# Patient Record
Sex: FEMALE | Race: WHITE | Employment: UNEMPLOYED | URBAN - METROPOLITAN AREA
[De-identification: names, ages, dates, MRNs, and addresses within clinical notes are randomized per-mention and may not be internally consistent; named-entity substitution may affect disease eponyms.]

---

## 2020-01-26 ENCOUNTER — OFFICE VISIT (OUTPATIENT)
Dept: URGENT CARE | Facility: URGENT CARE | Age: 20
End: 2020-01-26
Payer: COMMERCIAL

## 2020-01-26 ENCOUNTER — ANCILLARY PROCEDURE (OUTPATIENT)
Dept: GENERAL RADIOLOGY | Facility: CLINIC | Age: 20
End: 2020-01-26
Attending: PHYSICIAN ASSISTANT
Payer: COMMERCIAL

## 2020-01-26 VITALS
BODY MASS INDEX: 25.5 KG/M2 | DIASTOLIC BLOOD PRESSURE: 68 MMHG | OXYGEN SATURATION: 98 % | TEMPERATURE: 98 F | WEIGHT: 158 LBS | SYSTOLIC BLOOD PRESSURE: 116 MMHG | HEART RATE: 118 BPM

## 2020-01-26 DIAGNOSIS — R07.0 THROAT PAIN: ICD-10-CM

## 2020-01-26 DIAGNOSIS — J20.9 ACUTE BRONCHITIS, UNSPECIFIED ORGANISM: Primary | ICD-10-CM

## 2020-01-26 DIAGNOSIS — J20.9 ACUTE BRONCHITIS, UNSPECIFIED ORGANISM: ICD-10-CM

## 2020-01-26 LAB
DEPRECATED S PYO AG THROAT QL EIA: NORMAL
SPECIMEN SOURCE: NORMAL

## 2020-01-26 PROCEDURE — 99214 OFFICE O/P EST MOD 30 MIN: CPT | Performed by: PHYSICIAN ASSISTANT

## 2020-01-26 PROCEDURE — 71046 X-RAY EXAM CHEST 2 VIEWS: CPT

## 2020-01-26 PROCEDURE — 87081 CULTURE SCREEN ONLY: CPT | Performed by: PHYSICIAN ASSISTANT

## 2020-01-26 PROCEDURE — 87880 STREP A ASSAY W/OPTIC: CPT | Performed by: PHYSICIAN ASSISTANT

## 2020-01-26 RX ORDER — BUSPIRONE HYDROCHLORIDE 10 MG/1
10 TABLET ORAL 3 TIMES DAILY
COMMUNITY
End: 2021-03-18

## 2020-01-26 RX ORDER — METHYLPHENIDATE HYDROCHLORIDE 20 MG/1
50 TABLET ORAL
COMMUNITY
Start: 2016-12-20 | End: 2021-03-18

## 2020-01-26 RX ORDER — ALBUTEROL SULFATE 90 UG/1
2 AEROSOL, METERED RESPIRATORY (INHALATION)
Qty: 1 INHALER | Refills: 0 | Status: SHIPPED | OUTPATIENT
Start: 2020-01-26 | End: 2021-03-18

## 2020-01-26 NOTE — PROGRESS NOTES
SUBJECTIVE:  Pau Sanchez is a 19 year old female who presents to the clinic today with a chief complaint of cough cough  for 5 day(s).  Her cough is described as persistent.    The patient's symptoms are moderate and worsening.  Associated symptoms include RESOLVED VOMIT X1. The patient's symptoms are exacerbated by no particular triggers  Patient has been using ibuprofen  to improve symptoms 2 HOURS AGO.    Past Medical History:   Diagnosis Date     Bipolar affective disorder (H)      Constipation        Current Outpatient Medications   Medication Sig Dispense Refill     buPROPion (WELLBUTRIN XL) 150 MG 24 hr tablet Take 1 tablet (150 mg) by mouth daily 30 tablet 0     busPIRone (BUSPAR) 10 MG tablet Take 10 mg by mouth 3 times daily       methylphenidate (RITALIN) 20 MG tablet Take 50 mg by mouth       albuterol (PROAIR HFA, PROVENTIL HFA, VENTOLIN HFA) 108 (90 BASE) MCG/ACT inhaler Inhale 2 puffs into the lungs every 6 hours as needed for shortness of breath / dyspnea or wheezing       MELATONIN PO Take 3 mg by mouth nightly as needed       QUEtiapine (SEROQUEL XR) 300 MG 24 hr tablet Take 1 tablet (300 mg) by mouth At Bedtime (Patient not taking: Reported on 1/26/2020) 60 each 0     QUEtiapine (SEROQUEL) 50 MG tablet Take 1 tablet (50 mg) by mouth At Bedtime (Patient not taking: Reported on 1/26/2020) 120 tablet 0       Social History     Tobacco Use     Smoking status: Never Smoker     Smokeless tobacco: Never Used   Substance Use Topics     Alcohol use: No     Alcohol/week: 0.0 standard drinks     Comment: ETOH-last use 1/1/16       ROS  10 point ROS negative except as listed above      OBJECTIVE:  /68   Pulse 118   Temp 98  F (36.7  C) (Tympanic)   Wt 71.7 kg (158 lb)   SpO2 98%   BMI 25.50 kg/m    GENERAL APPEARANCE: healthy, alert and no distress  EYES: EOMI,  PERRL, conjunctiva clear  HENT: ear canals and TM's normal.  Nose and mouth without ulcers, erythema or lesions  NECK: supple,  nontender, no lymphadenopathy  RESP: lungs clear to auscultation - no rales, rhonchi or wheezes  CV: regular rates and rhythm, normal S1 S2, no murmur noted  ABDOMEN:  soft, nontender, no HSM or masses and bowel sounds normal  NEURO: Normal strength and tone, sensory exam grossly normal,  normal speech and mentation  SKIN: no suspicious lesions or rashes      Results for orders placed or performed in visit on 01/26/20   XR Chest 2 Views     Status: None    Narrative    CHEST TWO VIEW   1/26/2020 5:38 PM     HISTORY: Acute bronchitis, unspecified organism.    COMPARISON: None.      Impression    IMPRESSION: PA and lateral views of the chest. Lungs are clear. Heart  is normal in size. No effusions are evident. No pneumothorax.    NAMAN BOSS MD   Results for orders placed or performed in visit on 01/26/20   Rapid strep screen     Status: None   Result Value Ref Range    Specimen Description Throat     Rapid Strep A Screen       NEGATIVE: No Group A streptococcal antigen detected by immunoassay, await culture report.   Beta strep group A culture     Status: None   Result Value Ref Range    Specimen Description Throat     Culture Micro No beta hemolytic Streptococcus Group A isolated      X-ray image initially interpreted in clinic by me in order to rule out pneumonia.  No evidence appreciated.    ASSESSMENT:    (J20.9) Acute bronchitis, unspecified organism  (primary encounter diagnosis)  Comment: no evidence of bacteria  Plan: albuterol (PROAIR HFA/PROVENTIL HFA/VENTOLIN         HFA) 108 (90 Base) MCG/ACT inhaler, XR Chest 2         Views      (R07.0) Throat pain  Plan: Rapid strep screen, Beta strep group A culture      Follow up with PCP if symptoms worsen or fail to improve      Patient Instructions     Patient Education     Viral Upper Respiratory Illness with Wheezing (Adult)    You have a viral upper respiratory illness (URI), which is another term for the common cold. When the infection causes a lot of  irritation, the air passages can go into spasm. This causes wheezing and shortness of breath.  This illness is contagious during the first few days. It is spread through the air by coughing and sneezing. It may also be spread by direct contact. This could be by touching the sick person and then touching your own eyes, nose, or mouth. Frequent handwashing will decrease the risk.  Most viral illnesses go away within 7 to 10 days with rest and simple home remedies. Sometimes the illness may last for several weeks. Antibiotics will not kill a virus, and they are generally not prescribed for this condition.  Home care    If symptoms are severe, rest at home for the first 2 to 3 days. When you resume activity, don't let yourself get too tired.    If you smoke, stop. Ask your healthcare provider if you need help.    Stay away from secondhand cigarette smoke. Don't let people smoke in your house or car.    You may use acetaminophen or ibuprofen to control pain and fever, unless another medicine was prescribed. Take the medicine only as directed on the label. If you have chronic liver or kidney disease, have ever had a stomach ulcer or gastrointestinal bleeding, or are taking blood-thinning medicines, talk with your healthcare provider before using these medicines. Aspirin should never be given to anyone under 18 years of age who is ill with a viral infection or fever. It may cause severe liver or brain damage.    Your appetite may be poor, so a light diet is fine. Stay well hydrated by drinking 6 to 8 glasses of fluids per day (water, soft drinks, juices, tea, or soup). Extra fluids will help loosen secretions in the nose and lungs.    Over-the-counter cold medicines will not shorten the length of time you re sick, but they may be helpful for the following symptoms: cough, sore throat, and nasal and sinus congestion. Ask your healthcare provider or pharmacist which over-the-counter medicine to use. Don't use decongestants  if you have high blood pressure.  Follow-up care  Follow up with your healthcare provider, or as advised.  When to seek medical advice  Call your healthcare provider right away if any of these occur:    Cough with lots of colored sputum (mucus)    Severe headache; face, neck, or ear pain    Difficulty swallowing due to throat pain    Fever of 100.4 F (38 C) or higher, or as directed by your healthcare provider  Call 911  Call 911 if any of these occur:    Chest pain, shortness of breath, worsening wheezing, or difficulty breathing    Coughing up blood    Very severe pain when swallowing, especially if it goes along with a muffled voice  Date Last Reviewed: 6/1/2018 2000-2019 [employer]. [address] [place] [address], [place] [address]. All rights reserved. This information is not intended as a substitute for professional medical care. Always follow your healthcare professional's instructions.

## 2020-01-28 LAB
BACTERIA SPEC CULT: NORMAL
SPECIMEN SOURCE: NORMAL

## 2020-02-08 ENCOUNTER — HEALTH MAINTENANCE LETTER (OUTPATIENT)
Age: 20
End: 2020-02-08

## 2020-11-07 ENCOUNTER — HEALTH MAINTENANCE LETTER (OUTPATIENT)
Age: 20
End: 2020-11-07

## 2021-03-11 ENCOUNTER — OFFICE VISIT (OUTPATIENT)
Dept: FAMILY MEDICINE | Facility: CLINIC | Age: 21
End: 2021-03-11
Payer: COMMERCIAL

## 2021-03-11 VITALS
DIASTOLIC BLOOD PRESSURE: 82 MMHG | SYSTOLIC BLOOD PRESSURE: 114 MMHG | BODY MASS INDEX: 27.16 KG/M2 | WEIGHT: 169 LBS | HEIGHT: 66 IN | HEART RATE: 74 BPM

## 2021-03-11 DIAGNOSIS — Z00.8 ENCOUNTER FOR BIOMETRIC SCREENING: Primary | ICD-10-CM

## 2021-03-11 LAB
CHOLEST SERPL-MCNC: 170 MG/DL
GLUCOSE SERPL-MCNC: 96 MG/DL (ref 70–99)
HDLC SERPL-MCNC: 60 MG/DL
LDLC SERPL CALC-MCNC: 101 MG/DL
NONHDLC SERPL-MCNC: 110 MG/DL
TRIGL SERPL-MCNC: 46 MG/DL

## 2021-03-11 PROCEDURE — 36415 COLL VENOUS BLD VENIPUNCTURE: CPT | Performed by: NURSE PRACTITIONER

## 2021-03-11 PROCEDURE — 80061 LIPID PANEL: CPT | Performed by: NURSE PRACTITIONER

## 2021-03-11 PROCEDURE — 82947 ASSAY GLUCOSE BLOOD QUANT: CPT | Performed by: NURSE PRACTITIONER

## 2021-03-11 PROCEDURE — 99207 PR NO CHARGE NURSE ONLY: CPT

## 2021-03-11 ASSESSMENT — MIFFLIN-ST. JEOR: SCORE: 1535.39

## 2021-03-11 NOTE — PATIENT INSTRUCTIONS
"Thank you for completing your biometric screening on 3/11/2021.    Your laboratory results from this visit are:    Triglycerides (TRG) measures the amount of fat in your bloodstream. High levels may raise the risk of heart disease, especially in women.  Normal = under 150 mg/dl  Abnormal = over 150 mg/dl   Your value:   Triglycerides   Date Value Ref Range Status   03/11/2021 46 <150 mg/dL Final     Comment:     Fasting specimen        Fasting glucose measures the sugar circulating in your blood stream which is used for energy by all organs, particularly the brain and muscles.    Normal = 70 - 99 mg/dl  Prediabetes = 100 - 125 mg/dl  Diabetes = more than 125 mg/dl   Your value:   Glucose   Date Value Ref Range Status   03/11/2021 96 70 - 99 mg/dL Final     Comment:     Fasting specimen        Total cholesterol measures the total amount of cholesterol in your blood. High cholesterol levels can indicate fatty buildup in your arteries.  Normal = under 200 mg/dl   Borderline = 200 - 239 mg/dl  Abnormal = over 239 mg/dl   Your value:   Cholesterol   Date Value Ref Range Status   03/11/2021 170 <200 mg/dL Final     Comment:     Testing performed on the [facility] at the [facility]        High Density Lipoprotein (HDL) is a measurement of good fat.  A low HDL puts you at higher risk for coronary disease.  Normal Men: Above 39 mg/dl  Normal Women: Above 49 mg/dl  Your value:   HDL Cholesterol   Date Value Ref Range Status   03/11/2021 60 >49 mg/dL Final       Low Density Lipoprotein (LDL) measures the type of cholesterol is referred to by some as \"bad cholesterol.\"  An elevated LDL puts you at a greater risk for coronary disease.  Normal = under 100 mg/dl  Borderline = 100 - 129 mg/dl  Abnormal = above 129 mg/dl   Your value:   LDL Cholesterol Calculated   Date Value Ref Range Status   03/11/2021 101 (H) <100 mg/dL Final     Comment:     Above desirable:  100-129 mg/dl  Borderline High:  130-159 mg/dL  High:         "     160-189 mg/dL  Very high:       >189 mg/dl         Follow-Up    We recommend the following steps based on today's results:  Follow-up with your  during the 1st quarter of 2021 to discuss your abnormal results. You can schedule with your [employer]  by calling [phone] and selecting option 3 or emailing [email or web address]. You must complete this visit by 3/31/2021 to qualify for the wellness program.    As a reminder, you may also be due for the following screening(s):  Pap Smear    If you are due, please call your PCP to schedule.

## 2021-03-18 ENCOUNTER — OFFICE VISIT (OUTPATIENT)
Dept: FAMILY MEDICINE | Facility: CLINIC | Age: 21
End: 2021-03-18
Payer: COMMERCIAL

## 2021-03-18 VITALS
WEIGHT: 173 LBS | SYSTOLIC BLOOD PRESSURE: 118 MMHG | HEIGHT: 66 IN | OXYGEN SATURATION: 100 % | DIASTOLIC BLOOD PRESSURE: 78 MMHG | BODY MASS INDEX: 27.8 KG/M2 | TEMPERATURE: 99 F | HEART RATE: 69 BPM | RESPIRATION RATE: 16 BRPM

## 2021-03-18 DIAGNOSIS — Z11.3 SCREENING FOR STDS (SEXUALLY TRANSMITTED DISEASES): ICD-10-CM

## 2021-03-18 DIAGNOSIS — Z11.59 NEED FOR HEPATITIS C SCREENING TEST: ICD-10-CM

## 2021-03-18 DIAGNOSIS — Z12.4 SCREENING FOR MALIGNANT NEOPLASM OF CERVIX: ICD-10-CM

## 2021-03-18 DIAGNOSIS — Z00.00 ROUTINE GENERAL MEDICAL EXAMINATION AT A HEALTH CARE FACILITY: Primary | ICD-10-CM

## 2021-03-18 LAB — HGB BLD-MCNC: 13.7 G/DL (ref 11.7–15.7)

## 2021-03-18 PROCEDURE — 90471 IMMUNIZATION ADMIN: CPT | Performed by: FAMILY MEDICINE

## 2021-03-18 PROCEDURE — 87591 N.GONORRHOEAE DNA AMP PROB: CPT | Performed by: FAMILY MEDICINE

## 2021-03-18 PROCEDURE — 85018 HEMOGLOBIN: CPT | Performed by: FAMILY MEDICINE

## 2021-03-18 PROCEDURE — 86803 HEPATITIS C AB TEST: CPT | Performed by: FAMILY MEDICINE

## 2021-03-18 PROCEDURE — G0145 SCR C/V CYTO,THINLAYER,RESCR: HCPCS | Performed by: FAMILY MEDICINE

## 2021-03-18 PROCEDURE — 36415 COLL VENOUS BLD VENIPUNCTURE: CPT | Performed by: FAMILY MEDICINE

## 2021-03-18 PROCEDURE — 87491 CHLMYD TRACH DNA AMP PROBE: CPT | Performed by: FAMILY MEDICINE

## 2021-03-18 PROCEDURE — 99395 PREV VISIT EST AGE 18-39: CPT | Mod: 25 | Performed by: FAMILY MEDICINE

## 2021-03-18 PROCEDURE — 90715 TDAP VACCINE 7 YRS/> IM: CPT | Performed by: FAMILY MEDICINE

## 2021-03-18 ASSESSMENT — ENCOUNTER SYMPTOMS
DIARRHEA: 0
EYE PAIN: 0
NAUSEA: 0
ABDOMINAL PAIN: 0
PALPITATIONS: 0
JOINT SWELLING: 0
WEAKNESS: 0
SHORTNESS OF BREATH: 0
DYSURIA: 0
HEADACHES: 1
COUGH: 0
HEMATURIA: 0
HEARTBURN: 1
PARESTHESIAS: 0
DIZZINESS: 0
SORE THROAT: 0
FREQUENCY: 0
CHILLS: 0
FEVER: 0
ARTHRALGIAS: 0
CONSTIPATION: 0
BREAST MASS: 0
NERVOUS/ANXIOUS: 1
MYALGIAS: 0
HEMATOCHEZIA: 0

## 2021-03-18 ASSESSMENT — MIFFLIN-ST. JEOR: SCORE: 1561.47

## 2021-03-18 NOTE — PROGRESS NOTES
SUBJECTIVE:   CC: Pau Sanchez is an 21 year old woman who presents for preventive health visit.       Patient has been advised of split billing requirements and indicates understanding: Yes  Healthy Habits:     Getting at least 3 servings of Calcium per day:  Yes    Bi-annual eye exam:  NO    Dental care twice a year:  NO    Sleep apnea or symptoms of sleep apnea:  None    Diet:  Regular (no restrictions)    Frequency of exercise:  None    Taking medications regularly:  Yes    Medication side effects:  None    PHQ-2 Total Score: 1    Additional concerns today:  No      Today's PHQ-2 Score:   PHQ-2 ( 1999 [employer]) 3/18/2021   Q1: Little interest or pleasure in doing things 0   Q2: Feeling down, depressed or hopeless 1   PHQ-2 Score 1   Q1: Little interest or pleasure in doing things Not at all   Q2: Feeling down, depressed or hopeless Several days   PHQ-2 Score 1       Abuse: Current or Past (Physical, Sexual or Emotional) - No  Do you feel safe in your environment? Yes    Have you ever done Advance Care Planning? (For example, a Health Directive, POLST, or a discussion with a medical provider or your loved ones about your wishes): No, advance care planning information given to patient to review.  Patient declined advance care planning discussion at this time.    Social History     Tobacco Use     Smoking status: Never Smoker     Smokeless tobacco: Never Used   Substance Use Topics     Alcohol use: Yes     Alcohol/week: 0.0 standard drinks     Comment: ETOH-last use 1/1/16     If you drink alcohol do you typically have >3 drinks per day or >7 drinks per week? No    Alcohol Use 3/18/2021   Prescreen: >3 drinks/day or >7 drinks/week? No   Prescreen: >3 drinks/day or >7 drinks/week? -         Reviewed orders with patient.  Reviewed health maintenance and updated orders accordingly - Yes  Lab work is in process  Labs reviewed in EPIC        History of abnormal Pap smear: NO - age 21-29 PAP every 3 years  "recommended     Reviewed and updated as needed this visit by clinical staff  Tobacco  Allergies    Med Hx  Surg Hx  Fam Hx  Soc Hx        Reviewed and updated as needed this visit by Provider                Past Medical History:   Diagnosis Date     Bipolar affective disorder (H)      Constipation       Past Surgical History:   Procedure Laterality Date     EXTRACTION(S) DENTAL       ORTHOPEDIC SURGERY      had pins put in arm age 2       Review of Systems   Constitutional: Negative for chills and fever.   HENT: Negative for congestion, ear pain, hearing loss and sore throat.    Eyes: Negative for pain and visual disturbance.   Respiratory: Negative for cough and shortness of breath.    Cardiovascular: Negative for chest pain, palpitations and peripheral edema.   Gastrointestinal: Positive for heartburn. Negative for abdominal pain, constipation, diarrhea, hematochezia and nausea.   Breasts:  Negative for tenderness, breast mass and discharge.   Genitourinary: Positive for vaginal discharge. Negative for dysuria, frequency, genital sores, hematuria, pelvic pain, urgency and vaginal bleeding.   Musculoskeletal: Negative for arthralgias, joint swelling and myalgias.   Skin: Negative for rash.   Neurological: Positive for headaches. Negative for dizziness, weakness and paresthesias.   Psychiatric/Behavioral: Positive for mood changes. The patient is nervous/anxious.           OBJECTIVE:   /78 (BP Location: Right arm, Patient Position: Chair, Cuff Size: Adult Regular)   Pulse 69   Temp 99  F (37.2  C) (Oral)   Resp 16   Ht 1.676 m (5' 6\")   Wt 78.5 kg (173 lb)   LMP 02/18/2021 (Approximate)   SpO2 100%   Breastfeeding No   BMI 27.92 kg/m    Physical Exam  Vitals signs reviewed. Exam conducted with a chaperone present (MAREN Rebolledo).   Constitutional:       General: She is not in acute distress.     Appearance: Normal appearance. She is not ill-appearing or diaphoretic.   HENT:      Head: " Normocephalic and atraumatic.      Right Ear: Tympanic membrane normal.      Left Ear: Tympanic membrane normal.      Nose: Nose normal. No congestion or rhinorrhea.      Mouth/Throat:      Mouth: Mucous membranes are moist.      Pharynx: Oropharynx is clear.   Eyes:      General: No scleral icterus.        Right eye: No discharge.         Left eye: No discharge.      Extraocular Movements: Extraocular movements intact.      Pupils: Pupils are equal, round, and reactive to light.   Neck:      Musculoskeletal: Normal range of motion.   Cardiovascular:      Rate and Rhythm: Normal rate and regular rhythm.      Heart sounds: No murmur.   Pulmonary:      Effort: Pulmonary effort is normal.      Breath sounds: Normal breath sounds.   Abdominal:      General: Abdomen is flat. There is no distension.   Genitourinary:     Comments: Vulva is normal appearance.  Normal [place]'s glands and Barth[place]'s glands.    Thin white discharge from cervical eyes.    Manual examination, no adnexal tenderness, no cervical motion tenderness.  Unable to palpate uterine fundus, normal vaginal tone.  Musculoskeletal:         General: No swelling.   Lymphadenopathy:      Cervical: No cervical adenopathy.   Skin:     General: Skin is warm.      Capillary Refill: Capillary refill takes less than 2 seconds.   Neurological:      General: No focal deficit present.      Mental Status: She is alert.   Psychiatric:         Mood and Affect: Mood normal.         Behavior: Behavior normal.         Thought Content: Thought content normal.         Judgment: Judgment normal.         Diagnostic Test Results:  Labs reviewed in Epic    ASSESSMENT/PLAN:   1. Routine general medical examination at a health care facility  - Hemoglobin  - Hepatitis C Screen Reflex to HCV RNA Quant and Genotype    2. Screening for STDs (sexually transmitted diseases)  - NEISSERIA GONORRHOEA PCR  - CHLAMYDIA TRACHOMATIS PCR    3. Need for hepatitis C screening test    4. Screening for  "malignant neoplasm of cervix  - Pap imaged thin layer screen only - recommended age 21 - 24 years    Patient has been advised of split billing requirements and indicates understanding: Yes  COUNSELING:  Reviewed preventive health counseling, as reflected in patient instructions       Regular exercise       Healthy diet/nutrition       Consider Hep C screening for all patients one time for ages 18-79 years    Estimated body mass index is 27.92 kg/m  as calculated from the following:    Height as of this encounter: 1.676 m (5' 6\").    Weight as of this encounter: 78.5 kg (173 lb).    Weight management plan: Discussed healthy diet and exercise guidelines    She reports that she has never smoked. She has never used smokeless tobacco.      Counseling Resources:  ATP IV Guidelines  Pooled Cohorts Equation Calculator  Breast Cancer Risk Calculator  BRCA-Related Cancer Risk Assessment: FHS-7 Tool  FRAX Risk Assessment  ICSI Preventive Guidelines  Dietary Guidelines for Americans, 2010  USDA's MyPlate  ASA Prophylaxis  Lung CA Screening    Edd Siddiqui MD  [facility]  "

## 2021-03-18 NOTE — NURSING NOTE
Prior to immunization administration, verified patients identity using patient s name and date of birth. Please see Immunization Activity for additional information.     Screening Questionnaire for Adult Immunization    Are you sick today?   No   Do you have allergies to medications, food, a vaccine component or latex?   No   Have you ever had a serious reaction after receiving a vaccination?   No   Do you have a long-term health problem with heart, lung, kidney, or metabolic disease (e.g., diabetes), asthma, a blood disorder, no spleen, complement component deficiency, a cochlear implant, or a spinal fluid leak?  Are you on long-term aspirin therapy?   No   Do you have cancer, leukemia, HIV/AIDS, or any other immune system problem?   No   Do you have a parent, brother, or sister with an immune system problem?   No   In the past 3 months, have you taken medications that affect  your immune system, such as prednisone, other steroids, or anticancer drugs; drugs for the treatment of rheumatoid arthritis, Crohn s disease, or psoriasis; or have you had radiation treatments?   No   Have you had a seizure, or a brain or other nervous system problem?   No   During the past year, have you received a transfusion of blood or blood    products, or been given immune (gamma) globulin or antiviral drug?   No   For women: Are you pregnant or is there a chance you could become       pregnant during the next month?   No   Have you received any vaccinations in the past 4 weeks?   No     Immunization questionnaire answers were all negative.        Per orders of Dr. diop, injection of tdap given by Jhonathan Mendoza CMA. Patient instructed to remain in clinic for 15 minutes afterwards, and to report any adverse reaction to me immediately.       Screening performed by Jhonathan Mendoza CMA on 3/18/2021 at 6:05 PM.

## 2021-03-19 LAB — HCV AB SERPL QL IA: NONREACTIVE

## 2021-03-20 LAB
C TRACH DNA SPEC QL NAA+PROBE: NEGATIVE
N GONORRHOEA DNA SPEC QL NAA+PROBE: NEGATIVE
SPECIMEN SOURCE: NORMAL
SPECIMEN SOURCE: NORMAL

## 2021-03-23 LAB
COPATH REPORT: NORMAL
PAP: NORMAL

## 2021-09-05 ENCOUNTER — HEALTH MAINTENANCE LETTER (OUTPATIENT)
Age: 21
End: 2021-09-05

## 2021-09-21 ENCOUNTER — OFFICE VISIT (OUTPATIENT)
Dept: FAMILY MEDICINE | Facility: CLINIC | Age: 21
End: 2021-09-21
Payer: COMMERCIAL

## 2021-09-21 VITALS
HEIGHT: 67 IN | DIASTOLIC BLOOD PRESSURE: 76 MMHG | WEIGHT: 180.9 LBS | RESPIRATION RATE: 18 BRPM | HEART RATE: 70 BPM | TEMPERATURE: 97.3 F | SYSTOLIC BLOOD PRESSURE: 110 MMHG | OXYGEN SATURATION: 98 % | BODY MASS INDEX: 28.39 KG/M2

## 2021-09-21 DIAGNOSIS — Z79.899 CONTROLLED SUBSTANCE AGREEMENT SIGNED: ICD-10-CM

## 2021-09-21 DIAGNOSIS — F90.9 ADULT ADHD: Primary | ICD-10-CM

## 2021-09-21 DIAGNOSIS — Z30.09 GENERAL COUNSELING FOR PRESCRIPTION OF ORAL CONTRACEPTIVES: ICD-10-CM

## 2021-09-21 LAB — HCG UR QL: NEGATIVE

## 2021-09-21 PROCEDURE — 99214 OFFICE O/P EST MOD 30 MIN: CPT | Performed by: FAMILY MEDICINE

## 2021-09-21 PROCEDURE — 81025 URINE PREGNANCY TEST: CPT | Performed by: FAMILY MEDICINE

## 2021-09-21 PROCEDURE — 96127 BRIEF EMOTIONAL/BEHAV ASSMT: CPT | Performed by: FAMILY MEDICINE

## 2021-09-21 RX ORDER — DEXTROAMPHETAMINE SACCHARATE, AMPHETAMINE ASPARTATE MONOHYDRATE, DEXTROAMPHETAMINE SULFATE AND AMPHETAMINE SULFATE 2.5; 2.5; 2.5; 2.5 MG/1; MG/1; MG/1; MG/1
10 CAPSULE, EXTENDED RELEASE ORAL DAILY
Qty: 30 CAPSULE | Refills: 0 | Status: SHIPPED | OUTPATIENT
Start: 2021-09-21 | End: 2022-04-05

## 2021-09-21 RX ORDER — NORGESTIMATE AND ETHINYL ESTRADIOL 0.25-0.035
1 KIT ORAL DAILY
Qty: 84 TABLET | Refills: 3 | Status: SHIPPED | OUTPATIENT
Start: 2021-09-21 | End: 2022-04-05

## 2021-09-21 ASSESSMENT — ANXIETY QUESTIONNAIRES
7. FEELING AFRAID AS IF SOMETHING AWFUL MIGHT HAPPEN: SEVERAL DAYS
GAD7 TOTAL SCORE: 13
3. WORRYING TOO MUCH ABOUT DIFFERENT THINGS: SEVERAL DAYS
5. BEING SO RESTLESS THAT IT IS HARD TO SIT STILL: NEARLY EVERY DAY
7. FEELING AFRAID AS IF SOMETHING AWFUL MIGHT HAPPEN: SEVERAL DAYS
8. IF YOU CHECKED OFF ANY PROBLEMS, HOW DIFFICULT HAVE THESE MADE IT FOR YOU TO DO YOUR WORK, TAKE CARE OF THINGS AT HOME, OR GET ALONG WITH OTHER PEOPLE?: VERY DIFFICULT
2. NOT BEING ABLE TO STOP OR CONTROL WORRYING: SEVERAL DAYS
GAD7 TOTAL SCORE: 13
1. FEELING NERVOUS, ANXIOUS, OR ON EDGE: MORE THAN HALF THE DAYS
GAD7 TOTAL SCORE: 13
6. BECOMING EASILY ANNOYED OR IRRITABLE: NEARLY EVERY DAY
4. TROUBLE RELAXING: MORE THAN HALF THE DAYS

## 2021-09-21 ASSESSMENT — PATIENT HEALTH QUESTIONNAIRE - PHQ9
10. IF YOU CHECKED OFF ANY PROBLEMS, HOW DIFFICULT HAVE THESE PROBLEMS MADE IT FOR YOU TO DO YOUR WORK, TAKE CARE OF THINGS AT HOME, OR GET ALONG WITH OTHER PEOPLE: VERY DIFFICULT
SUM OF ALL RESPONSES TO PHQ QUESTIONS 1-9: 11
SUM OF ALL RESPONSES TO PHQ QUESTIONS 1-9: 11

## 2021-09-21 ASSESSMENT — MIFFLIN-ST. JEOR: SCORE: 1605.25

## 2021-09-21 ASSESSMENT — ENCOUNTER SYMPTOMS
NERVOUS/ANXIOUS: 0
DECREASED CONCENTRATION: 1

## 2021-09-21 NOTE — PATIENT INSTRUCTIONS
Patient Education     Treating Attention-Deficit/Hyperactivity Disorder (ADHD) in Adults  Attention-deficit/hyperactivity disorder (ADHD) starts in childhood. It may continue throughout your life. When it does, it may affect your job and even your relationships. But with help, you can manage ADHD.      Treatment for ADD can help you achieve your goals.   Therapy  Your therapist can help you learn healthy ways to cope with ADHD. Sometimes, your partner or family may attend your sessions with you. This helps them understand more about your disorder and give you additional support.   Coaching  An ADHD  works with you to achieve your goals. You ll learn the best ways to manage your time. You ll also learn to avoid clutter and noise that may distract you. In time, your life will have more order and structure. And your  will provide support and feedback on your progress.   Work  Look for jobs where you can be free and creative. Stay away from those that are dull or centered on details. You may still need to make a special effort. These tips may help:     Try to work at home, at least part time.    Ask for a private office.    Use headphones to muffle noise.    Work on more than one project at the same time. When you get bored with one, switch to the other.    Work on boring tasks when you feel most alert.    Have a schedule for each day and make every effort to stick to it.    Ask your  or  to help with details.    Use a day planner and to-do lists. Write yourself notes or set up reminder alerts on your electronic devices.    Reward yourself when you finish a task.  Medicines  In most cases, medicines can help control symptoms of ADHD. Most often, you'll use medicine along with therapy, coaching, and lifestyle changes. Your healthcare provider may prescribe a stimulant to help you stay focused. Or you may take a type of antidepressant. It may take some time to find what works best for  you. Keep in mind that medicines don t cure ADHD. And they may cause side effects such as headaches, trouble sleeping, or stomach problems. Take your medicine as prescribed. If you re bothered by side effects, be sure to tell your healthcare provider. Always talk with your healthcare provider before making any changes to your medicine.   Mila last reviewed this educational content on 5/1/2020 2000-2021 [employer]. All rights reserved. This information is not intended as a substitute for professional medical care. Always follow your healthcare professional's instructions.

## 2021-09-21 NOTE — LETTER
[facility] [place]  09/21/21  Patient: Pau Sanchez  YOB: 2000  Medical Record Number: [id number]                                                                                  Non-Opioid Controlled Substance Agreement    This is an agreement between you and your provider regarding safe and appropriate use of controlled substances prescribed by your care team. Controlled substances are?medicines that can cause physical and mental dependence (abuse).     There are strict laws about having and using these medicines. We here at [facility] are  committed to working with you in your efforts to get better. To support you in this work, we'll help you schedule regular office appointments for medicine refills. If we must cancel or change your appointment for any reason, we'll make sure you have enough medicine to last until your next appointment.     As a Provider, I will:     Listen carefully to your concerns while treating you with respect.     Recommend a treatment plan that I believe is in your best interest and may involve therapies other than medicine.      Talk with you often about the possible benefits and the risk of harm of any medicine that we prescribe for you.    Assess the safety of this medicine and check how well it works.      Provide a plan on how to taper (discontinue or go off) using this medicine if the decision is made to stop its use.      ::  As a Patient, I understand controlled substances:       Are prescribed by my care provider to help me function or work and manage my condition(s).?    Are strong medicines and can cause serious side effects.       Need to be taken exactly as prescribed.?Combining controlled substances with certain medicines or chemicals (such as illegal drugs, alcohol, sedatives, sleeping pills, and benzodiazepines) can be dangerous or even fatal.? If I stop taking my medicines suddenly, I may have severe withdrawal symptoms.     The  risks, benefits, and side effects of these medicine(s) were explained to me. I agree that:    1. I will take part in other treatments as advised by my care team. This may be psychiatry or counseling, physical therapy, behavioral therapy, group treatment or a referral to specialist.    2. I will keep all my appointments and understand this is part of the monitoring of controlled substances.?My care team may require an office visit for EVERY controlled substance refill. If I miss appointments or don t follow instructions, my care team may stop my medicine    3. I will take my medicines as prescribed. I will not change the dose or schedule unless my care team tells me to. There will be no refills if I run out early.      4. I may be asked to come to the clinic and complete a urine drug test or complete a pill count. If I don t give a urine sample or participate in a pill count, the care team may stop my medicine.    5. I will only receive controlled substance prescriptions from this clinic. If I am treated by another provider, I will tell them that I am taking controlled substances and that I have a treatment agreement with this provider. I will inform my [facility] care team within one business day if I am given a prescription for any controlled substance by another healthcare provider. My [facility] care team can contact other providers and pharmacists about my use of any medicines.    6. It is up to me to make sure that I don't run out of my medicines on weekends or holidays.?If my care team is willing to refill my prescription without a visit, I must request refills only during office hours. Refills may take up to 3 business days to process. I will use one pharmacy to fill all my controlled substance prescriptions. I will notify the clinic about any changes to my insurance or medicine availability.    7. I am responsible for my prescriptions. If the medicine/prescription is lost, stolen or destroyed,  it will not be replaced.?I also agree not to share controlled substance medicines with anyone.     8. I am aware I should not use any illegal or recreational drugs. I agree not to drink alcohol unless my care team says I can.     9. If I enroll in the [place] [facility] program, I will tell my care team before my next refill.    10. I will tell my care team right away if I become pregnant, have a new medical problem treated outside of my regular clinic, or have a change in my medicines.     11. I understand that this medicine can affect my thinking, judgment and reaction time.? Alcohol and drugs affect the brain and body, which can affect the safety of my driving. Being under the influence of alcohol or drugs can affect my decision-making, behaviors, personal safety and the safety of others. Driving while impaired (DWI) can occur if a person is driving, operating or in physical control of a car, motorcycle, boat, snowmobile, ATV, motorbike, off-road vehicle or any other motor vehicle ([place] Statute 169A.20). I understand the risk if I choose to drive or operate any vehicle or machinery.    I understand that if I do not follow any of the conditions above, my prescriptions or treatment may be stopped or changed.   I agree that my provider, clinic care team and pharmacy may work with any city, state or federal law enforcement agency that investigates the misuse, sale or other diversion of my controlled medicine. I will allow my provider to discuss my care with, or share a copy of, this agreement with any other treating provider, pharmacy or emergency room where I receive care.     I have read this agreement and have asked questions about anything I did not understand.    ________________________________________________________  Patient Signature - Pau Sanchez     ___________________                   Date     ________________________________________________________  Provider Signature - Edd Siddiqui MD        ___________________                   Date     ________________________________________________________  Witness Signature (required if provider not present while patient signing)          ___________________                   Date

## 2021-09-21 NOTE — PROGRESS NOTES
Assessment & Plan     Adult ADHD  Previous childhood psychiatric diagnosis through [facility], previously also see mental health counseling at [facility].  Does have a listed past medical history of bipolar affective disorder, lina and psychosis.  Has history of cannabis use disorder.  I had a gentle conversation with Pau about restarting stimulant-based medications and close interval monitoring of her mental health while restarting stimulant based medications for side effects of lina and substance use disorder which she is agreeable.  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  Close follow-up in 1 month for recheck.  Controlled substance agreement signed and placed. UDS within next 2 visits.   - amphetamine-dextroamphetamine (ADDERALL XR) 10 MG 24 hr capsule  Dispense: 30 capsule; Refill: 0    General counseling for prescription of oral contraceptives  Negative urine pregnancy test, previously had unintentional weight gain while on Depo-Provera, no contraindications to combination therapy, agreeable to start oral combined contraception, advised to use barrier protection with at least a week while starting oral contraceptives.  - HCG Qual, Urine ([id number])  - HCG Qual, Urine ([id number])  - norgestimate-ethinyl estradiol (ORTHO-CYCLEN) 0.25-35 MG-MCG tablet  Dispense: 84 tablet; Refill: 3                 Depression Screening Follow Up    PHQ 9/21/2021   PHQ-9 Total Score 11   Q9: Thoughts of better off dead/self-harm past 2 weeks Not at all       Return in about 1 month (around 10/21/2021) for ADHD followup .    Edd Siddiqui MD  [facility]                  Subjective   Pau is a 21 year old who presents for the following health issues     HPI     Medication Followup of ADHD medication, adderall, or ritalyn    Taking Medication as prescribed: NO-has been off of it 7-8 months    Side  "Effects:  Starting to have issues again    Medication Helping Symptoms:  Being off of it is affecting her job     Patient would like to restart her ADHD medication as she is having symptoms of forgetfulness and distractibility.    She would like to also start birth control, currently has a boyfriend.  No current intentions of being pregnant.  Previously did not tolerate Depo-Provera shots due to unintentional weight gain.  She is willing to try oral birth control.  No history of migraines with auras, no family or personal history of estrogen or progesterone sensitive cancers.      Review of Systems   Psychiatric/Behavioral: Positive for decreased concentration. The patient is not nervous/anxious.             Objective    /76   Pulse 70   Temp 97.3  F (36.3  C) (Tympanic)   Resp 18   Ht 1.689 m (5' 6.5\")   Wt 82.1 kg (180 lb 14.4 oz)   SpO2 98%   BMI 28.76 kg/m    Body mass index is 28.76 kg/m .  Physical Exam  Vitals reviewed.   Constitutional:       Appearance: Normal appearance.   Pulmonary:      Effort: No respiratory distress.   Neurological:      Mental Status: She is alert.   Psychiatric:         Mood and Affect: Mood normal.         Behavior: Behavior normal.        Ref Range & Units 10:41 AM      hCG Urine Qualitative Negative Negative    Comment: This test is for screening purposes.  Results should be interpreted along with the clinical picture.  Confirmation testing is available if warranted by ordering [id number], HCG Quantitative Pregnancy.         Answers for HPI/ROS submitted by the patient on 9/21/2021  If you checked off any problems, how difficult have these problems made it for you to do your work, take care of things at home, or get along with other people?: Very difficult  PHQ9 TOTAL SCORE: 11  KRISTA 7 TOTAL SCORE: 13      "

## 2021-09-22 ASSESSMENT — PATIENT HEALTH QUESTIONNAIRE - PHQ9: SUM OF ALL RESPONSES TO PHQ QUESTIONS 1-9: 11

## 2021-09-22 ASSESSMENT — ANXIETY QUESTIONNAIRES: GAD7 TOTAL SCORE: 13

## 2021-10-18 ENCOUNTER — VIRTUAL VISIT (OUTPATIENT)
Dept: FAMILY MEDICINE | Facility: CLINIC | Age: 21
End: 2021-10-18
Payer: COMMERCIAL

## 2021-10-18 DIAGNOSIS — F90.9 ADULT ADHD: ICD-10-CM

## 2021-10-18 DIAGNOSIS — U07.1 INFECTION DUE TO 2019 NOVEL CORONAVIRUS: Primary | ICD-10-CM

## 2021-10-18 PROCEDURE — 99214 OFFICE O/P EST MOD 30 MIN: CPT | Mod: 95 | Performed by: FAMILY MEDICINE

## 2021-10-18 RX ORDER — DEXTROAMPHETAMINE SACCHARATE, AMPHETAMINE ASPARTATE MONOHYDRATE, DEXTROAMPHETAMINE SULFATE AND AMPHETAMINE SULFATE 3.75; 3.75; 3.75; 3.75 MG/1; MG/1; MG/1; MG/1
15 CAPSULE, EXTENDED RELEASE ORAL DAILY
Qty: 30 CAPSULE | Refills: 0 | Status: SHIPPED | OUTPATIENT
Start: 2021-10-18 | End: 2021-11-17

## 2021-10-18 RX ORDER — FLUTICASONE PROPIONATE 50 MCG
1 SPRAY, SUSPENSION (ML) NASAL DAILY
Qty: 16 G | Refills: 2 | Status: SHIPPED | OUTPATIENT
Start: 2021-10-18 | End: 2024-05-28

## 2021-10-18 RX ORDER — DEXTROAMPHETAMINE SACCHARATE, AMPHETAMINE ASPARTATE MONOHYDRATE, DEXTROAMPHETAMINE SULFATE AND AMPHETAMINE SULFATE 3.75; 3.75; 3.75; 3.75 MG/1; MG/1; MG/1; MG/1
15 CAPSULE, EXTENDED RELEASE ORAL DAILY
Qty: 30 CAPSULE | Refills: 0 | Status: SHIPPED | OUTPATIENT
Start: 2021-12-19 | End: 2022-01-18

## 2021-10-18 RX ORDER — DEXTROAMPHETAMINE SACCHARATE, AMPHETAMINE ASPARTATE MONOHYDRATE, DEXTROAMPHETAMINE SULFATE AND AMPHETAMINE SULFATE 3.75; 3.75; 3.75; 3.75 MG/1; MG/1; MG/1; MG/1
15 CAPSULE, EXTENDED RELEASE ORAL DAILY
Qty: 30 CAPSULE | Refills: 0 | Status: SHIPPED | OUTPATIENT
Start: 2021-11-18 | End: 2021-12-18

## 2021-10-18 RX ORDER — ALBUTEROL SULFATE 90 UG/1
1-2 AEROSOL, METERED RESPIRATORY (INHALATION) EVERY 6 HOURS PRN
Qty: 18 G | Refills: 3 | Status: SHIPPED | OUTPATIENT
Start: 2021-10-18 | End: 2024-05-28

## 2021-10-18 ASSESSMENT — ENCOUNTER SYMPTOMS
COUGH: 1
DECREASED CONCENTRATION: 1
FEVER: 1
SLEEP DISTURBANCE: 1
SHORTNESS OF BREATH: 1
CHILLS: 1

## 2021-10-18 NOTE — PATIENT INSTRUCTIONS
"Patient Education       Coronavirus Disease 2019 (COVID-19): Caring for Yourself or Others  If you or a household member have symptoms of COVID-19, follow these guidelines for preventing spread of the virus and managing symptoms. This is regardless of your vaccination status.  If you have COVID-19 symptoms    Stay home. Call your healthcare provider and tell them you have symptoms of COVID-19. Do this before going to any hospital or clinic. Follow your provider's instructions. You may be advised to isolate yourself at home. This is called self-isolation. You may also be told to stay at least 6 feet from others to prevent the spread of COVID-19. This is called \"social distancing.\"    Stay away from work, school, and public places. Limit physical contact with family members. Limit visitors. Don't kiss anyone or share eating or drinking utensils. Clean surfaces you touch with disinfectant. This is to help prevent the virus from spreading.    If you need to cough or sneeze, do it into a tissue. Then throw the tissue into the trash. If you don't have tissues, cough or sneeze into the bend of your elbow.    Wear a cloth face mask with two or more layers of washable, breathable fabric and a nose wire while in public or when indoors with people who don't live with you. Or you can wear a disposable paper mask with a cloth mask on top. Wear the mask so that it covers both your nose and mouth.    Don t share food or personal items with people in your household. This includes items like eating and drinking utensils, towels, and bedding.    If you need to go to a hospital or clinic, expect that the healthcare staff will wear protective equipment such as masks, gowns, gloves, and eye protection. You may be advised to wait in or enter through a separate area. This is to prevent the possible virus from spreading.    Tell the healthcare staff about recent travel. This includes local travel on public transport. Staff may need to find " other people you have been in contact with.    Follow all instructions the healthcare staff give you.    If you have been diagnosed with COVID-19    Stay home and start self-isolation. Don t leave your home unless you need to get medical care. Don't go to work, school, or public areas. Don't use public transportation or taxis.    Follow all instructions from your healthcare provider. Call your healthcare provider s office before going. They can prepare and give you instructions. This will help prevent the virus from spreading.    If you need to go to a hospital or clinic, expect that the healthcare staff will wear protective equipment such as masks, gowns, gloves, and eye protection. You may be advised to wait in or enter through a separate area. This is to prevent the possible virus from spreading.    Wear a face mask with 2 or more layers and a nose wire. Use either a cloth mask with layers of tightly woven, breathable fabric or a disposable paper mask with a cloth mask on top. This is to protect other people from your germs. If you are not able to wear a mask, your caregivers should. Wear the mask so that it covers both your nose and mouth.    Stay away from other people in your home.    Stay away from pets and other animals.    Don t share food or personal items with people in your household. This includes items like eating and drinking utensils, towels, and bedding.    If you need to cough or sneeze, do it into a tissue. Then throw the tissue into the trash. If you don't have tissues, cough or sneeze into the bend of your elbow.    Wash your hands often.    Self-care at home   Prevention  The FDA has approved several vaccines to prevent COVID-19 or reduce its severity. These vaccines also reduce how severe the illness will be if you get the virus. No vaccine is ever 100% effective in preventing any illness, but the COVID-19 vaccines work well and are safe. One vaccine has been approved for people as young as  12. Expert groups, including ACOG and CDC, advise pregnant or breastfeeding people to be vaccinated. Talk with your healthcare provider about which vaccine is best for you and your family.  Treatment  Current treatment is mainly aimed at helping your body while it fights the virus. This is known as supportive care. For serious COVID-19, you may need to stay in the hospital. Supportive care includes:    Getting rest. This helps your body fight the illness.    Staying hydrated.  Drinking liquids is the best way to prevent dehydration. Try to drink 6 to 8 glasses of liquids every day, or as advised by your provider. Also check with your provider about which fluids are best for you. Don't drink fluids that contain caffeine or alcohol.    Taking over-the-counter (OTC) pain medicine. These are used to help ease pain and reduce fever. Follow your healthcare provider's instructions for which OTC medicine to use.  If you've been treated for suspected or confirmed COVID-19 , follow all of your healthcare team's instructions. This will include when it's OK to stop self-isolation. You may also get instructions on position changes to help your breathing, such as lying on your belly (prone positioning). If you were treated at a hospital and discharged, you may be sent home with a pulse oximeter. This is a small electronic device that you clip on your fingertip. It measures the amount of oxygen in your body. Follow your healthcare team's instructions on its use, how they will be in touch with you, and when to call them.  The FDA approved monoclonal antibody therapy for emergency investigational use in certain people who have a positive COVID-19 viral test and have mild to moderate symptoms but are not in the hospital. Your healthcare provider will advise you on whether monoclonal antibody therapy is appropriate for you. It's not approved to prevent COVID-19. It's approved for people 12 years and older who weigh about 88 pounds (40  kgs) and are at high risk for severe COVID-19 and a hospital stay. This includes people who are 65 years and older and people with certain chronic conditions. Monoclonal antibody therapy is not approved for people who:    Are in the hospital with COVID-19, or    Need oxygen therapy for COVID-19, or    Need oxygen therapy for a chronic condition and need to have oxygen flow increased because of COVID-19     If you've had confirmed COVID-19, your healthcare team may ask you to consider donating your plasma. This is called COVID-19 convalescent plasma donation. Plasma from people fully recovered from COVID-19 may contain antibodies to help fight COVID-19 in people who are currently seriously ill with the disease. Experts don't know the safety of COVID-19 convalescent plasma or how well it works. Research continues. The FDA has approved it for emergency use in certain people with serious or life-threatening COVID-19.  Home care for a sick person     Follow all instructions from healthcare staff.    Wash your hands often.    Wear protective clothing as advised.    Make sure the sick person wears a mask. If they can't wear a mask, don't stay in the same room with the person. If you must be in the same room, wear a face mask. When wearing a mask, make sure that it covers both the nose and mouth.    Keep track of the sick person s symptoms.    Clean home surfaces often with disinfectant. This includes phones, kitchen counters, fridge door handle, bathroom surfaces, and others.    Don t let anyone share household items with the sick person. This includes eating and drinking tools, towels, sheets, or blankets.    Clean fabrics and laundry thoroughly.    Keep other people and pets away from the sick person.    When you can stop self-isolation  When you are sick with COVID-19, you should stay away from other people. This is called self-isolation.  For normally healthy children or adults with COVID-19 symptoms, CDC advises  stopping self-isolation when all 3 of these are true:  1. You have had no fever for at least 24 hours. This means no fever without medicine that reduces fever, such as acetaminophen, for at least 24 hours.  2. Your symptoms such as cough or trouble breathing have improved.  3. It has been at least 10 days since your first symptoms started.  For people who have COVID-19 but no symptoms , isolation can stop 10 days after the first positive test.  If you have a weak immune system and COVID-19, or if you've had severe COVID-19,  your instructions on when to stop isolation will be somewhat different. Some conditions and treatments can cause a weak immune system. These include cancer treatment, bone marrow or organ transplants, and conditions such as HIV or other immune system disorders. You may be advised to self-isolate up to 20 days after your symptoms first started. Your healthcare provider may want to retest you for COVID-19. Follow your provider's instructions.  CDC mask guidance  Consider the CDC's guidance and your local community's instructions on face masks.       Cloth masks may help prevent people who have COVID-19 from spreading the virus to others.    Cloth masks are most likely to reduce COVID-19 spread when masks are widely used by people who are out in the public.    Wear a mask inside your house if you live with someone who has symptoms of COVID-19 or has tested positive for COVID-19.    CDC advises all people older than 2 who are not fully vaccinated to wear a mask and stay 6 feet away from others while in public.    CDC advises people with weak immune systems, even if fully vaccinated, to continue wearing masks and to stay 6 feet away from others while in public.    CDC advises indoor masking for all teachers, staff, students, and visitors to schools, regardless of vaccination status.    Like other viruses, the virus that causes COVID-19 changes (mutates) all the time. This leads to variants that are  easily spread, including the delta variant. To protect against variants, CDC advises all people over age 2, including those who are fully vaccinated, to wear a mask indoors in public settings if you are in an area of high numbers of COVID-19 cases. See the [facility]'s county data website for current transmission information in your area.     Certain people should not wear a face mask. This includes:    Children younger than 2 years old    Anyone with a health, developmental, or mental health condition that can be made worse by wearing a mask    Anyone who is unconscious or unable to remove the face covering without help     See the CDC's mask guidance.  When to call your healthcare provider  Call your healthcare provider right away if a sick person has any of these:    Trouble breathing    Pain or pressure in chest  If a sick person has any of these, call 911:    Trouble breathing that gets worse    Pain or pressure in chest that gets worse    Blue tint to lips or face    Fast or irregular heartbeat    Confusion or trouble waking    Fainting or loss of consciousness    Coughing up blood  Going home from the hospital  If you were diagnosed with COVID-19 and were recently discharged from the hospital:    Follow the instructions above for self-care and isolation.    Follow the hospital healthcare team s specific instructions.    Ask questions if anything is unclear to you. Write down answers so you remember them.  Date last modified: 9/24/2021  Mila last reviewed this educational content on 4/1/2020 2000-2021 [employer]. All rights reserved. This information is not intended as a substitute for professional medical care. Always follow your healthcare professional's instructions.

## 2021-10-18 NOTE — LETTER
[facility]  [address] [address]  Phone: [phone]    10/18/21    Pau ELIAS Laura  [address] [address]      To whom it may concern:     Okay to go back to work on 10- without restrictions.    Sincerely,      Edd Siddiqui MD

## 2021-10-18 NOTE — PROGRESS NOTES
Clotilde is a 21 year old who is being evaluated via a billable video visit.      How would you like to obtain your AVS? MyChart  If the video visit is dropped, the invitation should be resent by: Text to cell phone: [phone]  Will anyone else be joining your video visit? No      Video Start Time: 8:54 AM    Assessment & Plan     Adult ADHD  Suboptimally controlled, escalate to Adderall XR 15 mg, may need afternoon dose in future.  Need UDS at next in-person visit.  - amphetamine-dextroamphetamine (ADDERALL XR) 15 MG 24 hr capsule  Dispense: 30 capsule; Refill: 0  - amphetamine-dextroamphetamine (ADDERALL XR) 15 MG 24 hr capsule  Dispense: 30 capsule; Refill: 0  - amphetamine-dextroamphetamine (ADDERALL XR) 15 MG 24 hr capsule  Dispense: 30 capsule; Refill: 0    Infection due to 2019 novel coronavirus  Tested positive at outside facility, been started around 10-.  Some mild shortness of breath without overt respiratory distress.  Trial albuterol inhaler, and Flonase for symptom control.  Patient if she is having difficulty breathing, chest pain or tightness recommend in-person evaluation to rule out Covid pneumonia.  Work note written.  - albuterol (PROAIR HFA/PROVENTIL HFA/VENTOLIN HFA) 108 (90 Base) MCG/ACT inhaler  Dispense: 18 g; Refill: 3  - fluticasone (FLONASE) 50 MCG/ACT nasal spray  Dispense: 16 g; Refill: 2      Return in about 3 months (around 1/18/2022) for ADHD.    Edd Siddiqui MD  [facility]    Burke Mabry is a 21 year old who presents for the following health issues     HPI     Medication Followup of adderall    Taking Medication as prescribed: yes    Side Effects:  None    Medication Helping Symptoms:  yes     Interval ADHD follow-up.  Feels mostly symptoms are okay however feels in the afternoon the medication wears off and subsequently become sluggish.  Tolerates the medication well without insomnia.  Recently had some more difficult sleep due to a  traumatic event she's experienced in [employer].    Recently tested positive for COVID-19, symptoms started last Thursday, had positive testing at outside facility.  Still endorsing some mild generalized body aches, fatigue, chills.  Using over-the-counter analgesics with improvement.  Requesting refill on her albuterol inhaler which has been helpful, also requesting intranasal spray to help with congestion.  No chest pain or dizziness.  Minimal cough.  Not lost taste or smell.  Eating and drinking without difficulty.  Works as a  and is requesting return back to work note.        Review of Systems   Constitutional: Positive for chills and fever.   Respiratory: Positive for cough and shortness of breath.    Cardiovascular: Negative for chest pain.   Psychiatric/Behavioral: Positive for decreased concentration and sleep disturbance.        Objective           Vitals:  No vitals were obtained today due to virtual visit.    Physical Exam   GENERAL: Healthy, alert and no distress  EYES: Eyes grossly normal to inspection.  No discharge or erythema, or obvious scleral/conjunctival abnormalities.  RESP: No audible wheeze, cough, or visible cyanosis.  No visible retractions or increased work of breathing.    SKIN: Visible skin clear. No significant rash, abnormal pigmentation or lesions.  NEURO: Cranial nerves grossly intact.  Mentation and speech appropriate for age.  PSYCH: Mentation appears normal, affect normal/bright, judgement and insight intact, normal speech and appearance well-groomed.        Video-Visit Details    Type of service:  Video Visit    Video End Time: 9:15 AM    Originating Location (pt. Location): Home    Distant Location (provider location):  [facility]     Platform used for Video Visit: EMOSpeech

## 2022-01-19 ENCOUNTER — OFFICE VISIT (OUTPATIENT)
Dept: FAMILY MEDICINE | Facility: CLINIC | Age: 22
End: 2022-01-19
Payer: COMMERCIAL

## 2022-01-19 VITALS
TEMPERATURE: 97.8 F | HEIGHT: 67 IN | SYSTOLIC BLOOD PRESSURE: 118 MMHG | RESPIRATION RATE: 14 BRPM | WEIGHT: 173 LBS | DIASTOLIC BLOOD PRESSURE: 76 MMHG | BODY MASS INDEX: 27.15 KG/M2 | HEART RATE: 84 BPM

## 2022-01-19 DIAGNOSIS — F90.9 ADULT ADHD: ICD-10-CM

## 2022-01-19 LAB
AMPHETAMINES UR QL: DETECTED
BARBITURATES UR QL SCN: NOT DETECTED
BENZODIAZ UR QL SCN: NOT DETECTED
BUPRENORPHINE UR QL: NOT DETECTED
CANNABINOIDS UR QL: NOT DETECTED
COCAINE UR QL SCN: NOT DETECTED
D-METHAMPHET UR QL: NOT DETECTED
METHADONE UR QL SCN: NOT DETECTED
OPIATES UR QL SCN: NOT DETECTED
OXYCODONE UR QL SCN: NOT DETECTED
PCP UR QL SCN: NOT DETECTED
PROPOXYPH UR QL: NOT DETECTED
TRICYCLICS UR QL SCN: NOT DETECTED

## 2022-01-19 PROCEDURE — 99213 OFFICE O/P EST LOW 20 MIN: CPT | Performed by: FAMILY MEDICINE

## 2022-01-19 PROCEDURE — 80306 DRUG TEST PRSMV INSTRMNT: CPT | Performed by: FAMILY MEDICINE

## 2022-01-19 RX ORDER — DEXTROAMPHETAMINE SACCHARATE, AMPHETAMINE ASPARTATE, DEXTROAMPHETAMINE SULFATE AND AMPHETAMINE SULFATE 2.5; 2.5; 2.5; 2.5 MG/1; MG/1; MG/1; MG/1
10 TABLET ORAL DAILY PRN
Qty: 30 TABLET | Refills: 0 | Status: SHIPPED | OUTPATIENT
Start: 2022-02-19 | End: 2022-03-21

## 2022-01-19 RX ORDER — DEXTROAMPHETAMINE SACCHARATE, AMPHETAMINE ASPARTATE MONOHYDRATE, DEXTROAMPHETAMINE SULFATE AND AMPHETAMINE SULFATE 5; 5; 5; 5 MG/1; MG/1; MG/1; MG/1
20 CAPSULE, EXTENDED RELEASE ORAL DAILY
Qty: 30 CAPSULE | Refills: 0 | Status: SHIPPED | OUTPATIENT
Start: 2022-01-19 | End: 2022-04-05

## 2022-01-19 RX ORDER — DEXTROAMPHETAMINE SACCHARATE, AMPHETAMINE ASPARTATE MONOHYDRATE, DEXTROAMPHETAMINE SULFATE AND AMPHETAMINE SULFATE 5; 5; 5; 5 MG/1; MG/1; MG/1; MG/1
20 CAPSULE, EXTENDED RELEASE ORAL DAILY
Qty: 30 CAPSULE | Refills: 0 | Status: SHIPPED | OUTPATIENT
Start: 2022-02-19 | End: 2022-03-21

## 2022-01-19 RX ORDER — DEXTROAMPHETAMINE SACCHARATE, AMPHETAMINE ASPARTATE, DEXTROAMPHETAMINE SULFATE AND AMPHETAMINE SULFATE 2.5; 2.5; 2.5; 2.5 MG/1; MG/1; MG/1; MG/1
10 TABLET ORAL DAILY PRN
Qty: 30 TABLET | Refills: 0 | Status: SHIPPED | OUTPATIENT
Start: 2022-01-19 | End: 2022-02-18

## 2022-01-19 RX ORDER — DEXTROAMPHETAMINE SACCHARATE, AMPHETAMINE ASPARTATE MONOHYDRATE, DEXTROAMPHETAMINE SULFATE AND AMPHETAMINE SULFATE 5; 5; 5; 5 MG/1; MG/1; MG/1; MG/1
20 CAPSULE, EXTENDED RELEASE ORAL DAILY
Qty: 30 CAPSULE | Refills: 0 | Status: SHIPPED | OUTPATIENT
Start: 2022-03-22 | End: 2022-04-05

## 2022-01-19 RX ORDER — DEXTROAMPHETAMINE SACCHARATE, AMPHETAMINE ASPARTATE MONOHYDRATE, DEXTROAMPHETAMINE SULFATE AND AMPHETAMINE SULFATE 5; 5; 5; 5 MG/1; MG/1; MG/1; MG/1
20 CAPSULE, EXTENDED RELEASE ORAL DAILY
Qty: 30 CAPSULE | Refills: 0 | Status: SHIPPED | OUTPATIENT
Start: 2022-01-19 | End: 2022-02-18

## 2022-01-19 RX ORDER — DEXTROAMPHETAMINE SACCHARATE, AMPHETAMINE ASPARTATE, DEXTROAMPHETAMINE SULFATE AND AMPHETAMINE SULFATE 2.5; 2.5; 2.5; 2.5 MG/1; MG/1; MG/1; MG/1
10 TABLET ORAL DAILY PRN
Qty: 30 TABLET | Refills: 0 | Status: SHIPPED | OUTPATIENT
Start: 2022-03-22 | End: 2022-04-05

## 2022-01-19 ASSESSMENT — ENCOUNTER SYMPTOMS
SLEEP DISTURBANCE: 0
DECREASED CONCENTRATION: 1
DYSPHORIC MOOD: 0

## 2022-01-19 ASSESSMENT — MIFFLIN-ST. JEOR: SCORE: 1569.41

## 2022-01-19 NOTE — PROGRESS NOTES
"  {PROVIDER CHARTING PREFERENCE:575511}    Burke Mabry is a 21 year old who presents for the following health issues {ACCOMPANIED BY STATEMENT (Optional):085753}    A.D.H.D    History of Present Illness     Asthma:  She presents for follow up of asthma.  She has some cough, no wheezing, and no shortness of breath. She is using a relief medication a few times a month. She does not have a controller medication. Patient is aware of the following triggers: upper respiratory infections. The patient has not had a visit to the Emergency Room, Urgent Care or Hospital due to asthma since the last clinic visit.     She eats 0-1 servings of fruits and vegetables daily.She consumes 1 sweetened beverage(s) daily.She exercises with enough effort to increase her heart rate 10 to 19 minutes per day.  She exercises with enough effort to increase her heart rate 3 or less days per week. She is missing 1 dose(s) of medications per week.       Medication Followup of Adderall    Taking Medication as prescribed: {.:718176::\"yes\"}    Side Effects:  {NONEORCHOOSE:585590::\"None\"}    Medication Helping Symptoms:  {.:780640::\"yes\"}     {additonal problems for provider to add (Optional):597888}    Review of Systems   {ROS COMP (Optional):737380}      Objective    There were no vitals taken for this visit.  There is no height or weight on file to calculate BMI.  Physical Exam   {Exam List (Optional):049739}    {Diagnostic Test Results (Optional):632723}    {AMBULATORY ATTESTATION (Optional):329472}        "

## 2022-01-19 NOTE — PROGRESS NOTES
Assessment & Plan     Adult ADHD  Uncontrolled, increased morning dose from 15 to 20 mg of Adderall XR, start afternoon as needed dose of Adderall immediate release 10 mg as needed for concentration.  Informed patient to send [address] message in 3 months for med refill, med check in 6 months.  UDS collected today.  PDMP reviewed, appropriate  - Drug Abuse Screen Panel 13, Urine (Pain Care Package) - lab collect  - amphetamine-dextroamphetamine (ADDERALL XR) 20 MG 24 hr capsule  Dispense: 30 capsule; Refill: 0  - amphetamine-dextroamphetamine (ADDERALL XR) 20 MG 24 hr capsule  Dispense: 30 capsule; Refill: 0  - amphetamine-dextroamphetamine (ADDERALL XR) 20 MG 24 hr capsule  Dispense: 30 capsule; Refill: 0  - amphetamine-dextroamphetamine (ADDERALL) 10 MG tablet  Dispense: 30 tablet; Refill: 0  - amphetamine-dextroamphetamine (ADDERALL) 10 MG tablet  Dispense: 30 tablet; Refill: 0  - amphetamine-dextroamphetamine (ADDERALL) 10 MG tablet  Dispense: 30 tablet; Refill: 0  - Drug Abuse Screen Panel 13, Urine (Pain Care Package) - lab collect      Return in about 6 months (around 7/19/2022) for ADHD.    Edd Siddiqui MD  [facility]    Burke Mabry is a 21 year old who presents for the following health issues     A.D.H.D    History of Present Illness     Asthma:  She presents for follow up of asthma.  She has some cough, no wheezing, and no shortness of breath. She is using a relief medication a few times a month. She does not have a controller medication. Patient is aware of the following triggers: upper respiratory infections. The patient has not had a visit to the Emergency Room, Urgent Care or Hospital due to asthma since the last clinic visit.     She eats 0-1 servings of fruits and vegetables daily.She consumes 1 sweetened beverage(s) daily.She exercises with enough effort to increase her heart rate 10 to 19 minutes per day.  She exercises with enough effort to increase her heart rate  "3 or less days per week. She is missing 1 dose(s) of medications per week.       Medication Followup of adderall    Taking Medication as prescribed: yes    Side Effects:  None    Medication Helping Symptoms:  yes     Patient still continues to have difficulty with focus especially when working a double shift.  Afternoon, can be harder for her.  Otherwise no palpitations, dizziness, chest pain or insomnia.        Review of Systems   Psychiatric/Behavioral: Positive for decreased concentration. Negative for dysphoric mood and sleep disturbance.            Objective    /76 (BP Location: Right arm, Patient Position: Sitting, Cuff Size: Adult Regular)   Pulse 84   Temp 97.8  F (36.6  C) (Oral)   Resp 14   Ht 1.689 m (5' 6.5\")   Wt 78.5 kg (173 lb)   Breastfeeding No   BMI 27.50 kg/m    Body mass index is 27.5 kg/m .  Physical Exam  Constitutional:       Appearance: She is not ill-appearing.   Cardiovascular:      Rate and Rhythm: Normal rate and regular rhythm.   Pulmonary:      Effort: Pulmonary effort is normal.      Breath sounds: Normal breath sounds.   Psychiatric:         Mood and Affect: Mood normal.         Behavior: Behavior normal.         Thought Content: Thought content normal.         Judgment: Judgment normal.                        "

## 2022-03-25 ENCOUNTER — TELEPHONE (OUTPATIENT)
Dept: OBGYN | Facility: CLINIC | Age: 22
End: 2022-03-25
Payer: COMMERCIAL

## 2022-03-25 NOTE — TELEPHONE ENCOUNTER
Pt calling, newly pregnant. nauseous and vomiting frequently.  LMP 2/3/22, 7w1d.    Reviewed [employer]/B6, small, frequent meals, shola, not eating and drinking a the same time.    Will CB if no relief from above recommendations.    Edith Stanton RN

## 2022-03-29 ENCOUNTER — OFFICE VISIT (OUTPATIENT)
Dept: URGENT CARE | Facility: URGENT CARE | Age: 22
End: 2022-03-29
Payer: COMMERCIAL

## 2022-03-29 VITALS
SYSTOLIC BLOOD PRESSURE: 128 MMHG | HEART RATE: 81 BPM | RESPIRATION RATE: 16 BRPM | DIASTOLIC BLOOD PRESSURE: 76 MMHG | WEIGHT: 175 LBS | BODY MASS INDEX: 27.47 KG/M2 | HEIGHT: 67 IN | TEMPERATURE: 98.4 F | OXYGEN SATURATION: 97 %

## 2022-03-29 DIAGNOSIS — R09.89 RUNNY NOSE: Primary | ICD-10-CM

## 2022-03-29 DIAGNOSIS — R11.0 NAUSEA: ICD-10-CM

## 2022-03-29 DIAGNOSIS — R82.90 ABNORMAL URINE: ICD-10-CM

## 2022-03-29 DIAGNOSIS — R50.9 FEVER IN ADULT: ICD-10-CM

## 2022-03-29 DIAGNOSIS — Z33.1 PREGNANT STATE, INCIDENTAL: ICD-10-CM

## 2022-03-29 LAB
ALBUMIN UR-MCNC: NEGATIVE MG/DL
APPEARANCE UR: CLEAR
BACTERIA #/AREA URNS HPF: ABNORMAL /HPF
BASOPHILS # BLD AUTO: 0 10E3/UL (ref 0–0.2)
BASOPHILS NFR BLD AUTO: 1 %
BILIRUB UR QL STRIP: NEGATIVE
COLOR UR AUTO: YELLOW
DEPRECATED S PYO AG THROAT QL EIA: NEGATIVE
EOSINOPHIL # BLD AUTO: 0.1 10E3/UL (ref 0–0.7)
EOSINOPHIL NFR BLD AUTO: 2 %
ERYTHROCYTE [DISTWIDTH] IN BLOOD BY AUTOMATED COUNT: 12.3 % (ref 10–15)
FLUAV AG SPEC QL IA: NEGATIVE
FLUBV AG SPEC QL IA: NEGATIVE
GLUCOSE UR STRIP-MCNC: NEGATIVE MG/DL
GROUP A STREP BY PCR: NOT DETECTED
HCT VFR BLD AUTO: 40 % (ref 35–47)
HGB BLD-MCNC: 13.7 G/DL (ref 11.7–15.7)
HGB UR QL STRIP: NEGATIVE
KETONES UR STRIP-MCNC: 15 MG/DL
LEUKOCYTE ESTERASE UR QL STRIP: ABNORMAL
LYMPHOCYTES # BLD AUTO: 1.6 10E3/UL (ref 0.8–5.3)
LYMPHOCYTES NFR BLD AUTO: 24 %
MCH RBC QN AUTO: 29.1 PG (ref 26.5–33)
MCHC RBC AUTO-ENTMCNC: 34.3 G/DL (ref 31.5–36.5)
MCV RBC AUTO: 85 FL (ref 78–100)
MONOCYTES # BLD AUTO: 0.8 10E3/UL (ref 0–1.3)
MONOCYTES NFR BLD AUTO: 12 %
NEUTROPHILS # BLD AUTO: 4.1 10E3/UL (ref 1.6–8.3)
NEUTROPHILS NFR BLD AUTO: 61 %
NITRATE UR QL: NEGATIVE
PH UR STRIP: 6 [PH] (ref 5–7)
PLATELET # BLD AUTO: 227 10E3/UL (ref 150–450)
RBC # BLD AUTO: 4.71 10E6/UL (ref 3.8–5.2)
RBC #/AREA URNS AUTO: ABNORMAL /HPF
SP GR UR STRIP: 1.02 (ref 1–1.03)
SQUAMOUS #/AREA URNS AUTO: ABNORMAL /LPF
UROBILINOGEN UR STRIP-ACNC: 0.2 E.U./DL
WBC # BLD AUTO: 6.6 10E3/UL (ref 4–11)
WBC #/AREA URNS AUTO: ABNORMAL /HPF

## 2022-03-29 PROCEDURE — 87088 URINE BACTERIA CULTURE: CPT | Performed by: FAMILY MEDICINE

## 2022-03-29 PROCEDURE — U0003 INFECTIOUS AGENT DETECTION BY NUCLEIC ACID (DNA OR RNA); SEVERE ACUTE RESPIRATORY SYNDROME CORONAVIRUS 2 (SARS-COV-2) (CORONAVIRUS DISEASE [COVID-19]), AMPLIFIED PROBE TECHNIQUE, MAKING USE OF HIGH THROUGHPUT TECHNOLOGIES AS DESCRIBED BY CMS-2020-01-R: HCPCS | Performed by: FAMILY MEDICINE

## 2022-03-29 PROCEDURE — 99214 OFFICE O/P EST MOD 30 MIN: CPT | Performed by: FAMILY MEDICINE

## 2022-03-29 PROCEDURE — 87651 STREP A DNA AMP PROBE: CPT | Performed by: FAMILY MEDICINE

## 2022-03-29 PROCEDURE — 87804 INFLUENZA ASSAY W/OPTIC: CPT | Performed by: FAMILY MEDICINE

## 2022-03-29 PROCEDURE — 85025 COMPLETE CBC W/AUTO DIFF WBC: CPT | Performed by: FAMILY MEDICINE

## 2022-03-29 PROCEDURE — 87086 URINE CULTURE/COLONY COUNT: CPT | Performed by: FAMILY MEDICINE

## 2022-03-29 PROCEDURE — 36415 COLL VENOUS BLD VENIPUNCTURE: CPT | Performed by: FAMILY MEDICINE

## 2022-03-29 PROCEDURE — 81001 URINALYSIS AUTO W/SCOPE: CPT | Performed by: FAMILY MEDICINE

## 2022-03-29 PROCEDURE — U0005 INFEC AGEN DETEC AMPLI PROBE: HCPCS | Performed by: FAMILY MEDICINE

## 2022-03-29 RX ORDER — ONDANSETRON 4 MG/1
4 TABLET, ORALLY DISINTEGRATING ORAL EVERY 12 HOURS PRN
Qty: 20 TABLET | Refills: 0 | Status: SHIPPED | OUTPATIENT
Start: 2022-03-29 | End: 2022-05-26

## 2022-03-29 NOTE — PROGRESS NOTES
Chief Complaint   Patient presents with     Sick     8 weeks pregnant - NOW , vomiting, nausea x Thursday, temp, runny nose, congestion x 1 day  - took  Tylenol at 1am     Consult     HAD positive covid OCT 2021 at [facility]     Initial differential diagnosis included but was not restricted to   Differential Diagnosis:  URI Adult/Peds:  Allergic rhinitis, Bronchitis-viral, Influenza, Laryngitis, Sinusitis, Strep pharyngitis, Tonsilitis, Viral pharyngitis, Viral syndrome and Viral upper respiratory illness  Medical Decision Making:    ASSESMENT AND PLAN   Pau was seen today for sick and consult.    Diagnoses and all orders for this visit:    Runny nose  -     Influenza A/B antigen  -     Symptomatic; Unknown COVID-19 Virus (Coronavirus) by PCR Nose; Future    Pregnant state, incidental    Nausea    Fever in adult  -     CBC with platelets and differential; Future  -     UA Macro with Reflex to Micro and Culture - lab collect; Future  -     Streptococcus A Rapid Screen w/Reflex to PCR - Clinic Collect  -     Influenza A/B antigen  -     Symptomatic; Unknown COVID-19 Virus (Coronavirus) by PCR Nose; Future        Reviewed with Patient to do small meals to help with the nausea symptoms.  Urine culture is pending.  If fever does not resolve within 2 to 3 days should follow for further relation and treatment.  Tylenol, Fluids, Rest, Saline gargles and Vaporizer  Routine discharge counseling was given to the patient and the patient understands that worsening, changing or persistent symptoms should prompt an immediate call or follow up with their primary physician or the emergency department. The importance of appropriate follow up was also discussed with the patient.     I have reviewed the nursing notes.  Review of the result(s) of each unique test -   Results for orders placed or performed in visit on 03/29/22   UA Macro with Reflex to Micro and Culture - lab collect     Status: Abnormal    Specimen: Urine,  Midstream   Result Value Ref Range    Color Urine Yellow Colorless, Straw, Light Yellow, Yellow    Appearance Urine Clear Clear    Glucose Urine Negative Negative mg/dL    Bilirubin Urine Negative Negative    Ketones Urine 15  (A) Negative mg/dL    Specific Gravity Urine 1.025 1.003 - 1.035    Blood Urine Negative Negative    pH Urine 6.0 5.0 - 7.0    Protein Albumin Urine Negative Negative mg/dL    Urobilinogen Urine 0.2 0.2, 1.0 E.U./dL    Nitrite Urine Negative Negative    Leukocyte Esterase Urine Trace (A) Negative   CBC with platelets and differential     Status: None   Result Value Ref Range    WBC Count 6.6 4.0 - 11.0 10e3/uL    RBC Count 4.71 3.80 - 5.20 10e6/uL    Hemoglobin 13.7 11.7 - 15.7 g/dL    Hematocrit 40.0 35.0 - 47.0 %    MCV 85 78 - 100 fL    MCH 29.1 26.5 - 33.0 pg    MCHC 34.3 31.5 - 36.5 g/dL    RDW 12.3 10.0 - 15.0 %    Platelet Count 227 150 - 450 10e3/uL    % Neutrophils 61 %    % Lymphocytes 24 %    % Monocytes 12 %    % Eosinophils 2 %    % Basophils 1 %    Absolute Neutrophils 4.1 1.6 - 8.3 10e3/uL    Absolute Lymphocytes 1.6 0.8 - 5.3 10e3/uL    Absolute Monocytes 0.8 0.0 - 1.3 10e3/uL    Absolute Eosinophils 0.1 0.0 - 0.7 10e3/uL    Absolute Basophils 0.0 0.0 - 0.2 10e3/uL   Urine Microscopic     Status: Abnormal   Result Value Ref Range    Bacteria Urine Moderate (A) None Seen /HPF    RBC Urine 0-2 0-2 /HPF /HPF    WBC Urine 0-5 0-5 /HPF /HPF    Squamous Epithelials Urine Few (A) None Seen /LPF    Narrative    Urine Culture not indicated   Streptococcus A Rapid Screen w/Reflex to PCR - Clinic Collect     Status: Normal    Specimen: Throat; Swab   Result Value Ref Range    Group A Strep antigen Negative Negative   Influenza A/B antigen     Status: Normal    Specimen: Nose; Swab   Result Value Ref Range    Influenza A antigen Negative Negative    Influenza B antigen Negative Negative    Narrative    Test results must be correlated with clinical data. If necessary, results should be  confirmed by a molecular assay or viral culture.   CBC with platelets and differential     Status: None    Narrative    The following orders were created for panel order CBC with platelets and differential.  Procedure                               Abnormality         Status                     ---------                               -----------         ------                     CBC with platelets and d...[[id number]]                      Final result                 Please view results for these tests on the individual orders.         Time  spent on the date of the encounter doing chart review, review of test results, interpretation of tests, patient visit and documentation   see orders in Epic  Pt verbalized and agreed with the plan and is aware of the worsening symptoms for which would need to follow up .  Pt was stable during time of discharge from the clinic     [facility]     Pau Sanchez is a 22 year old female presenting with a chief complaint of    Chief Complaint   Patient presents with     Sick     8 weeks pregnant - NOW , vomiting, nausea x Thursday, temp, runny nose, congestion x 1 day  - took  Tylenol at 1am     Consult     HAD positive covid OCT 2021 at [facility]           Pau Sanchez is a 22 year old female 8 weeks pregnant presenting with a chief complaint of nausea and vomiting. She is an established patient of [facility].  Onset of symptoms was 4 day(s) ago.  Course of illness is worsening.    Severity moderate  Current and Associated symptoms: runny nose, stuffy nose and fever for a day   Also noticing some urinary frequency too   Treatment measures tried include Tylenol/Ibuprofen.  Predisposing factors include None.    Past Medical History:   Diagnosis Date     Bipolar affective disorder (H)      Constipation      Current Outpatient Medications   Medication Sig Dispense Refill     albuterol (PROAIR HFA/PROVENTIL HFA/VENTOLIN HFA) 108 (90 Base) MCG/ACT inhaler Inhale 1-2 puffs into the lungs  "every 6 hours as needed for shortness of breath / dyspnea or wheezing (Patient not taking: Reported on 3/29/2022) 18 g 3     amphetamine-dextroamphetamine (ADDERALL XR) 10 MG 24 hr capsule Take 1 capsule (10 mg) by mouth daily (Patient not taking: Reported on 3/29/2022) 30 capsule 0     amphetamine-dextroamphetamine (ADDERALL XR) 20 MG 24 hr capsule Take 1 capsule (20 mg) by mouth daily (Patient not taking: Reported on 3/29/2022) 30 capsule 0     amphetamine-dextroamphetamine (ADDERALL XR) 20 MG 24 hr capsule Take 1 capsule (20 mg) by mouth daily (Patient not taking: Reported on 3/29/2022) 30 capsule 0     amphetamine-dextroamphetamine (ADDERALL) 10 MG tablet Take 1 tablet (10 mg) by mouth daily as needed (concentration) (Patient not taking: Reported on 3/29/2022) 30 tablet 0     fluticasone (FLONASE) 50 MCG/ACT nasal spray Spray 1 spray into both nostrils daily (Patient not taking: Reported on 3/29/2022) 16 g 2     norgestimate-ethinyl estradiol (ORTHO-CYCLEN) 0.25-35 MG-MCG tablet Take 1 tablet by mouth daily (Patient not taking: Reported on 3/29/2022) 84 tablet 3     Social History     Tobacco Use     Smoking status: Never Smoker     Smokeless tobacco: Never Used   Substance Use Topics     Alcohol use: Not Currently     Alcohol/week: 0.0 standard drinks     Family History   Problem Relation Age of Onset     Bipolar Disorder Mother      Hyperlipidemia Father      Depression Sister          ROS:    10 point ROS of systems including Constitutional, Eyes, Respiratory, Cardiovascular, Gastroenterology, Genitourinary, Integumentary, Muscularskeletal, Psychiatric ,neurological were all negative except for pertinent positives noted in my HPI         OBJECTIVE:    /76 (BP Location: Right arm, Patient Position: Chair, Cuff Size: Adult Regular)   Pulse 81   Temp 98.4  F (36.9  C) (Oral)   Resp 16   Ht 1.689 m (5' 6.5\")   Wt 79.4 kg (175 lb)   LMP  (Approximate)   SpO2 97%   Breastfeeding No   BMI 27.82 kg/m  "   GENERAL APPEARANCE: healthy, alert and no distress  EYES: EOMI,  PERRL, conjunctiva clear  HENT: ear canals and TM's normal.  Nose and mouth without ulcers, erythema or lesions  NECK: supple, nontender, no lymphadenopathy  RESP: lungs clear to auscultation - no rales, rhonchi or wheezes  CV: regular rates and rhythm, normal S1 S2, no murmur noted  ABDOMEN:  soft, nontender, no HSM or masses and bowel sounds normal  SKIN: no suspicious lesions or rashes  PSYCH: mentation appears normal  Physical Exam      (Note was completed, in part, with [employer] voice-recognition software. Documentation reviewed, but some grammatical, spelling, and word errors may remain.)  Lexi Cummings MD.

## 2022-03-30 LAB — SARS-COV-2 RNA RESP QL NAA+PROBE: NEGATIVE

## 2022-03-31 ENCOUNTER — TELEPHONE (OUTPATIENT)
Dept: FAMILY MEDICINE | Facility: CLINIC | Age: 22
End: 2022-03-31
Payer: COMMERCIAL

## 2022-03-31 ENCOUNTER — MYC MEDICAL ADVICE (OUTPATIENT)
Dept: FAMILY MEDICINE | Facility: CLINIC | Age: 22
End: 2022-03-31
Payer: COMMERCIAL

## 2022-03-31 DIAGNOSIS — N30.90 CYSTITIS: Primary | ICD-10-CM

## 2022-03-31 LAB
BACTERIA UR CULT: ABNORMAL
BACTERIA UR CULT: ABNORMAL

## 2022-03-31 RX ORDER — AMOXICILLIN 875 MG
875 TABLET ORAL 2 TIMES DAILY
Qty: 10 TABLET | Refills: 0 | Status: SHIPPED | OUTPATIENT
Start: 2022-03-31 | End: 2022-04-05

## 2022-03-31 NOTE — TELEPHONE ENCOUNTER
Please see telephone encounter 3/31/22. Pt called in and message [employer] again regarding test results from 3/29/22. Informed pt that the original message was received at 1206 on 3/31/22, informed pt message was sent over to ordering provider as high priority. Informed pt that provider should get back to pt by the end of the day. Pt PCP states can address results with pt through an e-visit per telephone encounter 3/31/22 if pt would rather results be addressed by PCP. Apologized to pt regarding experiencing at . Pt states she will schedule e-visit with PCP to get results and discuss treatment options. No further questions.    Foreign CHAMPION RN

## 2022-03-31 NOTE — TELEPHONE ENCOUNTER
Received call from pt   She has seen her UA results and wants to know if she needs an ABX  Pt is pregnant- 8 weeks  Pt would like this message sent to her PCP    UC visit 3/29/22    Thank you  Dav Koenig RN on 3/31/2022 at 12:20 PM    This encounter is being handled by a team outside your facility.  If action needs to be taken, please route the encounter back to your team at your own clinic, not the sender.  Thank you

## 2022-03-31 NOTE — TELEPHONE ENCOUNTER
Please see pt [employer] message regarding UA results 3/29/22.     Foreign CHAMPION RN

## 2022-04-01 ENCOUNTER — MYC MEDICAL ADVICE (OUTPATIENT)
Dept: URGENT CARE | Facility: URGENT CARE | Age: 22
End: 2022-04-01
Payer: COMMERCIAL

## 2022-04-04 ASSESSMENT — PATIENT HEALTH QUESTIONNAIRE - PHQ9
10. IF YOU CHECKED OFF ANY PROBLEMS, HOW DIFFICULT HAVE THESE PROBLEMS MADE IT FOR YOU TO DO YOUR WORK, TAKE CARE OF THINGS AT HOME, OR GET ALONG WITH OTHER PEOPLE: SOMEWHAT DIFFICULT
SUM OF ALL RESPONSES TO PHQ QUESTIONS 1-9: 12
SUM OF ALL RESPONSES TO PHQ QUESTIONS 1-9: 12

## 2022-04-05 ENCOUNTER — PRENATAL OFFICE VISIT (OUTPATIENT)
Dept: NURSING | Facility: CLINIC | Age: 22
End: 2022-04-05
Payer: COMMERCIAL

## 2022-04-05 DIAGNOSIS — Z34.01 ENCOUNTER FOR SUPERVISION OF NORMAL FIRST PREGNANCY IN FIRST TRIMESTER: Primary | ICD-10-CM

## 2022-04-05 PROCEDURE — 99207 PR NO CHARGE NURSE ONLY: CPT

## 2022-04-05 RX ORDER — PRENATAL VIT/IRON FUM/FOLIC AC 27MG-0.8MG
1 TABLET ORAL DAILY
Qty: 90 TABLET | Refills: 3
Start: 2022-04-05 | End: 2024-05-28

## 2022-04-05 ASSESSMENT — PATIENT HEALTH QUESTIONNAIRE - PHQ9: SUM OF ALL RESPONSES TO PHQ QUESTIONS 1-9: 12

## 2022-04-05 NOTE — PROGRESS NOTES
Answers for HPI/ROS submitted by the patient on 4/4/2022  If you checked off any problems, how difficult have these problems made it for you to do your work, take care of things at home, or get along with other people?: Somewhat difficult  PHQ9 TOTAL SCORE: 12

## 2022-04-05 NOTE — PROGRESS NOTES
"Chief Complaint   Patient presents with     Prenatal Care     New Prenatal Nurse Telephone Visit   8w5d  Estimated Date of Delivery: Nov 10, 2022      Initial LMP 2022  Estimated body mass index is 27.82 kg/m  as calculated from the following:    Height as of 3/29/22: 1.689 m (5' 6.5\").    Weight as of 3/29/22: 79.4 kg (175 lb).  BP completed using cuff size: NA (Not Taken)    Questioned patient about current smoking habits.  Pt. has never smoked.    NPN nurse visit done over the phone. Pt will be given NPN folder and book at her upcoming appt.   Discussed optional screening available to assess chromosomal anomalies. Questions answered. Pt advised to call the clinic if she has any questions or concerns related to her pregnancy. Prenatal labs will be obtained at her upcoming appt. New prenatal visit scheduled on 22 with   Delma Johnson CNM.        Lab Results   Component Value Date    PAP NIL 2021           Patient supplied answers from flow sheet for:  Prenatal OB Questionnaire.  Past Medical History  Have you ever recieved care for your mental health? : (!) (P) Yes  Have you ever been in a major accident or suffered serious trauma?: (P) No  Within the last year, has anyone hit, slapped, kicked or otherwise hurt you?: (P) No  In the last year, has anyone forced you to have sex when you didn't want to?: (P) No    Past Medical History 2   Have you ever received a blood transfusion?: No  Would you accept a blood transfusion if was medically recommended?: Yes  Does anyone in your home smoke?: (P) No   Is your blood type Rh negative?: (P) Unknown  Have you ever ?: (P) No  Have you been hospitalized for a nonsurgical reason excluding normal delivery?: (!) Yes (hospitalized  for drug induced psychosis)  Have you ever had an abnormal pap smear?: No    Past Medical History (Continued)  Do you have a history of abnormalities of the uterus?: (P) No  Did your mother take ROXANA or any other " hormones when she was pregnant with you?: (P) No  Do you have any other problems we have not asked about which you feel may be important to this pregnancy?: (P) No    Maggie Lo RN                          Answers for HPI/ROS submitted by the patient on 4/4/2022  If you checked off any problems, how difficult have these problems made it for you to do your work, take care of things at home, or get along with other people?: Somewhat difficult  PHQ9 TOTAL SCORE: 12

## 2022-04-08 ENCOUNTER — TELEPHONE (OUTPATIENT)
Dept: OBGYN | Facility: CLINIC | Age: 22
End: 2022-04-08

## 2022-04-08 ENCOUNTER — LAB (OUTPATIENT)
Dept: LAB | Facility: CLINIC | Age: 22
End: 2022-04-08
Payer: COMMERCIAL

## 2022-04-08 DIAGNOSIS — R30.0 DYSURIA: ICD-10-CM

## 2022-04-08 DIAGNOSIS — N30.00 ACUTE CYSTITIS WITHOUT HEMATURIA: Primary | ICD-10-CM

## 2022-04-08 LAB
ALBUMIN UR-MCNC: NEGATIVE MG/DL
APPEARANCE UR: CLEAR
BACTERIA #/AREA URNS HPF: ABNORMAL /HPF
BILIRUB UR QL STRIP: NEGATIVE
COLOR UR AUTO: YELLOW
GLUCOSE UR STRIP-MCNC: NEGATIVE MG/DL
HGB UR QL STRIP: NEGATIVE
KETONES UR STRIP-MCNC: ABNORMAL MG/DL
LEUKOCYTE ESTERASE UR QL STRIP: ABNORMAL
NITRATE UR QL: POSITIVE
PH UR STRIP: 8.5 [PH] (ref 5–7)
RBC #/AREA URNS AUTO: ABNORMAL /HPF
SP GR UR STRIP: 1.01 (ref 1–1.03)
SQUAMOUS #/AREA URNS AUTO: ABNORMAL /LPF
UROBILINOGEN UR STRIP-ACNC: 0.2 E.U./DL
WBC #/AREA URNS AUTO: ABNORMAL /HPF

## 2022-04-08 PROCEDURE — 87086 URINE CULTURE/COLONY COUNT: CPT

## 2022-04-08 PROCEDURE — 81001 URINALYSIS AUTO W/SCOPE: CPT

## 2022-04-08 PROCEDURE — 87186 SC STD MICRODIL/AGAR DIL: CPT

## 2022-04-08 RX ORDER — NITROFURANTOIN 25; 75 MG/1; MG/1
100 CAPSULE ORAL 2 TIMES DAILY
Qty: 14 CAPSULE | Refills: 0 | Status: SHIPPED | OUTPATIENT
Start: 2022-04-08 | End: 2022-05-25

## 2022-04-08 NOTE — TELEPHONE ENCOUNTER
Called patient and advised of UA results.    Results for orders placed or performed in visit on 04/08/22   UA with Microscopic - lab collect     Status: Abnormal   Result Value Ref Range    Color Urine Yellow Colorless, Straw, Light Yellow, Yellow    Appearance Urine Clear Clear    Glucose Urine Negative Negative mg/dL    Bilirubin Urine Negative Negative    Ketones Urine Trace (A) Negative mg/dL    Specific Gravity Urine 1.015 1.003 - 1.035    Blood Urine Negative Negative    pH Urine 8.5 (H) 5.0 - 7.0    Protein Albumin Urine Negative Negative mg/dL    Urobilinogen Urine 0.2 0.2, 1.0 E.U./dL    Nitrite Urine Positive (A) Negative    Leukocyte Esterase Urine Moderate (A) Negative   Urine Microscopic Exam     Status: Abnormal   Result Value Ref Range    Bacteria Urine Many (A) None Seen /HPF    RBC Urine None Seen 0-2 /HPF /HPF    WBC Urine 5-10 (A) 0-5 /HPF /HPF    Squamous Epithelials Urine Many (A) None Seen /LPF         UC pending. Will treat with antibiotics. Advised patient to finish all antibiotics, even if feeling better. Call if she notes no improvement or worsening of symptoms. Encouraged patient to call with any questions or concerns.      ALOK Vidal, CNM

## 2022-04-10 LAB — BACTERIA UR CULT: ABNORMAL

## 2022-04-13 ENCOUNTER — PRENATAL OFFICE VISIT (OUTPATIENT)
Dept: MIDWIFE SERVICES | Facility: CLINIC | Age: 22
End: 2022-04-13
Payer: COMMERCIAL

## 2022-04-13 ENCOUNTER — ANCILLARY PROCEDURE (OUTPATIENT)
Dept: ULTRASOUND IMAGING | Facility: CLINIC | Age: 22
End: 2022-04-13
Payer: COMMERCIAL

## 2022-04-13 VITALS
DIASTOLIC BLOOD PRESSURE: 70 MMHG | WEIGHT: 167.6 LBS | SYSTOLIC BLOOD PRESSURE: 110 MMHG | BODY MASS INDEX: 26.3 KG/M2 | HEIGHT: 67 IN

## 2022-04-13 DIAGNOSIS — Z34.01 ENCOUNTER FOR SUPERVISION OF NORMAL FIRST PREGNANCY IN FIRST TRIMESTER: Primary | ICD-10-CM

## 2022-04-13 DIAGNOSIS — Z34.01 ENCOUNTER FOR SUPERVISION OF NORMAL FIRST PREGNANCY IN FIRST TRIMESTER: ICD-10-CM

## 2022-04-13 DIAGNOSIS — O21.9 NAUSEA AND VOMITING IN PREGNANCY: ICD-10-CM

## 2022-04-13 PROCEDURE — 76817 TRANSVAGINAL US OBSTETRIC: CPT | Performed by: OBSTETRICS & GYNECOLOGY

## 2022-04-13 PROCEDURE — 76801 OB US < 14 WKS SINGLE FETUS: CPT | Performed by: OBSTETRICS & GYNECOLOGY

## 2022-04-13 PROCEDURE — 99207 PR FIRST OB VISIT: CPT | Performed by: ADVANCED PRACTICE MIDWIFE

## 2022-04-13 PROCEDURE — 87591 N.GONORRHOEAE DNA AMP PROB: CPT | Performed by: ADVANCED PRACTICE MIDWIFE

## 2022-04-13 PROCEDURE — 87491 CHLMYD TRACH DNA AMP PROBE: CPT | Performed by: ADVANCED PRACTICE MIDWIFE

## 2022-04-13 RX ORDER — SCOLOPAMINE TRANSDERMAL SYSTEM 1 MG/1
1 PATCH, EXTENDED RELEASE TRANSDERMAL
Qty: 12 PATCH | Refills: 0 | Status: SHIPPED | OUTPATIENT
Start: 2022-04-13 | End: 2022-04-18

## 2022-04-13 RX ORDER — ONDANSETRON 4 MG/1
4 TABLET, ORALLY DISINTEGRATING ORAL EVERY 8 HOURS PRN
Qty: 30 TABLET | Refills: 1 | Status: SHIPPED | OUTPATIENT
Start: 2022-04-13 | End: 2022-05-26

## 2022-04-13 RX ORDER — MULTIVITAMIN WITH IRON
100 TABLET ORAL DAILY
COMMUNITY
End: 2022-09-21

## 2022-04-13 NOTE — PROGRESS NOTES
Pau Sanchez is a 22 year  woman,  who is not a previous CNM patient. She presents for a new OB Visit. This was not a planned pregnancy, a surprise they are adjusting to. FOB is Laith who is in good health. They are engaged and Laith is actively involved in relationship and this pregnancy.    She has not had bleeding since her LMP.    She has had occasional abdominal discomfort  She has had nausea.  has had vomiting- daily. Some days will vomit 0-1 times, others worse. 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   Any personal or family history of blood clots? No  History of sickle cell anemia or trait? No         Patient's last menstrual period was 2022..  Estimated Date of Delivery: Nov 10, 2022 Ultrasound consistent with LMP.    MENSTRUAL HISTORY    frequency: every 28-30 days, regular  Last PAP:  3/18/21  History of abnormal Pap?  No    Health maintenance updated:  yes        Current medications are:    Current Outpatient Medications:      albuterol (PROAIR HFA/PROVENTIL HFA/VENTOLIN HFA) 108 (90 Base) MCG/ACT inhaler, Inhale 1-2 puffs into the lungs every 6 hours as needed for shortness of breath / dyspnea or wheezing, Disp: 18 g, Rfl: 3     doxylamine (UNISOM) 25 MG TABS tablet, Take 25 mg by mouth At Bedtime, Disp: , Rfl:      fluticasone (FLONASE) 50 MCG/ACT nasal spray, Spray 1 spray into both nostrils daily, Disp: 16 g, Rfl: 2     nitroFURantoin macrocrystal-monohydrate (MACROBID) 100 MG capsule, Take 1 capsule (100 mg) by mouth 2 times daily, Disp: 14 capsule, Rfl: 0     ondansetron (ZOFRAN-ODT) 4 MG ODT tab, Take 1 tablet (4 mg) by mouth every 8 hours as needed for nausea, Disp: 30 tablet, Rfl: 1     ondansetron (ZOFRAN-ODT) 4 MG ODT tab, Take 1 tablet (4 mg) by mouth every 12 hours as needed for nausea, Disp: 20 tablet, Rfl: 0     Prenatal Vit-Fe Fumarate-FA (PRENATAL MULTIVITAMIN W/IRON) 27-0.8 MG tablet, Take 1 tablet by mouth daily, Disp: 90 tablet, Rfl: 3      scopolamine (TRANSDERM) 1 MG/3DAYS 72 hr patch, Place 1 patch onto the skin every 72 hours, Disp: 12 patch, Rfl: 0     vitamin B6 (PYRIDOXINE) 100 MG tablet, Take 100 mg by mouth daily, Disp: , Rfl:   No current facility-administered medications for this visit.    Facility-Administered Medications Ordered in Other Visits:      acetaminophen (TYLENOL) tablet 650 mg, 650 mg, Oral, Q4H PRN, Marcello Mann MD     calcium carbonate (TUMS) chewable tablet 500-1,000 mg, 500-1,000 mg, Oral, Q2H PRN, Marcello Mann MD     phenol-menthol (CEPASTAT) lozenge 1 lozenge, 1 lozenge, Buccal, Q1H PRN, Marcello Mann MD     What medications are you currently taking? Unisom, B6, zofran every day, PNV (she will check if there is iron in it)    INFECTION HISTORY  HIV: No  Hepatitis B: No  Hepatitis C: No  Tuberculosis: No    Genital Herpes self: no  Herpes partner:  no  Chlamydia:  no  Gonorrhea:  no  HPV: No  BV:  No  Syphilis:  No  Chicken Pox:  Yes   Gets UTIs easily - is currently taking Nitrofuratoin for a UTI diagnosed last week in urgent care on       OB HISTORY  OB History    Para Term  AB Living   1 0 0 0 0 0   SAB IAB Ectopic Multiple Live Births   0 0 0 0 0      # Outcome Date GA Lbr Sincere/2nd Weight Sex Delivery Anes PTL Lv   1 Current                Low Dose Aspirin for Preeclampsia Prevention:  Consider for those with 1 high risk or 2  moderate risk factors     High risk: previous pregnancy with preeclampsia, multifetal gestation, chronic htn, diabetes, chronic kidney disease, autoimmune disorder     Medium risk: nulliparity, BMI >30, family hx preeclampsia, age >/= 35,  race, low SES, personal risk factors (hx low birth weight, hx stillbirth, >10yrs between pregnancies).      Patient not qualify for low dose aspirin therapy        PERSONAL HISTORY  Exercise Habits:  walking irregularly- walks a lot at work   Employment: Full time.  Her job  involves moderate activity with no potential for toxic exposure.    Travel plans:  are intra [place] air travel- [place] in August  Diet: eats at irregular times with nausea  Prenatal vitamins? Yes:   Fish Oil? No  Abuse concerns? No  Any history if abuse, varbal, physical, sexual? No  Hgb A1c screen:  BMI > 30: No, 1st degree family DM: No, History of GDM: No, PCOS: No, High risk ethnicity: No    Social History     Socioeconomic History     Marital status: Single     Spouse name: Laith Murphy     Number of children: Not on file     Years of education: Not on file     Highest education level: Bachelor's degree (e.g., BA, AB, BS)   Occupational History     Occupation:    Tobacco Use     Smoking status: Never Smoker     Smokeless tobacco: Never Used   Vaping Use     Vaping Use: Never used   Substance and Sexual Activity     Alcohol use: Not Currently     Alcohol/week: 0.0 standard drinks     Drug use: Yes     Types: Marijuana, Cocaine     Sexual activity: Yes     Partners: Male     Birth control/protection: None   Other Topics Concern     Not on file   Social History Narrative             Social Determinants of Health     Financial Resource Strain: Not on file   Food Insecurity: Not on file   Transportation Needs: Not on file   Physical Activity: Not on file   Stress: Not on file   Social Connections: Not on file   Intimate Partner Violence: Not on file   Housing Stability: Not on file         She  reports that she has never smoked. She has never used smokeless tobacco.    STD testing offered?  Accepted  Last PHQ-9 score on record =     PHQ-9 SCORE 4/7/2022   PHQ-9 Total Score MyChart 5 (Mild depression)   PHQ-9 Total Score 5     Last GAD7 score on record =   KRISTA-7 SCORE 9/21/2021   Total Score 13 (moderate anxiety)   Total Score 13     Alcohol Score = 0  Referral/Meds needed? no    PAST MEDICAL/SURGICAL HISTORY  Past Medical History:   Diagnosis Date     Bipolar affective disorder (H)      Constipation       "Depressive disorder      Uncomplicated asthma      Past Surgical History:   Procedure Laterality Date     EXTRACTION(S) DENTAL       ORTHOPEDIC SURGERY      had pins put in arm age 2       FAMILY HISTORY  Family History   Problem Relation Age of Onset     Bipolar Disorder Mother      Hyperlipidemia Father      Impaired Fasting Glucose Father      Depression Sister      Diabetes Paternal Grandmother          ROS:  CONSTITUTIONAL: NEGATIVE for fever, chills, change in weight  INTEGUMENTARU/SKIN: NEGATIVE for worrisome rashes, moles or lesions  EYES: NEGATIVE for vision changes or irritation  ENT: NEGATIVE for ear, mouth and throat problems  RESP: NEGATIVE for significant cough or SOB  BREAST: NEGATIVE for masses, tenderness or discharge  CV: NEGATIVE for chest pain, palpitations or peripheral edema  GI: NEGATIVE for nausea, abdominal pain, heartburn, or change in bowel habits  : NEGATIVE for unusual urinary or vaginal symptoms. Periods are regular.  MUSCULOSKELETAL: NEGATIVE for significant arthralgias or myalgia  NEURO: NEGATIVE for weakness, dizziness or paresthesias  PSYCHIATRIC: NEGATIVE for changes in mood or affect    PHYSICAL EXAM  Vitals: /70 (BP Location: Left arm, Cuff Size: Adult Regular)   Ht 1.689 m (5' 6.5\")   Wt 76 kg (167 lb 9.6 oz)   LMP 02/03/2022   BMI 26.65 kg/m    BMI= Body mass index is 26.65 kg/m .     GENERAL:  22 year old pleasant pregnant female, alert, cooperative and well groomed.  NECK:  Thyroid without enlargement and nodules.  Lymph nodes not palpable.   LUNGS:  Clear to auscultation.  BREAST:  Declined  HEART:  RRR without murmur.  ABDOMEN: Soft without masses or tenderness.  No scars noted..  GENITALIA: Declined exam  LOWER EXTREMITIES: No edema. No significant varicosities.    ASSESSMENT/PLAN:    IUP at 9w6d    ICD-10-CM    1. Encounter for supervision of normal first pregnancy in first trimester  Z34.01 NEISSERIA GONORRHOEA PCR     CHLAMYDIA TRACHOMATIS PCR   2. Nausea " and vomiting in pregnancy  O21.9 scopolamine (TRANSDERM) 1 MG/3DAYS 72 hr patch     ondansetron (ZOFRAN-ODT) 4 MG ODT tab        consult for US for AMA patients: No   Genetic Testing reviewed and discussed, patient desires [facility]. 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 She has lost around 7lbs from her pre-pregnancy weight. An RX for scopolamine patches were provided which she is going to try. She has been instructed to call if she finds no relief from nausea rather than waiting 4 weeks for her next visit. PO fluids are encouraged as well to help keep her hydrated and with the constipation she is experiencing.       Instructed on use of triage nurse line and contacting the on call CNM after hours in an emergency.     Symptoms of N&V and fatigue usually start to resolve around 12-16 weeks     Reviewed CNM philosophy, call schedule for labor and delivery, and [facility] for delivery    1st OB handout given outlining appointment spacing and CNM information    Reviewed exercise and nutrition    Recommend to gain 20-30 pounds with her pregnancy.    Discussed OTC medications. OB med list given    Encouraged patient to arrange  if needed    Encouraged patient to take PNV's/DHA    Travel precautions discussed, no air travel after 36 weeks and Zika Virus discussed    Will call patient with lab results when available    Does patient desire a RN home visit from the [facility] at the [facility]?  No    If yes, paperwork completed?  No     F/U to be addressed next visit:  Nausea, constipation (she is going to try hydrating more), Rx's given    Will return to the clinic in 1 week for labs and 4 weeks for her next routine prenatal check.  Will call to be seen sooner if problems arise.    LUZMA Whittaker    I was present with the student who participated in the service and documentation of the services provided. I have verified the history and personally  performed the physical exam and medical decision making as documented by the student and edited by me.  ALOK Vidal, KAYLA 4/13/2022 1:00 PM

## 2022-04-13 NOTE — NURSING NOTE
"Chief Complaint   Patient presents with     Prenatal Care     New OB, 9w 6d, had U/S this morning. NPN blood work needs to be done. Reports that she has had N&V with pregnancy. Patient stated that she is concerned that she may have an allergy to amoxicillin. She stated that it caused vomiting when she tried it 1-2 weeks ago. She also noted that her twin sister has an allergy to amoxicillin.      Women's Health     UA/UC on 2022 positive for UTI. Last pap: 3/18/2021: NIL       Initial /70 (BP Location: Left arm, Cuff Size: Adult Regular)   Ht 1.689 m (5' 6.5\")   Wt 76 kg (167 lb 9.6 oz)   LMP 2022   BMI 26.65 kg/m   Estimated body mass index is 26.65 kg/m  as calculated from the following:    Height as of this encounter: 1.689 m (5' 6.5\").    Weight as of this encounter: 76 kg (167 lb 9.6 oz).  BP completed using cuff size: regular    Questioned patient about current smoking habits.  Pt. has never smoked.          The following HM Due: Sheila (13-)    "

## 2022-04-13 NOTE — PATIENT INSTRUCTIONS
Thank you for coming to see the Midwives at the   [facility]    We will notify you about your labs that were drawn today once we get the results back or if you have [employer] they will be posted there as well    We will call you personally with results that require further discussion    If any referrals were ordered today you should be getting a call in the next week or you may need to call the number listed with your referral to schedule.          If you need any refills of medications please call your pharmacy and they will contact us    If you have any questions or concerns before your next visit, you can reach the nurse midwife on call by calling the clinic number at [phone].    If you  wish to schedule another appointment, please call our office at [phone]. You can also make appointments through [employer]      If you have a medical emergency please call [phone].    Because you are pregnant, we have additional resources for you:    You may call our consulting RN's during normal business hours for non-urgent questions about your pregnancy.    After hours you may also page the midwife on call for urgent questions or issues by calling the triage line at [phone].  There is always a midwife on call 24 hours a day.    Prenatal Reminders:    Before 14 weeks: Dating ultrasound, Genetic testing       This ultrasound helps us determine your dates accurately. Verifi can be drawn anytime after 10 weeks of gestation  16 weeks: Optional genetic testing (quad screen) or single AFP       This testing helps understand your baby's risk for some genetic abnormalities.  20 weeks:  Screening ultrasound (fetal survey)       This testing will look for early growth abnormalities, and may tell the baby's sex if you wish to find out.  28 weeks: One hour sugar test (GCT), hemoglobin and platelets       This test helps identify diabetes of pregnancy or gestational diabetes.  We also look      at the iron in your blood and how  well your blood clots.  28 - 36 weeks: Tetanus shot (Tdap)       This shot helps protect you and your baby from tetanus and whooping cough.  36 weeks and later: Group B Strep test (GBS)       This test helps predict if you need antibiotics in labor to prevent infection in your baby.  Anytime September to April:  Flu shot       This shot helps protect you and your family from the flu.  This is especially important during pregnancy        Any time during or after your pregnancy you may experience increased depression and/or mood changes.    We are here to support you. Please contact us if you are:  Feeling anxious  Overwhelmed or sad   Trouble sleeping  Crying uncontrollably  Trouble caring for yourself or baby.    The typical schedule after your first visit today you can expect:     Visit 2 - 12-16 weeks  Visit 3 - 20 weeks  Visit 4 - 24 weeks  Visit 5 - 28 weeks  Visit 6 - 32 weeks  Visit 7 - 34 weeks  Visit 8 - 36 weeks  Weekly after 36 weeks until delivery.    If anything comes up between your visits or you have concerns please don't hesitate to contact us.    Secure access to your medical record:  Use [employer] (secure email communication and access to your chart) to send your primary care provider a message or make an appointment. Ask someone on your Team how to sign up for [employer]. To log on to GRAYL or for more information in [employer] please visit the website at [email or web address].       Certified Nurse Midwife (CNM) Team  ALOK Vidal, CNALOK Davis, RENATAM  ALOK Morris, CNM  ALOK Haddad, ALOK Juarez, KAYLA    Again, thank you for choosing the midwives at [facility].  We are excited to be a part of your pregnancy. Please let us know how we can best partner with you to improve your and your family's health.

## 2022-04-14 LAB
C TRACH DNA SPEC QL NAA+PROBE: NEGATIVE
N GONORRHOEA DNA SPEC QL NAA+PROBE: NEGATIVE

## 2022-04-18 PROBLEM — O21.9 NAUSEA/VOMITING IN PREGNANCY: Status: ACTIVE | Noted: 2022-04-18

## 2022-04-19 ENCOUNTER — INFUSION THERAPY VISIT (OUTPATIENT)
Dept: INFUSION THERAPY | Facility: CLINIC | Age: 22
End: 2022-04-19
Attending: ADVANCED PRACTICE MIDWIFE
Payer: COMMERCIAL

## 2022-04-19 VITALS
DIASTOLIC BLOOD PRESSURE: 69 MMHG | SYSTOLIC BLOOD PRESSURE: 100 MMHG | HEART RATE: 71 BPM | OXYGEN SATURATION: 97 % | TEMPERATURE: 97.7 F | RESPIRATION RATE: 16 BRPM

## 2022-04-19 DIAGNOSIS — O21.9 NAUSEA/VOMITING IN PREGNANCY: Primary | ICD-10-CM

## 2022-04-19 PROCEDURE — 96361 HYDRATE IV INFUSION ADD-ON: CPT

## 2022-04-19 PROCEDURE — 258N000003 HC RX IP 258 OP 636: Performed by: ADVANCED PRACTICE MIDWIFE

## 2022-04-19 PROCEDURE — 96374 THER/PROPH/DIAG INJ IV PUSH: CPT

## 2022-04-19 PROCEDURE — 250N000011 HC RX IP 250 OP 636: Performed by: ADVANCED PRACTICE MIDWIFE

## 2022-04-19 RX ORDER — DEXTROSE, SODIUM CHLORIDE, SODIUM LACTATE, POTASSIUM CHLORIDE, AND CALCIUM CHLORIDE 5; .6; .31; .03; .02 G/100ML; G/100ML; G/100ML; G/100ML; G/100ML
1000 INJECTION, SOLUTION INTRAVENOUS ONCE
Status: COMPLETED | OUTPATIENT
Start: 2022-04-19 | End: 2022-04-19

## 2022-04-19 RX ORDER — ONDANSETRON 2 MG/ML
4 INJECTION INTRAMUSCULAR; INTRAVENOUS ONCE
Status: CANCELLED | OUTPATIENT
Start: 2022-04-19 | End: 2022-04-19

## 2022-04-19 RX ORDER — HEPARIN SODIUM,PORCINE 10 UNIT/ML
5 VIAL (ML) INTRAVENOUS
Status: CANCELLED | OUTPATIENT
Start: 2022-04-19

## 2022-04-19 RX ORDER — HEPARIN SODIUM (PORCINE) LOCK FLUSH IV SOLN 100 UNIT/ML 100 UNIT/ML
5 SOLUTION INTRAVENOUS
Status: CANCELLED | OUTPATIENT
Start: 2022-04-19

## 2022-04-19 RX ORDER — ONDANSETRON 2 MG/ML
4 INJECTION INTRAMUSCULAR; INTRAVENOUS ONCE
Status: COMPLETED | OUTPATIENT
Start: 2022-04-19 | End: 2022-04-19

## 2022-04-19 RX ORDER — DEXTROSE, SODIUM CHLORIDE, SODIUM LACTATE, POTASSIUM CHLORIDE, AND CALCIUM CHLORIDE 5; .6; .31; .03; .02 G/100ML; G/100ML; G/100ML; G/100ML; G/100ML
1000 INJECTION, SOLUTION INTRAVENOUS ONCE
Status: CANCELLED | OUTPATIENT
Start: 2022-04-19 | End: 2022-04-19

## 2022-04-19 RX ADMIN — SODIUM CHLORIDE, SODIUM LACTATE, POTASSIUM CHLORIDE, CALCIUM CHLORIDE AND DEXTROSE MONOHYDRATE 1000 ML: 5; 600; 310; 30; 20 INJECTION, SOLUTION INTRAVENOUS at 14:48

## 2022-04-19 RX ADMIN — SODIUM CHLORIDE, POTASSIUM CHLORIDE, SODIUM LACTATE AND CALCIUM CHLORIDE 1000 ML: 600; 310; 30; 20 INJECTION, SOLUTION INTRAVENOUS at 13:40

## 2022-04-19 RX ADMIN — ONDANSETRON 4 MG: 2 INJECTION INTRAMUSCULAR; INTRAVENOUS at 14:45

## 2022-04-19 ASSESSMENT — PAIN SCALES - GENERAL: PAINLEVEL: NO PAIN (0)

## 2022-04-19 NOTE — PROGRESS NOTES
Infusion Nursing Note:  Pau Sanchez presents today for IV hydration/Zofran.    Patient seen by provider today: No   present during visit today: Not Applicable.    Note: Has c/o nausea.  Started on Reglan last night and emesis has decreased.  Has c/o dizziness with change in position.      Intravenous Access:  Peripheral IV placed.    Treatment Conditions:  Not Applicable.      Post Infusion Assessment:  Patient tolerated infusion without incident.  Blood return noted pre and post infusion.  Site patent and intact, free from redness, edema or discomfort.  No evidence of extravasations.  Access discontinued per protocol.       Discharge Plan:   Discharge instructions reviewed with: Patient.  Patient discharged in stable condition accompanied by: self.  Departure Mode: Ambulatory.      JUAQUIN WINTERS RN

## 2022-04-27 ENCOUNTER — LAB (OUTPATIENT)
Dept: LAB | Facility: CLINIC | Age: 22
End: 2022-04-27
Payer: COMMERCIAL

## 2022-04-27 DIAGNOSIS — R30.0 DYSURIA: ICD-10-CM

## 2022-04-27 DIAGNOSIS — R82.71 BACTERIURIA DURING PREGNANCY: Primary | ICD-10-CM

## 2022-04-27 DIAGNOSIS — O99.891 BACTERIURIA DURING PREGNANCY: Primary | ICD-10-CM

## 2022-04-27 LAB
ALBUMIN UR-MCNC: 30 MG/DL
APPEARANCE UR: CLEAR
BACTERIA #/AREA URNS HPF: ABNORMAL /HPF
BILIRUB UR QL STRIP: NEGATIVE
COLOR UR AUTO: YELLOW
GLUCOSE UR STRIP-MCNC: NEGATIVE MG/DL
HGB UR QL STRIP: NEGATIVE
KETONES UR STRIP-MCNC: NEGATIVE MG/DL
LEUKOCYTE ESTERASE UR QL STRIP: ABNORMAL
NITRATE UR QL: NEGATIVE
PH UR STRIP: 8.5 [PH] (ref 5–7)
RBC #/AREA URNS AUTO: ABNORMAL /HPF
SP GR UR STRIP: 1.02 (ref 1–1.03)
SQUAMOUS #/AREA URNS AUTO: ABNORMAL /LPF
UROBILINOGEN UR STRIP-ACNC: 1 E.U./DL
WBC #/AREA URNS AUTO: ABNORMAL /HPF

## 2022-04-27 PROCEDURE — 87086 URINE CULTURE/COLONY COUNT: CPT

## 2022-04-27 PROCEDURE — 81001 URINALYSIS AUTO W/SCOPE: CPT

## 2022-04-27 RX ORDER — SULFAMETHOXAZOLE/TRIMETHOPRIM 800-160 MG
1 TABLET ORAL 2 TIMES DAILY
Qty: 6 TABLET | Refills: 0 | Status: SHIPPED | OUTPATIENT
Start: 2022-04-27 | End: 2022-04-30

## 2022-04-29 LAB — BACTERIA UR CULT: NORMAL

## 2022-05-02 DIAGNOSIS — O21.9 NAUSEA/VOMITING IN PREGNANCY: Primary | ICD-10-CM

## 2022-05-02 RX ORDER — HEPARIN SODIUM (PORCINE) LOCK FLUSH IV SOLN 100 UNIT/ML 100 UNIT/ML
5 SOLUTION INTRAVENOUS
Status: CANCELLED | OUTPATIENT
Start: 2022-05-02

## 2022-05-02 RX ORDER — ONDANSETRON 2 MG/ML
4 INJECTION INTRAMUSCULAR; INTRAVENOUS ONCE
Status: CANCELLED | OUTPATIENT
Start: 2022-05-02 | End: 2022-05-02

## 2022-05-02 RX ORDER — HEPARIN SODIUM,PORCINE 10 UNIT/ML
5 VIAL (ML) INTRAVENOUS
Status: CANCELLED | OUTPATIENT
Start: 2022-05-02

## 2022-05-04 ENCOUNTER — LAB (OUTPATIENT)
Dept: INFUSION THERAPY | Facility: CLINIC | Age: 22
End: 2022-05-04
Attending: REGISTERED NURSE
Payer: COMMERCIAL

## 2022-05-04 VITALS
TEMPERATURE: 97.7 F | DIASTOLIC BLOOD PRESSURE: 62 MMHG | HEART RATE: 70 BPM | RESPIRATION RATE: 18 BRPM | OXYGEN SATURATION: 99 % | SYSTOLIC BLOOD PRESSURE: 101 MMHG

## 2022-05-04 DIAGNOSIS — O21.9 NAUSEA/VOMITING IN PREGNANCY: Primary | ICD-10-CM

## 2022-05-04 PROCEDURE — 250N000011 HC RX IP 250 OP 636: Performed by: REGISTERED NURSE

## 2022-05-04 PROCEDURE — 96365 THER/PROPH/DIAG IV INF INIT: CPT

## 2022-05-04 PROCEDURE — 250N000009 HC RX 250: Performed by: REGISTERED NURSE

## 2022-05-04 PROCEDURE — 258N000003 HC RX IP 258 OP 636: Performed by: REGISTERED NURSE

## 2022-05-04 PROCEDURE — 96375 TX/PRO/DX INJ NEW DRUG ADDON: CPT

## 2022-05-04 RX ORDER — HEPARIN SODIUM,PORCINE 10 UNIT/ML
5 VIAL (ML) INTRAVENOUS
Status: CANCELLED | OUTPATIENT
Start: 2022-05-07

## 2022-05-04 RX ORDER — ONDANSETRON 2 MG/ML
4 INJECTION INTRAMUSCULAR; INTRAVENOUS ONCE
Status: COMPLETED | OUTPATIENT
Start: 2022-05-04 | End: 2022-05-04

## 2022-05-04 RX ORDER — HEPARIN SODIUM (PORCINE) LOCK FLUSH IV SOLN 100 UNIT/ML 100 UNIT/ML
5 SOLUTION INTRAVENOUS
Status: CANCELLED | OUTPATIENT
Start: 2022-05-07

## 2022-05-04 RX ORDER — ONDANSETRON 2 MG/ML
4 INJECTION INTRAMUSCULAR; INTRAVENOUS ONCE
Status: CANCELLED | OUTPATIENT
Start: 2022-05-07 | End: 2022-05-07

## 2022-05-04 RX ADMIN — ONDANSETRON 4 MG: 2 INJECTION INTRAMUSCULAR; INTRAVENOUS at 14:12

## 2022-05-04 RX ADMIN — ASCORBIC ACID, VITAMIN A PALMITATE, CHOLECALCIFEROL, THIAMINE HYDROCHLORIDE, RIBOFLAVIN-5 PHOSPHATE SODIUM, PYRIDOXINE HYDROCHLORIDE, NIACINAMIDE, DEXPANTHENOL, ALPHA-TOCOPHEROL ACETATE, VITAMIN K1, FOLIC ACID, BIOTIN, CYANOCOBALAMIN: 200; 3300; 200; 6; 3.6; 6; 40; 15; 10; 150; 600; 60; 5 INJECTION, SOLUTION INTRAVENOUS at 14:12

## 2022-05-04 ASSESSMENT — PAIN SCALES - GENERAL: PAINLEVEL: NO PAIN (0)

## 2022-05-04 NOTE — PROGRESS NOTES
Infusion Nursing Note:  Pau Sanchez presents today for IVF, Zofran.    Patient seen by provider today: No   present during visit today: Not Applicable.    Note: N/A.    Intravenous Access:  Peripheral IV placed.    Treatment Conditions:  Not Applicable.    Post Infusion Assessment:  Patient tolerated infusion without incident.  Blood return noted pre and post infusion.  Site patent and intact, free from redness, edema or discomfort.  No evidence of extravasations.  Access discontinued per protocol.     Discharge Plan:   AVS to patient via MYCHART.    Patient discharged in stable condition accompanied by: self.  Departure Mode: Ambulatory.      Isi Rojas RN

## 2022-05-09 ENCOUNTER — INFUSION THERAPY VISIT (OUTPATIENT)
Dept: INFUSION THERAPY | Facility: CLINIC | Age: 22
End: 2022-05-09
Attending: REGISTERED NURSE
Payer: COMMERCIAL

## 2022-05-09 VITALS
RESPIRATION RATE: 16 BRPM | HEART RATE: 75 BPM | DIASTOLIC BLOOD PRESSURE: 61 MMHG | OXYGEN SATURATION: 98 % | SYSTOLIC BLOOD PRESSURE: 86 MMHG | TEMPERATURE: 98.2 F

## 2022-05-09 DIAGNOSIS — O21.9 NAUSEA/VOMITING IN PREGNANCY: Primary | ICD-10-CM

## 2022-05-09 PROCEDURE — 96375 TX/PRO/DX INJ NEW DRUG ADDON: CPT

## 2022-05-09 PROCEDURE — 250N000009 HC RX 250: Performed by: REGISTERED NURSE

## 2022-05-09 PROCEDURE — 258N000003 HC RX IP 258 OP 636: Performed by: REGISTERED NURSE

## 2022-05-09 PROCEDURE — 250N000011 HC RX IP 250 OP 636: Performed by: REGISTERED NURSE

## 2022-05-09 PROCEDURE — 96365 THER/PROPH/DIAG IV INF INIT: CPT

## 2022-05-09 RX ORDER — ONDANSETRON 2 MG/ML
4 INJECTION INTRAMUSCULAR; INTRAVENOUS ONCE
Status: CANCELLED | OUTPATIENT
Start: 2022-05-10 | End: 2022-05-10

## 2022-05-09 RX ORDER — HEPARIN SODIUM (PORCINE) LOCK FLUSH IV SOLN 100 UNIT/ML 100 UNIT/ML
5 SOLUTION INTRAVENOUS
Status: CANCELLED | OUTPATIENT
Start: 2022-05-10

## 2022-05-09 RX ORDER — HEPARIN SODIUM,PORCINE 10 UNIT/ML
5 VIAL (ML) INTRAVENOUS
Status: CANCELLED | OUTPATIENT
Start: 2022-05-10

## 2022-05-09 RX ORDER — ONDANSETRON 2 MG/ML
4 INJECTION INTRAMUSCULAR; INTRAVENOUS ONCE
Status: COMPLETED | OUTPATIENT
Start: 2022-05-09 | End: 2022-05-09

## 2022-05-09 RX ADMIN — ASCORBIC ACID, VITAMIN A PALMITATE, CHOLECALCIFEROL, THIAMINE HYDROCHLORIDE, RIBOFLAVIN-5 PHOSPHATE SODIUM, PYRIDOXINE HYDROCHLORIDE, NIACINAMIDE, DEXPANTHENOL, ALPHA-TOCOPHEROL ACETATE, VITAMIN K1, FOLIC ACID, BIOTIN, CYANOCOBALAMIN: 200; 3300; 200; 6; 3.6; 6; 40; 15; 10; 150; 600; 60; 5 INJECTION, SOLUTION INTRAVENOUS at 11:01

## 2022-05-09 RX ADMIN — ONDANSETRON 4 MG: 2 INJECTION INTRAMUSCULAR; INTRAVENOUS at 10:48

## 2022-05-09 NOTE — PROGRESS NOTES
Infusion Nursing Note:  Pau Sanchez presents today for IVF, IV Zofran.    Patient seen by provider today: No   present during visit today: Not Applicable.    Note: Still has ongoing nausea and vomiting. Feels better for ~ 24hrs after infusion appt then N/V returns.   Denies fever/chills/cough/SOB/chest pain.    Intravenous Access:  Peripheral IV placed.    Treatment Conditions:  Not Applicable.      Post Infusion Assessment:  Patient tolerated infusion without incident.  Blood return noted pre and post infusion.  Site patent and intact, free from redness, edema or discomfort.  No evidence of extravasations.  Access discontinued per protocol.       Discharge Plan:   AVS to patient via MYCHART.  Patient will return PRN for next appointment.   Patient discharged in stable condition accompanied by: self.  Departure Mode: Ambulatory.      Jonathan Naranjo RN

## 2022-05-11 ENCOUNTER — PRENATAL OFFICE VISIT (OUTPATIENT)
Dept: MIDWIFE SERVICES | Facility: CLINIC | Age: 22
End: 2022-05-11
Payer: COMMERCIAL

## 2022-05-11 VITALS — SYSTOLIC BLOOD PRESSURE: 92 MMHG | BODY MASS INDEX: 25.6 KG/M2 | DIASTOLIC BLOOD PRESSURE: 50 MMHG | WEIGHT: 161 LBS

## 2022-05-11 DIAGNOSIS — Z34.02 ENCOUNTER FOR SUPERVISION OF NORMAL FIRST PREGNANCY IN SECOND TRIMESTER: Primary | ICD-10-CM

## 2022-05-11 DIAGNOSIS — Z34.01 ENCOUNTER FOR SUPERVISION OF NORMAL FIRST PREGNANCY IN FIRST TRIMESTER: ICD-10-CM

## 2022-05-11 LAB
ABO/RH(D): NORMAL
ANTIBODY SCREEN: NEGATIVE
HBV SURFACE AG SERPL QL IA: NONREACTIVE
HCV AB SERPL QL IA: NONREACTIVE
HIV 1+2 AB+HIV1 P24 AG SERPL QL IA: NONREACTIVE
SPECIMEN EXPIRATION DATE: NORMAL

## 2022-05-11 PROCEDURE — 99207 PR PRENATAL VISIT: CPT | Performed by: ADVANCED PRACTICE MIDWIFE

## 2022-05-11 PROCEDURE — 36415 COLL VENOUS BLD VENIPUNCTURE: CPT | Performed by: ADVANCED PRACTICE MIDWIFE

## 2022-05-11 PROCEDURE — 86762 RUBELLA ANTIBODY: CPT | Performed by: ADVANCED PRACTICE MIDWIFE

## 2022-05-11 PROCEDURE — 86901 BLOOD TYPING SEROLOGIC RH(D): CPT | Performed by: ADVANCED PRACTICE MIDWIFE

## 2022-05-11 PROCEDURE — 86900 BLOOD TYPING SEROLOGIC ABO: CPT | Performed by: ADVANCED PRACTICE MIDWIFE

## 2022-05-11 PROCEDURE — 86850 RBC ANTIBODY SCREEN: CPT | Performed by: ADVANCED PRACTICE MIDWIFE

## 2022-05-11 PROCEDURE — 87389 HIV-1 AG W/HIV-1&-2 AB AG IA: CPT | Performed by: ADVANCED PRACTICE MIDWIFE

## 2022-05-11 PROCEDURE — 86803 HEPATITIS C AB TEST: CPT | Performed by: ADVANCED PRACTICE MIDWIFE

## 2022-05-11 PROCEDURE — 86780 TREPONEMA PALLIDUM: CPT | Performed by: ADVANCED PRACTICE MIDWIFE

## 2022-05-11 PROCEDURE — 87340 HEPATITIS B SURFACE AG IA: CPT | Performed by: ADVANCED PRACTICE MIDWIFE

## 2022-05-11 NOTE — NURSING NOTE
"Chief Complaint   Patient presents with     Prenatal Care     13w 6d, [facility] today       Initial BP 92/50 (BP Location: Right arm, Cuff Size: Adult Regular)   Wt 73 kg (161 lb)   LMP 2022   BMI 25.60 kg/m   Estimated body mass index is 25.6 kg/m  as calculated from the following:    Height as of 22: 1.689 m (5' 6.5\").    Weight as of this encounter: 73 kg (161 lb).  BP completed using cuff size: regular    Questioned patient about current smoking habits.  Pt. has never smoked.          The following HM Due: NONE    "

## 2022-05-11 NOTE — PATIENT INSTRUCTIONS
Weeks 14 thru 16 - Gestational Age (Fetal age - Weeks 12 thru 14):  The fetus s skin is thin and see-through. Fine hair called lanugo begins to form on the head. The fetus begins sucking and swallows bits of amniotic fluid. Fingerprints which individualize each human being have now begun to develop on the tiny fingers of the fetus. Meconium is made in the intestinal tract and will build up to be the baby s first bowel movement. Flutters may be felt in the mom s growing abdomen, as the fetus begins to move around more. Sweat glands have developed, and the liver and pancreas produce fluid secretions. The fetus has reached 6 inches in length and weighs about 4 ounces

## 2022-05-11 NOTE — PROGRESS NOTES
S: Patient feels a little better. Has been having IV hydration therapy 2x/week.  Patient has had nausea and has had vomiting  O: BP 92/50 (BP Location: Right arm, Cuff Size: Adult Regular)   Wt 73 kg (161 lb)   LMP 02/03/2022   BMI 25.60 kg/m         Exam:  Constitutional: healthy, alert and no distress  Respiratory: Respirations even and unlabored  Gastrointestinal: Abdomen soft, non-tender. Fundus measures appropriately for gestational age. Fetal heart tones heard easily  Psychiatric: mentation appears normal and affect normal/bright  A:    Diagnosis Comments   1. Encounter for supervision of normal first pregnancy in second trimester  Non Invasive Prenatal Test Cell Free DNA        P:  Educated about diet and exercise and normal weight gain  Normal to feel movement between 18-22 weeks  Reviewed labs from 1st OB  Discussed genetic screening; patient is having NIPT today.  Warning signs discussed  Discussed option for Quad screen at next visit.   Return to clinic 4 weeks  Encouraged patient to call with any questions or concerns.    ALOK Vidal, CNM

## 2022-05-12 LAB
RUBV IGG SERPL QL IA: 1.91 INDEX
RUBV IGG SERPL QL IA: POSITIVE
T PALLIDUM AB SER QL: NONREACTIVE

## 2022-05-16 ENCOUNTER — INFUSION THERAPY VISIT (OUTPATIENT)
Dept: INFUSION THERAPY | Facility: CLINIC | Age: 22
End: 2022-05-16
Attending: REGISTERED NURSE
Payer: COMMERCIAL

## 2022-05-16 VITALS
OXYGEN SATURATION: 97 % | DIASTOLIC BLOOD PRESSURE: 57 MMHG | SYSTOLIC BLOOD PRESSURE: 104 MMHG | TEMPERATURE: 98.7 F | HEART RATE: 81 BPM

## 2022-05-16 DIAGNOSIS — O21.9 NAUSEA/VOMITING IN PREGNANCY: Primary | ICD-10-CM

## 2022-05-16 PROCEDURE — 96375 TX/PRO/DX INJ NEW DRUG ADDON: CPT

## 2022-05-16 PROCEDURE — 250N000009 HC RX 250: Performed by: REGISTERED NURSE

## 2022-05-16 PROCEDURE — 258N000003 HC RX IP 258 OP 636: Performed by: REGISTERED NURSE

## 2022-05-16 PROCEDURE — 96374 THER/PROPH/DIAG INJ IV PUSH: CPT

## 2022-05-16 PROCEDURE — 250N000011 HC RX IP 250 OP 636: Performed by: REGISTERED NURSE

## 2022-05-16 RX ORDER — ONDANSETRON 2 MG/ML
4 INJECTION INTRAMUSCULAR; INTRAVENOUS ONCE
Status: COMPLETED | OUTPATIENT
Start: 2022-05-16 | End: 2022-05-16

## 2022-05-16 RX ORDER — ONDANSETRON 2 MG/ML
4 INJECTION INTRAMUSCULAR; INTRAVENOUS ONCE
Status: CANCELLED | OUTPATIENT
Start: 2022-05-17 | End: 2022-05-17

## 2022-05-16 RX ORDER — HEPARIN SODIUM (PORCINE) LOCK FLUSH IV SOLN 100 UNIT/ML 100 UNIT/ML
5 SOLUTION INTRAVENOUS
Status: CANCELLED | OUTPATIENT
Start: 2022-05-17

## 2022-05-16 RX ORDER — HEPARIN SODIUM,PORCINE 10 UNIT/ML
5 VIAL (ML) INTRAVENOUS
Status: CANCELLED | OUTPATIENT
Start: 2022-05-17

## 2022-05-16 RX ADMIN — ONDANSETRON 4 MG: 2 INJECTION INTRAMUSCULAR; INTRAVENOUS at 08:42

## 2022-05-16 RX ADMIN — ASCORBIC ACID, VITAMIN A PALMITATE, CHOLECALCIFEROL, THIAMINE HYDROCHLORIDE, RIBOFLAVIN-5 PHOSPHATE SODIUM, PYRIDOXINE HYDROCHLORIDE, NIACINAMIDE, DEXPANTHENOL, ALPHA-TOCOPHEROL ACETATE, VITAMIN K1, FOLIC ACID, BIOTIN, CYANOCOBALAMIN: 200; 3300; 200; 6; 3.6; 6; 40; 15; 10; 150; 600; 60; 5 INJECTION, SOLUTION INTRAVENOUS at 08:49

## 2022-05-16 NOTE — PROGRESS NOTES
Infusion Nursing Note:  Pau Sanchez presents today for IVF plus Zofran.    Patient seen by provider today: No   present during visit today: Not Applicable.    Note: Pt reports her weekend was a little rough as far as nausea was concerned.  Takes Reglan and Zofran every four hours alternating when at home.      Intravenous Access:  Peripheral IV placed.    Treatment Conditions:  Not Applicable.      Post Infusion Assessment:  Patient tolerated infusion without incident.  Blood return noted pre and post infusion.  Site patent and intact, free from redness, edema or discomfort.  No evidence of extravasations.  Access discontinued per protocol.       Discharge Plan:   [facility]  Patient will return 5/18/22 for IVF/Zofran for next appointment.   Patient discharged in stable condition accompanied by: self.  Departure Mode: Ambulatory.      Cherie Arias RN

## 2022-05-18 ENCOUNTER — INFUSION THERAPY VISIT (OUTPATIENT)
Dept: INFUSION THERAPY | Facility: CLINIC | Age: 22
End: 2022-05-18
Attending: REGISTERED NURSE
Payer: COMMERCIAL

## 2022-05-18 VITALS
HEART RATE: 96 BPM | SYSTOLIC BLOOD PRESSURE: 107 MMHG | DIASTOLIC BLOOD PRESSURE: 72 MMHG | OXYGEN SATURATION: 98 % | RESPIRATION RATE: 16 BRPM | TEMPERATURE: 98.2 F

## 2022-05-18 DIAGNOSIS — O21.9 NAUSEA/VOMITING IN PREGNANCY: Primary | ICD-10-CM

## 2022-05-18 PROCEDURE — 96365 THER/PROPH/DIAG IV INF INIT: CPT

## 2022-05-18 PROCEDURE — 96375 TX/PRO/DX INJ NEW DRUG ADDON: CPT

## 2022-05-18 PROCEDURE — 250N000011 HC RX IP 250 OP 636: Performed by: REGISTERED NURSE

## 2022-05-18 PROCEDURE — 258N000003 HC RX IP 258 OP 636: Performed by: REGISTERED NURSE

## 2022-05-18 PROCEDURE — 250N000009 HC RX 250: Performed by: REGISTERED NURSE

## 2022-05-18 RX ORDER — HEPARIN SODIUM,PORCINE 10 UNIT/ML
5 VIAL (ML) INTRAVENOUS
Status: CANCELLED | OUTPATIENT
Start: 2022-05-19

## 2022-05-18 RX ORDER — ONDANSETRON 2 MG/ML
4 INJECTION INTRAMUSCULAR; INTRAVENOUS ONCE
Status: CANCELLED | OUTPATIENT
Start: 2022-05-19 | End: 2022-05-19

## 2022-05-18 RX ORDER — HEPARIN SODIUM (PORCINE) LOCK FLUSH IV SOLN 100 UNIT/ML 100 UNIT/ML
5 SOLUTION INTRAVENOUS
Status: CANCELLED | OUTPATIENT
Start: 2022-05-19

## 2022-05-18 RX ORDER — ONDANSETRON 2 MG/ML
4 INJECTION INTRAMUSCULAR; INTRAVENOUS ONCE
Status: COMPLETED | OUTPATIENT
Start: 2022-05-18 | End: 2022-05-18

## 2022-05-18 RX ADMIN — ONDANSETRON 4 MG: 2 INJECTION INTRAMUSCULAR; INTRAVENOUS at 10:40

## 2022-05-18 RX ADMIN — ASCORBIC ACID, VITAMIN A PALMITATE, CHOLECALCIFEROL, THIAMINE HYDROCHLORIDE, RIBOFLAVIN-5 PHOSPHATE SODIUM, PYRIDOXINE HYDROCHLORIDE, NIACINAMIDE, DEXPANTHENOL, ALPHA-TOCOPHEROL ACETATE, VITAMIN K1, FOLIC ACID, BIOTIN, CYANOCOBALAMIN: 200; 3300; 200; 6; 3.6; 6; 40; 15; 10; 150; 600; 60; 5 INJECTION, SOLUTION INTRAVENOUS at 10:40

## 2022-05-18 NOTE — PROGRESS NOTES
Infusion Nursing Note:  Pau Sanchez presents today for IVF and IV Zofran.    Patient seen by provider today: No   present during visit today: Not Applicable.    Note: NA.      Intravenous Access:  Peripheral IV placed.    Treatment Conditions:  Not Applicable.      Post Infusion Assessment:  Patient tolerated infusion without incident.  Blood return noted pre and post infusion.  Site patent and intact, free from redness, edema or discomfort.  No evidence of extravasations.  Access discontinued per protocol.       Discharge Plan:   AVS to patient via MYCHART.  Patient will return 5/23 for next appointment.   Patient discharged in stable condition accompanied by: self.  Departure Mode: Ambulatory.      Jonathan Naranjo RN

## 2022-05-23 ENCOUNTER — INFUSION THERAPY VISIT (OUTPATIENT)
Dept: INFUSION THERAPY | Facility: CLINIC | Age: 22
End: 2022-05-23
Attending: REGISTERED NURSE
Payer: COMMERCIAL

## 2022-05-23 VITALS — HEART RATE: 63 BPM | OXYGEN SATURATION: 97 % | DIASTOLIC BLOOD PRESSURE: 77 MMHG | SYSTOLIC BLOOD PRESSURE: 119 MMHG

## 2022-05-23 DIAGNOSIS — O21.9 NAUSEA/VOMITING IN PREGNANCY: Primary | ICD-10-CM

## 2022-05-23 LAB — SCANNED LAB RESULT: NORMAL

## 2022-05-23 PROCEDURE — 250N000011 HC RX IP 250 OP 636: Performed by: REGISTERED NURSE

## 2022-05-23 PROCEDURE — 96365 THER/PROPH/DIAG IV INF INIT: CPT

## 2022-05-23 PROCEDURE — 96375 TX/PRO/DX INJ NEW DRUG ADDON: CPT

## 2022-05-23 PROCEDURE — 258N000003 HC RX IP 258 OP 636: Performed by: REGISTERED NURSE

## 2022-05-23 PROCEDURE — 250N000009 HC RX 250: Performed by: REGISTERED NURSE

## 2022-05-23 PROCEDURE — 96367 TX/PROPH/DG ADDL SEQ IV INF: CPT

## 2022-05-23 RX ORDER — HEPARIN SODIUM (PORCINE) LOCK FLUSH IV SOLN 100 UNIT/ML 100 UNIT/ML
5 SOLUTION INTRAVENOUS
Status: CANCELLED | OUTPATIENT
Start: 2022-05-24

## 2022-05-23 RX ORDER — ONDANSETRON 2 MG/ML
4 INJECTION INTRAMUSCULAR; INTRAVENOUS ONCE
Status: CANCELLED | OUTPATIENT
Start: 2022-05-24 | End: 2022-05-24

## 2022-05-23 RX ORDER — HEPARIN SODIUM,PORCINE 10 UNIT/ML
5 VIAL (ML) INTRAVENOUS
Status: CANCELLED | OUTPATIENT
Start: 2022-05-24

## 2022-05-23 RX ORDER — ONDANSETRON 2 MG/ML
4 INJECTION INTRAMUSCULAR; INTRAVENOUS ONCE
Status: COMPLETED | OUTPATIENT
Start: 2022-05-23 | End: 2022-05-23

## 2022-05-23 RX ADMIN — ONDANSETRON 4 MG: 2 INJECTION INTRAMUSCULAR; INTRAVENOUS at 08:17

## 2022-05-23 RX ADMIN — ASCORBIC ACID, VITAMIN A PALMITATE, CHOLECALCIFEROL, THIAMINE HYDROCHLORIDE, RIBOFLAVIN-5 PHOSPHATE SODIUM, PYRIDOXINE HYDROCHLORIDE, NIACINAMIDE, DEXPANTHENOL, ALPHA-TOCOPHEROL ACETATE, VITAMIN K1, FOLIC ACID, BIOTIN, CYANOCOBALAMIN: 200; 3300; 200; 6; 3.6; 6; 40; 15; 10; 150; 600; 60; 5 INJECTION, SOLUTION INTRAVENOUS at 08:25

## 2022-05-23 NOTE — PROGRESS NOTES
Infusion Nursing Note:  Pau Sanchez presents today for Zofran plus Infuvite.    Patient seen by provider today: No   present during visit today: Not Applicable.    Note: Pt states she had a rough day on Saturday in terms for nausea/vomiting. Only vomited once on Sunday.  Denies feeling dizzy/lightheaded.  States she seems to do better starting early evenings and that's when she gets most of her oral hydration in.    Pt had her gender reveal party this weekend...its a girl!      Intravenous Access:  Peripheral IV placed.    Treatment Conditions:  Not Applicable.      Post Infusion Assessment:  Patient tolerated infusion without incident.  Blood return noted pre and post infusion.  Site patent and intact, free from redness, edema or discomfort.  No evidence of extravasations.  Access discontinued per protocol.       Discharge Plan:   AVS to patient via MYCHART.  Patient will return 5/25/22 for Zofran/Infuvite for next appointment.   Patient discharged in stable condition accompanied by: self.  Departure Mode: Ambulatory.      Cherie Arias RN

## 2022-05-25 ENCOUNTER — INFUSION THERAPY VISIT (OUTPATIENT)
Dept: INFUSION THERAPY | Facility: CLINIC | Age: 22
End: 2022-05-25
Attending: REGISTERED NURSE
Payer: COMMERCIAL

## 2022-05-25 VITALS
TEMPERATURE: 98.4 F | DIASTOLIC BLOOD PRESSURE: 59 MMHG | OXYGEN SATURATION: 98 % | RESPIRATION RATE: 16 BRPM | SYSTOLIC BLOOD PRESSURE: 93 MMHG | HEART RATE: 64 BPM

## 2022-05-25 DIAGNOSIS — O21.9 NAUSEA/VOMITING IN PREGNANCY: Primary | ICD-10-CM

## 2022-05-25 PROCEDURE — 96375 TX/PRO/DX INJ NEW DRUG ADDON: CPT

## 2022-05-25 PROCEDURE — 258N000003 HC RX IP 258 OP 636: Performed by: REGISTERED NURSE

## 2022-05-25 PROCEDURE — 96365 THER/PROPH/DIAG IV INF INIT: CPT

## 2022-05-25 PROCEDURE — 250N000009 HC RX 250: Performed by: REGISTERED NURSE

## 2022-05-25 PROCEDURE — 250N000011 HC RX IP 250 OP 636: Performed by: REGISTERED NURSE

## 2022-05-25 RX ORDER — ONDANSETRON 2 MG/ML
4 INJECTION INTRAMUSCULAR; INTRAVENOUS ONCE
Status: COMPLETED | OUTPATIENT
Start: 2022-05-25 | End: 2022-05-25

## 2022-05-25 RX ORDER — ONDANSETRON 2 MG/ML
4 INJECTION INTRAMUSCULAR; INTRAVENOUS ONCE
Status: CANCELLED | OUTPATIENT
Start: 2022-05-26 | End: 2022-05-26

## 2022-05-25 RX ORDER — HEPARIN SODIUM (PORCINE) LOCK FLUSH IV SOLN 100 UNIT/ML 100 UNIT/ML
5 SOLUTION INTRAVENOUS
Status: DISCONTINUED | OUTPATIENT
Start: 2022-05-25 | End: 2022-05-25 | Stop reason: HOSPADM

## 2022-05-25 RX ORDER — HEPARIN SODIUM,PORCINE 10 UNIT/ML
5 VIAL (ML) INTRAVENOUS
Status: CANCELLED | OUTPATIENT
Start: 2022-05-26

## 2022-05-25 RX ORDER — HEPARIN SODIUM (PORCINE) LOCK FLUSH IV SOLN 100 UNIT/ML 100 UNIT/ML
5 SOLUTION INTRAVENOUS
Status: CANCELLED | OUTPATIENT
Start: 2022-05-26

## 2022-05-25 RX ORDER — HEPARIN SODIUM,PORCINE 10 UNIT/ML
5 VIAL (ML) INTRAVENOUS
Status: DISCONTINUED | OUTPATIENT
Start: 2022-05-25 | End: 2022-05-25 | Stop reason: HOSPADM

## 2022-05-25 RX ADMIN — ASCORBIC ACID, VITAMIN A PALMITATE, CHOLECALCIFEROL, THIAMINE HYDROCHLORIDE, RIBOFLAVIN-5 PHOSPHATE SODIUM, PYRIDOXINE HYDROCHLORIDE, NIACINAMIDE, DEXPANTHENOL, ALPHA-TOCOPHEROL ACETATE, VITAMIN K1, FOLIC ACID, BIOTIN, CYANOCOBALAMIN: 200; 3300; 200; 6; 3.6; 6; 40; 15; 10; 150; 600; 60; 5 INJECTION, SOLUTION INTRAVENOUS at 10:15

## 2022-05-25 RX ADMIN — ONDANSETRON 4 MG: 2 INJECTION INTRAMUSCULAR; INTRAVENOUS at 10:35

## 2022-05-25 ASSESSMENT — PAIN SCALES - GENERAL: PAINLEVEL: NO PAIN (0)

## 2022-05-25 NOTE — PROGRESS NOTES
Infusion Nursing Note:  Pau Sanchez presents today for IV hydration/Zofran.    Patient seen by provider today: No   present during visit today: Not Applicable.    Note: Feels like nausea, emesis, appetite and dizziness are slowly improving.      Intravenous Access:  Peripheral IV placed.    Treatment Conditions:  Not Applicable.      Post Infusion Assessment:  Patient tolerated infusion without incident.  Blood return noted pre and post infusion.  Site patent and intact, free from redness, edema or discomfort.  No evidence of extravasations.  Access discontinued per protocol.       Discharge Plan:   Discharge instructions reviewed with: Patient.  Patient discharged in stable condition accompanied by: self.  Departure Mode: Ambulatory.  Next infusion is scheduled for 5/31/22.      JUAQUIN WINTERS RN

## 2022-05-26 ENCOUNTER — MYC REFILL (OUTPATIENT)
Dept: URGENT CARE | Facility: URGENT CARE | Age: 22
End: 2022-05-26
Payer: COMMERCIAL

## 2022-05-26 DIAGNOSIS — O21.9 NAUSEA AND VOMITING IN PREGNANCY: ICD-10-CM

## 2022-05-26 DIAGNOSIS — R11.0 NAUSEA: ICD-10-CM

## 2022-05-26 RX ORDER — ONDANSETRON 4 MG/1
4 TABLET, ORALLY DISINTEGRATING ORAL EVERY 8 HOURS PRN
Qty: 30 TABLET | Refills: 1 | Status: ON HOLD | OUTPATIENT
Start: 2022-05-26 | End: 2022-11-19

## 2022-05-27 DIAGNOSIS — O21.9 NAUSEA/VOMITING IN PREGNANCY: ICD-10-CM

## 2022-05-27 RX ORDER — METOCLOPRAMIDE 5 MG/1
5 TABLET ORAL
Qty: 30 TABLET | Refills: 1 | Status: SHIPPED | OUTPATIENT
Start: 2022-05-27 | End: 2022-07-15

## 2022-05-27 RX ORDER — ONDANSETRON 4 MG/1
4 TABLET, ORALLY DISINTEGRATING ORAL EVERY 12 HOURS PRN
Qty: 20 TABLET | Refills: 0 | Status: SHIPPED | OUTPATIENT
Start: 2022-05-27 | End: 2022-06-16

## 2022-05-27 NOTE — TELEPHONE ENCOUNTER
"Requested Prescriptions   Pending Prescriptions Disp Refills     metoclopramide (REGLAN) 5 MG tablet 30 tablet 1     Sig: Take 1 tablet (5 mg) by mouth 4 times daily (before meals and nightly)        Antivertigo/Antiemetic Agents Passed - 5/27/2022  8:46 AM        Passed - Recent (12 mo) or future (30 days) visit within the authorizing provider's specialty     Patient has had an office visit with the authorizing provider or a provider within the authorizing providers department within the previous 12 mos or has a future within next 30 days. See \"Patient Info\" tab in inbasket, or \"Choose Columns\" in Meds & Orders section of the refill encounter.              Passed - Medication is active on med list        Passed - Patient is 18 years of age or older           16w1d    Filled.    Jennifer COOLEY RN BSN        "

## 2022-05-27 NOTE — TELEPHONE ENCOUNTER
Routing refill request to provider for review/approval because:  Filled by TORRES Varela RN

## 2022-05-31 ENCOUNTER — INFUSION THERAPY VISIT (OUTPATIENT)
Dept: INFUSION THERAPY | Facility: CLINIC | Age: 22
End: 2022-05-31
Attending: REGISTERED NURSE
Payer: COMMERCIAL

## 2022-05-31 VITALS
DIASTOLIC BLOOD PRESSURE: 57 MMHG | SYSTOLIC BLOOD PRESSURE: 99 MMHG | TEMPERATURE: 98.2 F | RESPIRATION RATE: 16 BRPM | OXYGEN SATURATION: 98 % | HEART RATE: 77 BPM

## 2022-05-31 DIAGNOSIS — O21.9 NAUSEA/VOMITING IN PREGNANCY: Primary | ICD-10-CM

## 2022-05-31 PROCEDURE — 250N000009 HC RX 250: Performed by: REGISTERED NURSE

## 2022-05-31 PROCEDURE — 96361 HYDRATE IV INFUSION ADD-ON: CPT

## 2022-05-31 PROCEDURE — 96375 TX/PRO/DX INJ NEW DRUG ADDON: CPT

## 2022-05-31 PROCEDURE — 96365 THER/PROPH/DIAG IV INF INIT: CPT

## 2022-05-31 PROCEDURE — 96374 THER/PROPH/DIAG INJ IV PUSH: CPT

## 2022-05-31 PROCEDURE — 250N000011 HC RX IP 250 OP 636: Performed by: REGISTERED NURSE

## 2022-05-31 PROCEDURE — 258N000003 HC RX IP 258 OP 636: Performed by: REGISTERED NURSE

## 2022-05-31 RX ORDER — HEPARIN SODIUM (PORCINE) LOCK FLUSH IV SOLN 100 UNIT/ML 100 UNIT/ML
5 SOLUTION INTRAVENOUS
Status: CANCELLED | OUTPATIENT
Start: 2022-06-03

## 2022-05-31 RX ORDER — ONDANSETRON 2 MG/ML
4 INJECTION INTRAMUSCULAR; INTRAVENOUS ONCE
Status: CANCELLED | OUTPATIENT
Start: 2022-06-03 | End: 2022-06-03

## 2022-05-31 RX ORDER — HEPARIN SODIUM,PORCINE 10 UNIT/ML
5 VIAL (ML) INTRAVENOUS
Status: CANCELLED | OUTPATIENT
Start: 2022-06-03

## 2022-05-31 RX ORDER — ONDANSETRON 2 MG/ML
4 INJECTION INTRAMUSCULAR; INTRAVENOUS ONCE
Status: COMPLETED | OUTPATIENT
Start: 2022-05-31 | End: 2022-05-31

## 2022-05-31 RX ADMIN — ASCORBIC ACID, VITAMIN A PALMITATE, CHOLECALCIFEROL, THIAMINE HYDROCHLORIDE, RIBOFLAVIN-5 PHOSPHATE SODIUM, PYRIDOXINE HYDROCHLORIDE, NIACINAMIDE, DEXPANTHENOL, ALPHA-TOCOPHEROL ACETATE, VITAMIN K1, FOLIC ACID, BIOTIN, CYANOCOBALAMIN: 200; 3300; 200; 6; 3.6; 6; 40; 15; 10; 150; 600; 60; 5 INJECTION, SOLUTION INTRAVENOUS at 08:47

## 2022-05-31 RX ADMIN — ONDANSETRON 4 MG: 2 INJECTION INTRAMUSCULAR; INTRAVENOUS at 08:45

## 2022-05-31 NOTE — PROGRESS NOTES
Infusion Nursing Note:  Pau Sanchez presents today for IVF/viatmin bag and zofran.    Patient seen by provider today: No   present during visit today: Not Applicable.    Note: N/A.      Intravenous Access:  Peripheral IV placed.    Treatment Conditions:  Not Applicable.      Post Infusion Assessment:  Patient tolerated infusion without incident.  Blood return noted pre and post infusion.  Site patent and intact, free from redness, edema or discomfort.  No evidence of extravasations.  Access discontinued per protocol.       Discharge Plan:   Discharge instructions reviewed with: Patient.  Patient and/or family verbalized understanding of discharge instructions and all questions answered.  AVS to patient via [employer].  Patient will return 6/2/2022 for next appointment.   Patient discharged in stable condition accompanied by: self.  Departure Mode: Ambulatory.      Jeniffer Peng RN

## 2022-06-02 ENCOUNTER — INFUSION THERAPY VISIT (OUTPATIENT)
Dept: INFUSION THERAPY | Facility: CLINIC | Age: 22
End: 2022-06-02
Attending: REGISTERED NURSE
Payer: COMMERCIAL

## 2022-06-02 VITALS
TEMPERATURE: 97.5 F | HEART RATE: 71 BPM | DIASTOLIC BLOOD PRESSURE: 63 MMHG | SYSTOLIC BLOOD PRESSURE: 112 MMHG | OXYGEN SATURATION: 99 %

## 2022-06-02 DIAGNOSIS — O21.9 NAUSEA/VOMITING IN PREGNANCY: Primary | ICD-10-CM

## 2022-06-02 PROCEDURE — 258N000003 HC RX IP 258 OP 636: Performed by: REGISTERED NURSE

## 2022-06-02 PROCEDURE — 250N000011 HC RX IP 250 OP 636: Performed by: REGISTERED NURSE

## 2022-06-02 PROCEDURE — 96375 TX/PRO/DX INJ NEW DRUG ADDON: CPT

## 2022-06-02 PROCEDURE — 250N000009 HC RX 250: Performed by: REGISTERED NURSE

## 2022-06-02 PROCEDURE — 96365 THER/PROPH/DIAG IV INF INIT: CPT

## 2022-06-02 RX ORDER — HEPARIN SODIUM (PORCINE) LOCK FLUSH IV SOLN 100 UNIT/ML 100 UNIT/ML
5 SOLUTION INTRAVENOUS
Status: CANCELLED | OUTPATIENT
Start: 2022-06-03

## 2022-06-02 RX ORDER — ONDANSETRON 2 MG/ML
4 INJECTION INTRAMUSCULAR; INTRAVENOUS ONCE
Status: COMPLETED | OUTPATIENT
Start: 2022-06-02 | End: 2022-06-02

## 2022-06-02 RX ORDER — ONDANSETRON 2 MG/ML
4 INJECTION INTRAMUSCULAR; INTRAVENOUS ONCE
Status: CANCELLED | OUTPATIENT
Start: 2022-06-03 | End: 2022-06-03

## 2022-06-02 RX ORDER — HEPARIN SODIUM,PORCINE 10 UNIT/ML
5 VIAL (ML) INTRAVENOUS
Status: CANCELLED | OUTPATIENT
Start: 2022-06-03

## 2022-06-02 RX ADMIN — ASCORBIC ACID, VITAMIN A PALMITATE, CHOLECALCIFEROL, THIAMINE HYDROCHLORIDE, RIBOFLAVIN-5 PHOSPHATE SODIUM, PYRIDOXINE HYDROCHLORIDE, NIACINAMIDE, DEXPANTHENOL, ALPHA-TOCOPHEROL ACETATE, VITAMIN K1, FOLIC ACID, BIOTIN, CYANOCOBALAMIN: 200; 3300; 200; 6; 3.6; 6; 40; 15; 10; 150; 600; 60; 5 INJECTION, SOLUTION INTRAVENOUS at 08:55

## 2022-06-02 RX ADMIN — ONDANSETRON 4 MG: 2 INJECTION INTRAMUSCULAR; INTRAVENOUS at 08:49

## 2022-06-02 NOTE — PROGRESS NOTES
Infusion Nursing Note:  Pau Sanchez presents today for Infuvite/Zofran.    Patient seen by provider today: No   present during visit today: Not Applicable.    Note: Pt reports she has not had any vomiting since the day of her last infusion, two days ago.  Still not eating much of anything and taking po antiemetics, however, is keeping fluids down.      Intravenous Access:  Peripheral IV placed.    Treatment Conditions:  Not Applicable.      Post Infusion Assessment:  Patient tolerated infusion without incident  Site patent and intact, free from redness, edema or discomfort.  No evidence of extravasations.  Access discontinued per protocol.       Discharge Plan:   AVS to patient via MYCHART.  Patient will return 6/6/22 for Infuvite/Zofran for next appointment.   Patient discharged in stable condition accompanied by: self.  Departure Mode: Ambulatory.      Cherie Arias RN

## 2022-06-06 ENCOUNTER — INFUSION THERAPY VISIT (OUTPATIENT)
Dept: INFUSION THERAPY | Facility: CLINIC | Age: 22
End: 2022-06-06
Attending: REGISTERED NURSE
Payer: COMMERCIAL

## 2022-06-06 VITALS
TEMPERATURE: 98 F | HEART RATE: 76 BPM | OXYGEN SATURATION: 98 % | DIASTOLIC BLOOD PRESSURE: 75 MMHG | SYSTOLIC BLOOD PRESSURE: 107 MMHG | RESPIRATION RATE: 16 BRPM

## 2022-06-06 DIAGNOSIS — O21.9 NAUSEA/VOMITING IN PREGNANCY: Primary | ICD-10-CM

## 2022-06-06 PROCEDURE — 96375 TX/PRO/DX INJ NEW DRUG ADDON: CPT

## 2022-06-06 PROCEDURE — 96365 THER/PROPH/DIAG IV INF INIT: CPT

## 2022-06-06 PROCEDURE — 250N000009 HC RX 250: Performed by: REGISTERED NURSE

## 2022-06-06 PROCEDURE — 258N000003 HC RX IP 258 OP 636: Performed by: REGISTERED NURSE

## 2022-06-06 PROCEDURE — 250N000011 HC RX IP 250 OP 636: Performed by: REGISTERED NURSE

## 2022-06-06 RX ORDER — HEPARIN SODIUM,PORCINE 10 UNIT/ML
5 VIAL (ML) INTRAVENOUS
Status: CANCELLED | OUTPATIENT
Start: 2022-06-07

## 2022-06-06 RX ORDER — HEPARIN SODIUM (PORCINE) LOCK FLUSH IV SOLN 100 UNIT/ML 100 UNIT/ML
5 SOLUTION INTRAVENOUS
Status: CANCELLED | OUTPATIENT
Start: 2022-06-07

## 2022-06-06 RX ORDER — ONDANSETRON 2 MG/ML
4 INJECTION INTRAMUSCULAR; INTRAVENOUS ONCE
Status: COMPLETED | OUTPATIENT
Start: 2022-06-06 | End: 2022-06-06

## 2022-06-06 RX ORDER — ONDANSETRON 2 MG/ML
4 INJECTION INTRAMUSCULAR; INTRAVENOUS ONCE
Status: CANCELLED | OUTPATIENT
Start: 2022-06-07 | End: 2022-06-07

## 2022-06-06 RX ADMIN — ASCORBIC ACID, VITAMIN A PALMITATE, CHOLECALCIFEROL, THIAMINE HYDROCHLORIDE, RIBOFLAVIN-5 PHOSPHATE SODIUM, PYRIDOXINE HYDROCHLORIDE, NIACINAMIDE, DEXPANTHENOL, ALPHA-TOCOPHEROL ACETATE, VITAMIN K1, FOLIC ACID, BIOTIN, CYANOCOBALAMIN: 200; 3300; 200; 6; 3.6; 6; 40; 15; 10; 150; 600; 60; 5 INJECTION, SOLUTION INTRAVENOUS at 11:34

## 2022-06-06 RX ADMIN — ONDANSETRON 4 MG: 2 INJECTION INTRAMUSCULAR; INTRAVENOUS at 11:35

## 2022-06-06 NOTE — PROGRESS NOTES
Infusion Nursing Note:  Pau Sanchez presents today for IVF, IV Zofran.    Patient seen by provider today: No   present during visit today: Not Applicable.    Note: N/A.      Intravenous Access:  Peripheral IV placed.    Treatment Conditions:  Not Applicable.      Post Infusion Assessment:  Patient tolerated infusion without incident.  Blood return noted pre and post infusion.  Site patent and intact, free from redness, edema or discomfort.  No evidence of extravasations.  Access discontinued per protocol.       Discharge Plan:   AVS to patient via MYCHART.  Patient will return 6/9 for next appointment.   Patient discharged in stable condition accompanied by: self.  Departure Mode: Ambulatory.      Jonathan Naranjo RN

## 2022-06-08 ENCOUNTER — PRENATAL OFFICE VISIT (OUTPATIENT)
Dept: MIDWIFE SERVICES | Facility: CLINIC | Age: 22
End: 2022-06-08
Payer: COMMERCIAL

## 2022-06-08 VITALS — WEIGHT: 162.6 LBS | SYSTOLIC BLOOD PRESSURE: 100 MMHG | DIASTOLIC BLOOD PRESSURE: 64 MMHG | BODY MASS INDEX: 25.85 KG/M2

## 2022-06-08 DIAGNOSIS — Z34.02 ENCOUNTER FOR SUPERVISION OF NORMAL FIRST PREGNANCY IN SECOND TRIMESTER: Primary | ICD-10-CM

## 2022-06-08 PROCEDURE — 99207 PR PRENATAL VISIT: CPT | Performed by: ADVANCED PRACTICE MIDWIFE

## 2022-06-08 NOTE — PATIENT INSTRUCTIONS
"Genetic Screening in Pregnancy    There are several options you have for genetic screening in your pregnancy.  Everyone has their own personal reasons to screen or not to screen.  We want you to make the best choice for you and your pregnancy.  Below is a list of options, what they screen for and when the screening is done.  Genetic screening is recommended for women who are 35 and older at delivery and for those with a family history of chromosomal abnormalities. However, screening is offered to all women.    Innatal:      This is a screening for the more common chromosome abnormalities, including trisomy 21 (Down's syndrome), trisomy 18, trisomy 13 and sex chromosome abnormalities. This is a prenatal test that can be done as early as 10 weeks gestation.     A maternal blood sample is drawn that sequences cell-free DNA in maternal plasma. This in turn allows for molecular counting of chromosome copy numbers with a >99% detection rate of Down syndrome, a 97% detection rate of Trisomy 18, and a 78% detection rate of Trisomy 13.  This test will give information about the gender of the baby.  You can choose to not have that disclosed.     Results come back within 10-14 days.   About 5% of samples will have results that are designated as \"indeterminate\" or \"uninterruptable.\" With this type of result, genetic counseling and diagnostic testing are recommended. Remember this is a screening test and does not definitively diagnose or exclude the presence of these chromosome conditions.   This screening may or may not be covered by insurance.  We recommend you consult your insurance company to discuss.  Financial assistance is available at a low cost if not covered by your insurance.         Hemoglobin Electrophoresis:  Hemoglobin electrophoresis is a non-invasive blood test that looks at abnormal hemoglobin (component of red blood cells) production. Examples of this include sickle cell anemia (which is a disorder of the red " blood cells and their shape) and the thalassemia s (which is also a form of anemia).  High risk groups include:  , Southeast Asians, , Mediterranean, American, South and Central American and [place] descent. This blood test is usually done with your first OB labs.  This is a one-time test.      Trio Carrier Screen:   1.  Cystic Fibrosis (CF) is the most common life-shortening autosomal  recessive disease among  populations, with a frequency of 1 in  Every 3,500 live births. Although it mainly affects the   Population anyone can request screening. If you have a family history of  cystic fibrosis you should request this test.  This screening is a blood draw      2.  Spinal Muscular Atrophy (SMA) is the most common inherited cause  of infant death.  The most common form of this disorder causes death by a age two.  One in every 6,000 to 10,000 babies born in the  has SMA      3.  Fragile X Syndrome (FXS) is the most common inherited cause of  intellectual disability.  Approximately 1 in every 3,600 boys and 1 in every  6,000 girls is born with FXS      **If you are a carrier of CF or SMA, your partner will also need to be tested. This partner testing is offered free of charge.  This screen can be done prior to pregnancy or at any time during the pregnancy.      Quad Screen:    The quad screen is a quadruple marker screening test done during the second trimester of your pregnancy. This prenatal screening test that measures levels of four substances in a pregnant woman's blood; Alpha-fetoprotein (AFP), Human chorionic gonadotropin (HCG), Estriol, and Inhibin A.   AFP screens for open neural tube defects like Spina Bifida and Anencephaly.   Spina bifida is a serious birth defect that occurs when a portion of the neural tube fails to develop or close properly, causing defects in the spinal cord and in the bones of the spine.   Anencephaly is a serious birth defect in the closure of  the neural tube, resulting in an underdeveloped brain and an incomplete skull.   Human chorionic gonadotropin (HCG), Estriol, and Inhibin screen for Down syndrome (trisomy 21) and Trisomy 18.   Down syndrome is a chromosomal disorder that causes lifelong intellectual disability and developmental delays and, in some people, health problems  Trisomy 18 is a chromosomal disorder that causes severe developmental delays and anatomic abnormalities. It is often fatal by age 1.    The screening is ideally done between 15 and 18 weeks of pregnancy but can be done up to week 20. Even if you have done the Ver[employer] testing you can still get a single AFP drawn to check for neural tube defects.       Screening Ultrasound:    This is a fetal survey done around 20 weeks gestation.  This screen will look at the cardiac activity, fetal heart rate and rhythm, assessment of the amniotic fluid volume, placenta appearance and location, and the umbilical cord vessel number and placental insertion site.  They also do many fetal measurements to make sure the baby is growing properly.  The cervical length is also measured and fetal movement is evaluated.  The sex of the baby can usually be determined.      In the event of a positive screen:    There are two diagnostic tests available if any screening tests come back with an increased risk.  You would first be referred to Maternal Fetal Medicine to be seen by a genetic counselor.  They would assess your risk and see if further diagnostic testing is warranted.  The options are; chorionic villus sampling (CVS), usually done at 11 to 12 weeks of pregnancy and amniocentesis which is generally done later in pregnancy around 15 to 20 weeks.  All risks/benefits would be explained and you can decide what course of action is best for you and your family.    Why you might choose to screen?  A desire to know as much as you can about your baby  If your baby had a genetic abnormality you can learn about it  before they are born  Choose whether to continue the pregnancy or to terminate    Why you might choose to NOT screen?  You feel that whatever happens is fine and you would not terminate  You know you don't want to do any diagnostic tests even if the screening test showed a high possibility of a genetic abnormality        For further information please call:  Keyonna Salas  [phone]         Weeks 17 thru 20 - Gestational Age (Fetal Age - Weeks 15 thru 18):  The baby has reached a point where movements are being felt more often by the mother. The eyebrows and eyelashes grow in, and tiny nails have begun to grow on the fingers and toes. The skin of the fetus is going through many changes and begins to produce vernix at the twentieth week. Vernix is a white pasty substance that covers the fetus  skin to protect it from amniotic fluid. Your baby can now hear your voices and music.  A fetal heartbeat can be heard by a stethoscope now. The fetus has reached a length of 8 inches and weighs about 12 ounces.

## 2022-06-08 NOTE — NURSING NOTE
"Chief Complaint   Patient presents with     Prenatal Care     17w 6d       Initial /64 (BP Location: Right arm, Cuff Size: Adult Regular)   Wt 73.8 kg (162 lb 9.6 oz)   LMP 2022   Breastfeeding No   BMI 25.85 kg/m   Estimated body mass index is 25.85 kg/m  as calculated from the following:    Height as of 22: 1.689 m (5' 6.5\").    Weight as of this encounter: 73.8 kg (162 lb 9.6 oz).  BP completed using cuff size: regular    Questioned patient about current smoking habits.  Pt. has never smoked.          The following HM Due: NONE    "

## 2022-06-08 NOTE — PROGRESS NOTES
S:Feels better. Has not vomited for one week.  Fetal movement No  Denies loss of fluid/vb/contractions/pelvic pain  Depression screening done  O:  /64 (BP Location: Right arm, Cuff Size: Adult Regular)   Wt 73.8 kg (162 lb 9.6 oz)   LMP 02/03/2022   Breastfeeding No   BMI 25.85 kg/m    Exam:  Constitutional: healthy, alert and no distress  Respiratory: Respirations even and unlabored  Gastrointestinal: Abdomen soft, non-tender. Fundus measures appropriately for gestational age. Fetal heart tones heard easily.  Psychiatric: mentation appears normal and affect normal/bright  A:    Diagnosis Comments   1. Encounter for supervision of normal first pregnancy in second trimester  US OB > 14 Weeks        P: Discussed options for quad screen today  Patient wishes to do some more research, and will let us know.  Anatomy ultrasound next visit between 20-22 weeks  Return to clinic 4 weeks    ALOK Vidal, KAYLA

## 2022-06-12 ENCOUNTER — HEALTH MAINTENANCE LETTER (OUTPATIENT)
Age: 22
End: 2022-06-12

## 2022-06-13 ENCOUNTER — INFUSION THERAPY VISIT (OUTPATIENT)
Dept: INFUSION THERAPY | Facility: CLINIC | Age: 22
End: 2022-06-13
Attending: REGISTERED NURSE
Payer: COMMERCIAL

## 2022-06-13 VITALS
SYSTOLIC BLOOD PRESSURE: 104 MMHG | DIASTOLIC BLOOD PRESSURE: 71 MMHG | OXYGEN SATURATION: 96 % | RESPIRATION RATE: 16 BRPM | TEMPERATURE: 98.7 F | HEART RATE: 76 BPM

## 2022-06-13 DIAGNOSIS — O21.9 NAUSEA/VOMITING IN PREGNANCY: Primary | ICD-10-CM

## 2022-06-13 PROCEDURE — 250N000011 HC RX IP 250 OP 636: Performed by: REGISTERED NURSE

## 2022-06-13 PROCEDURE — 258N000003 HC RX IP 258 OP 636: Performed by: REGISTERED NURSE

## 2022-06-13 PROCEDURE — 96374 THER/PROPH/DIAG INJ IV PUSH: CPT

## 2022-06-13 PROCEDURE — 250N000009 HC RX 250: Performed by: REGISTERED NURSE

## 2022-06-13 PROCEDURE — 96361 HYDRATE IV INFUSION ADD-ON: CPT

## 2022-06-13 RX ORDER — HEPARIN SODIUM,PORCINE 10 UNIT/ML
5 VIAL (ML) INTRAVENOUS
Status: CANCELLED | OUTPATIENT
Start: 2022-06-14

## 2022-06-13 RX ORDER — ONDANSETRON 2 MG/ML
4 INJECTION INTRAMUSCULAR; INTRAVENOUS ONCE
Status: COMPLETED | OUTPATIENT
Start: 2022-06-13 | End: 2022-06-13

## 2022-06-13 RX ORDER — HEPARIN SODIUM (PORCINE) LOCK FLUSH IV SOLN 100 UNIT/ML 100 UNIT/ML
5 SOLUTION INTRAVENOUS
Status: CANCELLED | OUTPATIENT
Start: 2022-06-14

## 2022-06-13 RX ORDER — ONDANSETRON 2 MG/ML
4 INJECTION INTRAMUSCULAR; INTRAVENOUS ONCE
Status: CANCELLED | OUTPATIENT
Start: 2022-06-14 | End: 2022-06-14

## 2022-06-13 RX ADMIN — ONDANSETRON 4 MG: 2 INJECTION INTRAMUSCULAR; INTRAVENOUS at 08:13

## 2022-06-13 RX ADMIN — ASCORBIC ACID, VITAMIN A PALMITATE, CHOLECALCIFEROL, THIAMINE HYDROCHLORIDE, RIBOFLAVIN-5 PHOSPHATE SODIUM, PYRIDOXINE HYDROCHLORIDE, NIACINAMIDE, DEXPANTHENOL, ALPHA-TOCOPHEROL ACETATE, VITAMIN K1, FOLIC ACID, BIOTIN, CYANOCOBALAMIN: 200; 3300; 200; 6; 3.6; 6; 40; 15; 10; 150; 600; 60; 5 INJECTION, SOLUTION INTRAVENOUS at 08:17

## 2022-06-13 NOTE — PROGRESS NOTES
Infusion Nursing Note:  Pau Sanchez presents today for IVF and zofran.    Patient seen by provider today: No   present during visit today: Not Applicable.    Note: Patient states she has been feeling better, denies vomiting for the past week.  Eating and drinking much better and getting some relief from the nausea.     Intravenous Access:  Peripheral IV placed.    Treatment Conditions:  Not Applicable.    Post Infusion Assessment:  Patient tolerated infusion without incident.  Blood return noted pre and post infusion.  Site patent and intact, free from redness, edema or discomfort.  No evidence of extravasations.  Access discontinued per protocol.     Discharge Plan:   AVS to patient via MYCHART.  Patient will return 6/16/22 for next appointment.   Patient discharged in stable condition accompanied by: self.  Departure Mode: Ambulatory.      Kaitlin Arzola RN

## 2022-06-15 DIAGNOSIS — R11.0 NAUSEA: ICD-10-CM

## 2022-06-15 NOTE — TELEPHONE ENCOUNTER
Routing refill request to provider for review/approval because:  Ongoing Rx?     Foreign CHAMPION RN

## 2022-06-16 RX ORDER — ONDANSETRON 4 MG/1
4 TABLET, ORALLY DISINTEGRATING ORAL EVERY 12 HOURS PRN
Qty: 20 TABLET | Refills: 0 | Status: SHIPPED | OUTPATIENT
Start: 2022-06-16 | End: 2022-09-21

## 2022-06-29 ENCOUNTER — INFUSION THERAPY VISIT (OUTPATIENT)
Dept: INFUSION THERAPY | Facility: CLINIC | Age: 22
End: 2022-06-29
Attending: REGISTERED NURSE
Payer: COMMERCIAL

## 2022-06-29 VITALS
SYSTOLIC BLOOD PRESSURE: 113 MMHG | RESPIRATION RATE: 18 BRPM | HEART RATE: 90 BPM | TEMPERATURE: 98.1 F | DIASTOLIC BLOOD PRESSURE: 75 MMHG | OXYGEN SATURATION: 96 %

## 2022-06-29 DIAGNOSIS — O21.9 NAUSEA/VOMITING IN PREGNANCY: Primary | ICD-10-CM

## 2022-06-29 PROCEDURE — 250N000009 HC RX 250: Performed by: REGISTERED NURSE

## 2022-06-29 PROCEDURE — 96374 THER/PROPH/DIAG INJ IV PUSH: CPT

## 2022-06-29 PROCEDURE — 250N000011 HC RX IP 250 OP 636: Performed by: REGISTERED NURSE

## 2022-06-29 PROCEDURE — 96361 HYDRATE IV INFUSION ADD-ON: CPT

## 2022-06-29 PROCEDURE — 258N000003 HC RX IP 258 OP 636: Performed by: REGISTERED NURSE

## 2022-06-29 RX ORDER — ONDANSETRON 2 MG/ML
4 INJECTION INTRAMUSCULAR; INTRAVENOUS ONCE
Status: COMPLETED | OUTPATIENT
Start: 2022-06-29 | End: 2022-06-29

## 2022-06-29 RX ORDER — HEPARIN SODIUM,PORCINE 10 UNIT/ML
5 VIAL (ML) INTRAVENOUS
Status: CANCELLED | OUTPATIENT
Start: 2022-07-02

## 2022-06-29 RX ORDER — HEPARIN SODIUM (PORCINE) LOCK FLUSH IV SOLN 100 UNIT/ML 100 UNIT/ML
5 SOLUTION INTRAVENOUS
Status: CANCELLED | OUTPATIENT
Start: 2022-07-02

## 2022-06-29 RX ORDER — ONDANSETRON 2 MG/ML
4 INJECTION INTRAMUSCULAR; INTRAVENOUS ONCE
Status: CANCELLED | OUTPATIENT
Start: 2022-07-02 | End: 2022-07-02

## 2022-06-29 RX ADMIN — ONDANSETRON 4 MG: 2 INJECTION INTRAMUSCULAR; INTRAVENOUS at 13:21

## 2022-06-29 RX ADMIN — ASCORBIC ACID, VITAMIN A PALMITATE, CHOLECALCIFEROL, THIAMINE HYDROCHLORIDE, RIBOFLAVIN-5 PHOSPHATE SODIUM, PYRIDOXINE HYDROCHLORIDE, NIACINAMIDE, DEXPANTHENOL, ALPHA-TOCOPHEROL ACETATE, VITAMIN K1, FOLIC ACID, BIOTIN, CYANOCOBALAMIN: 200; 3300; 200; 6; 3.6; 6; 40; 15; 10; 150; 600; 60; 5 INJECTION, SOLUTION INTRAVENOUS at 13:19

## 2022-06-29 ASSESSMENT — PAIN SCALES - GENERAL: PAINLEVEL: NO PAIN (0)

## 2022-06-29 NOTE — PROGRESS NOTES
"Infusion Nursing Note:  Pau Sanchez presents today for IVF, Zofran.    Patient seen by provider today: No   present during visit today: Not Applicable.    Note: Patient reports having a migraine the day before yesterday that \"really knocked her out\". Reports that she was vomiting for about a day, and was \"unable to keep anything down\". Reports she was able to eat this morning with no emesis afterwards, still having some nausea when she arrived for appointment.    Intravenous Access:  Peripheral IV placed.    Treatment Conditions:  Not Applicable.    Post Infusion Assessment:  Patient tolerated infusion without incident.  Blood return noted pre and post infusion.  Site patent and intact, free from redness, edema or discomfort.  No evidence of extravasations.  Access discontinued per protocol.     Discharge Plan:   [facility]  Patient will schedule next appointment as needed.   Patient discharged in stable condition accompanied by: self.  Departure Mode: Ambulatory.      Isi Rojas RN                      "

## 2022-07-01 ENCOUNTER — HOME INFUSION (PRE-WILLOW HOME INFUSION) (OUTPATIENT)
Dept: PHARMACY | Facility: CLINIC | Age: 22
End: 2022-07-01

## 2022-07-01 ENCOUNTER — NURSE TRIAGE (OUTPATIENT)
Dept: NURSING | Facility: CLINIC | Age: 22
End: 2022-07-01

## 2022-07-01 NOTE — TELEPHONE ENCOUNTER
Blance from [facility] is calling and says that patient self referred herself for hyperemesis and wants home infusion for IV fluids and IV Zofran.  [employer] routed message to midwife.

## 2022-07-06 NOTE — PROGRESS NOTES
This is a recent snapshot of the patient's [facility] medical record.  For current drug dose and complete information and questions, call [phone] or In Basket pool, [facility] ([id number])  CSN Number:  [id number]

## 2022-07-07 ENCOUNTER — HOME INFUSION (PRE-WILLOW HOME INFUSION) (OUTPATIENT)
Dept: PHARMACY | Facility: CLINIC | Age: 22
End: 2022-07-07

## 2022-07-12 NOTE — PROGRESS NOTES
This is a recent snapshot of the patient's [facility] medical record.  For current drug dose and complete information and questions, call [phone] or In Basket pool, [facility] ([id number])  CSN Number:  [id number]

## 2022-07-13 ENCOUNTER — ANCILLARY PROCEDURE (OUTPATIENT)
Dept: ULTRASOUND IMAGING | Facility: CLINIC | Age: 22
End: 2022-07-13
Attending: ADVANCED PRACTICE MIDWIFE
Payer: COMMERCIAL

## 2022-07-13 DIAGNOSIS — Z34.02 ENCOUNTER FOR SUPERVISION OF NORMAL FIRST PREGNANCY IN SECOND TRIMESTER: ICD-10-CM

## 2022-07-13 PROCEDURE — 76805 OB US >/= 14 WKS SNGL FETUS: CPT | Performed by: OBSTETRICS & GYNECOLOGY

## 2022-07-14 ENCOUNTER — HOME INFUSION (PRE-WILLOW HOME INFUSION) (OUTPATIENT)
Dept: PHARMACY | Facility: CLINIC | Age: 22
End: 2022-07-14

## 2022-07-15 ENCOUNTER — PRENATAL OFFICE VISIT (OUTPATIENT)
Dept: MIDWIFE SERVICES | Facility: CLINIC | Age: 22
End: 2022-07-15
Payer: COMMERCIAL

## 2022-07-15 VITALS — SYSTOLIC BLOOD PRESSURE: 106 MMHG | DIASTOLIC BLOOD PRESSURE: 68 MMHG | WEIGHT: 168.1 LBS | BODY MASS INDEX: 26.73 KG/M2

## 2022-07-15 DIAGNOSIS — Z34.02 ENCOUNTER FOR SUPERVISION OF NORMAL FIRST PREGNANCY IN SECOND TRIMESTER: Primary | ICD-10-CM

## 2022-07-15 PROCEDURE — 99207 PR PRENATAL VISIT: CPT | Performed by: ADVANCED PRACTICE MIDWIFE

## 2022-07-15 NOTE — PROGRESS NOTES
S: Feels well today. Had a bad migraine last week.  Baby active.  Denies uterine cramping, vaginal bleeding or leaking of fluid  O: Vitals: /68 (BP Location: Right arm, Cuff Size: Adult Regular)   Wt 76.2 kg (168 lb 1.6 oz)   LMP 02/03/2022   Breastfeeding No   BMI 26.73 kg/m    BMI= Body mass index is 26.73 kg/m .  Exam:  Constitutional: healthy, alert and no distress  Respiratory: respirations even and unlabored  Gastrointestinal: Abdomen soft, non-tender. Fundus measures appropriate for gestational age. Fetal heart tones hear without difficulty and within normal limits  : Deferred  Psychiatric: mentation appears normal and affect normal/bright  A:    Diagnosis Comments   1. Encounter for supervision of normal first pregnancy in second trimester       P: Discussed GCT/repeat RPR for next visit, handout provided, reminded of longer appointment.  Tdap next visit; reviewed CDC recommendations and partner/family vaccination recommended as well.  Need for Rhogam? No, to be done next visit   Encouraged patient to call with any questions or concerns.  Return to clinic 4 weeks    ALOK Vidal, KAYLA

## 2022-07-15 NOTE — PATIENT INSTRUCTIONS
Weeks 24 thru 26 - Gestational Age (Fetal Age - Weeks 22 thru 24)- Beginning the third trimester:  If your baby was delivered now, it could possibly survive outside of your body, with the assistance of medical technology. The fetus has developed patterns of  sleeping and waking and mom will begin to notice when each of these takes place. The fetus has a startle reflex, and the air sacs in the lungs have begun formation. The brain will be developing rapidly over the next few weeks. The nervous system has developed enough to control some functions. The fetus has reached about 14 inches in length and weighs about 2   pounds.   GESTATIONAL DIABETES SCREENING    All pregnant women are screened at least once for diabetes as part of their prenatal care. A woman has gestational diabetes if she has high blood sugars for the first time during pregnancy.    Diabetes can harm your health and the health of your baby.  But if we find the diabetes early in pregnancy we will watch your health closely and prevent further problems.     We will check for gestational diabetes during 28 week visit. Please note you can not do this prior to 24 weeks of gestation.    Plan to spend about an hour at the clinic.  When you check in let us know that you will be having your diabetes screening that day.     We will give you a 50 gram glucose drink that you have 5 minutes to consume.  Exactly one hour later you will have draw blood from your arm to check your blood sugar level.    We will call you to let you know if your results are normal.  If the results are normal no more testing will be needed.  If your results are not normal we will discuss follow up testing with you.      You may eat prior to the testing but it is not recommended to eat or drink very sweet things such as pop, juice, candy or dessert type foods.  Eat a high protein, low carb meals prior to testing.    If you have any questions please call:    [facility]   [phone]

## 2022-07-15 NOTE — NURSING NOTE
"Chief Complaint   Patient presents with     Prenatal Care     23w 1d, +FM. Reports migraine with vomiting 2 weeks ago, has since resolved.       Initial /68 (BP Location: Right arm, Cuff Size: Adult Regular)   Wt 76.2 kg (168 lb 1.6 oz)   LMP 2022   Breastfeeding No   BMI 26.73 kg/m   Estimated body mass index is 26.73 kg/m  as calculated from the following:    Height as of 22: 1.689 m (5' 6.5\").    Weight as of this encounter: 76.2 kg (168 lb 1.6 oz).  BP completed using cuff size: regular    Questioned patient about current smoking habits.  Pt. has never smoked.             "

## 2022-07-26 ENCOUNTER — HOME INFUSION (PRE-WILLOW HOME INFUSION) (OUTPATIENT)
Dept: PHARMACY | Facility: CLINIC | Age: 22
End: 2022-07-26

## 2022-07-27 NOTE — PROGRESS NOTES
This is a recent snapshot of the patient's [facility] medical record.  For current drug dose and complete information and questions, call [phone] or In Basket pool, [facility] ([id number])  CSN Number:  [id number]

## 2022-07-28 ENCOUNTER — HOME INFUSION (PRE-WILLOW HOME INFUSION) (OUTPATIENT)
Dept: PHARMACY | Facility: CLINIC | Age: 22
End: 2022-07-28

## 2022-08-01 NOTE — PROGRESS NOTES
This is a recent snapshot of the patient's [facility] medical record.  For current drug dose and complete information and questions, call [phone] or In Basket pool, [facility] ([id number])  CSN Number:  [id number]

## 2022-08-05 ENCOUNTER — HOME INFUSION (PRE-WILLOW HOME INFUSION) (OUTPATIENT)
Dept: PHARMACY | Facility: CLINIC | Age: 22
End: 2022-08-05

## 2022-08-09 NOTE — PROGRESS NOTES
This is a recent snapshot of the patient's [facility] medical record.  For current drug dose and complete information and questions, call [phone] or In Basket pool, [facility] ([id number])  CSN Number:  [id number]

## 2022-08-10 ENCOUNTER — PRENATAL OFFICE VISIT (OUTPATIENT)
Dept: MIDWIFE SERVICES | Facility: CLINIC | Age: 22
End: 2022-08-10
Payer: COMMERCIAL

## 2022-08-10 VITALS — SYSTOLIC BLOOD PRESSURE: 112 MMHG | BODY MASS INDEX: 27.65 KG/M2 | DIASTOLIC BLOOD PRESSURE: 64 MMHG | WEIGHT: 173.9 LBS

## 2022-08-10 DIAGNOSIS — Z34.02 ENCOUNTER FOR SUPERVISION OF NORMAL FIRST PREGNANCY IN SECOND TRIMESTER: Primary | ICD-10-CM

## 2022-08-10 DIAGNOSIS — O21.9 NAUSEA/VOMITING IN PREGNANCY: ICD-10-CM

## 2022-08-10 DIAGNOSIS — Z91.89 AT RISK FOR POSTPARTUM DEPRESSION: ICD-10-CM

## 2022-08-10 PROCEDURE — 99207 PR PRENATAL VISIT: CPT | Performed by: ADVANCED PRACTICE MIDWIFE

## 2022-08-10 NOTE — NURSING NOTE
"Chief Complaint   Patient presents with     Prenatal Care     26w 6d, +FM daily. Reports lower abdominal pain the last day or two. Recently went on a roller coaster and didn't realize she shouldn't go on them. Denies vaginal bleeding or LOF. Offered GCT today, but patient reports that she had a coffee beverage that is higher in sugar prior to appointment. Discussed with provider and recommends she come back for a Nurse Only visit to do the GCT another day.       Initial /64 (BP Location: Left arm, Cuff Size: Adult Regular)   Wt 78.9 kg (173 lb 14.4 oz)   LMP 2022   Breastfeeding No   BMI 27.65 kg/m   Estimated body mass index is 27.65 kg/m  as calculated from the following:    Height as of 22: 1.689 m (5' 6.5\").    Weight as of this encounter: 78.9 kg (173 lb 14.4 oz).  BP completed using cuff size: regular    Questioned patient about current smoking habits.  Pt. has never smoked.          Patient aware she needs to schedule Nurse Only for GCT and to avoid high sugar diet prior to test.    "

## 2022-08-10 NOTE — PROGRESS NOTES
S: Feels overall well,  Baby active.  Denies uterine cramping, vaginal bleeding or leaking of fluid    Continues to receive weekly IV fluids d/t nausea and vomiting. States this helps her symptoms. She states she skipped a week and her symptoms worsened.     States she had a caramel machiatto just prior to her appointment, worries this will impact her glucose test today.     EPDS 11, 0 on last question    O: Vitals: /64 (BP Location: Left arm, Cuff Size: Adult Regular)   Wt 78.9 kg (173 lb 14.4 oz)   LMP 02/03/2022   Breastfeeding No   BMI 27.65 kg/m    BMI= Body mass index is 27.65 kg/m .  Exam:  Constitutional: healthy, alert and no distress  Respiratory: respirations even and unlabored  Gastrointestinal: Abdomen soft, non-tender. Fundus measures appropriate for gestational age. Fetal heart tones hear without difficulty and within normal limits  : Deferred  Psychiatric: mentation appears normal and affect normal/bright  A/P:  1. (Z34.02) Encounter for supervision of normal first pregnancy in second trimester  (primary encounter diagnosis)      2. (Z91.89) At risk for postpartum depression  EPDS 11  Pt reports history of anxiety/depression. States she has been on medication in the past, but feels her symptoms are currently mild and declines meds or referral to counseling. Advised to reach out to us with any worsening or persistent symptoms, or thoughts of hurting herself/others.       Glucose test deferred today d/t recent ingestion of high sugar beverage.   Advised to make lab appointment for later this week for glucose , cbc, rpr.     Need for Rhogam? No, to be done next visit   Encouraged patient to call with any questions or concerns.  Return to clinic 4 weeks    ALOK Polk, KAYLA

## 2022-08-11 ENCOUNTER — ALLIED HEALTH/NURSE VISIT (OUTPATIENT)
Dept: NURSING | Facility: CLINIC | Age: 22
End: 2022-08-11
Payer: COMMERCIAL

## 2022-08-11 DIAGNOSIS — Z23 NEED FOR TDAP VACCINATION: ICD-10-CM

## 2022-08-11 DIAGNOSIS — O21.9 NAUSEA/VOMITING IN PREGNANCY: ICD-10-CM

## 2022-08-11 DIAGNOSIS — Z34.02 ENCOUNTER FOR SUPERVISION OF NORMAL FIRST PREGNANCY IN SECOND TRIMESTER: Primary | ICD-10-CM

## 2022-08-11 LAB
ERYTHROCYTE [DISTWIDTH] IN BLOOD BY AUTOMATED COUNT: 13.2 % (ref 10–15)
GLUCOSE 1H P 50 G GLC PO SERPL-MCNC: 104 MG/DL (ref 70–129)
HCT VFR BLD AUTO: 37.8 % (ref 35–47)
HGB BLD-MCNC: 13 G/DL (ref 11.7–15.7)
MCH RBC QN AUTO: 31.3 PG (ref 26.5–33)
MCHC RBC AUTO-ENTMCNC: 34.4 G/DL (ref 31.5–36.5)
MCV RBC AUTO: 91 FL (ref 78–100)
PLATELET # BLD AUTO: 192 10E3/UL (ref 150–450)
RBC # BLD AUTO: 4.16 10E6/UL (ref 3.8–5.2)
WBC # BLD AUTO: 12.1 10E3/UL (ref 4–11)

## 2022-08-11 PROCEDURE — 90715 TDAP VACCINE 7 YRS/> IM: CPT

## 2022-08-11 PROCEDURE — 86780 TREPONEMA PALLIDUM: CPT

## 2022-08-11 PROCEDURE — 82950 GLUCOSE TEST: CPT

## 2022-08-11 PROCEDURE — 99207 PR NO CHARGE NURSE ONLY: CPT

## 2022-08-11 PROCEDURE — 85027 COMPLETE CBC AUTOMATED: CPT

## 2022-08-11 PROCEDURE — 90471 IMMUNIZATION ADMIN: CPT

## 2022-08-11 PROCEDURE — 36415 COLL VENOUS BLD VENIPUNCTURE: CPT

## 2022-08-11 NOTE — NURSING NOTE
"Chief Complaint   Patient presents with     Allied Health Visit     GCT and tdap       Initial LMP 2022  Estimated body mass index is 27.65 kg/m  as calculated from the following:    Height as of 22: 1.689 m (5' 6.5\").    Weight as of 8/10/22: 78.9 kg (173 lb 14.4 oz).  BP completed using cuff size: regular    Questioned patient about current smoking habits.  Pt. has never smoked.        "

## 2022-08-12 ENCOUNTER — TELEPHONE (OUTPATIENT)
Dept: FAMILY MEDICINE | Facility: CLINIC | Age: 22
End: 2022-08-12

## 2022-08-12 ENCOUNTER — HOME INFUSION (PRE-WILLOW HOME INFUSION) (OUTPATIENT)
Dept: PHARMACY | Facility: CLINIC | Age: 22
End: 2022-08-12

## 2022-08-12 LAB — T PALLIDUM AB SER QL: NONREACTIVE

## 2022-08-12 NOTE — TELEPHONE ENCOUNTER
Reason for Call:  Form, our goal is to have forms completed with 72 hours, however, some forms may require a visit or additional information.    Type of letter, form or note:  medical    Who is the form from?: [employer] (if other please explain)    Where did the form come from: form was faxed in    What clinic location was the form placed at?: [facility]     Where the form was placed: Dr. Siddiqui Box/Folder    What number is listed as a contact on the form?: [phone]       Additional comments: fax back to [phone]    Call taken on 8/12/2022 at 9:23 AM by Cheire Naik MA

## 2022-08-19 ENCOUNTER — HOME INFUSION (PRE-WILLOW HOME INFUSION) (OUTPATIENT)
Dept: PHARMACY | Facility: CLINIC | Age: 22
End: 2022-08-19

## 2022-08-23 PROBLEM — Z34.03 ENCOUNTER FOR SUPERVISION OF NORMAL FIRST PREGNANCY IN THIRD TRIMESTER: Status: ACTIVE | Noted: 2022-08-23

## 2022-08-24 ENCOUNTER — PRENATAL OFFICE VISIT (OUTPATIENT)
Dept: MIDWIFE SERVICES | Facility: CLINIC | Age: 22
End: 2022-08-24
Payer: COMMERCIAL

## 2022-08-24 VITALS — SYSTOLIC BLOOD PRESSURE: 110 MMHG | BODY MASS INDEX: 28.08 KG/M2 | DIASTOLIC BLOOD PRESSURE: 64 MMHG | WEIGHT: 176.6 LBS

## 2022-08-24 DIAGNOSIS — Z34.03 ENCOUNTER FOR SUPERVISION OF NORMAL FIRST PREGNANCY IN THIRD TRIMESTER: Primary | ICD-10-CM

## 2022-08-24 DIAGNOSIS — Z91.89 AT RISK FOR POSTPARTUM DEPRESSION: ICD-10-CM

## 2022-08-24 PROCEDURE — 99207 PR PRENATAL VISIT: CPT | Performed by: REGISTERED NURSE

## 2022-08-24 NOTE — NURSING NOTE
"Chief Complaint   Patient presents with     Prenatal Care     28w 6d, +FM daily       Initial /64 (BP Location: Left arm, Cuff Size: Adult Regular)   Wt 80.1 kg (176 lb 9.6 oz)   LMP 2022   Breastfeeding No   BMI 28.08 kg/m   Estimated body mass index is 28.08 kg/m  as calculated from the following:    Height as of 22: 1.689 m (5' 6.5\").    Weight as of this encounter: 80.1 kg (176 lb 9.6 oz).  BP completed using cuff size: regular    Questioned patient about current smoking habits.  Pt. has never smoked.               "

## 2022-08-24 NOTE — PROGRESS NOTES
S: Feels good overall,  Baby active.  Denies uterine cramping, vaginal bleeding or leaking of fluid    Still getting home IV infusions. This helps.     Sciatic pain- worse after long shifts. Works as .     O: Vitals: /64 (BP Location: Left arm, Cuff Size: Adult Regular)   Wt 80.1 kg (176 lb 9.6 oz)   LMP 02/03/2022   Breastfeeding No   BMI 28.08 kg/m    BMI= Body mass index is 28.08 kg/m .  Exam:  Constitutional: healthy, alert and no distress  Respiratory: respirations even and unlabored  Gastrointestinal: Abdomen soft, non-tender. Fundus measures appropriate for gestational age. Fetal heart tones hear without difficulty and within normal limits  : Deferred  Psychiatric: mentation appears normal and affect normal/bright  A:     ICD-10-CM    1. Encounter for supervision of normal first pregnancy in third trimester  Z34.03    2. At risk for postpartum depression  Z91.89      P: Passed GCT. Has gotten tdap.   Discussed patient options for postpartum contraception. Patient is planning unsure- might consider depo shot. Reviewed options.   covid booster- declines   Reviewed comfort measures- recommended pregnancy support belt for later in pregnancy, especially given active job.   Encouraged patient to call with any questions or concerns.  Gave childbirth education resources      Return to clinic 2 weeks        ALOK Beasley CNM

## 2022-08-24 NOTE — PATIENT INSTRUCTIONS
Weeks 27 thru 32 - Gestational Age (Fetal Age - Weeks 25 thru 30):  The fetus really fills out over these next few weeks, storing fat on the body, reaching about 15-17 inches long and weighing about 4-4   lbs by the 32nd week. The lungs are not fully mature yet, but some rhythmic breathing movements are occurring. The bones are fully developed, but are still soft and pliable. The fetus is storing its own calcium, iron and phosphorus. The eyelids open after being closed, since the end of the first trimester.

## 2022-08-25 NOTE — PROGRESS NOTES
This is a recent snapshot of the patient's [facility] medical record.  For current drug dose and complete information and questions, call [phone] or In Basket pool, fv home infusion ([id number])  CSN Number:  [id number]

## 2022-08-26 ENCOUNTER — HOME INFUSION (PRE-WILLOW HOME INFUSION) (OUTPATIENT)
Dept: PHARMACY | Facility: CLINIC | Age: 22
End: 2022-08-26

## 2022-08-31 NOTE — PROGRESS NOTES
Agree with orders   This is a recent snapshot of the patient's [facility] medical record.  For current drug dose and complete information and questions, call [phone] or In Basket pool, fv home infusion ([id number])  CSN Number:  [id number]

## 2022-09-01 ENCOUNTER — HOME INFUSION (PRE-WILLOW HOME INFUSION) (OUTPATIENT)
Dept: PHARMACY | Facility: CLINIC | Age: 22
End: 2022-09-01

## 2022-09-08 ENCOUNTER — HOME INFUSION (PRE-WILLOW HOME INFUSION) (OUTPATIENT)
Dept: PHARMACY | Facility: CLINIC | Age: 22
End: 2022-09-08

## 2022-09-09 ENCOUNTER — HOME INFUSION (PRE-WILLOW HOME INFUSION) (OUTPATIENT)
Dept: PHARMACY | Facility: CLINIC | Age: 22
End: 2022-09-09

## 2022-09-09 NOTE — PROGRESS NOTES
This is a recent snapshot of the patient's [facility] medical record.  For current drug dose and complete information and questions, call [phone] or In Basket pool, fv home infusion ([id number])  CSN Number:  [id number]

## 2022-09-12 ENCOUNTER — MEDICAL CORRESPONDENCE (OUTPATIENT)
Dept: HEALTH INFORMATION MANAGEMENT | Facility: CLINIC | Age: 22
End: 2022-09-12

## 2022-09-14 ENCOUNTER — PRENATAL OFFICE VISIT (OUTPATIENT)
Dept: MIDWIFE SERVICES | Facility: CLINIC | Age: 22
End: 2022-09-14
Payer: COMMERCIAL

## 2022-09-14 VITALS — BODY MASS INDEX: 28.62 KG/M2 | WEIGHT: 180 LBS | SYSTOLIC BLOOD PRESSURE: 106 MMHG | DIASTOLIC BLOOD PRESSURE: 74 MMHG

## 2022-09-14 DIAGNOSIS — Z34.03 ENCOUNTER FOR SUPERVISION OF NORMAL FIRST PREGNANCY IN THIRD TRIMESTER: Primary | ICD-10-CM

## 2022-09-14 PROCEDURE — 99207 PR PRENATAL VISIT: CPT

## 2022-09-14 NOTE — PROGRESS NOTES
S: Feels good overal, complains about seasonal allergies, list of OTC meds that are safe during pregnancy given.  Baby active, reports decreased baby movement, reviewed kick counts with the patient and knows when to call.  Denies uterine cramping, vaginal bleeding or leaking of fluid  O: Vitals: /74 (BP Location: Left arm, Cuff Size: Adult Regular)   Wt 81.6 kg (180 lb)   LMP 02/03/2022   BMI 28.62 kg/m    BMI= Body mass index is 28.62 kg/m .  Exam:  Constitutional: healthy, alert and no distress  Respiratory: respirations even and unlabored  Gastrointestinal: Abdomen soft, non-tender. Fundus measures appropriate for gestational age. Fetal heart tones hear without difficulty and within normal limits  : Deferred  Psychiatric: mentation appears normal and affect normal/bright  A:     ICD-10-CM    1. Encounter for supervision of normal first pregnancy in third trimester  Z34.03      P: Discussed plans for labor. Discussed patient options for postpartum contraception. Patient is planning depo or other injectable  Encouraged patient to call with any questions or concerns.  Return to clinic 2 weeks    ALOK HAGER CNM

## 2022-09-15 ENCOUNTER — E-VISIT (OUTPATIENT)
Dept: URGENT CARE | Facility: CLINIC | Age: 22
End: 2022-09-15
Payer: COMMERCIAL

## 2022-09-15 ENCOUNTER — HOME INFUSION (PRE-WILLOW HOME INFUSION) (OUTPATIENT)
Dept: PHARMACY | Facility: CLINIC | Age: 22
End: 2022-09-15

## 2022-09-15 ENCOUNTER — NURSE TRIAGE (OUTPATIENT)
Dept: NURSING | Facility: CLINIC | Age: 22
End: 2022-09-15

## 2022-09-15 DIAGNOSIS — U07.1 SARS-COV-2 POSITIVE: Primary | ICD-10-CM

## 2022-09-15 PROCEDURE — 99207 PR NO CHARGE LOS: CPT | Performed by: NURSE PRACTITIONER

## 2022-09-15 NOTE — TELEPHONE ENCOUNTER
"Nurse Triage SBAR    Is this a 2nd Level Triage? YES, LICENSED PRACTITIONER REVIEW IS REQUIRED    Situation:   Covid positive today with home test, \"thought it was allergies\"    Background:   32w0d pregnant, pt denies high risk categories, mild exercise induced asthma \"haven't used inhaler since pregnancy\". IV fluid infusion scheduled for tomorrow, has been having for nausea and vomiting in pregnancy . Pt reports \"having some brief lower abdominal pains, three or four times, today and yesterday, after eating or drinking, goes away after 30 seconds\". Pt denies decreased fetal movement, vaginal bleeding or fluid leaking\".     Assessment:   Mild Covid illness     Protocol Recommended Disposition:   Call PCP Now    Recommendation:   Second level triage     Paged to provider Delma Johnson CNM     Does the patient meet one of the following criteria for ADS visit consideration? 16+ years old, with an [facility] PCP     TIP  Providers, please consider if this condition is appropriate for management at one of our Acute and Diagnostic Services sites.     If patient is a good candidate, please use dotphrase <dot>triageresponse and select Refer to ADS to document.    Reason for Disposition    HIGH RISK for severe COVID complications (e.g., weak immune system, age > 64 years, obesity with BMI > 25, pregnant, chronic lung disease or other chronic medical condition)  (Exception: Already seen by PCP and no new or worsening symptoms.)    [1] Intermittent lower abdominal pain AND [2] present > 24 hours    Additional Information    Negative: SEVERE difficulty breathing (e.g., struggling for each breath, speaks in single words)    Negative: Difficult to awaken or acting confused (e.g., disoriented, slurred speech)    Negative: Bluish (or gray) lips or face now    Negative: Shock suspected (e.g., cold/pale/clammy skin, too weak to stand, low BP, rapid pulse)    Negative: Sounds like a life-threatening emergency to the triager    Negative: [1] " Diagnosed or suspected COVID-19 AND [2] symptoms lasting 3 or more weeks    Negative: [1] COVID-19 exposure AND [2] no symptoms    Negative: COVID-19 vaccine reaction suspected (e.g., fever, headache, muscle aches) occurring 1 to 3 days after getting vaccine    Negative: COVID-19 vaccine, questions about    Negative: [1] Lives with someone known to have influenza (flu test positive) AND [2] flu-like symptoms (e.g., cough, runny nose, sore throat, SOB; with or without fever)    Negative: [1] Adult with possible COVID-19 symptoms AND [2] triager concerned about severity of symptoms or other causes    Negative: COVID-19 and breastfeeding, questions about    Negative: SEVERE or constant chest pain or pressure  (Exception: Mild central chest pain, present only when coughing.)    Negative: MODERATE difficulty breathing (e.g., speaks in phrases, SOB even at rest, pulse 100-120)    Negative: [1] Headache AND [2] stiff neck (can't touch chin to chest)    Negative: Oxygen level (e.g., pulse oximetry) 90 percent or lower    Negative: Chest pain or pressure    Negative: Patient sounds very sick or weak to the triager    Negative: MILD difficulty breathing (e.g., minimal/no SOB at rest, SOB with walking, pulse <100)    Negative: Fever > 103 F (39.4 C)    Negative: [1] Fever > 101 F (38.3 C) AND [2] age > 60 years    Negative: [1] Fever > 100.0 F (37.8 C) AND [2] bedridden (e.g., nursing home patient, CVA, chronic illness, recovering from surgery)    Negative: Passed out (i.e., lost consciousness, collapsed and was not responding)    Negative: Shock suspected (e.g., cold/pale/clammy skin, too weak to stand, low BP, rapid pulse)    Negative: Difficult to awaken or acting confused (e.g., disoriented, slurred speech)    Negative: [1] SEVERE abdominal pain (e.g., excruciating) AND [2] constant AND [3] present > 1 hour    Negative: SEVERE vaginal bleeding (e.g., continuous red blood from vagina, large blood clots)    Negative: Sounds  "like a life-threatening emergency to the triager    Negative: Followed an abdomen (stomach) injury    Negative: [1] Having contractions or other symptoms of labor (such as vaginal pressure) AND [2] 37 or more weeks pregnant (i.e., term pregnancy)    Negative: [1] Having contractions or other symptoms of labor (such as vaginal pressure) AND [2] < 37 weeks pregnant (i.e., )    Negative: [1] Abdominal pain AND [2] pregnant < 20 weeks    Negative: [1] Vomiting AND [2] contains red blood or black (\"coffee ground\") material  (Exception: few red streaks in vomit that only happened once)    Negative: [1] SEVERE headache AND [2] not relieved with acetaminophen (e.g., Tylenol)    Negative: MODERATE-SEVERE abdominal pain (e.g., interferes with normal activities, awakens from sleep)    Negative: Vaginal bleeding or spotting    Negative: [1] Baby moving less today (e.g., kick count < 5 in 1 hour or < 10 in 2 hours) AND [2] pregnant 23 or more weeks    Negative: Leakage of fluid from vagina    Negative: New hand or face swelling    Negative: New blurred vision or vision changes    Negative: [1] Upper abdominal pain AND [2] Systolic BP >= 140 OR Diastolic BP >= 90    Answer Assessment - Initial Assessment Questions  1. COVID-19 DIAGNOSIS: \"Who made your COVID-19 diagnosis?\" \"Was it confirmed by a positive lab test or self-test?\" If not diagnosed by a doctor (or NP/PA), ask \"Are there lots of cases (community spread) where you live?\" Note: See public health department website, if unsure.     At home Covid test \"about an hour ago\"  2. COVID-19 EXPOSURE: \"Was there any known exposure to COVID before the symptoms began?\" CDC Definition of close contact: within 6 feet (2 meters) for a total of 15 minutes or more over a 24-hour period.      Pt denies \"thought it was allergies\"  3. ONSET: \"When did the COVID-19 symptoms start?\"      \"started Monday\"  4. WORST SYMPTOM: \"What is your worst symptom?\" (e.g., cough, fever, shortness of " "breath, muscle aches)     \"dry cough and headache\"   5. COUGH: \"Do you have a cough?\" If Yes, ask: \"How bad is the cough?\"       \"not like a horrible cough, just often\"  6. FEVER: \"Do you have a fever?\" If Yes, ask: \"What is your temperature, how was it measured, and when did it start?\"     98.9 at time of call, oral temperature  7. RESPIRATORY STATUS: \"Describe your breathing?\" (e.g., shortness of breath, wheezing, unable to speak)       \"chest congestion, mouth breathing, haven't had to use inhaler\"  8. BETTER-SAME-WORSE: \"Are you getting better, staying the same or getting worse compared to yesterday?\"  If getting worse, ask, \"In what way?\"      \"getting worse, feel it more in chest, coughing more persistant\"  9. HIGH RISK DISEASE: \"Do you have any chronic medical problems?\" (e.g., asthma, heart or lung disease, weak immune system, obesity, etc.)     \"exercise induced asthma, mild per pt  10. VACCINE: \"Have you had the COVID-19 vaccine?\" If Yes, ask: \"Which one, how many shots, when did you get it?\"        Two doses  11. BOOSTER: \"Have you received your COVID-19 booster?\" If Yes, ask: \"Which one and when did you get it?\"        Pt denies   12. PREGNANCY: \"Is there any chance you are pregnant?\" \"When was your last menstrual period?\"        32w0d pregnant  13. OTHER SYMPTOMS: \"Do you have any other symptoms?\"  (e.g., chills, fatigue, headache, loss of smell or taste, muscle pain, sore throat)        \"moderate headache, pt denies neck stiffness\"  14. O2 SATURATION MONITOR:  \"Do you use an oxygen saturation monitor (pulse oximeter) at home?\" If Yes, ask \"What is your reading (oxygen level) today?\" \"What is your usual oxygen saturation reading?\" (e.g., 95%)        Pt not monitoring    Protocols used: CORONAVIRUS (COVID-19) DIAGNOSED OR [id number] 1.18.2022, PREGNANCY - ABDOMINAL PAIN GREATER THAN 20 WEEKS [id number]      "

## 2022-09-15 NOTE — TELEPHONE ENCOUNTER
Per Delma Johnson CNM pt should schedule an appointment to discuss Paxlovid treatment and Delma will call pt this evening.     Writer reviewed information below with pt as well as home care for Covid and CNM Delma Johnson advice to schedule an appointment.     Pt is interested but would like to wait to schedule until she has discussed with KAYLA Johnson. Pt understands Paxlovid must be started within five days of symptom onset.     Coronavirus (COVID-19) Notification    Caller Name (Patient, parent, daughter/son, grandparent, etc)  Pt    Gather patient reported symptoms   Assessment   Current Symptoms at time of phone call, reported by patient: (if no symptoms, document No symptoms] Nasal and chest congestion, cough, moderate headache, mild infrequent pelvic pain, backache   Date of Symptom(s) onset (if applicable) 9/12/22     If at time of call, Patients symptoms hare worsened, the Patient should contact 911 or have someone drive them to Emergency Dept promptly:      If Patient calling 911, inform 911 personal that you have tested positive for the Coronavirus (COVID-19).  Place mask on and await 911 to arrive.    If Emergency Dept, If possible, please have another adult drive you to the Emergency Dept but you need to wear mask when in contact with other people.      Monoclonal Antibody Administration    You may be eligible to receive a new treatment with a monoclonal antibody or oral medication for preventing hospitalization in patients at high risk for complications from COVID-19. This medication is still experimental and available on a limited basis; monoclonal antibodies are given through an IV and must be given at an infusion center. Please note that not all people who are eligible will receive the medication since it is in limited supply.  Is the patient symptomatic and onset of symptoms within the last 7 days?  Yes  Is the patient interested in a visit with a provider to discuss treatment options?: Yes  Is  the patient seen at [facility] or [facility]?  No: Warm transfer caller to [phone] to be scheduled with a virtual urgent provider.  During transfer, instruct  on appropriate time frame for visit     Review information with Patient    Your result was positive. This means you have COVID-19 (coronavirus).      How can I protect others?    These guidelines are for isolating before returning to work, school or .       If you DO have symptoms:  o Stay home and away from others  - For at least 5 days after your symptoms started, AND   - You are fever free for 24 hours (with no medicine that reduces fever), AND  - Your other symptoms are better.  o Wear a mask for 10 full days any time you are around others.    If you DON'T have symptoms:  o Stay at home and away from others for at least 5 days after your positive test.  o Wear a mask for 10 full days any time you are around others.    There may be different guidelines for healthcare facilities. Please check with the specific sites before arriving.     If you've been told by a doctor that you were severely ill with COVID-19 or are immunocompromised, you should isolate for at least 10 days.    You should not go back to work until you meet the guidelines above for ending your home isolation. You don't need to be retested for COVID-19 before going back to work--studies show that you won't spread the virus if it's been at least 10 days since your symptoms started (or 20 days, if you have a weak immune system).    Employers, schools, and daycares: This is an official notice for this person's medical guidelines for returning in-person. They must meet the above guidelines before going back to work, school, or  in person.    You will receive a positive COVID-19 letter via [employer] or the mail soon with additional self-care information.      Would you like me to review some of that information with you now?  No    If you were tested for an upcoming procedure,  please contact your provider for next steps.     Mary Ellen Whitley RN

## 2022-09-16 ENCOUNTER — TELEPHONE (OUTPATIENT)
Dept: MIDWIFE SERVICES | Facility: CLINIC | Age: 22
End: 2022-09-16

## 2022-09-16 ENCOUNTER — MYC MEDICAL ADVICE (OUTPATIENT)
Dept: FAMILY MEDICINE | Facility: CLINIC | Age: 22
End: 2022-09-16

## 2022-09-16 ENCOUNTER — HOSPITAL ENCOUNTER (OUTPATIENT)
Facility: CLINIC | Age: 22
End: 2022-09-16
Admitting: REGISTERED NURSE
Payer: COMMERCIAL

## 2022-09-16 ENCOUNTER — VIRTUAL VISIT (OUTPATIENT)
Dept: FAMILY MEDICINE | Facility: CLINIC | Age: 22
End: 2022-09-16
Payer: COMMERCIAL

## 2022-09-16 ENCOUNTER — HOSPITAL ENCOUNTER (OUTPATIENT)
Facility: CLINIC | Age: 22
Discharge: HOME | End: 2022-09-16
Attending: REGISTERED NURSE | Admitting: REGISTERED NURSE
Payer: COMMERCIAL

## 2022-09-16 ENCOUNTER — HOME INFUSION (PRE-WILLOW HOME INFUSION) (OUTPATIENT)
Dept: PHARMACY | Facility: CLINIC | Age: 22
End: 2022-09-16

## 2022-09-16 VITALS — RESPIRATION RATE: 18 BRPM | BODY MASS INDEX: 28.93 KG/M2 | TEMPERATURE: 99 F | WEIGHT: 180 LBS | HEIGHT: 66 IN

## 2022-09-16 DIAGNOSIS — U07.1 INFECTION DUE TO 2019 NOVEL CORONAVIRUS: Primary | ICD-10-CM

## 2022-09-16 DIAGNOSIS — U07.1 COVID-19 AFFECTING PREGNANCY IN THIRD TRIMESTER: Primary | ICD-10-CM

## 2022-09-16 DIAGNOSIS — O98.513 COVID-19 AFFECTING PREGNANCY IN THIRD TRIMESTER: Primary | ICD-10-CM

## 2022-09-16 PROCEDURE — G0463 HOSPITAL OUTPT CLINIC VISIT: HCPCS | Mod: 25

## 2022-09-16 PROCEDURE — 59025 FETAL NON-STRESS TEST: CPT

## 2022-09-16 PROCEDURE — 99213 OFFICE O/P EST LOW 20 MIN: CPT | Mod: CS | Performed by: NURSE PRACTITIONER

## 2022-09-16 ASSESSMENT — ACTIVITIES OF DAILY LIVING (ADL): ADLS_ACUITY_SCORE: 33

## 2022-09-16 NOTE — PLAN OF CARE
Data: Patient assessed in the Birthplace for decreased fetal movement.  Cervical exam not examined.  Membranes intact.  Contractions/uterine assessment performed, not present.  Action:  Presumed adequate fetal oxygenation documented (see flow record). Discharge instructions reviewed.  Patient instructed to report change in fetal movement, vaginal leaking of fluid or bleeding, abdominal pain, or any concerns related to the pregnancy to her nurse/physician.    Response: Orders to discharge home per Marjorie Cook.  Patient verbalized understanding of education and verbalized agreement with plan. Discharged to home at 0942.

## 2022-09-16 NOTE — DISCHARGE INSTRUCTIONS
Discharge Instruction for Undelivered Patients      You were seen for: Fetal Assessment  We Consulted: [facility], Marjorie TREVINO CNM  You had (Test or Medicine):fetal and uterine monitoring     Diet:   Drink 8 to 12 glasses of liquids (milk, juice, water) every day.  You may eat meals and snacks.     Activity:  Call your doctor or nurse midwife if your baby is moving less than usual.     Call your provider if you notice:  Swelling in your face or increased swelling in your hands or legs.  Headaches that are not relieved by Tylenol (acetaminophen).  Changes in your vision (blurring: seeing spots or stars.)  Nausea (sick to your stomach) and vomiting (throwing up).   Weight gain of 5 pounds or more per week.  Heartburn that doesn't go away.  Signs of bladder infection: pain when you urinate (use the toilet), need to go more often and more urgently.  The bag of anand (rupture of membranes) breaks, or you notice leaking in your underwear.  Bright red blood in your underwear.  Abdominal (lower belly) or stomach pain.  For first baby: Contractions (tightening) less than 5 minutes apart for one hour or more.  *If less than 34 weeks: Contractions (tightening) more than 6 times in one hour.  Increase or change in vaginal discharge (note the color and amount)    Follow-up:  As scheduled in the clinic

## 2022-09-16 NOTE — PROGRESS NOTES
"Clotilde is a 22 year old who is being evaluated via a billable telephone visit.      What phone number would you like to be contacted at? [phone]  How would you like to obtain your AVS? MyChart    Assessment & Plan     Infection due to 2019 novel coronavirus    - nirmatrelvir and ritonavir (PAXLOVID) therapy pack; Take 3 tablets by mouth 2 times daily for 5 days             BMI:   Estimated body mass index is 28.62 kg/m  as calculated from the following:    Height as of 4/13/22: 1.689 m (5' 6.5\").    Weight as of 9/14/22: 81.6 kg (180 lb).       FUTURE APPOINTMENTS:       - Follow up in 1 week for persistent symptoms, sooner for new or worsening symptoms.     See Patient Instructions    No follow-ups on file.    ALOK Simpson CNP  [facility]    Subjective   Clotilde is a 22 year old, presenting for the following health issues:  Covid Concern      HPI       COVID-19 Symptom Review  How many days ago did these symptoms start? 5 days, tested positive yesterday - patient is pregnant  Are any of the following symptoms significant for you?  New or worsening difficulty breathing? Yes    Please describe what kind of difficulty you are having breathing:Mild dyspnea (able to do ADLs without difficulty, mild shortness of breath with activities such as climbing one or two flights of stairs or walking briskly)    Worsening cough? Yes, it's a dry cough.     Fever or chills? No    Headache: YES    Sore throat: YES    Chest pain: No    Diarrhea: No    Body aches? YES    What treatments has patient tried? Robitussen, tylenol, flonase, guaifenesin    Does patient live in a nursing home, group home, or shelter? No  Does patient have a way to get food/medications during quarantined? Yes, I have a friend or family member who can help me.                Review of Systems   Constitutional, HEENT, cardiovascular, pulmonary, gi and gu systems are negative, except as otherwise noted.      Objective  "          Vitals:  No vitals were obtained today due to virtual visit.    Physical Exam   alert, no distress and fatigued  PSYCH: Alert and oriented times 3; coherent speech- hoarse voice, normal   rate and volume, able to articulate logical thoughts, able   to abstract reason, no tangential thoughts, no hallucinations   or delusions  Her affect is normal  RESP: Audible harsh dry cough, no audible wheezing, able to talk in full sentences  Remainder of exam unable to be completed due to telephone visits          Phone call duration: 9 minutes

## 2022-09-16 NOTE — PLAN OF CARE
Pt given marker button for fetal movements. Pt has marked 12x from [id number]. Pt states she is feeling better about the movement.

## 2022-09-16 NOTE — PLAN OF CARE
Data: Patient presented to Birthplace: 2022  8:51 AM.  Reason for maternal/fetal assessment is decreased fetal movement. Patient reports less fetal movement last night and fewer movements this morning.  Patient is a .  Prenatal record reviewed. Pregnancy  has been complicated by hyperemesis gravidarum.  Gestational Age 32w1d. VSS. Fetal movement decreased since 9/15. Patient denies uterine contractions, leaking of vaginal fluid/rupture of membranes, vaginal bleeding, abdominal pain, pelvic pressure, nausea, vomiting, visual disturbances, epigastric or URQ pain, significant edema. Support person is present.   Action: Verbal consent for EFM. Triage assessment completed. Bill of rights reviewed.  Response: Patient verbalized agreement with plan. Will contact KAYLA Cook with update and for further orders.

## 2022-09-16 NOTE — PATIENT INSTRUCTIONS
You are past the window for antivirals, but we give monoclonal antibodies up till day 7. May a virtual urgent care visit through my chart to discuss

## 2022-09-16 NOTE — TELEPHONE ENCOUNTER
Attempted to call patient regarding symptoms. Got voice mail. Will try to call her back shorty.    ALOK Vidal, RENATAM

## 2022-09-16 NOTE — PROVIDER NOTIFICATION
09/16/22 0918   Provider Notification   Provider Name/Title KAYLA Amador   Method of Notification Electronic Page;Phone   Notification Reason Patient Arrived   CNM updated re: pt arrival and c/o dfm since last evening. Cat 1 tracing, no ucs, pt has marked 8 times since being here for fetal movement. Marjorie will come up to see pt.

## 2022-09-16 NOTE — PROGRESS NOTES
"[facility] CNM TRIAGE NOTE    Pau Sanchez is a 22 year old  with and IUP at 32w1d who presents with complaint of decreased fetal movement. Patient reports less fetal movement last night and fewer movements this morning. She is COVID positive as of 5 days ago, has Rx to start paxlovid today. Has home IV infusion scheduled for today for N/V of pregnancy.     Denies ROM   Denies vaginal bleeding  Present OB History at [facility] with the CNMs.    Patient Active Problem List   Diagnosis     Psychosis (H)     Bipolar 1 disorder (H)     Bipolar affective disorder (H)     Suicidal ideation     Controlled substance agreement signed     Nausea/vomiting in pregnancy     At risk for postpartum depression     Encounter for supervision of normal first pregnancy in third trimester     Indication for care in labor and delivery, antepartum         ROS:  Patient is alert and oriented      PHYSICAL EXAM:  Temp 99  F (37.2  C) (Oral)   Resp 18   Ht 1.676 m (5' 6\")   Wt 81.6 kg (180 lb)   LMP 2022   BMI 29.05 kg/m   SpO2: 98%    FHT's 140 with moderate variability  Accelerations: present   Decelerations:  absent        Contractions: Pt is not romeo     Abdomen: gravid, mild tenderness over round ligaments   Bloody show: no  Cervix: not examined   Membranes are intact       ASSESSMENT :   22 year old  with monson IUP 32w1d with decreased fetal movement   NST  Reactive  COVID positive- O2 sats above 97%  GBS unknown and membranes intact      PLAN:  - NST- reactive, fetal movement picked up and she felt over 8 movements in 30 minute period  - has home IV infusion appointment at 1100 she is hoping to make if everything looks ok   - has Rx for paxlovid and will start taking today. Has not needed to use inhaler  - Reviewed reasons to call/present for evaluation   - discussed and ordered later 3rd tri US for hx of COVID in pregnancy and she is accepting of this     Discharge home " given reactive NST, increased fetal movement and stable vital signs   Continue routine prenatal care      Teaching done r/t to s/s of labor, SROM, decreased fetal movement, comfort measures in third trimester.  Instructed to please refer to the discharge handouts, the RN triage line or on-call CNM for any questions or concerns.  Pt verbalizes understanding and agreement with current plan of care.    ALOK BeasleyM

## 2022-09-16 NOTE — TELEPHONE ENCOUNTER
Was able to talk patient. Advised to call and set up appointment for e-visit in order to be treated with paxlovid. Patient also had asthma. She reports that she does have a current inhaler. Advised patient to call me back if she has any difficulty scheduling e-visit.  Patient verbalizes understanding.      ALOK Vidal, RENATAM

## 2022-09-16 NOTE — TELEPHONE ENCOUNTER
Pt calling, 32w1d.  Recently dx with COVID, has Rx for antivirals.    Has been up for 1 1/2 hr and has only felt 1 kick.  Has eaten and drank and has only felt 1 kick.    No ctx, VB or LOF.    Advised to go to L&D for eval.     L&D notified, CNM is on floor and they will let her know pt is coming in.    Edith Stanton, RN

## 2022-09-16 NOTE — PROVIDER NOTIFICATION
09/16/22 0930   Provider Notification   Provider Name/Title KAYLA Amador   Method of Notification At Bedside   Request Evaluate in Person   Pt reassured and feeling fetal movement.

## 2022-09-20 ENCOUNTER — HOME INFUSION (PRE-WILLOW HOME INFUSION) (OUTPATIENT)
Dept: PHARMACY | Facility: CLINIC | Age: 22
End: 2022-09-20

## 2022-09-21 ENCOUNTER — PRENATAL OFFICE VISIT (OUTPATIENT)
Dept: MIDWIFE SERVICES | Facility: CLINIC | Age: 22
End: 2022-09-21
Payer: COMMERCIAL

## 2022-09-21 VITALS
HEART RATE: 111 BPM | TEMPERATURE: 98 F | OXYGEN SATURATION: 96 % | WEIGHT: 182.6 LBS | RESPIRATION RATE: 20 BRPM | SYSTOLIC BLOOD PRESSURE: 112 MMHG | BODY MASS INDEX: 29.35 KG/M2 | DIASTOLIC BLOOD PRESSURE: 74 MMHG | HEIGHT: 66 IN

## 2022-09-21 DIAGNOSIS — J45.20 MILD INTERMITTENT ASTHMA WITHOUT COMPLICATION: ICD-10-CM

## 2022-09-21 DIAGNOSIS — Z34.03 ENCOUNTER FOR SUPERVISION OF NORMAL FIRST PREGNANCY IN THIRD TRIMESTER: Primary | ICD-10-CM

## 2022-09-21 PROCEDURE — 99207 PR PRENATAL VISIT: CPT | Performed by: ADVANCED PRACTICE MIDWIFE

## 2022-09-21 RX ORDER — ALBUTEROL SULFATE 90 UG/1
2 AEROSOL, METERED RESPIRATORY (INHALATION) EVERY 6 HOURS
Qty: 18 G | Refills: 1 | Status: SHIPPED | OUTPATIENT
Start: 2022-09-21 | End: 2022-10-17

## 2022-09-21 NOTE — PROGRESS NOTES
This is a recent snapshot of the patient's [facility] medical record.  For current drug dose and complete information and questions, call [phone] or In Basket pool, fv home infusion ([id number])  CSN Number:  [id number]

## 2022-09-21 NOTE — PATIENT INSTRUCTIONS
Weeks 33 thru 36 - Gestational Age (Fetal Age - Weeks 31 thru 34):  This is about the time that the fetus will descend into the head down position preparing for birth. The fetus is beginning to gain weight more rapidly. The lanugo hair will disappear from the skin, and it is becoming less red and wrinkled. The fetus is now 16-19 inches and weighs anywhere from 5   lbs to 6   lbs.

## 2022-09-21 NOTE — PROGRESS NOTES
"S: Feels OK. No SOB, no concerns today, came in for regular prenatal visit,  Baby active.  Denies uterine cramping, vaginal bleeding or leaking of fluid  O: Vitals: /74 (BP Location: Right arm, Cuff Size: Adult Regular)   Pulse 111   Temp 98  F (36.7  C) (Oral)   Resp 20   Ht 1.676 m (5' 6\")   Wt 82.8 kg (182 lb 9.6 oz)   LMP 02/03/2022   SpO2 96%   Breastfeeding No   BMI 29.47 kg/m    BMI= Body mass index is 29.47 kg/m .  Exam:  Constitutional: healthy, alert and no distress  Respiratory: respirations even and unlabored, lungs clear to auscultation  Gastrointestinal: Abdomen soft, non-tender. Fundus measures appropriate for gestational age. Fetal heart tones hear without difficulty and within normal limits  : Deferred  Psychiatric: mentation appears normal and affect normal/bright  A:     ICD-10-CM    1. Encounter for supervision of normal first pregnancy in third trimester  Z34.03    2. Mild intermittent asthma without complication  J45.20 albuterol (PROAIR HFA/PROVENTIL HFA/VENTOLIN HFA) 108 (90 Base) MCG/ACT inhaler     P: Discussed plans for subsequent visits, every two weeks.   Encouraged patient to call with any questions or concerns.  Return to clinic 2 weeks    Betty Wagner student KAYLA    I was present with the student who participated in the service and documentation of the services provided. I have verified the history and personally performed the physical exam and medical decision making as documented by the student and edited by me.  ALOK Vidal CNM 9/21/2022 11:44 AM        "

## 2022-09-21 NOTE — NURSING NOTE
"Chief Complaint   Patient presents with     Prenatal Care     32w 6d, +FM daily. Covid-19 positive on 2022, symptoms started on 2022. Reports that her symptoms have been improving and no fevers. States that she has lost her voice some.       Initial /74 (BP Location: Right arm, Cuff Size: Adult Regular)   Pulse 111   Temp 98  F (36.7  C) (Oral)   Resp 20   Ht 1.676 m (5' 6\")   Wt 82.8 kg (182 lb 9.6 oz)   LMP 2022   SpO2 96%   Breastfeeding No   BMI 29.47 kg/m   Estimated body mass index is 29.47 kg/m  as calculated from the following:    Height as of this encounter: 1.676 m (5' 6\").    Weight as of this encounter: 82.8 kg (182 lb 9.6 oz).  BP completed using cuff size: regular    Questioned patient about current smoking habits.  Pt. has never smoked.        "

## 2022-09-26 NOTE — PROGRESS NOTES
This is a recent snapshot of the patient's [facility] medical record.  For current drug dose and complete information and questions, call [phone] or In Basket pool, fv home infusion ([id number])  CSN Number:  [id number]

## 2022-09-30 ENCOUNTER — HOME INFUSION (PRE-WILLOW HOME INFUSION) (OUTPATIENT)
Dept: PHARMACY | Facility: CLINIC | Age: 22
End: 2022-09-30

## 2022-10-03 NOTE — PROGRESS NOTES
This is a recent snapshot of the patient's [facility] medical record.  For current drug dose and complete information and questions, call [phone] or In Basket pool, fv home infusion ([id number])  CSN Number:  [id number]

## 2022-10-05 ENCOUNTER — PRENATAL OFFICE VISIT (OUTPATIENT)
Dept: MIDWIFE SERVICES | Facility: CLINIC | Age: 22
End: 2022-10-05
Payer: COMMERCIAL

## 2022-10-05 VITALS
HEIGHT: 66 IN | BODY MASS INDEX: 29.89 KG/M2 | WEIGHT: 186 LBS | SYSTOLIC BLOOD PRESSURE: 110 MMHG | DIASTOLIC BLOOD PRESSURE: 64 MMHG

## 2022-10-05 DIAGNOSIS — O98.513 COVID-19 AFFECTING PREGNANCY IN THIRD TRIMESTER: ICD-10-CM

## 2022-10-05 DIAGNOSIS — Z34.83 ENCOUNTER FOR SUPERVISION OF OTHER NORMAL PREGNANCY IN THIRD TRIMESTER: Primary | ICD-10-CM

## 2022-10-05 DIAGNOSIS — U07.1 COVID-19 AFFECTING PREGNANCY IN THIRD TRIMESTER: ICD-10-CM

## 2022-10-05 DIAGNOSIS — Z34.03 ENCOUNTER FOR SUPERVISION OF NORMAL FIRST PREGNANCY IN THIRD TRIMESTER: ICD-10-CM

## 2022-10-05 PROCEDURE — 99207 PR PRENATAL VISIT: CPT | Performed by: ADVANCED PRACTICE MIDWIFE

## 2022-10-05 NOTE — PROGRESS NOTES
"S: Feels well, still doesn't have her voice back completely from her covid infection,  Baby active.  Denies uterine cramping, vaginal bleeding or leaking of fluid    COVID+ two weeks ago  Growth US planned for 10/19    Continuing weekly IV fluid infusions d/t nausea. Hasn't had to take any Zofran recently. She notices if she goes a week without IV fluids her nausea gets worse.     They are ready for baby's arrival. Her breast pump is ordered. Recommended she get her carseat installed soon. Discussed picking a pediatrician. She will have 12 wks off pp, Laith will have 1. Her sister lives with them and is in nursing school.       O: Vitals: /64 (BP Location: Right arm, Cuff Size: Adult Regular)   Ht 1.676 m (5' 6\")   Wt 84.4 kg (186 lb)   LMP 02/03/2022   Breastfeeding No   BMI 30.02 kg/m    BMI= Body mass index is 30.02 kg/m .  Exam:  Constitutional: healthy, alert and no distress  Respiratory: respirations even and unlabored  Gastrointestinal: Abdomen soft, non-tender. Fundus measures appropriate for gestational age. Fetal heart tones hear without difficulty and within normal limits  : Deferred  Psychiatric: mentation appears normal and affect normal/bright  A/P:   1. (Z34.83) Encounter for supervision of other normal pregnancy in third trimester  (primary encounter diagnosis)      2. (O98.513,  U07.1) COVID-19 affecting pregnancy in third trimester  - Recovered  - Growth Us 10/19      Discussed GBS screen for next visit. Discussed plans for labor.    Encouraged patient to call with any questions or concerns.  Return to clinic 2 weeks    ALOK Polk, CNM                            "

## 2022-10-07 ENCOUNTER — HOME INFUSION (PRE-WILLOW HOME INFUSION) (OUTPATIENT)
Dept: PHARMACY | Facility: CLINIC | Age: 22
End: 2022-10-07

## 2022-10-11 NOTE — PROGRESS NOTES
This is a recent snapshot of the patient's [facility] medical record.  For current drug dose and complete information and questions, call [phone] or In Basket pool, fv home infusion ([id number])  CSN Number:  [id number]

## 2022-10-17 ENCOUNTER — PRENATAL OFFICE VISIT (OUTPATIENT)
Dept: MIDWIFE SERVICES | Facility: CLINIC | Age: 22
End: 2022-10-17
Payer: COMMERCIAL

## 2022-10-17 ENCOUNTER — TELEPHONE (OUTPATIENT)
Dept: MIDWIFE SERVICES | Facility: CLINIC | Age: 22
End: 2022-10-17

## 2022-10-17 VITALS
DIASTOLIC BLOOD PRESSURE: 78 MMHG | HEIGHT: 66 IN | SYSTOLIC BLOOD PRESSURE: 114 MMHG | BODY MASS INDEX: 30.39 KG/M2 | WEIGHT: 189.1 LBS

## 2022-10-17 DIAGNOSIS — Z34.03 ENCOUNTER FOR SUPERVISION OF NORMAL FIRST PREGNANCY IN THIRD TRIMESTER: Primary | ICD-10-CM

## 2022-10-17 DIAGNOSIS — O36.8130 DECREASED FETAL MOVEMENTS IN THIRD TRIMESTER, SINGLE OR UNSPECIFIED FETUS: ICD-10-CM

## 2022-10-17 PROCEDURE — 99207 PR PRENATAL VISIT: CPT | Performed by: ADVANCED PRACTICE MIDWIFE

## 2022-10-17 PROCEDURE — 59025 FETAL NON-STRESS TEST: CPT | Performed by: ADVANCED PRACTICE MIDWIFE

## 2022-10-17 PROCEDURE — 87653 STREP B DNA AMP PROBE: CPT | Performed by: ADVANCED PRACTICE MIDWIFE

## 2022-10-17 NOTE — NURSING NOTE
"Chief Complaint   Patient presents with     Prenatal Care     36w 4d, GBS is due. Reports decreased fetal movement and intensity felt of the movement. Has felt some movements.       Initial /78 (BP Location: Right arm, Cuff Size: Adult Regular)   Ht 1.676 m (5' 6\")   Wt 85.8 kg (189 lb 1.6 oz)   LMP 2022   BMI 30.52 kg/m   Estimated body mass index is 30.52 kg/m  as calculated from the following:    Height as of this encounter: 1.676 m (5' 6\").    Weight as of this encounter: 85.8 kg (189 lb 1.6 oz).  BP completed using cuff size: regular    Questioned patient about current smoking habits.  Pt. has never smoked.        "

## 2022-10-17 NOTE — TELEPHONE ENCOUNTER
Pt calling regarding smaller movement from baby for the last few days.  She had mycharted Friday as well.  Laid down earlier and felt 2 small movements, then fell asleep.  Has felt 1 kick since waking.  Still feeling movement but states it is not a large of movements as normal.    Added to schedule today for reassurance.    Edith Stanton RN

## 2022-10-17 NOTE — PROGRESS NOTES
Feeling well.  Baby is less active than normal.. Denies any leaking of fluid, vaginal bleeding, regular uterine contractions. She just doesn't feel well.  She wonders if it is still due to COVID.    REACTIVE NST today:  Baseline 145 moderate variability, 2+ 15X15 accelerations, no decelerations.  She felt very uncomfortable during monitoring.  She felt faint, nauseaous and hot and sweaty.   She had to move and change positions during monitoring. Baby had hiccups and she could feel several movements during today's visit. Reviewed kick counts.   I urged her if she continues to feel that baby is not moving regularly to call CNM on call and go in for another NST.    She has her next appt and an US on Wednesday.  I recommended that she go to those appts to follow up on how she is feeling and repeat NST if needed.    GBS self collected today.  Cervical exam was not done.  BSUS not done today to confirm positions.  US planned for Wedneday.    Reviewed to call for contractions, loss of fluid, vaginal bleeding, decreased fetal movement or any other questions or concerns.    RTC Wednesday 10/19/22.  Leanne Dueñas, IDALIA, APRN, KAYLA

## 2022-10-18 LAB — GP B STREP DNA SPEC QL NAA+PROBE: NEGATIVE

## 2022-10-19 ENCOUNTER — ANCILLARY PROCEDURE (OUTPATIENT)
Dept: ULTRASOUND IMAGING | Facility: CLINIC | Age: 22
End: 2022-10-19
Attending: REGISTERED NURSE
Payer: COMMERCIAL

## 2022-10-19 ENCOUNTER — PRENATAL OFFICE VISIT (OUTPATIENT)
Dept: MIDWIFE SERVICES | Facility: CLINIC | Age: 22
End: 2022-10-19
Payer: COMMERCIAL

## 2022-10-19 VITALS — SYSTOLIC BLOOD PRESSURE: 108 MMHG | WEIGHT: 191 LBS | DIASTOLIC BLOOD PRESSURE: 70 MMHG | BODY MASS INDEX: 30.83 KG/M2

## 2022-10-19 DIAGNOSIS — O98.513 COVID-19 AFFECTING PREGNANCY IN THIRD TRIMESTER: ICD-10-CM

## 2022-10-19 DIAGNOSIS — U07.1 COVID-19 AFFECTING PREGNANCY IN THIRD TRIMESTER: ICD-10-CM

## 2022-10-19 DIAGNOSIS — Z34.03 ENCOUNTER FOR SUPERVISION OF NORMAL FIRST PREGNANCY IN THIRD TRIMESTER: Primary | ICD-10-CM

## 2022-10-19 DIAGNOSIS — Z23 NEED FOR PROPHYLACTIC VACCINATION AND INOCULATION AGAINST INFLUENZA: ICD-10-CM

## 2022-10-19 PROCEDURE — 90471 IMMUNIZATION ADMIN: CPT | Performed by: REGISTERED NURSE

## 2022-10-19 PROCEDURE — 99207 PR PRENATAL VISIT: CPT | Performed by: REGISTERED NURSE

## 2022-10-19 PROCEDURE — 76816 OB US FOLLOW-UP PER FETUS: CPT | Performed by: FAMILY MEDICINE

## 2022-10-19 PROCEDURE — 90686 IIV4 VACC NO PRSV 0.5 ML IM: CPT | Performed by: REGISTERED NURSE

## 2022-10-19 NOTE — PROGRESS NOTES
S: Feels ok, very tired.  Baby active.  Denies uterine cramping, vaginal bleeding or leaking of fluid. No headache, increase in edema, no epigastric pain.     Was seen on Monday in clinic for decreased fetal movement. Reactive NST. Baby has been moving well since then.    Question about whether induction would be recommended due to hx of covid in pregnancy.     O: Vitals: /70   Wt 86.6 kg (191 lb)   LMP 02/03/2022   BMI 30.83 kg/m    BMI= Body mass index is 30.83 kg/m .  Exam:  Constitutional: healthy, alert and no distress  Respiratory: respirations even and unlabored  Gastrointestinal: Abdomen soft, non-tender. Fundus measures appropriate for gestational age. Fetal heart tones hear without difficulty and within normal limits  : Normal external genitalia without lesions. 0/40/high, avg post  Psychiatric: mentation appears normal and affect normal/bright  A:     ICD-10-CM    1. Encounter for supervision of normal first pregnancy in third trimester  Z34.03       2. COVID-19 affecting pregnancy in third trimester  O98.513     U07.1       3. Need for prophylactic vaccination and inoculation against influenza  Z23 INFLUENZA VACCINE IM > 6 MONTHS VALENT IIV4 (AFLURIA/FLUZONE)        P: Growth US today for hx of covid in pregnancy. Fetal position will be confirmed at this visit. Reviewed Covid in pregnancy and possible reasons induction would be recommended but that it is not recommended as a standard of care for covid in pregnancy.   Labor signs and symptoms discussed, aware of numbers to call  Discussed warning signs of PIH/preeclampsia and patient will monitor.  GBS screen completed previously and negative.   Encouraged patient to call with any questions or concerns.  Return to clinic 1 weeks    ALOK Beasley CNM

## 2022-10-19 NOTE — NURSING NOTE
"Chief Complaint   Patient presents with     Prenatal Care       Initial /70   Wt 86.6 kg (191 lb)   LMP 2022   BMI 30.83 kg/m   Estimated body mass index is 30.83 kg/m  as calculated from the following:    Height as of 10/17/22: 1.676 m (5' 6\").    Weight as of this encounter: 86.6 kg (191 lb).  BP completed using cuff size: regular    Questioned patient about current smoking habits.  Pt. has never smoked.          36w6d  + FM daily  + david long contractions  + mild edema  - cramping  + fatigue  - headache  Stefanie Mace LPN               "

## 2022-10-25 NOTE — PROGRESS NOTES
This is a recent snapshot of the patient's [facility] medical record.  For current drug dose and complete information and questions, call [phone] or In Basket pool, fv home infusion ([id number])  CSN Number:  [id number]

## 2022-10-26 ENCOUNTER — TELEPHONE (OUTPATIENT)
Dept: OBGYN | Facility: CLINIC | Age: 22
End: 2022-10-26

## 2022-10-26 ENCOUNTER — PRENATAL OFFICE VISIT (OUTPATIENT)
Dept: MIDWIFE SERVICES | Facility: CLINIC | Age: 22
End: 2022-10-26
Payer: COMMERCIAL

## 2022-10-26 VITALS — BODY MASS INDEX: 30.86 KG/M2 | SYSTOLIC BLOOD PRESSURE: 118 MMHG | WEIGHT: 191.2 LBS | DIASTOLIC BLOOD PRESSURE: 76 MMHG

## 2022-10-26 DIAGNOSIS — Z34.83 ENCOUNTER FOR SUPERVISION OF OTHER NORMAL PREGNANCY IN THIRD TRIMESTER: Primary | ICD-10-CM

## 2022-10-26 DIAGNOSIS — O98.513 COVID-19 AFFECTING PREGNANCY IN THIRD TRIMESTER: ICD-10-CM

## 2022-10-26 DIAGNOSIS — U07.1 COVID-19 AFFECTING PREGNANCY IN THIRD TRIMESTER: ICD-10-CM

## 2022-10-26 PROCEDURE — 99207 PR PRENATAL VISIT: CPT | Performed by: ADVANCED PRACTICE MIDWIFE

## 2022-10-26 NOTE — TELEPHONE ENCOUNTER
Dr Madrid,     I am writing to review Clotilde's COVID status and Special Precautions PPE.     She is now fully recovered from COVID and we would like to Discontinue Special Precautions PPE and return to Standard Precautions PPE for future infusion visits.     This co insides with the [facility] policy for Special Precautions PPE duration.     Please let me know if you are ok with this change.     Thank you,  Medina Antonio RN

## 2022-10-26 NOTE — PROGRESS NOTES
This is a recent snapshot of the patient's [facility] medical record.  For current drug dose and complete information and questions, call [phone] or In Basket pool, fv home infusion ([id number])  CSN Number:  [id number]

## 2022-10-26 NOTE — PROGRESS NOTES
S: Feels well,  Baby active.  Denies uterine cramping, vaginal bleeding or leaking of fluid. No headache, increase in edema, no epigastric pain.     Requesting cervical exam today.     Has questions about colostrum collection before baby arrives.     O: Vitals: /76 (BP Location: Left arm, Cuff Size: Adult Regular)   Wt 86.7 kg (191 lb 3.2 oz)   LMP 02/03/2022   BMI 30.86 kg/m    BMI= Body mass index is 30.86 kg/m .  Exam:  Constitutional: healthy, alert and no distress  Respiratory: respirations even and unlabored  Gastrointestinal: Abdomen soft, non-tender. Fundus measures appropriate for gestational age. Fetal heart tones hear without difficulty and within normal limits  : Normal external genitalia without lesions  Cervix 0cm / 60% / -3  Psychiatric: mentation appears normal and affect normal/bright  A:     ICD-10-CM    1. Encounter for supervision of other normal pregnancy in third trimester  Z34.83       2. COVID-19 affecting pregnancy in third trimester  O98.513     U07.1         P:   Growth Us 10/19 d/t COVID in third trimester - EFW 26%  Reviewed hand expression of colostrum. Advised to freeze any collected.     Labor signs and symptoms discussed, aware of numbers to call  Discussed warning signs of PIH/preeclampsia and patient will monitor.  GBS screen completed. Discussed plans for labor.  Encouraged patient to call with any questions or concerns.  Return to clinic 1 weeks    ALOK Polk, KAYLA

## 2022-10-26 NOTE — NURSING NOTE
"Chief Complaint   Patient presents with     Prenatal Care     37w 6d, +Fm daily.       Initial /76 (BP Location: Left arm, Cuff Size: Adult Regular)   Wt 86.7 kg (191 lb 3.2 oz)   LMP 2022   BMI 30.86 kg/m   Estimated body mass index is 30.86 kg/m  as calculated from the following:    Height as of 10/17/22: 1.676 m (5' 6\").    Weight as of this encounter: 86.7 kg (191 lb 3.2 oz).  BP completed using cuff size: regular    Questioned patient about current smoking habits.  Pt. has never smoked.          "

## 2022-10-27 NOTE — PROGRESS NOTES
This is a recent snapshot of the patient's [facility] medical record.  For current drug dose and complete information and questions, call [phone] or In Basket pool, fv home infusion ([id number])  CSN Number:  [id number]

## 2022-11-03 ENCOUNTER — PRENATAL OFFICE VISIT (OUTPATIENT)
Dept: MIDWIFE SERVICES | Facility: CLINIC | Age: 22
End: 2022-11-03
Payer: COMMERCIAL

## 2022-11-03 VITALS — SYSTOLIC BLOOD PRESSURE: 128 MMHG | DIASTOLIC BLOOD PRESSURE: 78 MMHG | WEIGHT: 194 LBS | BODY MASS INDEX: 31.31 KG/M2

## 2022-11-03 DIAGNOSIS — Z34.03 ENCOUNTER FOR SUPERVISION OF NORMAL FIRST PREGNANCY IN THIRD TRIMESTER: Primary | ICD-10-CM

## 2022-11-03 PROCEDURE — 99207 PR PRENATAL VISIT: CPT | Performed by: ADVANCED PRACTICE MIDWIFE

## 2022-11-03 NOTE — NURSING NOTE
"Chief Complaint   Patient presents with     Prenatal Care     39w no concerns       Initial /78   Wt 88 kg (194 lb)   LMP 2022   BMI 31.31 kg/m   Estimated body mass index is 31.31 kg/m  as calculated from the following:    Height as of 10/17/22: 1.676 m (5' 6\").    Weight as of this encounter: 88 kg (194 lb).  BP completed using cuff size: regular    Questioned patient about current smoking habits.  Pt. has never smoked.          The following HM Due: NONE    "

## 2022-11-03 NOTE — PROGRESS NOTES
S: Feels well and has no concerns today, she would like a cervical examination, Baby active, reports change in movements but more than 10 fetal movements in an active 2 hr window. Patient had a normal growth ultrasound 2 weeks ago.  Denies uterine cramping, vaginal bleeding or leaking of fluid. Patient reported some bleeding after her last vaginal exam which stopped. No headache, increase in edema, no epigastric pain.   R  O: Vitals: /78   Wt 88 kg (194 lb)   LMP 02/03/2022   BMI 31.31 kg/m     BMI= Body mass index is 31.31 kg/m .     Exam:  Constitutional: healthy, alert and no distress  Respiratory: respirations even and unlabored  Gastrointestinal: Abdomen soft, non-tender. Fundus measures slightly smaller for gestational age. Fetal heart tones hear without difficulty and within normal limits  :Cervix  0/60/-2   Psychiatric: mentation appears normal and affect normal/bright    A:     ICD-10-CM    1. Encounter for supervision of normal first pregnancy in third trimester  Z34.03           P: Labor signs and symptoms discussed, aware of numbers to call.  Measuring small for gestational age - had growth Us 10/19 showing EFW 26%, so smaller FH today likely due to baby getting lower in pelvis. Continue to monitor.   Reviewed appropriate fetal movement - movements may feel different as baby gets larger but should not decrease in number. Call with concerns.   Discussed warning signs of PIH/preeclampsia and patient will monitor.  GBS screen completed-negative.  Discussed postdates options.  Encouraged patient to call with any questions or concerns.  Return to clinic 1 weeks    Betty Wagner, student KAYLA    I was present with the student who participated in the service and documentation of the services provided. I have verified the history and personally performed the physical exam and medical decision making as documented by the student and edited by me.    ALOK Polk, CNM

## 2022-11-04 ENCOUNTER — MYC MEDICAL ADVICE (OUTPATIENT)
Dept: FAMILY MEDICINE | Facility: CLINIC | Age: 22
End: 2022-11-04

## 2022-11-09 ENCOUNTER — PRENATAL OFFICE VISIT (OUTPATIENT)
Dept: MIDWIFE SERVICES | Facility: CLINIC | Age: 22
End: 2022-11-09
Payer: COMMERCIAL

## 2022-11-09 VITALS — BODY MASS INDEX: 31.8 KG/M2 | WEIGHT: 197 LBS | DIASTOLIC BLOOD PRESSURE: 78 MMHG | SYSTOLIC BLOOD PRESSURE: 112 MMHG

## 2022-11-09 DIAGNOSIS — Z34.03 ENCOUNTER FOR SUPERVISION OF NORMAL FIRST PREGNANCY IN THIRD TRIMESTER: Primary | ICD-10-CM

## 2022-11-09 PROCEDURE — 99207 PR PRENATAL VISIT: CPT

## 2022-11-09 NOTE — PROGRESS NOTES
S: Feels good,  Baby active.  Denies uterine cramping, vaginal bleeding or leaking of fluid. No headache, increase in edema, no epigastric pain.     Reports more contractions, last night had contractions for two hours 7-10 minutes apart. currently not having any contractions.   O: Vitals: /78   Wt 89.4 kg (197 lb)   LMP 2022   BMI 31.80 kg/m    BMI= Body mass index is 31.8 kg/m .  Exam:  Constitutional: healthy, alert and no distress  Respiratory: respirations even and unlabored  Gastrointestinal: Abdomen soft, non-tender. Fundus measures appropriate for gestational age. Fetal heart tones hear without difficulty and within normal limits  : Normal external genitalia without lesions  Cervix: ft/60/-2  Psychiatric: mentation appears normal and affect normal/bright  A:     ICD-10-CM    1. Encounter for supervision of normal first pregnancy in third trimester  Z34.03         P: Labor signs and symptoms discussed, aware of numbers to call.  Discussed warning signs of PIH/preeclampsia and patient will monitor.  GBS screen completed. Discussed plans for labor. Discussed patient options for postpartum contraception. Patient counseled on risks after 41 weeks of gestation.  The health risks for you and your fetus may increase if a pregnancy is late term or postterm, but problems occur in only a small number of postterm pregnancies. Most women who give birth after their due dates have uncomplicated labor and give birth to healthy babies. Risks associated with postterm pregnancy include the following:    -Stillbirth  -Macrosomia  -Postmaturity syndrome  -Meconium in the lungs of the fetus, which can cause serious breathing problems after birth  -Decreased amniotic fluid, which can cause the umbilical cord to pinch and restrict the flow of oxygen to the fetus  -Other risks include an increased chance of an assisted vaginal delivery or  delivery. There also is a higher chance of infection and postpartum  hemorrhage when your pregnancy goes past your due date.     Discussed postdates options and need for bi-weekly BPPs to start at 41 weeks.  Encouraged patient to call with any questions or concerns.  Plans for inductions at 41 weeks if baby doesn't come by then.     Return to clinic 1 weeks    ALOK HAGER CNM

## 2022-11-09 NOTE — NURSING NOTE
"Chief Complaint   Patient presents with     Prenatal Care     39w 6d had some consistent contractions last night       Initial /78   Wt 89.4 kg (197 lb)   LMP 2022   BMI 31.80 kg/m   Estimated body mass index is 31.8 kg/m  as calculated from the following:    Height as of 10/17/22: 1.676 m (5' 6\").    Weight as of this encounter: 89.4 kg (197 lb).  BP completed using cuff size: regular    Questioned patient about current smoking habits.  Pt. has never smoked.          "

## 2022-11-11 ENCOUNTER — MYC MEDICAL ADVICE (OUTPATIENT)
Dept: FAMILY MEDICINE | Facility: CLINIC | Age: 22
End: 2022-11-11

## 2022-11-13 NOTE — PROGRESS NOTES
This is a recent snapshot of the patient's [facility] medical record.  For current drug dose and complete information and questions, call [phone] or In Basket pool, fv home infusion ([id number])  CSN Number:  [id number]

## 2022-11-14 ASSESSMENT — ASTHMA QUESTIONNAIRES
QUESTION_3 LAST FOUR WEEKS HOW OFTEN DID YOUR ASTHMA SYMPTOMS (WHEEZING, COUGHING, SHORTNESS OF BREATH, CHEST TIGHTNESS OR PAIN) WAKE YOU UP AT NIGHT OR EARLIER THAN USUAL IN THE MORNING: NOT AT ALL
QUESTION_4 LAST FOUR WEEKS HOW OFTEN HAVE YOU USED YOUR RESCUE INHALER OR NEBULIZER MEDICATION (SUCH AS ALBUTEROL): NOT AT ALL
QUESTION_2 LAST FOUR WEEKS HOW OFTEN HAVE YOU HAD SHORTNESS OF BREATH: NOT AT ALL
ACT_TOTALSCORE: 25
QUESTION_5 LAST FOUR WEEKS HOW WOULD YOU RATE YOUR ASTHMA CONTROL: COMPLETELY CONTROLLED
ACT_TOTALSCORE: 25
QUESTION_1 LAST FOUR WEEKS HOW MUCH OF THE TIME DID YOUR ASTHMA KEEP YOU FROM GETTING AS MUCH DONE AT WORK, SCHOOL OR AT HOME: NONE OF THE TIME

## 2022-11-14 NOTE — TELEPHONE ENCOUNTER
Panel Management Review      Patient has the following on her problem list:     Asthma review     ACT Total Scores 11/14/2022   ACT TOTAL SCORE (Goal Greater than or Equal to 20) 25   In the past 12 months, how many times did you visit the emergency room for your asthma without being admitted to the hospital? 0   In the past 12 months, how many times were you hospitalized overnight because of your asthma? 0      1. Is Asthma diagnosis on the Problem List? No   2. Is Asthma listed on Health Maintenance? Yes    3. Patient is due for:  ACT      Composite cancer screening  Chart review shows that this patient is due/due soon for the following None  Summary:    Patient is due/failing the following:   ACT    Action needed:   Patient answered via [employer]    Type of outreach:    Sent Mippin message.    Questions for provider review:    None                                                                                                                                    Martha Maxwell CMA

## 2022-11-15 ENCOUNTER — PRENATAL OFFICE VISIT (OUTPATIENT)
Dept: MIDWIFE SERVICES | Facility: CLINIC | Age: 22
End: 2022-11-15
Payer: COMMERCIAL

## 2022-11-15 ENCOUNTER — TRANSFERRED RECORDS (OUTPATIENT)
Dept: MIDWIFE SERVICES | Facility: CLINIC | Age: 22
End: 2022-11-15

## 2022-11-15 VITALS
WEIGHT: 197.2 LBS | DIASTOLIC BLOOD PRESSURE: 88 MMHG | BODY MASS INDEX: 31.69 KG/M2 | SYSTOLIC BLOOD PRESSURE: 120 MMHG | HEIGHT: 66 IN

## 2022-11-15 DIAGNOSIS — O36.8130 DECREASED FETAL MOVEMENTS IN THIRD TRIMESTER, SINGLE OR UNSPECIFIED FETUS: ICD-10-CM

## 2022-11-15 DIAGNOSIS — Z34.03 ENCOUNTER FOR SUPERVISION OF NORMAL FIRST PREGNANCY IN THIRD TRIMESTER: Primary | ICD-10-CM

## 2022-11-15 PROCEDURE — 59025 FETAL NON-STRESS TEST: CPT | Performed by: REGISTERED NURSE

## 2022-11-15 PROCEDURE — 99207 PR PRENATAL VISIT: CPT | Performed by: REGISTERED NURSE

## 2022-11-15 NOTE — PROGRESS NOTES
"S: Feels ok, ready for baby,  Baby feels less active. Feels movements but they are smaller, seem less frequent.  Denies uterine cramping, vaginal bleeding. Feels like she leaked fluid once on Friday went she sat up but no more occurrences. No fever or chills. No headache, increase in edema, no epigastric pain.     O: Vitals: /88 (BP Location: Right arm, Cuff Size: Adult Regular)   Ht 1.676 m (5' 6\")   Wt 89.4 kg (197 lb 3.2 oz)   LMP 02/03/2022   BMI 31.83 kg/m    BMI= Body mass index is 31.83 kg/m .  Exam:  Constitutional: healthy, alert and no distress  Respiratory: respirations even and unlabored  Gastrointestinal: Abdomen soft, non-tender. Fundus measures appropriate for gestational age. Fetal heart tones hear without difficulty and within normal limits  : Normal external genitalia without lesions. 2-3/60/-2, post, avg. CHERY: 6. Bag of water palpated.   Psychiatric: mentation appears normal and affect normal/bright  A:     ICD-10-CM    1. Encounter for supervision of normal first pregnancy in third trimester  Z34.03 FETAL NON-STRESS TEST      2. Decreased fetal movements in third trimester, single or unspecified fetus  O36.8130 FETAL NON-STRESS TEST        NST reactive in 40 minute period.     Baseline: 135  Variability: moderate  Deceleration: none   Accelerations: greater than two 15x15 accelerations     REACTIVE       P: Labor signs and symptoms discussed, aware of numbers to call.  Discussed warning signs of PIH/preeclampsia and patient will monitor.  GBS screen completed previously and negative  Discussed postdates options and need for bi-weekly BPPs to start at 41 weeks. Desires IOL. Ripening scheduled for tomorrow night, 11/16/2022, IOL for 41 weeks on 11/17/2022. On-call midwife alerted. This provider on call 11/17/2022  Encouraged patient to call with any questions or concerns.    Marjorie Cook, ALOK CNM          "

## 2022-11-15 NOTE — NURSING NOTE
"Chief Complaint   Patient presents with     Prenatal Care     40w 5d, GBS negative, +FM daily, decreased intensity/amount. Discuss IOL for postdates.       Initial /88 (BP Location: Right arm, Cuff Size: Adult Regular)   Ht 1.676 m (5' 6\")   Wt 89.4 kg (197 lb 3.2 oz)   LMP 2022   BMI 31.83 kg/m   Estimated body mass index is 31.83 kg/m  as calculated from the following:    Height as of this encounter: 1.676 m (5' 6\").    Weight as of this encounter: 89.4 kg (197 lb 3.2 oz).  BP completed using cuff size: regular    Questioned patient about current smoking habits.  Pt. has never smoked.        "

## 2022-11-15 NOTE — PATIENT INSTRUCTIONS
Labor and Childbirth: Support Person's Notes  You may be excited and anxious about the impending labor and childbirth. You may also wonder how you can help. Learning about the birth process can help you know what to expect. And following the suggestions below can help ease you andthe mother through this exciting time.   During early labor    Be sure to time the contractions.    Keep the setting soothing. Dim lights and prevent loud noises. Try playing relaxing music.    Suggest that the mother soak in a warm tub to ease the pain of contractions.    Try to distract the mother from the contractions with a short walk or massage.    Encourage the mother to rest if she's tired.    As contractions become stronger, help her use labor breathing techniques.    During active labor    Have the mother walk or change position at least once an hour. This improves circulation and helps the baby descend.    Keep reminding the mother to breathe and relax through each contraction.    Reassure her. Try to keep her from getting anxious or overstressed.    Take care of yourself. Take a short break to eat or go to the bathroom when you need to.    Rest when the mother does. You'll both benefit.    During a vaginal birth    Help the mother into a pushing position. Support her body as she pushes. A semi-sitting or semi-squatting position allows gravity to assist the birth.    Remind her to rest between contractions. Encourage her by telling her when the baby's head appears.    Keep in mind that you may be masked and gowned for the birth, depending on hospital policy.    During a  birth    You will most likely be able to stay with the mother during the . If you remain with her, you'll wear a mask and surgical gown.    Remember that  birth is surgery. The mother's abdominal area will be draped and out of view. Don't touch the draped areas, which are sterile.    If you aren't allowed to attend the delivery or  aren't comfortable doing so, wait with other friends and family members in the family waiting area.    [employer] last reviewed this educational content on7/1/2021 2000-2022 [employer]. All rights reserved. This information is not intended as a substitute for professional medical care. Always follow yourhealthcare professional's instructions.          Labor Induction  What You Need to Know  What is labor induction?  In most cases, it is best to go into labor naturally. When labor does not start on its own, we may use medicine or other methods to start (induce) labor.   This is called an induction of labor. It helps the uterus to contract. It also helps to thin, soften and open the cervix. (The cervix is the opening of the uterus.)  When is induction used?  There are two types of induction:    Medical induction is done to protect the health of the mom or baby. We may start labor if:  ? There are medical concerns for you or your baby.  ? You haven't had your baby by 41 weeks.    Elective induction is done for non-medical reasons. This may be done if:  ? You live far from the hospital.  ? You have been having contractions for many days.  ? You have given birth quickly in the past.  We will not perform an elective induction before 39 weeks. Studies show that babies born before 39 weeks may struggle with breathing, feeding, sleeping and staying warm. They are more likely to have health problems. They may need to stay in the hospital longer.  If we start your labor for medical reasons, the benefits will outweigh these risks.   We will talk to you about your risks, benefits and alternatives (other options) before we start your labor.  What might happen if my labor is induced?  Some of this depends on how your labor is started and how your body responds. Your labor may be more complicated. You and your baby may need more medical treatments. In general:    You may not go into labor right away. If so, we may  send you home with follow-up instructions.    You may not be able to move around during labor. You will have two belts with monitors attached to your belly. These allow the nurse to watch your contractions and your baby's heart rate.    Your labor may be longer and more painful. It might take more than one day.    A long labor may increase the risk of infection in mother and baby.    Your labor may not progress as planned. This could lead to a  birth.  How is induction done?  We may start your labor by doing one or more of the following:    We may place medicine in your vagina, near your cervix. This can help to open and prepare the cervix for labor. It is only used when there are medical reasons to start labor.    After your cervix is ready:  ? We may give you medicine through an IV (a small tube placed in your vein). This medicine is called pitocin. It helps the muscles of your uterus to contract.  ? We may make a small opening in your bag of water (the sac around your baby). This is called an amniotomy. It may help your uterus contract and your cervix open.  Can I plan the date of my delivery?  After 39 weeks, you may ask about planning the date of your delivery. This is only an option if your body--and your baby--are ready.   We will check your cervix and your baby's heart rate.     If you are ready to be induced, we will give you a date and time to come to the hospital. If many mothers are in labor that day, we may need to start your labor at another time.    If you are not ready, we will not start your labor. It will be safer for your baby to come on its own.  What else do I need to know?  Before you have an induction, make sure you understand the reasons, risks and benefits. Ask your doctor or nurse-midwife:    Why do I need to be induced?    What are the risks to my baby?    How will you start my labor?    How will you know if my baby is ready to be born?    How will you know if my body is ready to  give birth?  Where can I get more information?  To learn more about induction, you may visit these websites:  The American College of Nurse-Midwives: www.mymidwife.org  The American College of Obstetricians and Gynecologists: www.acog.org  Childbirth Connection: www.childbirthconnection.org  [employer] of [employer]: www.marchofdimes.Pulsity  For informational purposes only. Not to replace the advice of your health care provider.   Copyright   2008 [employer]. All rights reserved. Clinically reviewed by the  [employer] team. [employer] 517058 - REV .  For informational purposes only. Not to replace the advice of your health care provider.  Copyright   2018 [employer]. All rights reserved.

## 2022-11-16 ENCOUNTER — HOSPITAL ENCOUNTER (INPATIENT)
Facility: CLINIC | Age: 22
LOS: 3 days | Discharge: HOME | End: 2022-11-19
Attending: ADVANCED PRACTICE MIDWIFE | Admitting: ADVANCED PRACTICE MIDWIFE
Payer: COMMERCIAL

## 2022-11-16 PROBLEM — Z34.90 ENCOUNTER FOR ELECTIVE INDUCTION OF LABOR: Status: ACTIVE | Noted: 2022-11-16

## 2022-11-16 LAB
ABO/RH(D): NORMAL
ANTIBODY SCREEN: NEGATIVE
ERYTHROCYTE [DISTWIDTH] IN BLOOD BY AUTOMATED COUNT: 12.7 % (ref 10–15)
HCT VFR BLD AUTO: 35.5 % (ref 35–47)
HGB BLD-MCNC: 12.2 G/DL (ref 11.7–15.7)
HOLD SPECIMEN: NORMAL
MCH RBC QN AUTO: 30.6 PG (ref 26.5–33)
MCHC RBC AUTO-ENTMCNC: 34.4 G/DL (ref 31.5–36.5)
MCV RBC AUTO: 89 FL (ref 78–100)
PLATELET # BLD AUTO: 194 10E3/UL (ref 150–450)
RBC # BLD AUTO: 3.99 10E6/UL (ref 3.8–5.2)
SPECIMEN EXPIRATION DATE: NORMAL
WBC # BLD AUTO: 13.4 10E3/UL (ref 4–11)

## 2022-11-16 PROCEDURE — 85027 COMPLETE CBC AUTOMATED: CPT | Performed by: ADVANCED PRACTICE MIDWIFE

## 2022-11-16 PROCEDURE — 86780 TREPONEMA PALLIDUM: CPT | Performed by: ADVANCED PRACTICE MIDWIFE

## 2022-11-16 PROCEDURE — 120N000001 HC R&B MED SURG/OB

## 2022-11-16 PROCEDURE — 86850 RBC ANTIBODY SCREEN: CPT | Performed by: ADVANCED PRACTICE MIDWIFE

## 2022-11-16 PROCEDURE — 86901 BLOOD TYPING SEROLOGIC RH(D): CPT | Performed by: ADVANCED PRACTICE MIDWIFE

## 2022-11-16 RX ORDER — KETOROLAC TROMETHAMINE 30 MG/ML
30 INJECTION, SOLUTION INTRAMUSCULAR; INTRAVENOUS
Status: DISCONTINUED | OUTPATIENT
Start: 2022-11-16 | End: 2022-11-18

## 2022-11-16 RX ORDER — PROCHLORPERAZINE MALEATE 10 MG
10 TABLET ORAL EVERY 6 HOURS PRN
Status: DISCONTINUED | OUTPATIENT
Start: 2022-11-16 | End: 2022-11-17 | Stop reason: HOSPADM

## 2022-11-16 RX ORDER — OXYTOCIN/0.9 % SODIUM CHLORIDE 30/500 ML
100-340 PLASTIC BAG, INJECTION (ML) INTRAVENOUS CONTINUOUS PRN
Status: DISCONTINUED | OUTPATIENT
Start: 2022-11-16 | End: 2022-11-18

## 2022-11-16 RX ORDER — CALCIUM CARBONATE 500 MG/1
1000 TABLET, CHEWABLE ORAL EVERY 4 HOURS PRN
Status: DISCONTINUED | OUTPATIENT
Start: 2022-11-16 | End: 2022-11-19 | Stop reason: HOSPADM

## 2022-11-16 RX ORDER — NALOXONE HYDROCHLORIDE 0.4 MG/ML
0.4 INJECTION, SOLUTION INTRAMUSCULAR; INTRAVENOUS; SUBCUTANEOUS
Status: DISCONTINUED | OUTPATIENT
Start: 2022-11-16 | End: 2022-11-17 | Stop reason: HOSPADM

## 2022-11-16 RX ORDER — METOCLOPRAMIDE HYDROCHLORIDE 5 MG/ML
10 INJECTION INTRAMUSCULAR; INTRAVENOUS EVERY 6 HOURS PRN
Status: DISCONTINUED | OUTPATIENT
Start: 2022-11-16 | End: 2022-11-17 | Stop reason: HOSPADM

## 2022-11-16 RX ORDER — NALOXONE HYDROCHLORIDE 0.4 MG/ML
0.2 INJECTION, SOLUTION INTRAMUSCULAR; INTRAVENOUS; SUBCUTANEOUS
Status: DISCONTINUED | OUTPATIENT
Start: 2022-11-16 | End: 2022-11-17 | Stop reason: HOSPADM

## 2022-11-16 RX ORDER — MISOPROSTOL 100 UG/1
25 TABLET ORAL
Status: DISCONTINUED | OUTPATIENT
Start: 2022-11-16 | End: 2022-11-17 | Stop reason: HOSPADM

## 2022-11-16 RX ORDER — OXYTOCIN/0.9 % SODIUM CHLORIDE 30/500 ML
340 PLASTIC BAG, INJECTION (ML) INTRAVENOUS CONTINUOUS PRN
Status: DISCONTINUED | OUTPATIENT
Start: 2022-11-16 | End: 2022-11-17 | Stop reason: HOSPADM

## 2022-11-16 RX ORDER — METHYLERGONOVINE MALEATE 0.2 MG/ML
200 INJECTION INTRAVENOUS
Status: DISCONTINUED | OUTPATIENT
Start: 2022-11-16 | End: 2022-11-17 | Stop reason: HOSPADM

## 2022-11-16 RX ORDER — PROCHLORPERAZINE 25 MG
25 SUPPOSITORY, RECTAL RECTAL EVERY 12 HOURS PRN
Status: DISCONTINUED | OUTPATIENT
Start: 2022-11-16 | End: 2022-11-17 | Stop reason: HOSPADM

## 2022-11-16 RX ORDER — IBUPROFEN 800 MG/1
800 TABLET, FILM COATED ORAL
Status: DISCONTINUED | OUTPATIENT
Start: 2022-11-16 | End: 2022-11-18

## 2022-11-16 RX ORDER — HYDROXYZINE HYDROCHLORIDE 50 MG/1
50 TABLET, FILM COATED ORAL
Status: DISCONTINUED | OUTPATIENT
Start: 2022-11-16 | End: 2022-11-17 | Stop reason: HOSPADM

## 2022-11-16 RX ORDER — MISOPROSTOL 200 UG/1
400 TABLET ORAL
Status: DISCONTINUED | OUTPATIENT
Start: 2022-11-16 | End: 2022-11-17 | Stop reason: HOSPADM

## 2022-11-16 RX ORDER — MORPHINE SULFATE 10 MG/ML
10 INJECTION, SOLUTION INTRAMUSCULAR; INTRAVENOUS
Status: DISCONTINUED | OUTPATIENT
Start: 2022-11-16 | End: 2022-11-17 | Stop reason: HOSPADM

## 2022-11-16 RX ORDER — OXYTOCIN 10 [USP'U]/ML
10 INJECTION, SOLUTION INTRAMUSCULAR; INTRAVENOUS
Status: DISCONTINUED | OUTPATIENT
Start: 2022-11-16 | End: 2022-11-18

## 2022-11-16 RX ORDER — OXYTOCIN 10 [USP'U]/ML
10 INJECTION, SOLUTION INTRAMUSCULAR; INTRAVENOUS
Status: DISCONTINUED | OUTPATIENT
Start: 2022-11-16 | End: 2022-11-17 | Stop reason: HOSPADM

## 2022-11-16 RX ORDER — TRANEXAMIC ACID 10 MG/ML
1 INJECTION, SOLUTION INTRAVENOUS EVERY 30 MIN PRN
Status: DISCONTINUED | OUTPATIENT
Start: 2022-11-16 | End: 2022-11-17 | Stop reason: HOSPADM

## 2022-11-16 RX ORDER — MISOPROSTOL 200 UG/1
800 TABLET ORAL
Status: DISCONTINUED | OUTPATIENT
Start: 2022-11-16 | End: 2022-11-17 | Stop reason: HOSPADM

## 2022-11-16 RX ORDER — ONDANSETRON 4 MG/1
4 TABLET, ORALLY DISINTEGRATING ORAL EVERY 6 HOURS PRN
Status: DISCONTINUED | OUTPATIENT
Start: 2022-11-16 | End: 2022-11-17 | Stop reason: HOSPADM

## 2022-11-16 RX ORDER — CARBOPROST TROMETHAMINE 250 UG/ML
250 INJECTION, SOLUTION INTRAMUSCULAR
Status: DISCONTINUED | OUTPATIENT
Start: 2022-11-16 | End: 2022-11-17 | Stop reason: HOSPADM

## 2022-11-16 RX ORDER — ONDANSETRON 2 MG/ML
4 INJECTION INTRAMUSCULAR; INTRAVENOUS EVERY 6 HOURS PRN
Status: DISCONTINUED | OUTPATIENT
Start: 2022-11-16 | End: 2022-11-17 | Stop reason: HOSPADM

## 2022-11-16 RX ORDER — ALBUTEROL SULFATE 90 UG/1
1-2 AEROSOL, METERED RESPIRATORY (INHALATION) EVERY 6 HOURS PRN
Status: DISCONTINUED | OUTPATIENT
Start: 2022-11-16 | End: 2022-11-19 | Stop reason: HOSPADM

## 2022-11-16 RX ORDER — METOCLOPRAMIDE 10 MG/1
10 TABLET ORAL EVERY 6 HOURS PRN
Status: DISCONTINUED | OUTPATIENT
Start: 2022-11-16 | End: 2022-11-17 | Stop reason: HOSPADM

## 2022-11-16 RX ORDER — CITRIC ACID/SODIUM CITRATE 334-500MG
30 SOLUTION, ORAL ORAL
Status: DISCONTINUED | OUTPATIENT
Start: 2022-11-16 | End: 2022-11-17 | Stop reason: HOSPADM

## 2022-11-16 ASSESSMENT — ACTIVITIES OF DAILY LIVING (ADL)
ADLS_ACUITY_SCORE: 20
ADLS_ACUITY_SCORE: 20

## 2022-11-17 ENCOUNTER — ANESTHESIA (OUTPATIENT)
Dept: OBGYN | Facility: CLINIC | Age: 22
End: 2022-11-17
Payer: COMMERCIAL

## 2022-11-17 ENCOUNTER — ANESTHESIA EVENT (OUTPATIENT)
Dept: OBGYN | Facility: CLINIC | Age: 22
End: 2022-11-17
Payer: COMMERCIAL

## 2022-11-17 LAB — T PALLIDUM AB SER QL: NONREACTIVE

## 2022-11-17 PROCEDURE — 370N000003 HC ANESTHESIA WARD SERVICE

## 2022-11-17 PROCEDURE — 00HU33Z INSERTION OF INFUSION DEVICE INTO SPINAL CANAL, PERCUTANEOUS APPROACH: ICD-10-PCS | Performed by: ANESTHESIOLOGY

## 2022-11-17 PROCEDURE — 250N000011 HC RX IP 250 OP 636

## 2022-11-17 PROCEDURE — 0HQ9XZZ REPAIR PERINEUM SKIN, EXTERNAL APPROACH: ICD-10-PCS | Performed by: REGISTERED NURSE

## 2022-11-17 PROCEDURE — 250N000009 HC RX 250: Performed by: ADVANCED PRACTICE MIDWIFE

## 2022-11-17 PROCEDURE — 120N000001 HC R&B MED SURG/OB

## 2022-11-17 PROCEDURE — 59400 OBSTETRICAL CARE: CPT | Performed by: REGISTERED NURSE

## 2022-11-17 PROCEDURE — 250N000013 HC RX MED GY IP 250 OP 250 PS 637: Performed by: ADVANCED PRACTICE MIDWIFE

## 2022-11-17 PROCEDURE — 250N000013 HC RX MED GY IP 250 OP 250 PS 637: Performed by: REGISTERED NURSE

## 2022-11-17 PROCEDURE — 250N000011 HC RX IP 250 OP 636: Performed by: ANESTHESIOLOGY

## 2022-11-17 PROCEDURE — 722N000001 HC LABOR CARE VAGINAL DELIVERY SINGLE

## 2022-11-17 PROCEDURE — 258N000003 HC RX IP 258 OP 636: Performed by: ADVANCED PRACTICE MIDWIFE

## 2022-11-17 PROCEDURE — 3E0R3BZ INTRODUCTION OF ANESTHETIC AGENT INTO SPINAL CANAL, PERCUTANEOUS APPROACH: ICD-10-PCS | Performed by: ANESTHESIOLOGY

## 2022-11-17 PROCEDURE — 258N000003 HC RX IP 258 OP 636: Performed by: REGISTERED NURSE

## 2022-11-17 PROCEDURE — 3E033VJ INTRODUCTION OF OTHER HORMONE INTO PERIPHERAL VEIN, PERCUTANEOUS APPROACH: ICD-10-PCS | Performed by: REGISTERED NURSE

## 2022-11-17 PROCEDURE — 0UQGXZZ REPAIR VAGINA, EXTERNAL APPROACH: ICD-10-PCS | Performed by: REGISTERED NURSE

## 2022-11-17 PROCEDURE — 10907ZC DRAINAGE OF AMNIOTIC FLUID, THERAPEUTIC FROM PRODUCTS OF CONCEPTION, VIA NATURAL OR ARTIFICIAL OPENING: ICD-10-PCS | Performed by: REGISTERED NURSE

## 2022-11-17 PROCEDURE — 250N000009 HC RX 250: Performed by: REGISTERED NURSE

## 2022-11-17 PROCEDURE — 250N000009 HC RX 250: Performed by: ANESTHESIOLOGY

## 2022-11-17 PROCEDURE — 0UQMXZZ REPAIR VULVA, EXTERNAL APPROACH: ICD-10-PCS | Performed by: REGISTERED NURSE

## 2022-11-17 RX ORDER — HYDROCORTISONE 25 MG/G
CREAM TOPICAL 3 TIMES DAILY PRN
Status: DISCONTINUED | OUTPATIENT
Start: 2022-11-17 | End: 2022-11-19 | Stop reason: HOSPADM

## 2022-11-17 RX ORDER — MISOPROSTOL 200 UG/1
400 TABLET ORAL
Status: DISCONTINUED | OUTPATIENT
Start: 2022-11-17 | End: 2022-11-19 | Stop reason: HOSPADM

## 2022-11-17 RX ORDER — DOCUSATE SODIUM 100 MG/1
100 CAPSULE, LIQUID FILLED ORAL DAILY
Status: DISCONTINUED | OUTPATIENT
Start: 2022-11-17 | End: 2022-11-19 | Stop reason: HOSPADM

## 2022-11-17 RX ORDER — TRANEXAMIC ACID 10 MG/ML
1 INJECTION, SOLUTION INTRAVENOUS EVERY 30 MIN PRN
Status: DISCONTINUED | OUTPATIENT
Start: 2022-11-17 | End: 2022-11-19 | Stop reason: HOSPADM

## 2022-11-17 RX ORDER — LIDOCAINE HYDROCHLORIDE AND EPINEPHRINE 15; 5 MG/ML; UG/ML
INJECTION, SOLUTION EPIDURAL PRN
Status: DISCONTINUED | OUTPATIENT
Start: 2022-11-17 | End: 2022-11-17

## 2022-11-17 RX ORDER — FENTANYL CITRATE 50 UG/ML
INJECTION, SOLUTION INTRAMUSCULAR; INTRAVENOUS
Status: COMPLETED
Start: 2022-11-17 | End: 2022-11-17

## 2022-11-17 RX ORDER — NALBUPHINE HYDROCHLORIDE 10 MG/ML
2.5-5 INJECTION, SOLUTION INTRAMUSCULAR; INTRAVENOUS; SUBCUTANEOUS EVERY 6 HOURS PRN
Status: DISCONTINUED | OUTPATIENT
Start: 2022-11-17 | End: 2022-11-19 | Stop reason: HOSPADM

## 2022-11-17 RX ORDER — BUPIVACAINE HYDROCHLORIDE 2.5 MG/ML
INJECTION, SOLUTION EPIDURAL; INFILTRATION; INTRACAUDAL PRN
Status: DISCONTINUED | OUTPATIENT
Start: 2022-11-17 | End: 2022-11-17

## 2022-11-17 RX ORDER — FENTANYL/BUPIVACAINE/NS/PF 2-1250MCG
PLASTIC BAG, INJECTION (ML) INJECTION
Status: COMPLETED
Start: 2022-11-17 | End: 2022-11-17

## 2022-11-17 RX ORDER — OXYTOCIN 10 [USP'U]/ML
10 INJECTION, SOLUTION INTRAMUSCULAR; INTRAVENOUS
Status: DISCONTINUED | OUTPATIENT
Start: 2022-11-17 | End: 2022-11-19 | Stop reason: HOSPADM

## 2022-11-17 RX ORDER — OXYTOCIN/0.9 % SODIUM CHLORIDE 30/500 ML
340 PLASTIC BAG, INJECTION (ML) INTRAVENOUS CONTINUOUS PRN
Status: DISCONTINUED | OUTPATIENT
Start: 2022-11-17 | End: 2022-11-19 | Stop reason: HOSPADM

## 2022-11-17 RX ORDER — IBUPROFEN 800 MG/1
800 TABLET, FILM COATED ORAL EVERY 6 HOURS PRN
Status: DISCONTINUED | OUTPATIENT
Start: 2022-11-17 | End: 2022-11-19 | Stop reason: HOSPADM

## 2022-11-17 RX ORDER — BISACODYL 10 MG
10 SUPPOSITORY, RECTAL RECTAL DAILY PRN
Status: DISCONTINUED | OUTPATIENT
Start: 2022-11-17 | End: 2022-11-19 | Stop reason: HOSPADM

## 2022-11-17 RX ORDER — METHYLERGONOVINE MALEATE 0.2 MG/ML
200 INJECTION INTRAVENOUS
Status: DISCONTINUED | OUTPATIENT
Start: 2022-11-17 | End: 2022-11-19 | Stop reason: HOSPADM

## 2022-11-17 RX ORDER — SODIUM CHLORIDE, SODIUM LACTATE, POTASSIUM CHLORIDE, CALCIUM CHLORIDE 600; 310; 30; 20 MG/100ML; MG/100ML; MG/100ML; MG/100ML
INJECTION, SOLUTION INTRAVENOUS CONTINUOUS PRN
Status: DISCONTINUED | OUTPATIENT
Start: 2022-11-17 | End: 2022-11-17 | Stop reason: HOSPADM

## 2022-11-17 RX ORDER — FENTANYL CITRATE-0.9 % NACL/PF 10 MCG/ML
100 PLASTIC BAG, INJECTION (ML) INTRAVENOUS EVERY 5 MIN PRN
Status: DISCONTINUED | OUTPATIENT
Start: 2022-11-17 | End: 2022-11-17 | Stop reason: HOSPADM

## 2022-11-17 RX ORDER — FENTANYL CITRATE 50 UG/ML
100 INJECTION, SOLUTION INTRAMUSCULAR; INTRAVENOUS
Status: DISCONTINUED | OUTPATIENT
Start: 2022-11-17 | End: 2022-11-17 | Stop reason: HOSPADM

## 2022-11-17 RX ORDER — CARBOPROST TROMETHAMINE 250 UG/ML
250 INJECTION, SOLUTION INTRAMUSCULAR
Status: DISCONTINUED | OUTPATIENT
Start: 2022-11-17 | End: 2022-11-19 | Stop reason: HOSPADM

## 2022-11-17 RX ORDER — TERBUTALINE SULFATE 1 MG/ML
0.25 INJECTION, SOLUTION SUBCUTANEOUS
Status: DISCONTINUED | OUTPATIENT
Start: 2022-11-17 | End: 2022-11-17 | Stop reason: HOSPADM

## 2022-11-17 RX ORDER — OXYTOCIN/0.9 % SODIUM CHLORIDE 30/500 ML
1-24 PLASTIC BAG, INJECTION (ML) INTRAVENOUS CONTINUOUS
Status: DISCONTINUED | OUTPATIENT
Start: 2022-11-17 | End: 2022-11-17 | Stop reason: HOSPADM

## 2022-11-17 RX ORDER — ACETAMINOPHEN 325 MG/1
650 TABLET ORAL EVERY 4 HOURS PRN
Status: DISCONTINUED | OUTPATIENT
Start: 2022-11-17 | End: 2022-11-19 | Stop reason: HOSPADM

## 2022-11-17 RX ORDER — MISOPROSTOL 200 UG/1
800 TABLET ORAL
Status: DISCONTINUED | OUTPATIENT
Start: 2022-11-17 | End: 2022-11-19 | Stop reason: HOSPADM

## 2022-11-17 RX ORDER — MODIFIED LANOLIN
OINTMENT (GRAM) TOPICAL
Status: DISCONTINUED | OUTPATIENT
Start: 2022-11-17 | End: 2022-11-19 | Stop reason: HOSPADM

## 2022-11-17 RX ADMIN — MISOPROSTOL 25 MCG: 100 TABLET ORAL at 02:00

## 2022-11-17 RX ADMIN — LIDOCAINE HYDROCHLORIDE,EPINEPHRINE BITARTRATE 3 ML: 15; .005 INJECTION, SOLUTION EPIDURAL; INFILTRATION; INTRACAUDAL; PERINEURAL at 12:51

## 2022-11-17 RX ADMIN — FENTANYL CITRATE 100 MCG: 50 INJECTION, SOLUTION INTRAMUSCULAR; INTRAVENOUS at 11:00

## 2022-11-17 RX ADMIN — BUPIVACAINE HYDROCHLORIDE 10 ML: 2.5 INJECTION, SOLUTION EPIDURAL; INFILTRATION; INTRACAUDAL at 12:58

## 2022-11-17 RX ADMIN — Medication: at 13:01

## 2022-11-17 RX ADMIN — MISOPROSTOL 25 MCG: 100 TABLET ORAL at 06:03

## 2022-11-17 RX ADMIN — MISOPROSTOL 25 MCG: 100 TABLET ORAL at 00:01

## 2022-11-17 RX ADMIN — ACETAMINOPHEN 650 MG: 325 TABLET, FILM COATED ORAL at 21:46

## 2022-11-17 RX ADMIN — CALCIUM CARBONATE (ANTACID) CHEW TAB 500 MG 1000 MG: 500 CHEW TAB at 00:01

## 2022-11-17 RX ADMIN — LIDOCAINE HYDROCHLORIDE 20 ML: 10 INJECTION, SOLUTION EPIDURAL; INFILTRATION; INTRACAUDAL; PERINEURAL at 17:41

## 2022-11-17 RX ADMIN — Medication 2 MILLI-UNITS/MIN: at 12:23

## 2022-11-17 RX ADMIN — MISOPROSTOL 25 MCG: 100 TABLET ORAL at 10:22

## 2022-11-17 RX ADMIN — MISOPROSTOL 25 MCG: 100 TABLET ORAL at 08:00

## 2022-11-17 RX ADMIN — SODIUM CHLORIDE, POTASSIUM CHLORIDE, SODIUM LACTATE AND CALCIUM CHLORIDE: 600; 310; 30; 20 INJECTION, SOLUTION INTRAVENOUS at 13:04

## 2022-11-17 RX ADMIN — DOCUSATE SODIUM 100 MG: 100 CAPSULE, LIQUID FILLED ORAL at 21:46

## 2022-11-17 RX ADMIN — LIDOCAINE HYDROCHLORIDE,EPINEPHRINE BITARTRATE 2 ML: 15; .005 INJECTION, SOLUTION EPIDURAL; INFILTRATION; INTRACAUDAL; PERINEURAL at 12:54

## 2022-11-17 RX ADMIN — MISOPROSTOL 25 MCG: 100 TABLET ORAL at 04:02

## 2022-11-17 RX ADMIN — HYDROXYZINE HYDROCHLORIDE 50 MG: 50 TABLET, FILM COATED ORAL at 00:01

## 2022-11-17 RX ADMIN — SODIUM CHLORIDE, POTASSIUM CHLORIDE, SODIUM LACTATE AND CALCIUM CHLORIDE 500 ML: 600; 310; 30; 20 INJECTION, SOLUTION INTRAVENOUS at 08:21

## 2022-11-17 RX ADMIN — IBUPROFEN 800 MG: 800 TABLET, FILM COATED ORAL at 21:46

## 2022-11-17 ASSESSMENT — ACTIVITIES OF DAILY LIVING (ADL)
ADLS_ACUITY_SCORE: 20

## 2022-11-17 NOTE — PROGRESS NOTES
CNM PROGRESS NOTE    SUBJECTIVE:  Clotilde is laying in bed. Her mother and partner are at bedside and supportive. She utilized fentanyl about 1 hr after last cytotec dose with good relief. Feels like contractions got stronger. No leaking of fluid or vaginal bleeding.     OBJECTIVE:  /81   Temp 98.2  F (36.8  C) (Oral)   Resp 16   LMP 2022   Breastfeeding No     Fetal heart tones: Baseline 130   Variability: moderate  Accelerations: present  Decelerations: present    Contractions: Pt is romeo every 1-3.5 minutes, lasting  seconds     Cervix: 5/ 80/ -1, Vtx  ROM: not ruptured, BOW palpated     Pitocin- none  Antibiotics- none  Cervical ripening: misoprostol, s/p 6 doses    ASSESSMENT:  IUP @ 41w0d IOl for post-dates   Adequate labor progress  Overall cat I FHT, period of cat II, not concerned for fetal acidemia   GBS- negative  Covid recovered      PLAN:   - Labor induction with pitocin, titration per unit protocol   - continuous fetal monitoring   - uterine resuscitation as needed   - pain plan per patient preference    - Reviewed upright positioning and positions to encourage fetal descent and optimal positioning.     Anticipate   Labor induction with Pitocin  reevaluate in 2-4 hours/PRN    ALOK Beasley CNM

## 2022-11-17 NOTE — PLAN OF CARE
1400 elizabeth monitor not recording, picking up mother's hr, pt repositioned to left side, and elizabeth removed and replaced with efm and toco.

## 2022-11-17 NOTE — PROVIDER NOTIFICATION
11/17/22 0854   Provider Notification   Provider Name/Title Marjorie CNM   Method of Notification At Bedside   CNM at bedside to see patient. Category 1 tracing with contractions q1-1.5 min that palpate mild, fluid bolus given. Contractions now 1-3 min apart.     Notified of ESBL notification on patient chart, Marjorie states there have been normal urine cultures since, no need for contact precautions.    No new orders. Plan to continue with cytotec protocol.

## 2022-11-17 NOTE — PROGRESS NOTES
CNM PROGRESS NOTE    SUBJECTIVE:  Clotilde is sitting up in bed eating breakfast. Felt cramping overnight but was able to rest. Utilized vistaril overnight. Feeling like cramping is definitely getting stronger. No leaking of fluid or vaginal bleeding. Laith sleeping at bedside     OBJECTIVE:  /74 (BP Location: Left arm, Patient Position: Semi-Saleh's, Cuff Size: Adult Regular)   Temp 98.2  F (36.8  C) (Oral)   Resp 16   LMP 02/03/2022   Breastfeeding No     Fetal heart tones: Baseline 125   Variability: moderate  Accelerations: present  Decelerations: absent    Contractions: Pt is romeo every 1-4 minutes, lasting 50-70 seconds and palpates mild    Was tachysystole but received fluid bolus by RN and normalizing     Cervix: deferred  ROM: not ruptured    Pitocin- none  Antibiotics- none  Cervical ripening: misoprostol, s/p 5 doses    ASSESSMENT:  IUP @ 41w0d IOL for postdates   GBS- negative  Cat I FHT  Covid recovered      PLAN:   - Continue with cytotec regimen per unit protocol until ripening achieved   - Continuous fetal monitoring   - uterine resuscitation as needed  - pain plan per patient preference    reevaluate in 2-4 hours/PRN    ALOK Beasley CNM

## 2022-11-17 NOTE — ANESTHESIA PROCEDURE NOTES
Epidural catheter Procedure Note    Pre-Procedure   Staff -        Anesthesiologist:  Chito Johnson MD       Performed By: anesthesiologist       Location: OB       Pre-Anesthestic Checklist: patient identified, IV checked, risks and benefits discussed, informed consent, monitors and equipment checked, pre-op evaluation and at physician/surgeon's request  Timeout:       Correct Patient: Yes        Correct Procedure: Yes        Correct Site: Yes        Correct Position: Yes   Procedure Documentation  Procedure: epidural catheter       Diagnosis: labor       Patient Position: sitting       Patient Prep/Sterile Barriers: sterile gloves, mask, patient draped       Skin prep: Betadine       Local skin infiltrated with 3 mL of 1% lidocaine.        Insertion Site: L3-4. (midline approach).       Technique: LORT saline        FORTINO at 7 cm.       Needle Type: [employer] needle       Needle Gauge: 17.        Needle Length (Inches): 3.5        Catheter: 19 G.          Catheter threaded easily.           Threaded 12 cm at skin.         # of attempts: 2 and  # of redirects:  2    Assessment/Narrative         Paresthesias: No.       Test dose of 3 mL lidocaine 1.5% w/ 1:200,000 epinephrine at.         Test dose negative, 3 minutes after injection, for signs of intravascular, subdural, or intrathecal injection.       Insertion/Infusion Method: LORT saline       Aspiration negative for Heme or CSF via Epidural Catheter.     Comments:  Procedure tolerated well without apparent complications. Initial MDA bolus of 0.25% bupivacaine was incrementally given without difficulty or complications. Epidural infusion (0.125% bupi with fentanyl 2mcg/ml) started at 10 ml/hr with PCEA of 5 ml q15min with a max cumulative dose of 25 ml/hr. PCEA instructions given and use encouraged PRN. Epidural expectations again reviewed and questions answered. Patient hemodynamically stable.  Patient and epidural functionality to be reassessed later via  "vital sign flowsheet, nursing communication, and/or patient report.  Anesthesiologist immediately available for ongoing assessment and management.           FOR [facility]) ONLY:   Pain Team Contact information: please page the Pain Team Via [employer]. Search \"Pain\". During daytime hours, please page the attending first. At night please page the resident first.    "

## 2022-11-17 NOTE — PROVIDER NOTIFICATION
11/17/22 1410   Provider Notification   Provider Name/Title keyshawn cnm   Method of Notification At Bedside   Request Evaluate in Person

## 2022-11-17 NOTE — PROVIDER NOTIFICATION
11/17/22 1553   Provider Notification   Provider Name/Title Marjorie GARZA   Method of Notification Phone   CNM notified of contraction pattern. Contractions q1.5 min that palpate moderate. Occasional late decelerations. Pitocin currently at 6.    Order to decrease pitocin by 2.

## 2022-11-17 NOTE — PROVIDER NOTIFICATION
11/17/22 1010   Provider Notification   Provider Name/Title Marjorie GARZA   Method of Notification Phone   Pt reports becoming more uncomfortable with contractions, requests to have SVE. SVE by RN 3/60/-2, hamilton score of 7. Category 1 tracing, contractions q1.5-4 min that palpate mild.    Plan to continue with 1000 dose of cytotec, will reassess before next dose to update POC.

## 2022-11-17 NOTE — PROVIDER NOTIFICATION
11/17/22 1042   Provider Notification   Provider Name/Title Marjorie CNM   Method of Notification Phone   CNM notified of patient requesting pain relief.     Order for 100 mcg of Fentanyl IV.

## 2022-11-17 NOTE — ANESTHESIA PREPROCEDURE EVALUATION
Anesthesia Pre-Procedure Evaluation    Patient: Pau Sanchez   MRN: [id number] : 2000        Procedure :           Past Medical History:   Diagnosis Date     Bipolar affective disorder (H)      Constipation      Depressive disorder      Uncomplicated asthma       Past Surgical History:   Procedure Laterality Date     EXTRACTION(S) DENTAL       ORTHOPEDIC SURGERY      had pins put in arm age 2      No Known Allergies   Social History     Tobacco Use     Smoking status: Never     Smokeless tobacco: Never   Substance Use Topics     Alcohol use: Not Currently     Alcohol/week: 0.0 standard drinks      Wt Readings from Last 1 Encounters:   11/15/22 89.4 kg (197 lb 3.2 oz)        Anesthesia Evaluation            ROS/MED HX  ENT/Pulmonary:     (+) Intermittent, asthma Treatment: Inhaler prn,      Neurologic:  - neg neurologic ROS     Cardiovascular:  - neg cardiovascular ROS     METS/Exercise Tolerance:     Hematologic:  - neg hematologic  ROS     Musculoskeletal:  - neg musculoskeletal ROS     GI/Hepatic:  - neg GI/hepatic ROS     Renal/Genitourinary:  - neg Renal ROS     Endo:     (+) Obesity,     Psychiatric/Substance Use:     (+) psychiatric history bipolar and depression     Infectious Disease:  - neg infectious disease ROS     Malignancy:       Other:            Physical Exam    Airway        Mallampati: II   TM distance: > 3 FB   Neck ROM: full   Mouth opening: > 3 cm    Respiratory Devices and Support         Dental  no notable dental history         Cardiovascular          Rhythm and rate: regular and normal     Pulmonary   pulmonary exam normal                OUTSIDE LABS:  CBC:   Lab Results   Component Value Date    WBC 13.4 (H) 2022    WBC 12.1 (H) 2022    HGB 12.2 2022    HGB 13.0 2022    HCT 35.5 2022    HCT 37.8 2022     2022     2022     BMP:   Lab Results   Component Value Date     2016     2016    POTASSIUM  4.0 03/30/2016    POTASSIUM 5.0 02/14/2016    CHLORIDE 108 03/30/2016    CHLORIDE 105 02/14/2016    CO2 22 03/30/2016    CO2 28 02/14/2016    BUN 14 03/30/2016    BUN 17 02/14/2016    CR 0.68 03/30/2016    CR 0.63 02/14/2016    GLC 96 03/11/2021     (H) 03/30/2016     COAGS: No results found for: PTT, INR, FIBR  POC:   Lab Results   Component Value Date    HCG Negative 09/21/2021     HEPATIC:   Lab Results   Component Value Date    ALBUMIN 4.0 03/30/2016    PROTTOTAL 7.6 03/30/2016    ALT 31 03/30/2016    AST 14 03/30/2016    ALKPHOS 90 03/30/2016    BILITOTAL 0.4 03/30/2016    FROYLAN 24 02/05/2016     OTHER:   Lab Results   Component Value Date    ISHMAEL 9.4 03/30/2016    LIPASE 334 (H) 03/30/2016    AMYLASE 94 03/30/2016    TSH 0.78 01/22/2016    SED 5 01/26/2016       Anesthesia Plan    ASA Status:  2   - Procedure: Procedure only, no anesthetic delivered      Anesthesia Type: Epidural.              Consents    Anesthesia Plan(s) and associated risks, benefits, and realistic alternatives discussed. Questions answered and patient/representative(s) expressed understanding.    - Discussed:     - Discussed with:  Patient         Postoperative Care            Comments:    Other Comments: Continuous Labor Epidural: Indication is for labor pain. Following an discussion of the procedure, epidural expectations, and risks and benefits (risks including, but not limited to, nerve damage, infection, bleeding/hematoma, inadvertent dural puncture, spinal headache, partial or failed block), the patient appears to understand and consents to proceed. Questions were encouraged and answered.             Chito Johnson MD

## 2022-11-17 NOTE — H&P
Labor & Delivery Admission Note:    Date: 2022  Time: 7:51 PM    Admission H&P  Pau Sanchez,  2000, MRN [id number]    No admission diagnoses are documented for this encounter.    PCP: Edd Siddiqui, [phone]   Code status:  Prior       Extended Emergency Contact Information  Primary Emergency Contact: stantonsukhdeep mccain  Address: [address] [place] [address] [facility]  Home Phone: [phone]  Mobile Phone: [phone]  Relation: Mother  Secondary [facility] Contact: [facility]  Address: [address] [place] [address] [facility]  Home Phone: [phone]  Mobile Phone: [phone]  Relation: Father       Chief Complaint: Encounter for elective induction of labor       HPI:      Pau Sanchez, is a 22 year old,  AT 40+6 weeks gestation, LMP 2/3/22, Estimated Date of Delivery: Nov 10, 2022, confirmed by LMP, admitted to [facility] on 2022 secondary to: elective induction of labor. Patient will be 41 weeks at midnight and the cervical ripening will start at that time. Patient denies any leakage of fluid, vaginal bleeding or changes in fetal movement.  Denies any recent Covid 19 symptoms. Tested positive in last 90 days for Covid 19, so is considered Covid recovered.      Prenatal History:  Pau Sanchez began care with [facility] on 22 at 9 weeks gestation with regular care thereafter for a total of 15 visits. Her care was complicated by nausea and vomiting of pregnancy, bipolar, and Covid 19 positive during pregnancy.    Pre-pregnant weight 175#  Total weight gain 22#  Pre-pregnant BMI 28.26      Pertinent Labs:  Prenatal Office Visit on 10/17/2022   Component Date Value Ref Range Status     Group B Strep PCR 10/17/2022 Negative  Negative Final    Presumed negative for Streptococcus agalactiae (Group B Streptococcus) or the number of organisms may be below the limit of  detection of the assay.   Allied Health/Nurse Visit on 08/11/2022   Component Date Value Ref Range Status     WBC Count 08/11/2022 12.1 (H)  4.0 - 11.0 10e3/uL Final     RBC Count 08/11/2022 4.16  3.80 - 5.20 10e6/uL Final     Hemoglobin 08/11/2022 13.0  11.7 - 15.7 g/dL Final     Hematocrit 08/11/2022 37.8  35.0 - 47.0 % Final     MCV 08/11/2022 91  78 - 100 fL Final     MCH 08/11/2022 31.3  26.5 - 33.0 pg Final     MCHC 08/11/2022 34.4  31.5 - 36.5 g/dL Final     RDW 08/11/2022 13.2  10.0 - 15.0 % Final     Platelet Count 08/11/2022 192  150 - 450 10e3/uL Final     Glu Gest Screen 1hr 50g 08/11/2022 104  70 - 129 mg/dL Final     Treponema Antibody Total 08/11/2022 Nonreactive  Nonreactive Final   Prenatal Office Visit on 05/11/2022   Component Date Value Ref Range Status     See Scanned Result 05/11/2022 INVITAE NON-INVASIVE PRENATAL SCREENING-Scanned   Final     Hepatitis C Antibody 05/11/2022 Nonreactive  Nonreactive Final     Treponema Antibody Total 05/11/2022 Nonreactive  Nonreactive Final     Rubella Joanna IgG Instrument Value 05/11/2022 1.91  <0.90 Index Final     Rubella Antibody IgG 05/11/2022 Positive   Final    Suggests previous exposure or immunization and probable immunity.     HIV Antigen Antibody Combo 05/11/2022 Nonreactive  Nonreactive Final    HIV-1 p24 Ag & HIV-1/HIV-2 Ab Not Detected     Hepatitis B Surface Antigen 05/11/2022 Nonreactive  Nonreactive Final     ABO/RH(D) 05/11/2022 O POS   Final     Antibody Screen 05/11/2022 Negative  Negative Final     SPECIMEN EXPIRATION DATE 05/11/2022 [id number]   Final   Lab on 04/27/2022   Component Date Value Ref Range Status     Color Urine 04/27/2022 Yellow  Colorless, Straw, Light Yellow, Yellow Final     Appearance Urine 04/27/2022 Clear  Clear Final     Glucose Urine 04/27/2022 Negative  Negative mg/dL Final     Bilirubin Urine 04/27/2022 Negative  Negative Final     Ketones Urine 04/27/2022 Negative  Negative mg/dL Final     Specific Gravity  Urine 04/27/2022 1.020  1.003 - 1.035 Final     Blood Urine 04/27/2022 Negative  Negative Final     pH Urine 04/27/2022 8.5 (H)  5.0 - 7.0 Final     Protein Albumin Urine 04/27/2022 30 (A)  Negative mg/dL Final     Urobilinogen Urine 04/27/2022 1.0  0.2, 1.0 E.U./dL Final     Nitrite Urine 04/27/2022 Negative  Negative Final     Leukocyte Esterase Urine 04/27/2022 Small (A)  Negative Final     Culture 04/27/2022 <10,000 CFU/mL Mixture of urogenital bianca   Final     Bacteria Urine 04/27/2022 Moderate (A)  None Seen /HPF Final     RBC Urine 04/27/2022 0-2  0-2 /HPF /HPF Final     WBC Urine 04/27/2022 10-25 (A)  0-5 /HPF /HPF Final     Squamous Epithelials Urine 04/27/2022 Moderate (A)  None Seen /LPF Final   Prenatal Office Visit on 04/13/2022   Component Date Value Ref Range Status     Neisseria gonorrhoeae 04/13/2022 Negative  Negative Final    Negative for N. gonorrhoeae rRNA by transcription mediated amplification. A negative result by transcription mediated amplification does not preclude the presence of C. trachomatis infection because results are dependent on proper and adequate collection, absence of inhibitors and sufficient rRNA to be detected.     Chlamydia trachomatis 04/13/2022 Negative  Negative Final    A negative result by transcription mediated amplification does not preclude the presence of C. trachomatis infection because results are dependent on proper and adequate collection, absence of inhibitors and sufficient rRNA to be detected.   Lab on 04/08/2022   Component Date Value Ref Range Status     Culture 04/08/2022 >100,000 CFU/mL Escherichia coli ESBL (A)   Final     Color Urine 04/08/2022 Yellow  Colorless, Straw, Light Yellow, Yellow Final     Appearance Urine 04/08/2022 Clear  Clear Final     Glucose Urine 04/08/2022 Negative  Negative mg/dL Final     Bilirubin Urine 04/08/2022 Negative  Negative Final     Ketones Urine 04/08/2022 Trace (A)  Negative mg/dL Final     Specific Gravity Urine  04/08/2022 1.015  1.003 - 1.035 Final     Blood Urine 04/08/2022 Negative  Negative Final     pH Urine 04/08/2022 8.5 (H)  5.0 - 7.0 Final     Protein Albumin Urine 04/08/2022 Negative  Negative mg/dL Final     Urobilinogen Urine 04/08/2022 0.2  0.2, 1.0 E.U./dL Final     Nitrite Urine 04/08/2022 Positive (A)  Negative Final     Leukocyte Esterase Urine 04/08/2022 Moderate (A)  Negative Final     Bacteria Urine 04/08/2022 Many (A)  None Seen /HPF Final     RBC Urine 04/08/2022 None Seen  0-2 /HPF /HPF Final     WBC Urine 04/08/2022 5-10 (A)  0-5 /HPF /HPF Final     Squamous Epithelials Urine 04/08/2022 Many (A)  None Seen /LPF Final   Office Visit on 03/29/2022   Component Date Value Ref Range Status     Group A Strep antigen 03/29/2022 Negative  Negative Final     Influenza A antigen 03/29/2022 Negative  Negative Final     Influenza B antigen 03/29/2022 Negative  Negative Final     SARS CoV2 PCR 03/29/2022 Negative  Negative, Testing sent to reference lab. Results will be returned via unsolicited result Final    NEGATIVE: SARS-CoV-2 (COVID-19) RNA not detected, presumed negative.     Color Urine 03/29/2022 Yellow  Colorless, Straw, Light Yellow, Yellow Final     Appearance Urine 03/29/2022 Clear  Clear Final     Glucose Urine 03/29/2022 Negative  Negative mg/dL Final     Bilirubin Urine 03/29/2022 Negative  Negative Final     Ketones Urine 03/29/2022 15 (A)  Negative mg/dL Final     Specific Gravity Urine 03/29/2022 1.025  1.003 - 1.035 Final     Blood Urine 03/29/2022 Negative  Negative Final     pH Urine 03/29/2022 6.0  5.0 - 7.0 Final     Protein Albumin Urine 03/29/2022 Negative  Negative mg/dL Final     Urobilinogen Urine 03/29/2022 0.2  0.2, 1.0 E.U./dL Final     Nitrite Urine 03/29/2022 Negative  Negative Final     Leukocyte Esterase Urine 03/29/2022 Trace (A)  Negative Final     WBC Count 03/29/2022 6.6  4.0 - 11.0 10e3/uL Final     RBC Count 03/29/2022 4.71  3.80 - 5.20 10e6/uL Final     Hemoglobin  03/29/2022 13.7  11.7 - 15.7 g/dL Final     Hematocrit 03/29/2022 40.0  35.0 - 47.0 % Final     MCV 03/29/2022 85  78 - 100 fL Final     MCH 03/29/2022 29.1  26.5 - 33.0 pg Final     MCHC 03/29/2022 34.3  31.5 - 36.5 g/dL Final     RDW 03/29/2022 12.3  10.0 - 15.0 % Final     Platelet Count 03/29/2022 227  150 - 450 10e3/uL Final     % Neutrophils 03/29/2022 61  % Final     % Lymphocytes 03/29/2022 24  % Final     % Monocytes 03/29/2022 12  % Final     % Eosinophils 03/29/2022 2  % Final     % Basophils 03/29/2022 1  % Final     Absolute Neutrophils 03/29/2022 4.1  1.6 - 8.3 10e3/uL Final     Absolute Lymphocytes 03/29/2022 1.6  0.8 - 5.3 10e3/uL Final     Absolute Monocytes 03/29/2022 0.8  0.0 - 1.3 10e3/uL Final     Absolute Eosinophils 03/29/2022 0.1  0.0 - 0.7 10e3/uL Final     Absolute Basophils 03/29/2022 0.0  0.0 - 0.2 10e3/uL Final     Bacteria Urine 03/29/2022 Moderate (A)  None Seen /HPF Final     RBC Urine 03/29/2022 0-2  0-2 /HPF /HPF Final     WBC Urine 03/29/2022 0-5  0-5 /HPF /HPF Final     Squamous Epithelials Urine 03/29/2022 Few (A)  None Seen /LPF Final     Group A strep by PCR 03/29/2022 Not Detected  Not Detected Final     Culture 03/29/2022 <10,000 CFU/mL Streptococcus agalactiae (Group B Streptococcus) (A)   Final    Comment: This organism is susceptible to ampicillin, penicillin, vancomycin and the cephalosporins. If treatment is required AND your patient is allergic to penicillin, contact the Microbiology Lab within 5 days to request susceptibility testing.  This organi                           sm may be significant in OB patients, however, it is part of the normal urogenital bianca.     Culture 03/29/2022 10,000-50,000 CFU/mL Mixture of urogenital bianca   Final   Office Visit on 01/19/2022   Component Date Value Ref Range Status     Cannabinoids (81-flt-6-carboxy-9-T* 01/19/2022 Not Detected  Not Detected, Indeterminate Final    Cutoff for a negative cannabinoid is 50 ng/mL or less.      Phencyclidine 2022 Not Detected  Not Detected, Indeterminate Final    Cutoff for a negative PCP is 25 ng/mL or less.     Cocaine (Benzoylecgonine) 2022 Not Detected  Not Detected, Indeterminate Final    Cutoff for a negative cocaine is 150 ng/ml or less.     Methamphetamine (d-Methamphetamine) 2022 Not Detected  Not Detected, Indeterminate Final    Cutoff for a negative methamphetamine is 500 ng/ml or less.     Opiates (Morphine) 2022 Not Detected  Not Detected, Indeterminate Final    Cutoff for a negative opiate is 100 ng/ml or less.     Amphetamine (d-Amphetamine) 2022 Detected (A)  Not Detected, Indeterminate Final    Cutoff for a positive amphetamine is greater than 500 ng/ml.  This is an unconfirmed screening result to be used for medical purposes only.      Benzodiazepines (Nordiazepam) 2022 Not Detected  Not Detected, Indeterminate Final    Cutoff for a negative benzodiazepine is 150 ng/ml or less.     Tricyclic Antidepressants (Desipra* 2022 Not Detected  Not Detected, Indeterminate Final    Cutoff for a negative tricyclic antidepressant is 300 ng/ml or less.     Methadone 2022 Not Detected  Not Detected, Indeterminate Final    Cutoff for a negative methadone is 200 ng/ml or less.     Barbiturates (Butalbital) 2022 Not Detected  Not Detected, Indeterminate Final    Cutoff for a negative barbituate is 200 ng/ml or less.     Oxycodone 2022 Not Detected  Not Detected, Indeterminate Final    Cutoff for a negative oxycodone is 100 ng/mL or less.     Propoxyphene (Norpropoxyphene) 2022 Not Detected  Not Detected, Indeterminate Final    Cutoff for a negative propoxyphene is 300 ng/ml or less.     Buprenorphine 2022 Not Detected  Not Detected, Indeterminate Final    Cutoff for a negative buprenorphine is 10 ng/ml or less.       Pertinent Radiology:    See Radiology section of chart      OB History  OB History    Para Term  AB  Living   1 0 0 0 0 0   SAB IAB Ectopic Multiple Live Births   0 0 0 0 0      # Outcome Date GA Lbr Sincere/2nd Weight Sex Delivery Anes PTL Lv   1 Current                Medical History  Past Medical History:   Diagnosis Date     Bipolar affective disorder (H)      Constipation      Depressive disorder      Uncomplicated asthma          Surgical History  She  has a past surgical history that includes Extraction(s) dental and orthopedic surgery.       Social History  Reviewed, and she  reports that she has never smoked. She has never used smokeless tobacco. She reports that she does not currently use alcohol. She reports that she does not currently use drugs after having used the following drugs: Marijuana and Cocaine.        Family History  Reviewed, and family history includes Bipolar Disorder in her mother; Depression in her sister; Diabetes in her paternal grandmother; Hyperlipidemia in her father; Impaired Fasting Glucose in her father.   Psychosocial Needs  Social History     Social History Narrative             Additional psychosocial needs reviewed per nursing assessment.       No Known Allergies   Medications Prior to Admission   Medication Sig Dispense Refill Last Dose     albuterol (PROAIR HFA/PROVENTIL HFA/VENTOLIN HFA) 108 (90 Base) MCG/ACT inhaler Inhale 1-2 puffs into the lungs every 6 hours as needed for shortness of breath / dyspnea or wheezing 18 g 3      fluticasone (FLONASE) 50 MCG/ACT nasal spray Spray 1 spray into both nostrils daily 16 g 2      ondansetron (ZOFRAN ODT) 4 MG ODT tab Take 1 tablet (4 mg) by mouth every 8 hours as needed for nausea 30 tablet 1      Prenatal Vit-Fe Fumarate-FA (PRENATAL MULTIVITAMIN W/IRON) 27-0.8 MG tablet Take 1 tablet by mouth daily 90 tablet 3         Review of Systems:  A comprehensive review of systems was negative.   Physical Exam:  Temp:  [98  F (36.7  C)] 98  F (36.7  C)  Resp:  [16] 16    Temp 98  F (36.7  C) (Oral)   Resp 16   LMP 02/03/2022     OBJECTIVE:  Temperature 98  F (36.7  C), temperature source Oral, resp. rate 16, last menstrual period 02/03/2022, not currently breastfeeding.  Exam: NORMAL  GYN Exam:Normal        Membrane Status:  Intact     Fluid color/            Cervical Exam:  2cm   50%   -1  Posterior, medium consistency  Chilel 4              Fetal Heart Rate:    Fetus A:   Baseline 125  Moderate variability  Accelerations present  No decelerations           Fetus B:             Fetus C:              Uterine Activity:  Uterine irritability  No contractions noted                                  Notable AP/IP factors to date:  1.)History Bipolar disorder  2.)Covid positive in pregnancy      Impression:  Pau Sanchez is a 22 year old year old at 40+6 weeks gestation.  Elective Induction of labor  FHTs Cat 1  GBS negative      Plan:  Admit to [facility]  Continuous fetal monitoring  Oral Cytotec for cervical ripening, First dose to start at midnight when patient will be 41 weeks gestation    CBC with platelets, Type and Screen, RPR on admission   Covid 19 PCR not needed due to positive test in last 90 days  Regular diet  Morphine and Vistaril ordered for sleep overnight  Encourage position changes, rest, hydrotherapy as desires  CNM support as needed  Reassess in morning or sooner with change in status     Provider: ALOK Meraz, KAYLA, GEOFFREY    Date: 11/16/2022  Time: 7:51 PM    Total time with patient = 40 minutes obtaining and clarifying the above history, performing the physical exam, providing patient education and counseling, coordinating and developing this plan of care. Greater than 50% of which was spent on education, counseling or coordination of care.

## 2022-11-17 NOTE — PROVIDER NOTIFICATION
11/17/22 1148   Provider Notification   Provider Name/Title Marjorie GARZA   Method of Notification At Bedside   CNM at bedside to see patient. Patient reports contractions are feeling stronger. Mother and partner at bedside. All agreeable to current POC. Position changes and upright movement encouraged.     Plan to recheck cervix prior to next cytotec dose to reassess hamilton score. If hamilton score is at least 8, will start pitocin protocol.

## 2022-11-17 NOTE — PROVIDER NOTIFICATION
11/17/22 1242   Provider Notification   Provider Name/Title    Method of Notification At Bedside   MD at bedside for epidural placement

## 2022-11-18 LAB — HGB BLD-MCNC: 11.5 G/DL (ref 11.7–15.7)

## 2022-11-18 PROCEDURE — 120N000001 HC R&B MED SURG/OB

## 2022-11-18 PROCEDURE — 85018 HEMOGLOBIN: CPT | Performed by: REGISTERED NURSE

## 2022-11-18 PROCEDURE — 36415 COLL VENOUS BLD VENIPUNCTURE: CPT | Performed by: REGISTERED NURSE

## 2022-11-18 PROCEDURE — 250N000013 HC RX MED GY IP 250 OP 250 PS 637: Performed by: REGISTERED NURSE

## 2022-11-18 RX ORDER — DOCUSATE SODIUM 100 MG/1
100 CAPSULE, LIQUID FILLED ORAL 2 TIMES DAILY PRN
Qty: 30 CAPSULE | Refills: 0 | Status: SHIPPED | OUTPATIENT
Start: 2022-11-18 | End: 2022-12-28

## 2022-11-18 RX ADMIN — ACETAMINOPHEN 650 MG: 325 TABLET, FILM COATED ORAL at 11:02

## 2022-11-18 RX ADMIN — ACETAMINOPHEN 650 MG: 325 TABLET, FILM COATED ORAL at 15:24

## 2022-11-18 RX ADMIN — ACETAMINOPHEN 650 MG: 325 TABLET, FILM COATED ORAL at 06:55

## 2022-11-18 RX ADMIN — IBUPROFEN 800 MG: 800 TABLET, FILM COATED ORAL at 16:43

## 2022-11-18 RX ADMIN — IBUPROFEN 800 MG: 800 TABLET, FILM COATED ORAL at 04:06

## 2022-11-18 RX ADMIN — DOCUSATE SODIUM 100 MG: 100 CAPSULE, LIQUID FILLED ORAL at 08:24

## 2022-11-18 RX ADMIN — IBUPROFEN 800 MG: 800 TABLET, FILM COATED ORAL at 10:24

## 2022-11-18 RX ADMIN — ACETAMINOPHEN 650 MG: 325 TABLET, FILM COATED ORAL at 19:33

## 2022-11-18 RX ADMIN — ACETAMINOPHEN 650 MG: 325 TABLET, FILM COATED ORAL at 03:02

## 2022-11-18 RX ADMIN — IBUPROFEN 800 MG: 800 TABLET, FILM COATED ORAL at 22:37

## 2022-11-18 RX ADMIN — BENZOCAINE: 11.4 AEROSOL, SPRAY TOPICAL at 22:52

## 2022-11-18 RX ADMIN — ACETAMINOPHEN 650 MG: 325 TABLET, FILM COATED ORAL at 23:58

## 2022-11-18 ASSESSMENT — ACTIVITIES OF DAILY LIVING (ADL)
ADLS_ACUITY_SCORE: 20

## 2022-11-18 NOTE — PLAN OF CARE
Vital signs stable. Postpartum checks within defined limits. Ambulating independently, denies dizziness. Voiding spontaneously and reports having a bowel movement. Breastfeeding every 2-3 hours, latch observed, seen by lactation today. Reports intermittent cramping is tolerable with ibuprofen, tylenol, and heat. Significant other, John, at bedside. Attentive to  cues, bonding well.

## 2022-11-18 NOTE — L&D DELIVERY NOTE
OB Vaginal Delivery Note    Pau Sanchez MRN# [id number]   Age: 22 year old YOB: 2000       GA: 41w0d  GP:   Labor Complications: None   Delivery QBL: 200 mL  Delivery Type: Vaginal, Spontaneous   ROM to Delivery Time: (Delivered) Minutes: 43  [place] Weight: 3.29 kg (7 lb 4.1 oz)    1 Minute 5 Minute 10 Minute   Apgar Totals: 9   9        JOHN DIAZ      Delivery Note    IUP at 41 weeks gestation delivered on 2022.     delivery of a viable  Female infant.  Apgars of 9 at 1 minute and 9 at 5 minutes.  Labor was induced.    Medications administered  in labor:  Pain Rx narcotics  and Epidural; Antibiotics No; other: none   Perineum: 1st degree and bilateral labial laceration, delayed cord clamping Yes- until cord pulsation ceased, approximately 5 minutes  Placenta-mechanism: spontaneous, intact, by Deangelo  with a 3 vessel cord. IV oxytocin was given.  Complications of pregnancy, labor and delivery: None    Delivery Details:  Pau Sanchez, a 22 year old  female delivered a viable infant with apgars of 9  and 9 . Patient was fully dilated and pushing after   hours   minutes in active labor. Delivery was via vaginal, spontaneous  to a sterile field under intravenous ;epidural  anesthesia. Infant delivered in vertex  left  occiput  anterior  position. Loose nuchal easily reduced, body cord unwrapped after birth. Anterior and posterior shoulders delivered without difficulty. The cord was clamped, cut twice and 3 vessels  were noted. Cord blood was obtained in routine fashion with the following disposition: lab .      Cord complications: nuchal   Placenta delivered at 2022  5:35 PM . Placental disposition was Hospital disposal . Fundal massage performed and fundus found to be firm.     Episiotomy: none    Perineum, vagina, cervix were inspected, and the following lacerations were noted:   Perineal lacerations: 1st   1st degree hemostatic and well approximated. Not  repaired     labial laceration: bilateral       . Left labial extending into vaginal canal, just at introitus. Repaired with continuous 4-0 suture. Right labial repaired with 3 interrupted sutures.     Any lacerations were repaired in the usual fashion using 4-0 vicryl.    Excellent hemostasis was noted. Needle count correct. Infant and patient in delivery room in good and stable condition.        Madalyn Sanchez [[id number]]    Labor Event Times    Labor onset date: 22 Onset time: 10:00 AM   Dilation complete date: 22 Complete time:  4:35 PM   Start pushing date/time: 2022 1654      Labor Events     labor?: No   steroids: None  Labor Type: Induction/Cervical ripening  Predominate monitoring during 1st stage: continuous electronic fetal monitoring     Antibiotics received during labor?: No     Rupture date/time: 22 1638   Rupture type: Artificial Rupture of Membranes  Fluid color: Clear  Fluid odor: Normal     Induction: Misoprostol, Oxytocin  Induction date/time:     Cervical ripening date/time:     Indications for induction: Post-term Gestation     1:1 continuous labor support provided by?: none       Delivery/Placenta Date and Time    Delivery Date: 22 Delivery Time:  5:21 PM   Placenta Date/Time: 2022  5:35 PM  Delivering clinician: Marjorie Cook APRN CNM   Other personnel present at delivery:  Provider Role   Belem Godoy, RN Delivery Nurse         Vaginal Counts     Initial count performed by 2 team members:  Two Team Members   Belem Cook       Needles Suture Needles Sponges (RETIRED) Instruments   Initial counts 2  5    Added to count  1     Relief counts       Final counts             Placed during labor Accounted for at the end of labor   FSE NA NA   IUPC NA NA   Cervidil NA NA              Final count performed by 2 team members:  Two Team Members   Belem Cook      Final count correct?: Yes     Apgars    Living  "status: Living   1 Minute 5 Minute 10 Minute 15 Minute 20 Minute   Skin color: 1  1       Heart rate: 2  2       Reflex irritability: 2  2       Muscle tone: 2  2       Respiratory effort: 2  2       Total: 9  9       Apgars assigned by: JOHN GODOY RN     Cord    Vessels: 3 Vessels    Cord Complications: Nuchal   Nuchal Intervention: reduced         Nuchal cord description: loose nuchal cord         Cord Blood Disposition: Lab    Gases Sent?: No    Delayed cord clamping?: Yes    Cord Clamping Delay (seconds): >120 seconds    Stem cell collection?: No        Resuscitation    Methods: None  Output in Delivery Room: Voided, Stool     Vendor Measurements    Weight: 7 lb 4.1 oz Length: 1' 7.75\"   Head circumference: 34.5 cm    Output in delivery room: Voided, Stool     Skin to Skin and Feeding Plan    Skin to skin initiation date/time: 1841    Skin to skin with: Mother  Skin to skin end date/time:        Labor Events and Shoulder Dystocia    Fetal Tracing Prior to Delivery: Category 2  Shoulder dystocia present?: Neg     Delivery (Maternal) (Provider to Complete) (028995)    Episiotomy: None  Perineal lacerations: 1st Repaired?: No   Labial laceration: bilateral Repaired?: Yes   Repair suture: 4-0 Vicryl  Number of repair packets: 1  Genital tract inspection done: Pos     Blood Loss  Mother: Clotilde Sanchez #[id number]   Start of Mother's Information    Delivery Blood Loss  22 1000 - 22 1838    Delivery QBL (mL) Hospital Encounter 200 mL    Total  200 mL         End of Mother's Information  Mother: Clotilde Sanchez #[id number]          Delivery - Provider to Complete ([id number])    Delivering clinician: Marjorie Cook APRN CNM  CNM Care: Exclusive CNM care in labor  Attempted Delivery Types (Choose all that apply): Spontaneous Vaginal Delivery  Delivery Type (Choose the 1 that will go to the Birth History): Vaginal, Spontaneous                   Other personnel:  Provider Role   John Godoy RN " Delivery Nurse                Placenta    Date/Time: 11/17/2022  5:35 PM  Removal: Spontaneous  Disposition: Hospital disposal           Anesthesia    Method: INTRAVENOUS , Epidural  Cervical dilation at placement: 4-7                Presentation and Position    Presentation: Vertex    Position: Left Occiput Anterior                 ALOK Beasley CNM

## 2022-11-18 NOTE — PLAN OF CARE
Patient VSS, good pain control with medications as per MAR. Patient also using ice packs, pain spray, tucks and alana-bottle for perineum pain control and comfort. Heat packs used on her stomach for cramps. Fundal checks WDL. Encouraged to stay hydrated and void frequently. Ambulating and self cares independently. Breast feeding independently every 2-3 hours. IV removed this shift at the patient's request. FOB at the bedside and attentive to Mom and active in baby cares. Both parents bonding with the baby.

## 2022-11-18 NOTE — PLAN OF CARE
Data: Pua Sanchez transferred to [facility] via wheelchair at 2030. Baby transferred via parent's arms.  Action: Receiving unit notified of transfer: Yes. Patient and family notified of room change. Report given to MEGA Wong at 2030. Belongings sent to receiving unit. Accompanied by Registered Nurse. Oriented patient to surroundings. Call light within reach. ID bands double-checked with receiving RN.  Response: Patient tolerated transfer and is stable.    Patients mobililty level scored using the bedside mobility assistance tool (BMAT). Patient is at a mobility level test number: 3. Mobility equipment used: wheelchair. Required assist of 1 staff members. Further use of BMAT scoring required.

## 2022-11-18 NOTE — PROVIDER NOTIFICATION
11/17/22 1630   Provider Notification   Provider Name/Title Marjorie GARZA   Method of Notification At Bedside   Marjorie at bedside for SVE. FHR category 2 with recurrent variables. Contractions q1.5-2 min that palpate strong, patient reports feeling pressure with contractions.     SVE 10/100/+1. AROM with small amount of clear fluid.    Patient set up for delivery and pushing efforts began at 1654.

## 2022-11-18 NOTE — PLAN OF CARE
Vital signs stable on room air. Bonding well with infant. Breastfeeding well with some assistance from staff with achieving a deep latch and holding infant in a cross cradle. Tolerating a regular diet. Using tylenol and ibuprofen for pain control. SO at bedside and supportive.    Patients mobililty level scored using the bedside mobility assistance tool (BMAT). Patient is at a mobility level test number: 2. Mobility equipment used: wheelchair. Required assist of 1 staff members. Further use of BMAT scoring required.

## 2022-11-18 NOTE — PROGRESS NOTES
Patient unavailable when Public Health Nurse (PHN) stopped by to discuss [facility] Public Health Resources.

## 2022-11-18 NOTE — PROGRESS NOTES
Keyonna Salas CNM Progress Note: Postpartum Day #1    SUBJECTIVE:  Patient is stable and is tolerating acitivity well  Baby is rooming in  Complications since 2 hours post delivery: None  Pain is well controlled.  Patient is taking pain medications.  Breastfeeding status:initiated   Elimination:  She is using bathroom without difficulty.    Desired contraception - not at this time.   Denies heavy bleeding or passage of large clots.    OBJECTIVE:  /76 (BP Location: Left arm, Patient Position: Sitting, Cuff Size: Adult Regular)   Pulse 95   Temp 98.3  F (36.8  C) (Oral)   Resp 14   LMP 2022   SpO2 98%   Breastfeeding No     Constitutional: healthy, alert and no distress  Breasts: Currently breastfeeding, nipples intact per patient and declined inspection.   Fundus: Uterine fundus is firm, non-tender and at umbilicus  Perineum: Perineum is intact and/or well approximated, minimal swelling  Lochia: Lochia is appropriate for the duration of time since delivery.     ASSESSMENT:  PPD #1    Doing well.  GBS negative  Hemoglobin   Date Value Ref Range Status   2022 12.2 11.7 - 15.7 g/dL Final   2021 13.7 11.7 - 15.7 g/dL Final       PLAN:  Continue routine care.  Has pump at home.   Reviewed postpartum warning signs  Reviewed postpartum blues and postpartum depression warning signs  Anticipated discharge tomorrow    Hannah Landin CNM

## 2022-11-19 VITALS
OXYGEN SATURATION: 98 % | RESPIRATION RATE: 16 BRPM | TEMPERATURE: 98.2 F | HEART RATE: 80 BPM | DIASTOLIC BLOOD PRESSURE: 64 MMHG | SYSTOLIC BLOOD PRESSURE: 137 MMHG

## 2022-11-19 PROCEDURE — 999N000079 HC STATISTIC IP LACTATION SERVICES 1-15 MIN

## 2022-11-19 PROCEDURE — 250N000013 HC RX MED GY IP 250 OP 250 PS 637: Performed by: REGISTERED NURSE

## 2022-11-19 RX ORDER — NALOXONE HYDROCHLORIDE 0.4 MG/ML
0.2 INJECTION, SOLUTION INTRAMUSCULAR; INTRAVENOUS; SUBCUTANEOUS
Status: DISCONTINUED | OUTPATIENT
Start: 2022-11-19 | End: 2022-11-19 | Stop reason: HOSPADM

## 2022-11-19 RX ORDER — NALOXONE HYDROCHLORIDE 0.4 MG/ML
0.4 INJECTION, SOLUTION INTRAMUSCULAR; INTRAVENOUS; SUBCUTANEOUS
Status: DISCONTINUED | OUTPATIENT
Start: 2022-11-19 | End: 2022-11-19 | Stop reason: HOSPADM

## 2022-11-19 RX ADMIN — IBUPROFEN 800 MG: 800 TABLET, FILM COATED ORAL at 10:25

## 2022-11-19 RX ADMIN — DOCUSATE SODIUM 100 MG: 100 CAPSULE, LIQUID FILLED ORAL at 08:22

## 2022-11-19 RX ADMIN — ACETAMINOPHEN 650 MG: 325 TABLET, FILM COATED ORAL at 08:22

## 2022-11-19 RX ADMIN — ACETAMINOPHEN 650 MG: 325 TABLET, FILM COATED ORAL at 04:24

## 2022-11-19 RX ADMIN — IBUPROFEN 800 MG: 800 TABLET, FILM COATED ORAL at 04:24

## 2022-11-19 ASSESSMENT — ACTIVITIES OF DAILY LIVING (ADL)
ADLS_ACUITY_SCORE: 20

## 2022-11-19 NOTE — LACTATION NOTE
Lactation in to see patient. Clotilde stated baby had difficulty latching last night so she started pumping. Baby now nursing well. Clotilde had questions on pumping and bottle feeding. Discussed how pumping and bottle feeding looks like. Has a spectra pump at home. No other questions. Home today.

## 2022-11-19 NOTE — PLAN OF CARE
VSS on room air. Up ad shala. Voiding without difficulty. Perineum appears swollen and some bruising noted on left labial. Medicated pads, spray, ice and alana bottle used for pain. Lochia scant, no clots. Fundus firm and midline. Pain well managed with tylenol and ibuprofen. Worst pain reported 6/10, best pain reported 4/10. Breastfeeding every 2-3 hours. Randa was sleepy at breast so Clotilde pumped and was able to get a spoonful of EBM. FOB supportive and at bedside. Bonding well with infant.

## 2022-11-19 NOTE — DISCHARGE SUMMARY
[facility]    Discharge Summary  Obstetrics    Date of Admission:  2022  Date of Discharge:  2022  Discharging Provider: Akilah Cole CNM  Date of Service (when I saw the patient): 22    Discharge Diagnoses    without postpartum complications.   ml.  First degree laceration, well approximated, healing.     History of Present Illness   Pau Sanchez is a 22 year old female who presented for IOL at 41 weeks gestation resulting in an uncomplicated   on 22.    Hospital Course   The patient's hospital course was unremarkable.  She recovered as anticipated and experienced no post-delivery complications.  On discharge, her pain was well controlled. Vaginal bleeding is less than peak menstrual flow.  Voiding without difficulty.  Ambulating well and tolerating a normal diet.  No fevers.  Breastfeeding well.  Infant is stable.  She was discharged on post-partum day #2.    Post-partum hemoglobin:   Hemoglobin   Date Value Ref Range Status   2022 11.5 (L) 11.7 - 15.7 g/dL Final   2021 13.7 11.7 - 15.7 g/dL Final       ALOK Winslow CNM    Discharge Disposition   Discharged to home   Condition at discharge: Stable    Primary Care Physician   Edd Siddiqui    Consultations This Hospital Stay   ANESTHESIOLOGY IP CONSULT  LACTATION IP CONSULT    Discharge Orders      Postpartum Home Care Referral      Activity    Activity as tolerated     Reason for your hospital stay    Maternity care     Follow Up    Follow up with provider in 2 weeks as needed for post partum mood concerns and at 6 weeks for post-delivery check     Breast pump - Manual/Electric    Breast Pump Documentation:  Manual/Electric Pump: To support adequate breast milk production and nutrition for infant.     I, the undersigned, certify that the above prescribed supplies are medically necessary for this patient and is both reasonable and necessary in reference to accepted standards of  medical and necessary in reference to accepted standards of medical practice in the treatment of this patient's condition and is not prescribed as a convenience.     Breast Pump DME    Breast Pump Documentation:   Manual/Electric Pump: To support adequate breast milk production and nutrition for infant.     I, the undersigned, certify that the above prescribed supplies are medically necessary for this patient and is both reasonable and necessary in reference to accepted standards of medical and necessary in reference to accepted standards of medical practice in the treatment of this patient's condition and is not prescribed as a convenience.     Diet    Resume previous diet     Discharge Medications   Current Discharge Medication List      START taking these medications    Details   docusate sodium (COLACE) 100 MG capsule Take 1 capsule (100 mg) by mouth 2 times daily as needed for constipation  Qty: 30 capsule, Refills: 0    Associated Diagnoses:  (normal spontaneous vaginal delivery)         CONTINUE these medications which have NOT CHANGED    Details   albuterol (PROAIR HFA/PROVENTIL HFA/VENTOLIN HFA) 108 (90 Base) MCG/ACT inhaler Inhale 1-2 puffs into the lungs every 6 hours as needed for shortness of breath / dyspnea or wheezing  Qty: 18 g, Refills: 3    Comments: Pharmacy may dispense brand covered by insurance (Proair, or proventil or ventolin or generic albuterol inhaler)  Associated Diagnoses: Infection due to 2019 novel coronavirus      fluticasone (FLONASE) 50 MCG/ACT nasal spray Spray 1 spray into both nostrils daily  Qty: 16 g, Refills: 2    Associated Diagnoses: Infection due to 2019 novel coronavirus      Prenatal Vit-Fe Fumarate-FA (PRENATAL MULTIVITAMIN W/IRON) 27-0.8 MG tablet Take 1 tablet by mouth daily  Qty: 90 tablet, Refills: 3    Associated Diagnoses: Encounter for supervision of normal first pregnancy in first trimester         STOP taking these medications       ondansetron (ZOFRAN ODT)  4 MG ODT tab Comments:   Reason for Stopping:             Allergies   No Known Allergies

## 2022-11-19 NOTE — LACTATION NOTE
"This note was copied from a baby's chart.  Lactation visit. Randa is Clotilde's first baby, she reports breastfeeding is overall going \"well\" - she is having some sore nipples. Randa finishing on L breast at time of visit, shallow latch with slightly creased nipple when unlatched. Burped and brought to R breast, hand expression done and good drops visualized. Randa latched in football hold, not opening wide with latching. Demonstrated breaking latch, stroking nose with nipple to encourage wide mouth and once latched to keep Randa tucked in close to breast. Swallows heard with breast compressions on R breast. Nipple cream being used, will bring gel pads to bedside. Discussed feeding behaviors to expect in second 24 hours of life. Plan for additional lactation follow up prn.   "

## 2022-11-19 NOTE — PLAN OF CARE
Vital signs stable. Postpartum checks within defined limits. Reports pain is well controlled. Ambulating independently, denies dizziness. Voiding spontaneously. Tolerating regular diet. Breastfeeding infant every 2-3 hours, latch observed, seen by lactation today.    Discharge instructions completed.  Patient states she understands all discharge instructions and all of her questions have been answered.  Patient verbalizes when she needs to return to clinic for follow up for herself and baby.  She is caring for herself and her baby independently.  Prescriptions filled and given to patient/family.  Postpartum depression symptoms reviewed and encouraged frequent review of depression scale. Breast pump ordered and given. Patient discharged with father and baby at 1325.

## 2022-12-05 ENCOUNTER — TRANSCRIBE ORDERS (OUTPATIENT)
Dept: MIDWIFE SERVICES | Facility: CLINIC | Age: 22
End: 2022-12-05

## 2022-12-05 ENCOUNTER — PRENATAL OFFICE VISIT (OUTPATIENT)
Dept: MIDWIFE SERVICES | Facility: CLINIC | Age: 22
End: 2022-12-05
Payer: COMMERCIAL

## 2022-12-05 VITALS — DIASTOLIC BLOOD PRESSURE: 64 MMHG | SYSTOLIC BLOOD PRESSURE: 108 MMHG | BODY MASS INDEX: 29.38 KG/M2 | WEIGHT: 182 LBS

## 2022-12-05 DIAGNOSIS — Z83.49 FAMILY HISTORY OF CYSTIC FIBROSIS: Primary | ICD-10-CM

## 2022-12-05 PROCEDURE — 99024 POSTOP FOLLOW-UP VISIT: CPT | Performed by: ADVANCED PRACTICE MIDWIFE

## 2022-12-05 NOTE — PROGRESS NOTES
"Midwife Postpartum 2 Week Visit    Pau Sanchez is a 22 year old here for a 2 week postpartum checkup.     Delivery date was 22. She had a  of a viable girl, weight 7 pounds 4.1 oz., with no complications      Since delivery, she has been breast feeding.  She has not had any signs of infection. Her lochia is now minimal.  She has not had other complications.  Her daughter was found to be a cystic fibrosis carried on the  screening.  Clotilde would like to find out if she is a carrier.      She is voiding and having bowel movements without difficulty.       Contraception was discussed and patient desires IUD.   She  has not had intercourse since delivery.   She complains of No  perineal discomfort.     Mood is Stable  Patient screened for postpartum depression.  She has a history of depression and \"psychosis\". She reports that she is doing fine.  Her partner and twin sister are also attentive.  She has not had any treatment for a couple years and declines any referrals and resources today.    Depression Rating was:   Last PHQ-9 score on record =   PHQ-9 SCORE 2022   PHQ-9 Total Score MyChart 5 (Mild depression)   PHQ-9 Total Score 5     Last GAD7 score on record =   KRISTA-7 SCORE 2021   Total Score 13 (moderate anxiety)   Total Score 13       ROS:  CONSTITUTIONAL: NEGATIVE for fever, chills, change in weight  INTEGUMENTARU/SKIN: NEGATIVE for worrisome rashes, moles or lesions  EYES: NEGATIVE for vision changes or irritation  ENT: NEGATIVE for ear, mouth and throat problems  RESP: NEGATIVE for significant cough or SOB  BREAST: NEGATIVE for masses, tenderness or discharge  CV: NEGATIVE for chest pain, palpitations or peripheral edema  GI: NEGATIVE for nausea, abdominal pain, heartburn, or change in bowel habits  : NEGATIVE for unusual urinary or vaginal symptoms. Periods are regular.  MUSCULOSKELETAL: NEGATIVE for significant arthralgias or myalgia  NEURO: NEGATIVE for weakness, dizziness or " paresthesias  PSYCHIATRIC: NEGATIVE for changes in mood or affect      Current Outpatient Medications:      albuterol (PROAIR HFA/PROVENTIL HFA/VENTOLIN HFA) 108 (90 Base) MCG/ACT inhaler, Inhale 1-2 puffs into the lungs every 6 hours as needed for shortness of breath / dyspnea or wheezing, Disp: 18 g, Rfl: 3     docusate sodium (COLACE) 100 MG capsule, Take 1 capsule (100 mg) by mouth 2 times daily as needed for constipation, Disp: 30 capsule, Rfl: 0     fluticasone (FLONASE) 50 MCG/ACT nasal spray, Spray 1 spray into both nostrils daily, Disp: 16 g, Rfl: 2     Prenatal Vit-Fe Fumarate-FA (PRENATAL MULTIVITAMIN W/IRON) 27-0.8 MG tablet, Take 1 tablet by mouth daily, Disp: 90 tablet, Rfl: 3  No current facility-administered medications for this visit.    Facility-Administered Medications Ordered in Other Visits:      acetaminophen (TYLENOL) tablet 650 mg, 650 mg, Oral, Q4H PRN, Marcello Mann MD     calcium carbonate (TUMS) chewable tablet 500-1,000 mg, 500-1,000 mg, Oral, Q2H PRN, Marcello Mann MD     phenol-menthol (CEPASTAT) lozenge 1 lozenge, 1 lozenge, Buccal, Q1H PRN, Marcello Mann MD.   OB History    Para Term  AB Living   1 1 1 0 0 1   SAB IAB Ectopic Multiple Live Births   0 0 0 0 1      # Outcome Date GA Lbr Sincere/2nd Weight Sex Delivery Anes PTL Lv   1 Term 21 41w0d 06:35 / 00:46 3.29 kg (7 lb 4.1 oz) F Vag-Spont IV, EPI N MAXX      Name: SHARMIN,FEMALE-RICHARD      Apgar1: 9  Apgar5: 9     Last pap:    Lab Results   Component Value Date    PAP NIL 2021       EXAM:  LMP 2022   BMI: There is no height or weight on file to calculate BMI.  Exam:  Constitutional: healthy, alert and no distress  Respiratory: negative, Percussion normal. Good diaphragmatic excursion.   Psychiatric: mentation appears normal and affect normal/bright    ASSESSMENT:   Normal postpartum exam after .    ICD-10-CM    1. Family history of cystic fibrosis   Z83.49 Cancer Risk Mgmt/Cancer Genetic Counseling Referral      2. Normal postpartum course  Z39.2             PLAN:  No results found for any visits on 12/05/22.    Return for 6 week postpartum visit.  Teaching: birth control  Family Planning:Mirena IUD  Encourage Kegels and abdominal exercise.  Continue a multivitamin/prenatal supplement, especially if breastfeeding.  Postpartum Hgb was not done today.

## 2022-12-06 ENCOUNTER — TELEPHONE (OUTPATIENT)
Dept: PULMONOLOGY | Facility: CLINIC | Age: 22
End: 2022-12-06

## 2022-12-06 NOTE — TELEPHONE ENCOUNTER
I contacted Clotilde regarding her recent referral for genetic counseling due to a reported family history of cystic fibrosis.  During our call Clotilde clarified that her infant daughter had a positive  screen for cystic fibrosis, and Clotilde herself is interested in carrier screening.  Her daughter has a sweat chloride test [facility].  A genetic counseling appointment was scheduled for both Clotilde and her partner Brandon at their convenience.  I remain available for any additional questions or concerns in the meantime.        Cheri Madison MS, [facility]  Genetic Counselor  [facility]  [facility]

## 2022-12-07 ENCOUNTER — VIRTUAL VISIT (OUTPATIENT)
Dept: PULMONOLOGY | Facility: CLINIC | Age: 22
End: 2022-12-07
Attending: GENETIC COUNSELOR, MS
Payer: COMMERCIAL

## 2022-12-07 ENCOUNTER — LAB (OUTPATIENT)
Dept: LAB | Facility: CLINIC | Age: 22
End: 2022-12-07
Payer: COMMERCIAL

## 2022-12-07 VITALS — BODY MASS INDEX: 28.93 KG/M2 | WEIGHT: 180 LBS | HEIGHT: 66 IN

## 2022-12-07 DIAGNOSIS — Z84.81 FAMILY HISTORY OF CARRIER OF GENETIC DISEASE: Primary | ICD-10-CM

## 2022-12-07 DIAGNOSIS — Z31.5 ENCOUNTER FOR PROCREATIVE GENETIC COUNSELING: ICD-10-CM

## 2022-12-07 DIAGNOSIS — Z83.49 FAMILY HISTORY OF CYSTIC FIBROSIS: ICD-10-CM

## 2022-12-07 DIAGNOSIS — Z84.81 FAMILY HISTORY OF CARRIER OF GENETIC DISEASE: ICD-10-CM

## 2022-12-07 PROCEDURE — 36415 COLL VENOUS BLD VENIPUNCTURE: CPT

## 2022-12-07 PROCEDURE — 96040 HC GENETIC COUNSELING, EACH 30 MINUTES: CPT | Mod: GT,95 | Performed by: GENETIC COUNSELOR, MS

## 2022-12-07 ASSESSMENT — PAIN SCALES - GENERAL: PAINLEVEL: NO PAIN (0)

## 2022-12-07 NOTE — LETTER
"2022      RE: Pau Sanchez  [id number] [address] [id number]     Dear Colleague,    Thank you for the opportunity to participate in the care of your patient, Pau Sanchez, at the [facility] at [facility]. Please see a copy of my visit note below.          Video End Time (time video stopped): 12:42pm    Presenting Information:  Pau \"Harvinder Sanchez is a 22-year-old woman whose daughter Randa had a recent positive  screen for cystic fibrosis.  This prompted Clotilde to pursue genetic counseling and carrier screening for cystic fibrosis.  During today's virtual genetic counseling visit a family history was obtained, the genetics and inheritance of cystic fibrosis were reviewed, and the options for carrier screening were discussed.  At the conclusion of our visit Clotilde wished to proceed with expanded carrier screening and informed consent was obtained.     Family History:  A detailed pedigree was obtained and scanned into the electronic medical record.  It is significant for the following:     Clotilde herself is healthy.      Clotilde has one child, Randa, who as noted above had a positive  screen for cystic fibrosis.  With Clotilde's permission we were able to review her screen.  Randa was found to have elevated IRT and a single CFTR mutatio: G551D.  Randa has a sweat chloride test planned at [facility] next week.      Clotilde has a twin sister who is healthy and does not yet have children.  They are thought to be identical twins but this has not been confirmed with genetic testing.     Clotilde also has a 26-year-old brother who is healthy.    Clotilde's parents are in their 50s and healthy.      Clotilde's partner Laith is 25-years-old and healthy.      Neither Clotilde nor Laith have a known family history of cystic fibrosis or other known genetic conditions.     Clotilde is " of [place] and [place] ancestry.  Her partner John is of  ancestry.  Consanguinity was denied.     Discussion:  Today we reviewed that genes are long stretches of DNA that are responsible for how our bodies look and how our bodies work. We all have two copies of every gene; one inherited from the mother and one inherited from the father. When there is a change, called a mutation, in a gene it can cause it to not do its job correctly which can cause the signs and symptoms of a genetic condition. Cystic fibrosis is caused by mutations in a gene called CFTR.      Cystic fibrosis is inherited in an autosomal recessive pattern. This means that to be affected an individual must inherit a mutation on both copies of the CFTR gene (one from each parent). Individuals with just one mutation in the CFTR gene are said to be carriers. Carriers do not have cystic fibrosis but can have an affected child if their partner is also a carrier. When both parents are carriers, with each pregnancy there is a 25% chance for the child to be affected, a 50% chance for the child to be a carrier only, and a 25% chance for the child to be unaffected and not a carrier.      As noted above, Clotilde's daughter Randa was found to be a carrier for cystic fibrosis with one copy of a mutation called G551D identified in the CFTR gene.  The notation of this mutation refers to its location and its effect.  We can assume either Clotilde or her partner carries this mutation.  It is also possible both are carriers for CF, in which case a future child could have CF.  To help determine the chances for future pregnancies, Clotilde wished to proceed with carrier screening.       If Clotilde and her partner are both carriers, the chance to have a child with cystic fibrosis would be 1/4 (25%).  If only one is found to be a carrier, the chance to have a child would be very significantly reduced (much less than 1%), but not zero.  Carrier screening can help  refine this chance.  We reviewed the options for carrier screening including for the CFTR gene only, or expanded carrier screening for a large panel of genes in addition to the CFTR gene.  We had a detailed discussion about the potential costs, benefits, and limitations of the options.  Our conversation included the reality that genetic testing is not perfect, and a normal result does not completely exclude the possibility of being a carrier.      There is also a possibility of identification of a milder CFTR variant associated with variable symptoms and/or a milder CFTR-related disorder.  This testing is designed to offer information for reproductive purposes, but can incidentally identify information that may be relevant to Clotilde's own health.  We also discussed that this screen will not offer information about other untested conditions.  There are also genetic conditions not inherited from a parent that are new in a child, as well as health conditions not genetic in nature.  After discussion Clotilde opted to proceed with expanded carrier screen.  Verbal informed consent was obtained and witnessed.     Carrier screening will be performed at [facility] who will contact Clotilde directly with an estimate of insurance benefits and option for out of pocket billing.  Results are expected in approximately 2-3 weeks and I will contact Clotilde by telephone when results return.  With Clotilde's permission results will be withheld from [facility] for 7 days.  Additional recommendations including possible carrier screening for Clotilde's partner will be made at that time.      Finally, we briefly discussed reproductive options available to the couple in the event both are found to be carriers for cystic fibrosis or another condition.  If the family wished to determine during a future pregnancy if the baby is affected, prenatal testing is available.  This could be completed by chorionic villus sampling (CVS) or amniocentesis.   These procedures obtain a small sample of either the placenta or the amniotic fluid respectively to test for cystic fibrosis.  Both of these procedures carry a small risk for miscarriage.      If the couple wished to avoid an affected pregnancy there is an option called pre-implantation genetic diagnosis (PGD).  PGD uses in vitro fertilization (IVF) to create very early embryos outside of the body.  The embryos are then tested for cystic fibrosis and only the unaffected embryos are implanted.  Alternative options include egg or sperm donation, or adoption.  The family could also, of course, wait until a baby is born to be tested.  With each of these options there are medical, financial, ethical, and emotional considerations that every family must weight for themselves.       I appreciate the opportunity to be a part of Clotilde's care today.  My contact information was shared should she have any additional questions or concerns.       Plan:  1.  Expanded carrier screen, to be performed at [facility]  2.  [facility] will contact [facility] directly with an estimation of insurance benefits   3.  Clotilde will schedule a lab draw at her home [facility] for sample collection   4.  I will contact Clotilde when results return, approximately 2-3 weeks after her sample is submitted  5.  Follow-up as determined by results of the above screening       This visit was co-counseled by Julianna Watkins, Genetic Counseling Intern     Cheri Madison MS, [facility]  Genetic Counselor  [facility]  [facility]

## 2022-12-07 NOTE — PROGRESS NOTES
"          Pau Sanchez  is being evaluated via a billable video visit.      How would you like to obtain your AVS? [email or web address]  For the video visit, send the invitation by: Text to cell phone: [phone]  Will anyone else be joining your video visit? No        Video-Visit Details    Type of service:  Video Visit    Video Start Time (time video started): 12:02pm    Video End Time (time video stopped): 12:42pm    Originating Location (pt. Location): Home    Distant Location (provider location):  On-Site    Mode of Communication:  Video Conference via [employer]      Presenting Information:  Pau \"Harvinder Sanchez is a 22-year-old woman whose daughter Randa had a recent positive  screen for cystic fibrosis.  This prompted Clotilde to pursue genetic counseling and carrier screening for cystic fibrosis.  During today's virtual genetic counseling visit a family history was obtained, the genetics and inheritance of cystic fibrosis were reviewed, and the options for carrier screening were discussed.  At the conclusion of our visit Clotilde wished to proceed with expanded carrier screening and informed consent was obtained.     Family History:  A detailed pedigree was obtained and scanned into the electronic medical record.  It is significant for the following:     Clotilde herself is healthy.      Clotilde has one child, Randa, who as noted above had a positive  screen for cystic fibrosis.  With Clotilde's permission we were able to review her screen.  Randa was found to have elevated IRT and a single CFTR mutatio: G551D.  Randa has a sweat chloride test planned at [facility] next week.      Clotilde has a twin sister who is healthy and does not yet have children.  They are thought to be identical twins but this has not been confirmed with genetic testing.     Clotilde also has a 26-year-old brother who is healthy.    Clotilde's parents are in their 50s and healthy.      Clotilde's partner " Laith is 25-years-old and healthy.      Neither Clotilde nor Laith have a known family history of cystic fibrosis or other known genetic conditions.     Clotilde is of [place] and [place] ancestry.  Her partner John is of  ancestry.  Consanguinity was denied.     Discussion:  Today we reviewed that genes are long stretches of DNA that are responsible for how our bodies look and how our bodies work. We all have two copies of every gene; one inherited from the mother and one inherited from the father. When there is a change, called a mutation, in a gene it can cause it to not do its job correctly which can cause the signs and symptoms of a genetic condition. Cystic fibrosis is caused by mutations in a gene called CFTR.      Cystic fibrosis is inherited in an autosomal recessive pattern. This means that to be affected an individual must inherit a mutation on both copies of the CFTR gene (one from each parent). Individuals with just one mutation in the CFTR gene are said to be carriers. Carriers do not have cystic fibrosis but can have an affected child if their partner is also a carrier. When both parents are carriers, with each pregnancy there is a 25% chance for the child to be affected, a 50% chance for the child to be a carrier only, and a 25% chance for the child to be unaffected and not a carrier.      As noted above, Clotilde's daughter Randa was found to be a carrier for cystic fibrosis with one copy of a mutation called G551D identified in the CFTR gene.  The notation of this mutation refers to its location and its effect.  We can assume either Clotilde or her partner carries this mutation.  It is also possible both are carriers for CF, in which case a future child could have CF.  To help determine the chances for future pregnancies, Clotilde wished to proceed with carrier screening.       If Clotilde and her partner are both carriers, the chance to have a child with cystic fibrosis would be 1/4 (25%).  If  only one is found to be a carrier, the chance to have a child would be very significantly reduced (much less than 1%), but not zero.  Carrier screening can help refine this chance.  We reviewed the options for carrier screening including for the CFTR gene only, or expanded carrier screening for a large panel of genes in addition to the CFTR gene.  We had a detailed discussion about the potential costs, benefits, and limitations of the options.  Our conversation included the reality that genetic testing is not perfect, and a normal result does not completely exclude the possibility of being a carrier.      There is also a possibility of identification of a milder CFTR variant associated with variable symptoms and/or a milder CFTR-related disorder.  This testing is designed to offer information for reproductive purposes, but can incidentally identify information that may be relevant to Flynns own health.  We also discussed that this screen will not offer information about other untested conditions.  There are also genetic conditions not inherited from a parent that are new in a child, as well as health conditions not genetic in nature.  After discussion Clotilde opted to proceed with expanded carrier screen.  Verbal informed consent was obtained and witnessed.     Carrier screening will be performed at [facility] who will contact Clotilde directly with an estimate of insurance benefits and option for out of pocket billing.  Results are expected in approximately 2-3 weeks and I will contact Clotilde by telephone when results return.  With Clotilde's permission results will be withheld from [facility] for 7 days.  Additional recommendations including possible carrier screening for Clotilde's partner will be made at that time.      Finally, we briefly discussed reproductive options available to the couple in the event both are found to be carriers for cystic fibrosis or another condition.  If the family wished to determine  during a future pregnancy if the baby is affected, prenatal testing is available.  This could be completed by chorionic villus sampling (CVS) or amniocentesis.  These procedures obtain a small sample of either the placenta or the amniotic fluid respectively to test for cystic fibrosis.  Both of these procedures carry a small risk for miscarriage.      If the couple wished to avoid an affected pregnancy there is an option called pre-implantation genetic diagnosis (PGD).  PGD uses in vitro fertilization (IVF) to create very early embryos outside of the body.  The embryos are then tested for cystic fibrosis and only the unaffected embryos are implanted.  Alternative options include egg or sperm donation, or adoption.  The family could also, of course, wait until a baby is born to be tested.  With each of these options there are medical, financial, ethical, and emotional considerations that every family must weight for themselves.       I appreciate the opportunity to be a part of Clotilde's care today.  My contact information was shared should she have any additional questions or concerns.       Plan:  1.  Expanded carrier screen, to be performed at [facility]  2.  [facility] contact [facility] directly with an estimation of insurance benefits   3.  Clotilde will schedule a lab draw at her home [facility] for sample collection   4.  I will contact Clotilde when results return, approximately 2-3 weeks after her sample is submitted  5.  Follow-up as determined by results of the above screening       This visit was co-counseled by Julianna Watkins, Genetic Counseling Intern     Cheri Madison MS, [facility]  Genetic Counselor  [facility]  [facility]

## 2022-12-19 ENCOUNTER — TELEPHONE (OUTPATIENT)
Dept: PULMONOLOGY | Facility: CLINIC | Age: 22
End: 2022-12-19

## 2022-12-19 LAB — SCANNED LAB RESULT: ABNORMAL

## 2022-12-19 NOTE — TELEPHONE ENCOUNTER
I contacted Clotilde to disclose and discuss the results of her recent carrier screening.  This was pursued due to her daughter's Randa's positive  screen for cystic fibrosis.  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  This likely means Randa inherited this mutation from Clotilde.  Testing is available to Clotilde's partner Laith to ensure he is also not a carrier, which would put any future child at a 25% chance to actually have cystic fibrosis.  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  Testing for these conditions is also available to Laith.  Clotilde expressed understanding and had no additional questions at this time.  Laith was encouraged to contact me directly to schedule screening for himself.  I remain available for any additional questions or concerns.       Cheri Madison MS, [facility]  Genetic Counselor  [facility]  [facility]

## 2022-12-28 ENCOUNTER — PRENATAL OFFICE VISIT (OUTPATIENT)
Dept: MIDWIFE SERVICES | Facility: CLINIC | Age: 22
End: 2022-12-28
Payer: COMMERCIAL

## 2022-12-28 VITALS
HEIGHT: 66 IN | BODY MASS INDEX: 29.02 KG/M2 | DIASTOLIC BLOOD PRESSURE: 64 MMHG | WEIGHT: 180.6 LBS | SYSTOLIC BLOOD PRESSURE: 104 MMHG

## 2022-12-28 DIAGNOSIS — Z30.430 ENCOUNTER FOR INSERTION OF INTRAUTERINE CONTRACEPTIVE DEVICE: ICD-10-CM

## 2022-12-28 PROBLEM — U07.1 COVID-19 AFFECTING PREGNANCY IN THIRD TRIMESTER: Status: RESOLVED | Noted: 2022-10-05 | Resolved: 2022-12-28

## 2022-12-28 PROBLEM — O98.513 COVID-19 AFFECTING PREGNANCY IN THIRD TRIMESTER: Status: RESOLVED | Noted: 2022-10-05 | Resolved: 2022-12-28

## 2022-12-28 PROBLEM — Z34.90 ENCOUNTER FOR ELECTIVE INDUCTION OF LABOR: Status: RESOLVED | Noted: 2022-11-16 | Resolved: 2022-12-28

## 2022-12-28 PROBLEM — Z34.03 ENCOUNTER FOR SUPERVISION OF NORMAL FIRST PREGNANCY IN THIRD TRIMESTER: Status: RESOLVED | Noted: 2022-08-23 | Resolved: 2022-12-28

## 2022-12-28 PROCEDURE — 99207 PR POST PARTUM EXAM: CPT | Performed by: ADVANCED PRACTICE MIDWIFE

## 2022-12-28 PROCEDURE — 58300 INSERT INTRAUTERINE DEVICE: CPT | Performed by: ADVANCED PRACTICE MIDWIFE

## 2022-12-28 NOTE — PROGRESS NOTES
Midwife Postpartum 6 Week Visit    Pau Sanchez is a 22 year old here for a postpartum checkup.     Delivery date was 2022. She had a  of a viable girl, weight 7 pounds 4.1 oz., with no complications      Since delivery, she has been breast feeding.  She has not had any signs of infection, her lochia stopped after 6 weeks.  She has not had other complications.      Baby girl recovering from RSV.     She is voiding and having bowel movements without difficulty.       Contraception was discussed and patient desires Kyleena IUD. She was originally considering Mirena, but likes that the Kyleena has a lower hormone dose.   She  has not had intercourse since delivery.   She complains of mild  perineal discomfort.     Mood is Stable   EPDS 3    Patient screened for postpartum depression.   Depression Rating was:   Last PHQ-9 score on record =   PHQ-9 SCORE 2022   PHQ-9 Total Score MyChart 5 (Mild depression)   PHQ-9 Total Score 5     Last GAD7 score on record =   KRISTA-7 SCORE 2021   Total Score 13 (moderate anxiety)   Total Score 13         ROS:  CONSTITUTIONAL: NEGATIVE for fever, chills, change in weight  INTEGUMENTARU/SKIN: NEGATIVE for worrisome rashes, moles or lesions  EYES: NEGATIVE for vision changes or irritation  ENT: NEGATIVE for ear, mouth and throat problems  RESP: NEGATIVE for significant cough or SOB  BREAST: NEGATIVE for masses, tenderness or discharge  CV: NEGATIVE for chest pain, palpitations or peripheral edema  GI: NEGATIVE for nausea, abdominal pain, heartburn, or change in bowel habits  : NEGATIVE for unusual urinary or vaginal symptoms. Periods are absent  MUSCULOSKELETAL: NEGATIVE for significant arthralgias or myalgia  NEURO: NEGATIVE for weakness, dizziness or paresthesias  PSYCHIATRIC: NEGATIVE for changes in mood or affect      Current Outpatient Medications:      albuterol (PROAIR HFA/PROVENTIL HFA/VENTOLIN HFA) 108 (90 Base) MCG/ACT inhaler, Inhale 1-2 puffs into the  "lungs every 6 hours as needed for shortness of breath / dyspnea or wheezing, Disp: 18 g, Rfl: 3     fluticasone (FLONASE) 50 MCG/ACT nasal spray, Spray 1 spray into both nostrils daily, Disp: 16 g, Rfl: 2     levonorgestrel (KYLEENA) 19.5 MG IUD, 1 each (19.5 mg) by Intrauterine route once, Disp: , Rfl:      Multiple Vitamins-Minerals (AIRBORNE PO), , Disp: , Rfl:      Prenatal Vit-Fe Fumarate-FA (PRENATAL MULTIVITAMIN W/IRON) 27-0.8 MG tablet, Take 1 tablet by mouth daily, Disp: 90 tablet, Rfl: 3     VITAMIN D PO, , Disp: , Rfl:   No current facility-administered medications for this visit.    Facility-Administered Medications Ordered in Other Visits:      acetaminophen (TYLENOL) tablet 650 mg, 650 mg, Oral, Q4H PRN, Marcello Mann MD     calcium carbonate (TUMS) chewable tablet 500-1,000 mg, 500-1,000 mg, Oral, Q2H PRN, Marcello Mann MD     phenol-menthol (CEPASTAT) lozenge 1 lozenge, 1 lozenge, Buccal, Q1H PRN, Marcello aMnn MD.   OB History    Para Term  AB Living   1 1 1 0 0 1   SAB IAB Ectopic Multiple Live Births   0 0 0 0 1      # Outcome Date GA Lbr Sincere/2nd Weight Sex Delivery Anes PTL Lv   1 Term 21 41w0d 06:35 / 00:46 3.29 kg (7 lb 4.1 oz) F Vag-Spont IV, EPI N MAXX      Name: SHARMIN,FEMALE-RICHARD      Apgar1: 9  Apgar5: 9     Last pap:    Lab Results   Component Value Date    PAP NIL 2021     Hgb in hospital was 12.2    EXAM:  /64 (BP Location: Right arm, Cuff Size: Adult Regular)   Ht 1.676 m (5' 6\")   Wt 81.9 kg (180 lb 9.6 oz)   LMP 2022   Breastfeeding Yes   BMI 29.15 kg/m    BMI: Body mass index is 29.15 kg/m .  Exam:  Constitutional: healthy, alert and no distress  Head: Normocephalic. No masses, lesions, tenderness or abnormalities  Neck: Neck supple. No adenopathy. Thyroid symmetric, normal size,, Carotids without bruits.  Breasts: Deferred as patient is lactating  Gastrointestinal: Abdomen soft, " non-tender. BS normal. No masses, organomegaly  Musculoskeletal: extremities normal- no gross deformities noted, gait normal and normal muscle tone  Skin: no suspicious lesions or rashes  Neurologic: Gait normal. Reflexes normal and symmetric. Sensation grossly WNL.  Psychiatric: mentation appears normal and affect normal/bright    PELVIC EXAM:  Vulva: No lesions, well healed, BUS WNL, no tenderness  Vagina: Moist, pink, discharge normal  well rugated, no lesions  Cervix:smooth, pink, no visible lesions        ASSESSMENT:   Normal postpartum exam after .    ICD-10-CM    1. Routine postpartum follow-up  Z39.2       2. Encounter for insertion of intrauterine contraceptive device  Z30.430 levonorgestrel (KYLEENA) 19.5 MG IUD     levonorgestrel (KYLEENA) 19.5 MG IUD 19.5 mg     INSERTION INTRAUTERINE DEVICE            PLAN:  No results found for any visits on 22.    Return as needed or at time of next expected pap, pelvic, or breast exam.  Teaching: birth control  Family Planning:Kyleena  Encourage Kegels and abdominal exercise.  Continue a multivitamin/prenatal supplement, especially if breastfeeding.  Pap smear was not obtained today.  Postpartum Hgb was not done today.      IUD Insertion:  CONSULT:    Is a pregnancy test required: No.  Was a consent obtained?  Yes    Subjective: Pau Sanchez is a 22 year old  presents for IUD and desires Kyleena type IUD.    Patient has been given the opportunity to ask questions about all forms of birth control, including all options appropriate for Pau Sanchez. Discussed that no method of birth control, except abstinence is 100% effective against pregnancy or sexually transmitted infection.     Pau Sanchez understands she may have the IUD removed at any time. IUD should be removed by a health care provider.    The entire insertion procedure was reviewed with the patient, including care after placement.    Patient's last menstrual period was 2022.  "Last sexual activity: prior to delivery. No allergy to betadine or shellfish. Patient declines STD screening  HCG Qual Urine   Date Value Ref Range Status   05/12/2016 Negative NEG Final     hCG Urine Qualitative   Date Value Ref Range Status   09/21/2021 Negative Negative Final     Comment:     This test is for screening purposes.  Results should be interpreted along with the clinical picture.  Confirmation testing is available if warranted by ordering [id number], HCG Quantitative Pregnancy.         /64 (BP Location: Right arm, Cuff Size: Adult Regular)   Ht 1.676 m (5' 6\")   Wt 81.9 kg (180 lb 9.6 oz)   LMP 02/03/2022   Breastfeeding Yes   BMI 29.15 kg/m      Pelvic Exam:   EG/BUS: normal genital architecture without lesions, erythema or abnormal secretions.   Vagina: moist, pink, rugae with physiologic discharge and secretions  Cervix: multiparous no lesions and pink, moist, closed, without lesion       PROCEDURE NOTE: -- IUD Insertion    Reason for Insertion: contraception      Under sterile technique, cervix was visualized with speculum and prepped with Betadine solution swab x 3.The uterus was gently sounded to 7.0 cm. IUD prepared for placement, and IUD inserted according to 's instructions without difficulty or significant resitance, and deployed at the fundus. The strings were visualized and trimmed to 2.5 cm from the external os. Tenaculum was removed and hemostasis noted. Speculum removed.  Patient tolerated procedure well.    Lot # see MAR  Exp: see MAR    EBL: minimal    Complications: none    ASSESSMENT:     ICD-10-CM    1. Encounter for insertion of intrauterine contraceptive device  Z30.430 levonorgestrel (KYLEENA) 19.5 MG IUD     levonorgestrel (KYLEENA) 19.5 MG IUD 19.5 mg     INSERTION INTRAUTERINE DEVICE           PLAN:    Given 's handouts, including when to have IUD removed, list of danger s/sx, side effects and follow up recommended. Encouraged condom use for " prevention of STD. Back up contraception advised for 7 days if progestin method. Advised to call for any fever, for prolonged or severe pain or bleeding, abnormal vaginal discharge, or unable to palpate strings. She was advised to use pain medications (ibuprofen) as needed for mild to moderate pain. Advised to follow-up in clinic in 4-6 weeks for IUD string check if unable to find strings or as directed by provider.     Elma Madrid CNM

## 2022-12-28 NOTE — PROGRESS NOTES
"  IUD Insertion:  CONSULT:    Is a pregnancy test required: {Pregnancy test required:243637}  Was a consent obtained?  {Yes/No:402757::\"Yes\"}    Subjective: Pau Sanchez is a 22 year old  presents for IUD and desires {OB IUD TYPE:348401} type IUD.    Patient has been given the opportunity to ask questions about all forms of birth control, including all options appropriate for Pau Sanchez. Discussed that no method of birth control, except abstinence is 100% effective against pregnancy or sexually transmitted infection.     Pau Sanchez understands she may have the IUD removed at any time. IUD should be removed by a health care provider.    The entire insertion procedure was reviewed with the patient, including care after placement.    Patient's last menstrual period was 2022. {last sex (Optional):687807}. No allergy to betadine or shellfish. {std screenin}  HCG Qual Urine   Date Value Ref Range Status   2016 Negative NEG Final     hCG Urine Qualitative   Date Value Ref Range Status   2021 Negative Negative Final     Comment:     This test is for screening purposes.  Results should be interpreted along with the clinical picture.  Confirmation testing is available if warranted by ordering [id number], HCG Quantitative Pregnancy.         /64 (BP Location: Right arm, Cuff Size: Adult Regular)   Ht 1.676 m (5' 6\")   Wt 81.9 kg (180 lb 9.6 oz)   LMP 2022   Breastfeeding Yes   BMI 29.15 kg/m      Pelvic Exam:   EG/BUS: normal genital architecture without lesions, erythema or abnormal secretions.   Vagina: moist, pink, rugae with physiologic discharge and secretions  Cervix: ***parous no lesions and pink, moist, closed, without lesion or CMT  Uterus: ***position, mobile, no pain  Adnexa: within normal limits and no masses, nodularity, tenderness    PROCEDURE NOTE: -- IUD Insertion    Reason for Insertion: {IUD reasons:388373::\"contraception\"}    {IUD pain:767788}  Under " "sterile technique, cervix was visualized with speculum and prepped with {cleansing agents:862395::\"Betadine\"} solution swab x 3. {IUD instruments:742681::\"Tenaculum\"} was placed for stability. The uterus was gently straightened and sounded to {IUD SOUNDING DEPTHS:380294} cm. IUD prepared for placement, and IUD inserted according to 's instructions without difficulty or significant resitance, and deployed at the fundus. The strings were visualized and trimmed to {IUD SOUNDING DEPTHS:909207} cm from the external os. Tenaculum was removed and hemostasis noted. Speculum removed.  Patient tolerated procedure well.    Lot # ***  Exp: ***    EBL: minimal    Complications: none    ASSESSMENT:     ICD-10-CM    1. Encounter for insertion of intrauterine contraceptive device  Z30.430 levonorgestrel (KYLEENA) 19.5 MG IUD     levonorgestrel (KYLEENA) 19.5 MG IUD 19.5 mg     INSERTION INTRAUTERINE DEVICE           PLAN:    Given 's handouts, including when to have IUD removed, list of danger s/sx, side effects and follow up recommended. Encouraged condom use for prevention of STD. Back up contraception advised for 7 days if progestin method. Advised to call for any fever, for prolonged or severe pain or bleeding, abnormal vaginal discharge, or unable to palpate strings. She was advised to use pain medications (ibuprofen) as needed for mild to moderate pain. Advised to follow-up in clinic in 4-6 weeks for IUD string check if unable to find strings or as directed by provider.     Elma Madrid CNM    "

## 2023-01-03 ENCOUNTER — MEDICAL CORRESPONDENCE (OUTPATIENT)
Dept: HEALTH INFORMATION MANAGEMENT | Facility: CLINIC | Age: 23
End: 2023-01-03

## 2023-01-10 ENCOUNTER — TELEPHONE (OUTPATIENT)
Dept: PULMONOLOGY | Facility: CLINIC | Age: 23
End: 2023-01-10

## 2023-01-10 NOTE — TELEPHONE ENCOUNTER
I contacted Clotilde to review our plan for her partner Laith to pursue genetic testing due to Clotilde's prior positive carrier screening results for cystic fibrosis and GJB2 and TMPRSS3-related hearing loss.  Clotilde explained that Laith is still not yet registered with his insurance but plans to do so soon.  The family is encouraged to contact me again if and when they wish to pursue this testing.       Cheri Madison MS, [facility]  Genetic Counselor  [facility]  [facility]

## 2023-04-04 NOTE — PROGRESS NOTES
This is a recent snapshot of the patient's [facility] medical record.  For current drug dose and complete information and questions, call [phone] or In Basket pool, [facility] ([id number])  CSN Number:  [id number]

## 2023-04-11 ENCOUNTER — E-VISIT (OUTPATIENT)
Dept: URGENT CARE | Facility: CLINIC | Age: 23
End: 2023-04-11
Payer: COMMERCIAL

## 2023-04-11 DIAGNOSIS — R19.7 DIARRHEA, UNSPECIFIED TYPE: Primary | ICD-10-CM

## 2023-04-11 PROCEDURE — 99421 OL DIG E/M SVC 5-10 MIN: CPT | Performed by: EMERGENCY MEDICINE

## 2023-04-11 NOTE — PATIENT INSTRUCTIONS
Thank you for choosing us for your care. Given your symptoms, I would like you to do a lab-only visit to determine what is causing them.  I have placed the orders.  Please schedule an appointment with the lab right here in [facility], or call [phone].  I will let you know when the results are back and next steps to take.    Diarrhea with Uncertain Cause (Adult)    Diarrhea is when stools are loose and watery. This can be caused by:     Viral infections    Bacterial infections    Food poisoning    Parasites    Irritable bowel syndrome (IBS)    Inflammatory bowel diseases such as ulcerative colitis, Crohn's disease, and celiac disease    Food intolerance, such as to lactose, the sugar found in milk and milk products    Reaction to medicines like antibiotics, laxatives, cancer medicines, and antacids  Along with diarrhea, you may also have:    Abdominal pain and cramping    Nausea and vomiting    Loss of bowel control    Fever and chills    Bloody stools  In some cases, antibiotics may help to treat diarrhea. You may have a stool sample test which is done to see what is causing your diarrhea, and if antibiotics will help treat it. The results of a stool sample test may take up to 2 days. The healthcare provider may not give you antibiotics until they have the stool test results.   Diarrhea can cause dehydration. This is the loss of too much water and other fluids from the body. When this occurs, you must replace those body fluids. This can be done with oral rehydration solutions. Oral rehydration solutions are available at drugstores and grocery stores without a prescription. Sports drinks are not the best choice if you are very dehydrated. They usually have too much sugar and not enough electrolytes.   Home care  Follow all instructions given by your healthcare provider. Rest at home for the next 24 hours, or until you feel better. Avoid caffeine, tobacco, and alcohol. These can make diarrhea, cramping, and pain  worse.   If taking medicines:    Over-the-counter nausea and diarrhea medicines are generally OK unless you experience fever or blood in the stool. Check with your healthcare provider first in those circumstances.    You may use acetaminophen or nonsteroidal anti-inflammatory drugs (NSAIDs) such as ibuprofen or naproxen to reduce pain and fever. Don t use these if you have chronic liver or kidney disease, or ever had a stomach ulcer or gastrointestinal bleeding. Don't use NSAID medicines if you are already taking one for another condition (like arthritis) or are on daily aspirin therapy (such as for heart disease or after a stroke). Talk with your healthcare provider first.    If antibiotics were prescribed, be sure you take them until they are finished. Don t stop taking them even when you feel better. Antibiotics must be taken exactly as prescribed.  To prevent the spread of illness:    Remember that washing with soap and clean, running water and using alcohol-based  is the best way to prevent the spread of infection. Dry your hands with a single-use towel (like a paper towel).    Clean the toilet after each use.    Wash your hands before eating.    Wash your hands before and after preparing food. Keep in mind that people with diarrhea or vomiting should not prepare food for others.    Wash your hands after using cutting boards, counter tops, and knives that have been in contact with raw foods.    Wash and then peel fruits and vegetables.    Keep uncooked meats away from cooked and ready-to-eat foods.    Use a food thermometer when cooking. Cook poultry to at least 165 F (74 C). Cook ground meat (beef, veal, pork, lamb) to at least 160 F (71 C). Cook fresh beef, veal, lamb, and pork to at least 145 F (63 C).    Don t eat raw or undercooked eggs (poached or linda side up), poultry, meat, or unpasteurized milk and juices.  Food and drinks  The main goal while treating vomiting or diarrhea is to prevent  dehydration. This is done by taking small amounts of liquids often.     Keep in mind that liquids are more important than food right now.    Drink only small amounts of liquids at a time.    Don t force yourself to eat, especially if you are having cramping, vomiting, or diarrhea. Don t eat large amounts at a time, even if you are hungry.    If you eat, avoid fatty, greasy, spicy, or fried foods.    Don t eat dairy foods or drink milk if you have diarrhea. These can make diarrhea worse.  During the first 24 hours you can try:    Oral rehydration solutions.  Sports drinks may be used if you are not too dehydrated and are otherwise healthy.    Soft drinks without caffeine    Ginger ale    Water (plain or flavored)    Decaf tea or coffee    Clear broth, consommé, or bouillon    Gelatin, ice pops, or frozen fruit juice bars  The second 24 hours, if you are feeling better, you can add:    Hot cereal, plain toast, bread, rolls, or crackers    Plain noodles, rice, mashed potatoes, chicken noodle soup, or rice soup    Applesauce, unsweetened canned fruit (no pineapple)    Bananas  As you recover:    Limit fat intake to less than 15 grams per day. Don t eat margarine, butter, oils, mayonnaise, sauces, gravies, fried foods, peanut butter, meat, poultry, or fish.    Limit fiber. Don t eat raw or cooked vegetables, fresh fruits except bananas, or bran cereals.    Limit caffeine and chocolate.    Limit dairy.    Don t use spices or seasonings except salt.    Go back to your normal diet over time, as you feel better and your symptoms improve.    If the symptoms come back, go back to a simple diet or clear liquids.  Follow-up care  Follow up with your healthcare provider, or as advised. If a stool sample was taken or cultures were done, call the healthcare provider for the results as instructed.   Call 911  Call 911 if you have any of these symptoms:     Trouble breathing    Confusion    Extreme drowsiness or trouble  walking    Loss of consciousness    Rapid heart rate    Chest pain    Stiff neck    Seizure  When to get medical advice  Call your healthcare provider right away if any of these occur:     Abdominal pain that gets worse    Constant lower right abdominal pain    Continued vomiting and inability to keep liquids down    Diarrhea more than 5 times a day    Blood in vomit or stool    Dark urine or no urine for 8 hours, dry mouth and tongue, tiredness, weakness, or dizziness    Drowsiness    New rash    You don t get better in 2 to 3 days    Fever of 100.4 F (38 C) or higher, or as advised by your provider  Mila last reviewed this educational content on 2/1/2022 2000-2022 [employer]. All rights reserved. This information is not intended as a substitute for professional medical care. Always follow your healthcare professional's instructions.

## 2023-06-24 ENCOUNTER — HEALTH MAINTENANCE LETTER (OUTPATIENT)
Age: 23
End: 2023-06-24

## 2023-11-11 ENCOUNTER — NURSE TRIAGE (OUTPATIENT)
Dept: NURSING | Facility: CLINIC | Age: 23
End: 2023-11-11

## 2023-11-11 ENCOUNTER — OFFICE VISIT (OUTPATIENT)
Dept: URGENT CARE | Facility: URGENT CARE | Age: 23
End: 2023-11-11

## 2023-11-11 VITALS
BODY MASS INDEX: 31.72 KG/M2 | OXYGEN SATURATION: 97 % | DIASTOLIC BLOOD PRESSURE: 78 MMHG | HEART RATE: 78 BPM | TEMPERATURE: 98.5 F | WEIGHT: 196.5 LBS | SYSTOLIC BLOOD PRESSURE: 115 MMHG

## 2023-11-11 DIAGNOSIS — R30.0 DYSURIA: ICD-10-CM

## 2023-11-11 DIAGNOSIS — N30.00 ACUTE CYSTITIS WITHOUT HEMATURIA: Primary | ICD-10-CM

## 2023-11-11 LAB
BACTERIA #/AREA URNS HPF: ABNORMAL /HPF
RBC #/AREA URNS AUTO: ABNORMAL /HPF
SQUAMOUS #/AREA URNS AUTO: ABNORMAL /LPF
WBC #/AREA URNS AUTO: ABNORMAL /HPF

## 2023-11-11 PROCEDURE — 99213 OFFICE O/P EST LOW 20 MIN: CPT | Performed by: PHYSICIAN ASSISTANT

## 2023-11-11 PROCEDURE — 87086 URINE CULTURE/COLONY COUNT: CPT | Performed by: PHYSICIAN ASSISTANT

## 2023-11-11 PROCEDURE — 87088 URINE BACTERIA CULTURE: CPT | Performed by: PHYSICIAN ASSISTANT

## 2023-11-11 PROCEDURE — 81015 MICROSCOPIC EXAM OF URINE: CPT

## 2023-11-11 RX ORDER — SULFAMETHOXAZOLE/TRIMETHOPRIM 800-160 MG
1 TABLET ORAL 2 TIMES DAILY
Qty: 10 TABLET | Refills: 0 | Status: SHIPPED | OUTPATIENT
Start: 2023-11-11 | End: 2023-11-16

## 2023-11-11 NOTE — TELEPHONE ENCOUNTER
Patient calling. She has a UTI, and it started on Tuesday. She's hoping for an antibiotic prescription. She's starting to have back/flank pain. Symptoms include cloudy urine, urgency, frequency, pelvic cramping, she doesn't feel like she's emptying her bladder. She's been taking AZO for symptom management.    Care advice given for patient to be seen within 24 hours. Call transferred to scheduling for a virtual urgent care visit. Patient was cautioned that if a virtual visit isn't available, she should go to [facility] for treatment.     Sarah Carmona RN  [facility]  November 11, 2023, 2:20 PM    Reason for Disposition   Side (flank) or lower back pain present    Additional Information   Negative: Shock suspected (e.g., cold/pale/clammy skin, too weak to stand, low BP, rapid pulse)   Negative: Sounds like a life-threatening emergency to the triager   Negative: Followed a female genital area injury (e.g., labia, vagina, vulva)   Negative: Followed a male genital area injury (e.g., penis, scrotum)   Negative: Vaginal discharge   Negative: Pus (white, yellow) or bloody discharge from end of penis   Negative: [1] Taking antibiotic for urinary tract infection (UTI) AND [2] female   Negative: [1] Taking antibiotic for urinary tract infection (UTI) AND [2] male   Negative: [1] Pain or burning with passing urine (urination) AND [2] pregnant   Negative: [1] Pain or burning with passing urine (urination) AND [2] postpartum (from 0 to 6 weeks after delivery)   Negative: [1] Pain or burning with passing urine (urination) AND [2] female   Negative: [1] Pain or burning with passing urine (urination) AND [2] male   Negative: Pain or itching in the vulvar area   Negative: Pain in scrotum is main symptom   Negative: Blood in the urine is main symptom   Negative: Symptoms arising from use of a urinary catheter (e.g., Coude, Ayala)   Negative: [1] Unable to urinate (or only a few drops) > 4 hours AND [2] bladder feels  very full (e.g., palpable bladder or strong urge to urinate)   Negative: [1] Decreased urination and [2] drinking very little AND [2] dehydration suspected (e.g., dark urine, no urine > 12 hours, very dry mouth, very lightheaded)   Negative: Patient sounds very sick or weak to the triager   Negative: Fever > 100.4 F (38.0 C)    Protocols used: Urinary Symptoms-A-AH

## 2023-11-11 NOTE — PROGRESS NOTES
SUBJECTIVE:  Pau Sanchez is a 23 year old female who comes in for possible UTI.  Patient states that for the past 4 days she has had UTI related symptoms.  Has been taking Azo but has not improved.  She is noticing frequency of urination along with some pelvic pain and pressure along with low back pain.  Does burn when she urinates.  She has no vaginal symptoms and is not concerned for STDs and does not want testing.  Denies any blood in her urine.  No nausea, vomiting, fevers.  She does have a history of UTIs.  She otherwise at baseline health.  Has been drinking fluids well.    Past Medical History:   Diagnosis Date    Bipolar affective disorder (H)     Constipation     Depressive disorder     Uncomplicated asthma      Current Outpatient Medications   Medication    CRANBERRY PO    levonorgestrel (KYLEENA) 19.5 MG IUD    albuterol (PROAIR HFA/PROVENTIL HFA/VENTOLIN HFA) 108 (90 Base) MCG/ACT inhaler    fluticasone (FLONASE) 50 MCG/ACT nasal spray    Multiple Vitamins-Minerals (AIRBORNE PO)    Prenatal Vit-Fe Fumarate-FA (PRENATAL MULTIVITAMIN W/IRON) 27-0.8 MG tablet    VITAMIN D PO     No current facility-administered medications for this visit.     Facility-Administered Medications Ordered in Other Visits   Medication    acetaminophen (TYLENOL) tablet 650 mg    calcium carbonate (TUMS) chewable tablet 500-1,000 mg    phenol-menthol (CEPASTAT) lozenge 1 lozenge     Social History     Socioeconomic History    Marital status: Single     Spouse name: Laith Murphy    Number of children: Not on file    Years of education: Not on file    Highest education level: Bachelor's degree (e.g., BA, AB, BS)   Occupational History    Occupation:    Tobacco Use    Smoking status: Never    Smokeless tobacco: Never   Vaping Use    Vaping Use: Never used   Substance and Sexual Activity    Alcohol use: Not Currently     Alcohol/week: 0.0 standard drinks of alcohol    Drug use: Not Currently     Types: Marijuana, Cocaine     Sexual activity: Yes     Partners: Male     Birth control/protection: None   Other Topics Concern    Not on file   Social History Narrative             Social Determinants of Health     Financial Resource Strain: Not on file   Food Insecurity: Not on file   Transportation Needs: Not on file   Physical Activity: Not on file   Stress: Not on file   Social Connections: Not on file   Interpersonal Safety: Not on file   Housing Stability: Not on file     ROS  negative other than stated above    Exam:  GENERAL APPEARANCE: healthy, alert and no distress  EYES: EOMI,  PERRL  RESP: lungs clear to auscultation - no rales, rhonchi or wheezes  CV: regular rates and rhythm, normal S1 S2, no S3 or S4 and no murmur, click or rub -  ABDOMEN:  soft, nontender, no HSM no CVA tenderness noted  SKIN: no suspicious lesions or rashes    Results for orders placed or performed in visit on 11/11/23   UA Microscopic with Reflex to Culture     Status: Abnormal   Result Value Ref Range    Bacteria Urine Few (A) None Seen /HPF    RBC Urine 2-5 (A) 0-2 /HPF /HPF    WBC Urine 25-50 (A) 0-5 /HPF /HPF    Squamous Epithelials Urine Few (A) None Seen /LPF     assessment/plan:  (N30.00) Acute cystitis without hematuria  (primary encounter diagnosis)  Comment:   Plan: sulfamethoxazole-trimethoprim (BACTRIM DS)         800-160 MG tablet        Patient with a 4-day history of UTI related symptoms.  She does have evidence for urinary tract infection with no evidence for pyelonephritis.  Taking Azo and culture is pending.  Declines wet prep and GC testing.  Bactrim as directed.  Red flag signs were discussed.  We will continue to push fluids and follow-up with primary as needed    (R30.0) Dysuria  Comment:   Plan: UA Macroscopic with reflex to Microscopic and         Culture - Lab Collect, UA Microscopic with         Reflex to Culture, Urine Culture, CANCELED: Wet        preparation

## 2023-11-14 LAB — BACTERIA UR CULT: ABNORMAL

## 2024-05-28 ENCOUNTER — OFFICE VISIT (OUTPATIENT)
Dept: OBGYN | Facility: CLINIC | Age: 24
End: 2024-05-28
Payer: COMMERCIAL

## 2024-05-28 VITALS — SYSTOLIC BLOOD PRESSURE: 110 MMHG | WEIGHT: 184 LBS | DIASTOLIC BLOOD PRESSURE: 68 MMHG | BODY MASS INDEX: 29.7 KG/M2

## 2024-05-28 DIAGNOSIS — Z30.432 ENCOUNTER FOR IUD REMOVAL: ICD-10-CM

## 2024-05-28 DIAGNOSIS — Z12.4 SCREENING FOR CERVICAL CANCER: Primary | ICD-10-CM

## 2024-05-28 PROCEDURE — 58301 REMOVE INTRAUTERINE DEVICE: CPT | Performed by: OBSTETRICS & GYNECOLOGY

## 2024-05-28 PROCEDURE — G0145 SCR C/V CYTO,THINLAYER,RESCR: HCPCS | Performed by: OBSTETRICS & GYNECOLOGY

## 2024-05-28 NOTE — NURSING NOTE
"Chief Complaint   Patient presents with    IUD     removal       Initial /68   Wt 83.5 kg (184 lb)   Breastfeeding No   BMI 29.70 kg/m   Estimated body mass index is 29.7 kg/m  as calculated from the following:    Height as of 22: 1.676 m (5' 6\").    Weight as of this encounter: 83.5 kg (184 lb).  BP completed using cuff size: regular    Questioned patient about current smoking habits.  Pt. has never smoked.          The following HM Due: pap smear      Sandhya Red LPN on 2024 at 2:03 PM      "

## 2024-05-28 NOTE — PROGRESS NOTES
INDICATIONS:                                                      Pau Murphy is a 24 year old female,, No LMP recorded. (Menstrual status: IUD). who presents today for Kyleena  IUD removal.  She has not had problems with the IUD. She requests removal of the IUD because she desires to conceive. She is also due for a pap smear.      PROCEDURE:                                                      A speculum exam was performed and the cervix was visualized. Pap done. The IUD string was visualized. Using ring forceps, the string was grasped and the IUD removed intact.    POST PROCEDURE:                                                      The patient tolerated the procedure well. Patient was discharged in stable condition.    Reviewed that no further protection from pregnancy is present, must use another form of contraception if not desiring pregnancy. Reviewed that she may have light bleeding, uncertain when menses will return. Should start a prenatal vitamin or folic acid supplement right away if not using birth control. Call for any questions or problems.    Coral Raya MD

## 2024-05-31 LAB
BKR LAB AP GYN ADEQUACY: NORMAL
BKR LAB AP GYN INTERPRETATION: NORMAL
BKR LAB AP HPV REFLEX: NORMAL
BKR LAB AP PREVIOUS ABNORMAL: NORMAL
PATH REPORT.COMMENTS IMP SPEC: NORMAL
PATH REPORT.COMMENTS IMP SPEC: NORMAL
PATH REPORT.RELEVANT HX SPEC: NORMAL

## 2024-08-12 ENCOUNTER — RESULTS ONLY (OUTPATIENT)
Dept: LAB | Facility: CLINIC | Age: 24
End: 2024-08-12

## 2024-08-17 ENCOUNTER — HEALTH MAINTENANCE LETTER (OUTPATIENT)
Age: 24
End: 2024-08-17

## 2024-08-21 ENCOUNTER — TRANSFERRED RECORDS (OUTPATIENT)
Dept: HEALTH INFORMATION MANAGEMENT | Facility: CLINIC | Age: 24
End: 2024-08-21

## 2024-08-21 ENCOUNTER — RESULTS ONLY (OUTPATIENT)
Dept: LAB | Facility: CLINIC | Age: 24
End: 2024-08-21

## 2024-08-25 ENCOUNTER — HOSPITAL ENCOUNTER (EMERGENCY)
Facility: CLINIC | Age: 24
Discharge: HOME | End: 2024-08-25
Attending: STUDENT IN AN ORGANIZED HEALTH CARE EDUCATION/TRAINING PROGRAM | Admitting: STUDENT IN AN ORGANIZED HEALTH CARE EDUCATION/TRAINING PROGRAM
Payer: COMMERCIAL

## 2024-08-25 ENCOUNTER — APPOINTMENT (OUTPATIENT)
Dept: MRI IMAGING | Facility: CLINIC | Age: 24
End: 2024-08-25
Attending: EMERGENCY MEDICINE
Payer: COMMERCIAL

## 2024-08-25 VITALS
TEMPERATURE: 98.7 F | HEART RATE: 65 BPM | DIASTOLIC BLOOD PRESSURE: 65 MMHG | OXYGEN SATURATION: 99 % | SYSTOLIC BLOOD PRESSURE: 111 MMHG | RESPIRATION RATE: 20 BRPM | HEIGHT: 66 IN | WEIGHT: 179.68 LBS | BODY MASS INDEX: 28.88 KG/M2

## 2024-08-25 DIAGNOSIS — R53.1 WEAKNESS: ICD-10-CM

## 2024-08-25 DIAGNOSIS — R51.9 NONINTRACTABLE HEADACHE, UNSPECIFIED CHRONICITY PATTERN, UNSPECIFIED HEADACHE TYPE: ICD-10-CM

## 2024-08-25 DIAGNOSIS — R20.2 PARESTHESIAS: ICD-10-CM

## 2024-08-25 LAB
ANION GAP SERPL CALCULATED.3IONS-SCNC: 13 MMOL/L (ref 7–15)
BASOPHILS # BLD AUTO: 0 10E3/UL (ref 0–0.2)
BASOPHILS NFR BLD AUTO: 0 %
BUN SERPL-MCNC: 7.8 MG/DL (ref 6–20)
CALCIUM SERPL-MCNC: 8.9 MG/DL (ref 8.8–10.4)
CHLORIDE SERPL-SCNC: 105 MMOL/L (ref 98–107)
CREAT SERPL-MCNC: 0.49 MG/DL (ref 0.51–0.95)
EGFRCR SERPLBLD CKD-EPI 2021: >90 ML/MIN/1.73M2
EOSINOPHIL # BLD AUTO: 0.2 10E3/UL (ref 0–0.7)
EOSINOPHIL NFR BLD AUTO: 3 %
ERYTHROCYTE [DISTWIDTH] IN BLOOD BY AUTOMATED COUNT: 12.1 % (ref 10–15)
GLUCOSE SERPL-MCNC: 94 MG/DL (ref 70–99)
HCO3 SERPL-SCNC: 18 MMOL/L (ref 22–29)
HCT VFR BLD AUTO: 36.7 % (ref 35–47)
HGB BLD-MCNC: 13 G/DL (ref 11.7–15.7)
HOLD SPECIMEN: NORMAL
HOLD SPECIMEN: NORMAL
IMM GRANULOCYTES # BLD: 0.1 10E3/UL
IMM GRANULOCYTES NFR BLD: 1 %
LYMPHOCYTES # BLD AUTO: 3 10E3/UL (ref 0.8–5.3)
LYMPHOCYTES NFR BLD AUTO: 32 %
MCH RBC QN AUTO: 29 PG (ref 26.5–33)
MCHC RBC AUTO-ENTMCNC: 35.4 G/DL (ref 31.5–36.5)
MCV RBC AUTO: 82 FL (ref 78–100)
MONOCYTES # BLD AUTO: 0.6 10E3/UL (ref 0–1.3)
MONOCYTES NFR BLD AUTO: 7 %
NEUTROPHILS # BLD AUTO: 5.3 10E3/UL (ref 1.6–8.3)
NEUTROPHILS NFR BLD AUTO: 58 %
NRBC # BLD AUTO: 0 10E3/UL
NRBC BLD AUTO-RTO: 0 /100
PLATELET # BLD AUTO: 192 10E3/UL (ref 150–450)
POTASSIUM SERPL-SCNC: 3.5 MMOL/L (ref 3.4–5.3)
RBC # BLD AUTO: 4.49 10E6/UL (ref 3.8–5.2)
SODIUM SERPL-SCNC: 136 MMOL/L (ref 135–145)
WBC # BLD AUTO: 9.2 10E3/UL (ref 4–11)

## 2024-08-25 PROCEDURE — 93005 ELECTROCARDIOGRAM TRACING: CPT

## 2024-08-25 PROCEDURE — 70547 MR ANGIOGRAPHY NECK W/O DYE: CPT

## 2024-08-25 PROCEDURE — 99285 EMERGENCY DEPT VISIT HI MDM: CPT | Mod: 25

## 2024-08-25 PROCEDURE — 70551 MRI BRAIN STEM W/O DYE: CPT

## 2024-08-25 PROCEDURE — 70544 MR ANGIOGRAPHY HEAD W/O DYE: CPT

## 2024-08-25 PROCEDURE — 85025 COMPLETE CBC W/AUTO DIFF WBC: CPT | Performed by: EMERGENCY MEDICINE

## 2024-08-25 PROCEDURE — 36415 COLL VENOUS BLD VENIPUNCTURE: CPT | Performed by: EMERGENCY MEDICINE

## 2024-08-25 PROCEDURE — 80048 BASIC METABOLIC PNL TOTAL CA: CPT | Performed by: EMERGENCY MEDICINE

## 2024-08-25 ASSESSMENT — COLUMBIA-SUICIDE SEVERITY RATING SCALE - C-SSRS
1. IN THE PAST MONTH, HAVE YOU WISHED YOU WERE DEAD OR WISHED YOU COULD GO TO SLEEP AND NOT WAKE UP?: NO
6. HAVE YOU EVER DONE ANYTHING, STARTED TO DO ANYTHING, OR PREPARED TO DO ANYTHING TO END YOUR LIFE?: NO
2. HAVE YOU ACTUALLY HAD ANY THOUGHTS OF KILLING YOURSELF IN THE PAST MONTH?: NO

## 2024-08-25 ASSESSMENT — ACTIVITIES OF DAILY LIVING (ADL)
ADLS_ACUITY_SCORE: 37

## 2024-08-25 NOTE — ED TRIAGE NOTES
"      Pt states that at 1630 she started to experience numbness and weakness on the right half of her body. Pt was sitting down with her daughter when this started. Pt also states she felt disorientated as this was happening, \"I couldn't think of the dogs name, and I was having trouble texting.\" A headache did start around this time.    At 1500 Pt states she felt dizzy and had blurred vision.      Pt currently does not have weakness, but does endorse a sensation difference when comparing right and left arms. Legs and face do not have sensation changes. Currently does not have any weakness.     Pt is A/O x 4, clear speech noted.   "

## 2024-08-26 ENCOUNTER — PATIENT OUTREACH (OUTPATIENT)
Dept: FAMILY MEDICINE | Facility: CLINIC | Age: 24
End: 2024-08-26
Payer: COMMERCIAL

## 2024-08-26 LAB
ATRIAL RATE - MUSE: 71 BPM
DIASTOLIC BLOOD PRESSURE - MUSE: NORMAL MMHG
INTERPRETATION ECG - MUSE: NORMAL
P AXIS - MUSE: 60 DEGREES
PR INTERVAL - MUSE: 140 MS
QRS DURATION - MUSE: 94 MS
QT - MUSE: 410 MS
QTC - MUSE: 445 MS
R AXIS - MUSE: 48 DEGREES
SYSTOLIC BLOOD PRESSURE - MUSE: NORMAL MMHG
T AXIS - MUSE: 11 DEGREES
VENTRICULAR RATE- MUSE: 71 BPM

## 2024-08-26 NOTE — TELEPHONE ENCOUNTER
ED / Discharge Outreach Protocol    Patient Contact    No outreach needed. Pt has not been seen by  clinic since 2022. ER follow up advised to follow up with OBGYN in 3-5 days.    Plan OB/GYN follow-up for recheck in 3 to 5 days return precautions for recurrent headache, stroke symptoms, or any other concerns.     Marcy MILLER RN

## 2024-08-26 NOTE — ED PROVIDER NOTES
"  Emergency Department Note      History of Present Illness     Chief Complaint   Numbness      HPI   Pau Murphy is a 24 year old female currently pregnant in 1st trimester who presents for evaluation of headache, paresthesias, dizziness, and vision changes.  Patient notes that 1500 today, she stood up and felt transient lightheaded dizziness with bilateral eye blurred vision.  She sat down and both symptoms resolved.  Then, at 1630 today, she began to have mild left frontal headache, paresthesias to the right face and right half of the body, and weakness.  She notes some difficulty texting with her sister, who is an RN.  She does note the symptoms improved, but her sister encouraged her to present for further evaluation and to rule out stroke.  She notes minimal symptoms at this time.    Independent Historian   None    Review of External Notes   Reviewed 5/28/2024 OB/GYN visit    Past Medical History     Medical History and Problem List   Bipolar  Depression  Asthma   Cystic   Psychosis     Medications   Zofran     Surgical History   Dental extraction   Pins in the arm age 2      Physical Exam     Patient Vitals for the past 24 hrs:   BP Temp Temp src Pulse Resp SpO2 Height Weight   08/25/24 2200 111/65 -- -- 65 -- 99 % -- --   08/25/24 1856 (!) 141/94 98.7  F (37.1  C) Oral 106 20 96 % 1.676 m (5' 6\") 81.5 kg (179 lb 10.8 oz)     Physical Exam  Constitutional: Alert, attentive  HENT:    Nose: Nose normal.    Mouth/Throat: Oropharynx is clear, mucous membranes are moist  Eyes: EOM are normal. Pupils are equal, round, and reactive to light.   CV: Regular rate and rhythm, no murmurs, rubs or gallops.  Chest: Effort normal and breath sounds normal.   GI: No distension. There is no tenderness  MSK: Normal range of motion.   Neurological:   A/Ox3;   Cranial nerves 2-12 intact;   5/5 strength throughout the upper and lower extremities;   sensation intact to light touch throughout the upper and lower extremities; "   normal gait   No meningismus   Skin: Skin is warm and dry.      Diagnostics     Results for orders placed or performed during the hospital encounter of 08/25/24 (from the past 24 hour(s))   EKG 12 lead   Result Value Ref Range    Systolic Blood Pressure  mmHg    Diastolic Blood Pressure  mmHg    Ventricular Rate 71 BPM    Atrial Rate 71 BPM    VT Interval 140 ms    QRS Duration 94 ms     ms    QTc 445 ms    P Axis 60 degrees    R AXIS 48 degrees    T Axis 11 degrees    Interpretation ECG       Sinus rhythm with sinus arrhythmia  Normal ECG  No previous ECGs available     CBC + differential    Narrative    The following orders were created for panel order CBC + differential.  Procedure                               Abnormality         Status                     ---------                               -----------         ------                     CBC with platelets and d...[[id number]]                      Final result                 Please view results for these tests on the individual orders.   Basic metabolic panel (BMP)   Result Value Ref Range    Sodium 136 135 - 145 mmol/L    Potassium 3.5 3.4 - 5.3 mmol/L    Chloride 105 98 - 107 mmol/L    Carbon Dioxide (CO2) 18 (L) 22 - 29 mmol/L    Anion Gap 13 7 - 15 mmol/L    Urea Nitrogen 7.8 6.0 - 20.0 mg/dL    Creatinine 0.49 (L) 0.51 - 0.95 mg/dL    GFR Estimate >90 >60 mL/min/1.73m2    Calcium 8.9 8.8 - 10.4 mg/dL    Glucose 94 70 - 99 mg/dL   [place] Draw    Narrative    The following orders were created for panel order [place] Draw.  Procedure                               Abnormality         Status                     ---------                               -----------         ------                     Extra Blue Top Tube[[id number]]                              Final result               Extra Red Top Tube[[id number]]                               Final result                 Please view results for these tests on the individual orders.   CBC with  platelets and differential   Result Value Ref Range    WBC Count 9.2 4.0 - 11.0 10e3/uL    RBC Count 4.49 3.80 - 5.20 10e6/uL    Hemoglobin 13.0 11.7 - 15.7 g/dL    Hematocrit 36.7 35.0 - 47.0 %    MCV 82 78 - 100 fL    MCH 29.0 26.5 - 33.0 pg    MCHC 35.4 31.5 - 36.5 g/dL    RDW 12.1 10.0 - 15.0 %    Platelet Count 192 150 - 450 10e3/uL    % Neutrophils 58 %    % Lymphocytes 32 %    % Monocytes 7 %    % Eosinophils 3 %    % Basophils 0 %    % Immature Granulocytes 1 %    NRBCs per 100 WBC 0 <1 /100    Absolute Neutrophils 5.3 1.6 - 8.3 10e3/uL    Absolute Lymphocytes 3.0 0.8 - 5.3 10e3/uL    Absolute Monocytes 0.6 0.0 - 1.3 10e3/uL    Absolute Eosinophils 0.2 0.0 - 0.7 10e3/uL    Absolute Basophils 0.0 0.0 - 0.2 10e3/uL    Absolute Immature Granulocytes 0.1 <=0.4 10e3/uL    Absolute NRBCs 0.0 10e3/uL   Extra Blue Top Tube   Result Value Ref Range    Hold Specimen JIC    Extra Red Top Tube   Result Value Ref Range    Hold Specimen JIC    MR Brain w/o Contrast    Narrative    EXAM: MR BRAIN W/O CONTRAST, MRA NECK (CAROTIDS) W/O CONTRAST, MRA BRAIN ([place]) W/O CONTRAST  LOCATION: [facility]  DATE: 8/25/2024    INDICATION: headache, dizziness, right sided paresthesias weakness, pregnant  COMPARISON: None.  TECHNIQUE:   1) Routine multiplanar multisequence head MRI without intravenous contrast.  2) 3D time-of-flight head MRA without intravenous contrast.  3) Neck MRA without IV contrast. Stenosis measurements made according to NASCET criteria unless otherwise specified.      FINDINGS:  HEAD MRI:  INTRACRANIAL CONTENTS: No acute or subacute infarct. No mass, acute hemorrhage, or extra-axial fluid collections. Normal brain parenchymal signal. Normal ventricles and sulci. Normal position of the cerebellar tonsils.     SELLA: No abnormality accounting for technique.    OSSEOUS STRUCTURES/SOFT TISSUES: Normal marrow signal. The major intracranial vascular flow voids are maintained.      ORBITS: No abnormality accounting for technique.     SINUSES/MASTOIDS: No paranasal sinus mucosal disease. No middle ear or mastoid effusion.     HEAD MRA:   ANTERIOR CIRCULATION: No stenosis/occlusion, aneurysm, or high flow vascular malformation. Standard [place] anatomy.    POSTERIOR CIRCULATION: No stenosis/occlusion, aneurysm, or high flow vascular malformation. Balanced vertebral arteries supply a normal basilar artery.     NECK MRA:   RIGHT CAROTID: No measurable stenosis or dissection.    LEFT CAROTID: No measurable stenosis or dissection.    VERTEBRAL ARTERIES: Symmetric decreased signal within the horizontal portions of the V3 and proximal V4 segments of the vertebral arteries reflecting artifact/in plane saturation related to technique. No focal stenosis or dissection. Balanced vertebral   arteries.    AORTIC ARCH: Classic aortic arch anatomy with no significant stenosis at the origin of the great vessels.      Impression    IMPRESSION:  HEAD MRI:   1.  Normal head MRI.    HEAD MRA:   1.  Normal MRA [place].    NECK MRA:  1.  Normal neck MRA.   MRA Brain ([place]) wo Contrast    Narrative    EXAM: MR BRAIN W/O CONTRAST, MRA NECK (CAROTIDS) W/O CONTRAST, MRA BRAIN ([place]) W/O CONTRAST  LOCATION: [facility]  DATE: 8/25/2024    INDICATION: headache, dizziness, right sided paresthesias weakness, pregnant  COMPARISON: None.  TECHNIQUE:   1) Routine multiplanar multisequence head MRI without intravenous contrast.  2) 3D time-of-flight head MRA without intravenous contrast.  3) Neck MRA without IV contrast. Stenosis measurements made according to NASCET criteria unless otherwise specified.      FINDINGS:  HEAD MRI:  INTRACRANIAL CONTENTS: No acute or subacute infarct. No mass, acute hemorrhage, or extra-axial fluid collections. Normal brain parenchymal signal. Normal ventricles and sulci. Normal position of the cerebellar tonsils.     SELLA: No  abnormality accounting for technique.    OSSEOUS STRUCTURES/SOFT TISSUES: Normal marrow signal. The major intracranial vascular flow voids are maintained.     ORBITS: No abnormality accounting for technique.     SINUSES/MASTOIDS: No paranasal sinus mucosal disease. No middle ear or mastoid effusion.     HEAD MRA:   ANTERIOR CIRCULATION: No stenosis/occlusion, aneurysm, or high flow vascular malformation. Standard [place] anatomy.    POSTERIOR CIRCULATION: No stenosis/occlusion, aneurysm, or high flow vascular malformation. Balanced vertebral arteries supply a normal basilar artery.     NECK MRA:   RIGHT CAROTID: No measurable stenosis or dissection.    LEFT CAROTID: No measurable stenosis or dissection.    VERTEBRAL ARTERIES: Symmetric decreased signal within the horizontal portions of the V3 and proximal V4 segments of the vertebral arteries reflecting artifact/in plane saturation related to technique. No focal stenosis or dissection. Balanced vertebral   arteries.    AORTIC ARCH: Classic aortic arch anatomy with no significant stenosis at the origin of the great vessels.      Impression    IMPRESSION:  HEAD MRI:   1.  Normal head MRI.    HEAD MRA:   1.  Normal MRA [place].    NECK MRA:  1.  Normal neck MRA.   MRA Neck (Carotids) wo Contrast    Narrative    EXAM: MR BRAIN W/O CONTRAST, MRA NECK (CAROTIDS) W/O CONTRAST, MRA BRAIN ([place]) W/O CONTRAST  LOCATION: [facility]  DATE: 8/25/2024    INDICATION: headache, dizziness, right sided paresthesias weakness, pregnant  COMPARISON: None.  TECHNIQUE:   1) Routine multiplanar multisequence head MRI without intravenous contrast.  2) 3D time-of-flight head MRA without intravenous contrast.  3) Neck MRA without IV contrast. Stenosis measurements made according to NASCET criteria unless otherwise specified.      FINDINGS:  HEAD MRI:  INTRACRANIAL CONTENTS: No acute or subacute infarct. No mass, acute hemorrhage, or  extra-axial fluid collections. Normal brain parenchymal signal. Normal ventricles and sulci. Normal position of the cerebellar tonsils.     SELLA: No abnormality accounting for technique.    OSSEOUS STRUCTURES/SOFT TISSUES: Normal marrow signal. The major intracranial vascular flow voids are maintained.     ORBITS: No abnormality accounting for technique.     SINUSES/MASTOIDS: No paranasal sinus mucosal disease. No middle ear or mastoid effusion.     HEAD MRA:   ANTERIOR CIRCULATION: No stenosis/occlusion, aneurysm, or high flow vascular malformation. Standard [place] anatomy.    POSTERIOR CIRCULATION: No stenosis/occlusion, aneurysm, or high flow vascular malformation. Balanced vertebral arteries supply a normal basilar artery.     NECK MRA:   RIGHT CAROTID: No measurable stenosis or dissection.    LEFT CAROTID: No measurable stenosis or dissection.    VERTEBRAL ARTERIES: Symmetric decreased signal within the horizontal portions of the V3 and proximal V4 segments of the vertebral arteries reflecting artifact/in plane saturation related to technique. No focal stenosis or dissection. Balanced vertebral   arteries.    AORTIC ARCH: Classic aortic arch anatomy with no significant stenosis at the origin of the great vessels.      Impression    IMPRESSION:  HEAD MRI:   1.  Normal head MRI.    HEAD MRA:   1.  Normal MRA [place].    NECK MRA:  1.  Normal neck MRA.         ED Course      Medications Administered   Medications - No data to display    Procedures   Procedures         ED Course   I evaluated the patient in triage, discussed a diagnostic plan and answered questions.  I rechecked the patient in the room.  Symptoms are resolved.  Discussed next steps.        Medical Decision Making / Diagnosis     MDM   Pau Murphy is a 24 year old female currently pregnant for semester who presents for evaluation of headache and the above neurologic symptoms.  She has a normal neurologic exam.  Given  concern for possible stroke or other phenomenon, MRI brain and MRA head/neck were performed.  Fortunately these studies are negative.  On recheck, symptoms are resolved.  She does note a history of remote headaches/migraines but today's presentation was distinct.  Plan OB/GYN follow-up for recheck in 3 to 5 days return precautions for recurrent headache, stroke symptoms, or any other concerns.      Disposition   The patient was discharged.     Diagnosis     ICD-10-CM    1. Nonintractable headache, unspecified chronicity pattern, unspecified headache type  R51.9       2. Paresthesias  R20.2       3. Weakness  R53.1                 Chalo Stacy MD  08/25/24 0729

## 2024-08-26 NOTE — DISCHARGE INSTRUCTIONS
It was a pleasure taking care of you today. I hope you feel much better soon.  Your evaluation showed no serious neurologic abnormality.  Please follow-up with your primary care doctor in 1 week.  Return immediately for worse pain, stroke symptoms, or any other concerns.

## 2024-09-01 ASSESSMENT — ASTHMA QUESTIONNAIRES
QUESTION_4 LAST FOUR WEEKS HOW OFTEN HAVE YOU USED YOUR RESCUE INHALER OR NEBULIZER MEDICATION (SUCH AS ALBUTEROL): NOT AT ALL
ACT_TOTALSCORE: 25
QUESTION_5 LAST FOUR WEEKS HOW WOULD YOU RATE YOUR ASTHMA CONTROL: COMPLETELY CONTROLLED
ACT_TOTALSCORE: 25
QUESTION_2 LAST FOUR WEEKS HOW OFTEN HAVE YOU HAD SHORTNESS OF BREATH: NOT AT ALL
QUESTION_1 LAST FOUR WEEKS HOW MUCH OF THE TIME DID YOUR ASTHMA KEEP YOU FROM GETTING AS MUCH DONE AT WORK, SCHOOL OR AT HOME: NONE OF THE TIME
QUESTION_3 LAST FOUR WEEKS HOW OFTEN DID YOUR ASTHMA SYMPTOMS (WHEEZING, COUGHING, SHORTNESS OF BREATH, CHEST TIGHTNESS OR PAIN) WAKE YOU UP AT NIGHT OR EARLIER THAN USUAL IN THE MORNING: NOT AT ALL

## 2024-09-03 ENCOUNTER — OFFICE VISIT (OUTPATIENT)
Dept: FAMILY MEDICINE | Facility: CLINIC | Age: 24
End: 2024-09-03
Payer: COMMERCIAL

## 2024-09-03 VITALS
DIASTOLIC BLOOD PRESSURE: 70 MMHG | SYSTOLIC BLOOD PRESSURE: 108 MMHG | BODY MASS INDEX: 26.84 KG/M2 | WEIGHT: 167 LBS | RESPIRATION RATE: 16 BRPM | TEMPERATURE: 98.3 F | HEIGHT: 66 IN | OXYGEN SATURATION: 97 % | HEART RATE: 105 BPM

## 2024-09-03 DIAGNOSIS — Z87.59 HISTORY OF HYPEREMESIS GRAVIDARUM: Primary | ICD-10-CM

## 2024-09-03 DIAGNOSIS — R51.9 NONINTRACTABLE HEADACHE, UNSPECIFIED CHRONICITY PATTERN, UNSPECIFIED HEADACHE TYPE: ICD-10-CM

## 2024-09-03 PROCEDURE — 99213 OFFICE O/P EST LOW 20 MIN: CPT | Performed by: FAMILY MEDICINE

## 2024-09-03 RX ORDER — VITAMIN A, VITAMIN C, VITAMIN D-3, VITAMIN E, VITAMIN B-1, VITAMIN B-2, NIACIN, VITAMIN B-6, CALCIUM, IRON, ZINC, COPPER 4000; 120; 400; 22; 1.84; 3; 20; 10; 1; 12; 200; 27; 25; 2 [IU]/1; MG/1; [IU]/1; MG/1; MG/1; MG/1; MG/1; MG/1; MG/1; UG/1; MG/1; MG/1; MG/1; MG/1
TABLET ORAL DAILY
COMMUNITY

## 2024-09-03 RX ORDER — ONDANSETRON 4 MG/1
4 TABLET, ORALLY DISINTEGRATING ORAL DAILY PRN
Qty: 2 TABLET | Refills: 0 | Status: SHIPPED | OUTPATIENT
Start: 2024-09-03 | End: 2024-09-11

## 2024-09-03 NOTE — PROGRESS NOTES
"  Assessment & Plan     History of hyperemesis gravidarum  Patient was started on Reglan for nausea which likely caused extraparametal side effects as she also has a known history of psychosis and other mental health issues.  In the past she has done well with doxylamine, B6 and weekly IV fluid administration.  Based on this and recent ER visit, I recommend she speaks with OB/GYN for this management as part of her OB care package inform patient that Zofran is to be taken as a last resort as the medication does cross the placenta.  - Ob/Gyn  Referral  - ondansetron (ZOFRAN ODT) 4 MG ODT tab  Dispense: 2 tablet; Refill: 0    Nonintractable headache, unspecified chronicity pattern, unspecified headache type  Likely side effect of reglan. Resolved, reassuring MRI.  No known history of preeclampsia, normotensive today.          MED REC REQUIRED  Post Medication Reconciliation Status:  Discharge medications reconciled and changed, see notes/orders          Burke Mabry is a 24 year old, presenting for the following health issues:  ER F/U        9/3/2024     8:46 AM   Additional Questions   Roomed by Marjorie SARMIENTO       ED/UC Followup:    Facility:  [facility]  Date of visit: 08/25/2024  Reason for visit: Nonintractable Headache  Current Status: Resolved    Patient took Reglan and shortly afterwards had all the symptoms, spoke to her sister who is a registered nurse, had a negative stroke workup.  Symptoms have now resolved.  She would like to restart IV fluids currently going through self pay for IV fluids weekly.      Objective    /70 (Cuff Size: Adult Regular)   Pulse 105   Temp 98.3  F (36.8  C) (Oral)   Resp 16   Ht 1.676 m (5' 6\")   Wt 75.8 kg (167 lb)   SpO2 97%   BMI 26.95 kg/m    Body mass index is 26.95 kg/m .  Physical Exam  Vitals reviewed.   Constitutional:       Appearance: She is not ill-appearing.   Cardiovascular:      Rate and Rhythm: Normal rate and regular " rhythm.   Pulmonary:      Effort: Pulmonary effort is normal.      Breath sounds: Normal breath sounds.                    Signed Electronically by: Edd Siddiqui MD

## 2024-09-11 ENCOUNTER — TELEPHONE (OUTPATIENT)
Dept: MIDWIFE SERVICES | Facility: CLINIC | Age: 24
End: 2024-09-11

## 2024-09-11 ENCOUNTER — OFFICE VISIT (OUTPATIENT)
Dept: MIDWIFE SERVICES | Facility: CLINIC | Age: 24
End: 2024-09-11
Payer: COMMERCIAL

## 2024-09-11 VITALS
WEIGHT: 175 LBS | HEART RATE: 92 BPM | DIASTOLIC BLOOD PRESSURE: 70 MMHG | HEIGHT: 66 IN | BODY MASS INDEX: 28.12 KG/M2 | SYSTOLIC BLOOD PRESSURE: 102 MMHG

## 2024-09-11 DIAGNOSIS — O21.9 NAUSEA/VOMITING IN PREGNANCY: Primary | ICD-10-CM

## 2024-09-11 DIAGNOSIS — Z87.59 HISTORY OF HYPEREMESIS GRAVIDARUM: ICD-10-CM

## 2024-09-11 DIAGNOSIS — Z14.1 CYSTIC FIBROSIS CARRIER: ICD-10-CM

## 2024-09-11 LAB
ALBUMIN UR-MCNC: ABNORMAL MG/DL
APPEARANCE UR: ABNORMAL
BASOPHILS # BLD AUTO: 0 10E3/UL (ref 0–0.2)
BASOPHILS NFR BLD AUTO: 0 %
BILIRUB UR QL STRIP: ABNORMAL
COLOR UR AUTO: ABNORMAL
EOSINOPHIL # BLD AUTO: 0.2 10E3/UL (ref 0–0.7)
EOSINOPHIL NFR BLD AUTO: 2 %
ERYTHROCYTE [DISTWIDTH] IN BLOOD BY AUTOMATED COUNT: 12.3 % (ref 10–15)
GLUCOSE UR STRIP-MCNC: NEGATIVE MG/DL
HCT VFR BLD AUTO: 39.9 % (ref 35–47)
HGB BLD-MCNC: 14 G/DL (ref 11.7–15.7)
HGB UR QL STRIP: NEGATIVE
IMM GRANULOCYTES # BLD: 0.1 10E3/UL
IMM GRANULOCYTES NFR BLD: 1 %
KETONES UR STRIP-MCNC: NEGATIVE MG/DL
LEUKOCYTE ESTERASE UR QL STRIP: ABNORMAL
LYMPHOCYTES # BLD AUTO: 2.5 10E3/UL (ref 0.8–5.3)
LYMPHOCYTES NFR BLD AUTO: 24 %
MCH RBC QN AUTO: 28.9 PG (ref 26.5–33)
MCHC RBC AUTO-ENTMCNC: 35.1 G/DL (ref 31.5–36.5)
MCV RBC AUTO: 82 FL (ref 78–100)
MONOCYTES # BLD AUTO: 0.5 10E3/UL (ref 0–1.3)
MONOCYTES NFR BLD AUTO: 5 %
NEUTROPHILS # BLD AUTO: 7.4 10E3/UL (ref 1.6–8.3)
NEUTROPHILS NFR BLD AUTO: 68 %
NITRATE UR QL: NEGATIVE
NRBC # BLD AUTO: 0 10E3/UL
NRBC BLD AUTO-RTO: 0 /100
PH UR STRIP: 6 [PH] (ref 5–8)
PLATELET # BLD AUTO: 249 10E3/UL (ref 150–450)
RBC # BLD AUTO: 4.85 10E6/UL (ref 3.8–5.2)
SP GR UR STRIP: >=1.03 (ref 1–1.03)
UROBILINOGEN UR STRIP-ACNC: 1 E.U./DL
WBC # BLD AUTO: 10.7 10E3/UL (ref 4–11)

## 2024-09-11 PROCEDURE — 84443 ASSAY THYROID STIM HORMONE: CPT | Performed by: ADVANCED PRACTICE MIDWIFE

## 2024-09-11 PROCEDURE — 85025 COMPLETE CBC W/AUTO DIFF WBC: CPT | Performed by: ADVANCED PRACTICE MIDWIFE

## 2024-09-11 PROCEDURE — 36415 COLL VENOUS BLD VENIPUNCTURE: CPT | Performed by: ADVANCED PRACTICE MIDWIFE

## 2024-09-11 PROCEDURE — 99203 OFFICE O/P NEW LOW 30 MIN: CPT | Performed by: ADVANCED PRACTICE MIDWIFE

## 2024-09-11 PROCEDURE — 81003 URINALYSIS AUTO W/O SCOPE: CPT | Performed by: ADVANCED PRACTICE MIDWIFE

## 2024-09-11 PROCEDURE — 80053 COMPREHEN METABOLIC PANEL: CPT | Performed by: ADVANCED PRACTICE MIDWIFE

## 2024-09-11 RX ORDER — HEPARIN SODIUM,PORCINE 10 UNIT/ML
5-20 VIAL (ML) INTRAVENOUS DAILY PRN
Status: CANCELLED | OUTPATIENT
Start: 2024-09-12

## 2024-09-11 RX ORDER — ONDANSETRON 4 MG/1
4 TABLET, ORALLY DISINTEGRATING ORAL EVERY 8 HOURS PRN
Qty: 30 TABLET | Refills: 3 | Status: SHIPPED | OUTPATIENT
Start: 2024-09-11

## 2024-09-11 RX ORDER — HEPARIN SODIUM (PORCINE) LOCK FLUSH IV SOLN 100 UNIT/ML 100 UNIT/ML
5 SOLUTION INTRAVENOUS
Status: CANCELLED | OUTPATIENT
Start: 2024-09-12

## 2024-09-11 RX ORDER — DEXTROSE, SODIUM CHLORIDE, SODIUM LACTATE, POTASSIUM CHLORIDE, AND CALCIUM CHLORIDE 5; .6; .31; .03; .02 G/100ML; G/100ML; G/100ML; G/100ML; G/100ML
500 INJECTION, SOLUTION INTRAVENOUS 2 TIMES WEEKLY
Status: CANCELLED | OUTPATIENT
Start: 2024-09-13

## 2024-09-11 RX ORDER — PYRIDOXINE HCL (VITAMIN B6) 25 MG
25 TABLET ORAL 3 TIMES DAILY
Qty: 90 TABLET | Refills: 3 | Status: SHIPPED | OUTPATIENT
Start: 2024-09-11

## 2024-09-11 RX ORDER — DEXTROSE, SODIUM CHLORIDE, SODIUM LACTATE, POTASSIUM CHLORIDE, AND CALCIUM CHLORIDE 5; .6; .31; .03; .02 G/100ML; G/100ML; G/100ML; G/100ML; G/100ML
500 INJECTION, SOLUTION INTRAVENOUS ONCE
Status: CANCELLED | OUTPATIENT
Start: 2024-09-13 | End: 2024-09-13

## 2024-09-11 NOTE — TELEPHONE ENCOUNTER
[facility] Call Center    Phone Message    May a detailed message be left on voicemail: yes     Reason for Call: Other: Patient is calling because she was trying to schedule her infusions for tomorrow but the order is not complete so unable to schedule. She did send a mychart message as well but wanted to follow up with a phone call also. Please call or mychart message her back. Thank you.      Action Taken: Other: obgyn    Travel Screening: Not Applicable     Date of Service:

## 2024-09-11 NOTE — PROGRESS NOTES
"Pregnancy Problem Visit      Subjective:   Pau is a 24 year old y.o. female who presents to clinic today for concerns regarding hyperemesis gravidarum  She is accompanied by her  Laith and daughter Randa. She is [facility] but new to [facility] care. Saw CNM team at [facility] for last pregnancy. Reports that NVP continued throughout pregnancy, though she felt significantly better after weekly IV rehydration.     Patient's last menstrual period was 06/13/2024 (approximate). With an EDB 3/20/24 and current GA 12w3d.This is consistent with ultrasound 8/12/24 at [facility] showing SIUP at 8w1d with EDB 3/23/25.     History HG with first pregnancy-- tried zofran and reglan (EPS/psychosis reaction). Zofran increased constipation and did not help with vomiting. Has had IV fluid rehydration with last pregnancy (with vitamins), and with [place] IV last Thursday. Symptoms improved x24 hours. Has had 2 days over last week and could not keep anything down. Symptoms were starting to improve, but now vomiting minimum of 2x/day, up to 6-7. Feeling very fatigued and difficulty getting through work day and parenting toddler. Vomiting often triggered by brushing teeth and worsens at night. Has kept pancake and small sips water down today.  Currently taking unisom (1 tab at bedtime) and 50 mg B6 at night. Requesting biweekly IV hydration until better able to tolerate PO fluids.     Pt reported pre-pregnancy weight 195.    Clotilde also notes that she is a carrier for CF and wonders if partner can be tested for carrier status.     PSFH:  Electronic Health Record updated for Medications, Allergies, Medical History, Surgical History and Family History.    A 12 point comprehensive review of systems was negative except as noted.      Objective:   /70 (BP Location: Right arm, Patient Position: Sitting, Cuff Size: Adult Regular)   Pulse 92   Ht 1.683 m (5' 6.25\")   Wt 79.4 kg (175 lb)   LMP " 06/13/2024 (Approximate)   Breastfeeding No   BMI 28.03 kg/m    175 lbs 0 oz  Body mass index is 28.03 kg/m .      Physical Exam:  General Appearance: alert, articulate, well-groomed, well nourished female.  Not in any apparent distress.  Skin: Skin color, texture, turgor normal, no rashes or lesions.  Throat: Lips, mucosa, and tongue normal; teeth and gums normal  HEENT: grossly normal; otoscopic and opthalmic exam not performed.   Neck: Supple, symmetrical, trachea midline, no adenopathy  Respiratory: Normal respiratory effort, CTA bilaterally  Cardiovascular: No peripheral edema. RRR  Abdomen: fundus at pubic symphysis,   Musculoskeletal: No digital cyanosis. Normal gait and station. Moves all limbs freely.  Neuro: Cranial nerves II-XII grossly intact.  Psych: Intact judgment and insight. OX3 and conversational.         Assessment/Plan:       Encounter Diagnoses   Name Primary?    Nausea/vomiting in pregnancy Yes    History of hyperemesis gravidarum     Cystic fibrosis carrier      Advised to increase B6 to 25 mg TID with half tab unisom in am and full tab at bedtime. Reviewed risks/benefits of zofran and that this can be used as needed days of for persistent vomiting.   IV rehydration ordered as requested. Discussed adding vitamins if indicated but will start with hydration only.  RTC in 1 week or earlier if unable to keep any fluids down for >24 hours.  IOB as scheduled.   Referral placed for genetic counseling to discuss genetic testing/carrier screening for her and partner.     Total time spent on the date of this encounter doing: chart review, review of test results, patient visit, the physical exam & procedure, education, counseling, developing this plan of care, and documenting =   30 minutes

## 2024-09-12 ENCOUNTER — INFUSION THERAPY VISIT (OUTPATIENT)
Dept: INFUSION THERAPY | Facility: CLINIC | Age: 24
End: 2024-09-12
Attending: ADVANCED PRACTICE MIDWIFE
Payer: COMMERCIAL

## 2024-09-12 VITALS
OXYGEN SATURATION: 96 % | DIASTOLIC BLOOD PRESSURE: 74 MMHG | TEMPERATURE: 98.8 F | RESPIRATION RATE: 16 BRPM | HEART RATE: 73 BPM | SYSTOLIC BLOOD PRESSURE: 118 MMHG

## 2024-09-12 DIAGNOSIS — O21.9 NAUSEA/VOMITING IN PREGNANCY: Primary | ICD-10-CM

## 2024-09-12 LAB
ALBUMIN SERPL BCG-MCNC: 4.1 G/DL (ref 3.5–5.2)
ALP SERPL-CCNC: 58 U/L (ref 40–150)
ALT SERPL W P-5'-P-CCNC: 16 U/L (ref 0–50)
ANION GAP SERPL CALCULATED.3IONS-SCNC: 10 MMOL/L (ref 7–15)
AST SERPL W P-5'-P-CCNC: 13 U/L (ref 0–45)
BILIRUB SERPL-MCNC: 0.4 MG/DL
BUN SERPL-MCNC: 9.5 MG/DL (ref 6–20)
CALCIUM SERPL-MCNC: 9.4 MG/DL (ref 8.8–10.4)
CHLORIDE SERPL-SCNC: 103 MMOL/L (ref 98–107)
CREAT SERPL-MCNC: 0.55 MG/DL (ref 0.51–0.95)
EGFRCR SERPLBLD CKD-EPI 2021: >90 ML/MIN/1.73M2
GLUCOSE SERPL-MCNC: 92 MG/DL (ref 70–99)
HCO3 SERPL-SCNC: 22 MMOL/L (ref 22–29)
POTASSIUM SERPL-SCNC: 4 MMOL/L (ref 3.4–5.3)
PROT SERPL-MCNC: 6.8 G/DL (ref 6.4–8.3)
SODIUM SERPL-SCNC: 135 MMOL/L (ref 135–145)
TSH SERPL DL<=0.005 MIU/L-ACNC: 0.52 UIU/ML (ref 0.3–4.2)

## 2024-09-12 PROCEDURE — 258N000003 HC RX IP 258 OP 636: Performed by: ADVANCED PRACTICE MIDWIFE

## 2024-09-12 PROCEDURE — 96361 HYDRATE IV INFUSION ADD-ON: CPT

## 2024-09-12 PROCEDURE — 96360 HYDRATION IV INFUSION INIT: CPT

## 2024-09-12 PROCEDURE — 250N000011 HC RX IP 250 OP 636: Performed by: ADVANCED PRACTICE MIDWIFE

## 2024-09-12 RX ORDER — HEPARIN SODIUM (PORCINE) LOCK FLUSH IV SOLN 100 UNIT/ML 100 UNIT/ML
5 SOLUTION INTRAVENOUS
Status: CANCELLED | OUTPATIENT
Start: 2024-09-16

## 2024-09-12 RX ORDER — DEXTROSE, SODIUM CHLORIDE, SODIUM LACTATE, POTASSIUM CHLORIDE, AND CALCIUM CHLORIDE 5; .6; .31; .03; .02 G/100ML; G/100ML; G/100ML; G/100ML; G/100ML
500 INJECTION, SOLUTION INTRAVENOUS 2 TIMES WEEKLY
Status: DISCONTINUED | OUTPATIENT
Start: 2024-09-16 | End: 2024-09-12 | Stop reason: HOSPADM

## 2024-09-12 RX ORDER — ONDANSETRON 2 MG/ML
4 INJECTION INTRAMUSCULAR; INTRAVENOUS 2 TIMES WEEKLY
Status: CANCELLED | OUTPATIENT
Start: 2024-09-16

## 2024-09-12 RX ORDER — DEXTROSE, SODIUM CHLORIDE, SODIUM LACTATE, POTASSIUM CHLORIDE, AND CALCIUM CHLORIDE 5; .6; .31; .03; .02 G/100ML; G/100ML; G/100ML; G/100ML; G/100ML
500 INJECTION, SOLUTION INTRAVENOUS 2 TIMES WEEKLY
Status: CANCELLED | OUTPATIENT
Start: 2024-09-14

## 2024-09-12 RX ORDER — HEPARIN SODIUM,PORCINE 10 UNIT/ML
5-20 VIAL (ML) INTRAVENOUS DAILY PRN
Status: CANCELLED | OUTPATIENT
Start: 2024-09-16

## 2024-09-12 RX ORDER — DEXTROSE, SODIUM CHLORIDE, SODIUM LACTATE, POTASSIUM CHLORIDE, AND CALCIUM CHLORIDE 5; .6; .31; .03; .02 G/100ML; G/100ML; G/100ML; G/100ML; G/100ML
500 INJECTION, SOLUTION INTRAVENOUS ONCE
Status: CANCELLED | OUTPATIENT
Start: 2024-09-16 | End: 2024-09-16

## 2024-09-12 RX ADMIN — SODIUM CHLORIDE, SODIUM LACTATE, POTASSIUM CHLORIDE, CALCIUM CHLORIDE AND DEXTROSE MONOHYDRATE 500 ML: 5; 600; 310; 30; 20 INJECTION, SOLUTION INTRAVENOUS at 12:22

## 2024-09-12 RX ADMIN — SODIUM CHLORIDE, POTASSIUM CHLORIDE, SODIUM LACTATE AND CALCIUM CHLORIDE 1000 ML: 600; 310; 30; 20 INJECTION, SOLUTION INTRAVENOUS at 11:14

## 2024-09-12 NOTE — PROGRESS NOTES
Infusion Nursing Note:  Pau Murphy presents today for IV hydration (1.5 L LR + D5LR).    Patient seen by provider today: No   present during visit today: Not Applicable.    Note: Clotilde reports she is feeling ok today.  She reports she has had hyperemesis with previous pregnancy as well.  She reports constant nausea and emesis 3-4 times per day.  She states she is able to tolerate and keep down most PO fluid and food intake, depending on the day.    Clotilde requested IV Zofran, but no order.  She also states she would prefer to receive IVF plus multivitamins, as she had before.   Paged provider, but no response.  In-basket message sent to provider to order for future visits, if appropriate.    Intravenous Access:  Peripheral IV placed.    Treatment Conditions:  Not Applicable.    Post Infusion Assessment:  Patient tolerated infusion without incident.  Blood return noted pre and post infusion.  Site patent and intact, free from redness, edema or discomfort.  No evidence of extravasations.  Access discontinued per protocol.     Discharge Plan:   Discharge instructions reviewed with: Patient.  Patient and/or family verbalized understanding of discharge instructions and all questions answered.  AVS to patient via [employer].  Patient will return 9/19 for next appointment.   Patient discharged in stable condition accompanied by: self.  Departure Mode: Ambulatory.      Catrachito Delvalle RN

## 2024-09-17 ENCOUNTER — INFUSION THERAPY VISIT (OUTPATIENT)
Dept: INFUSION THERAPY | Facility: CLINIC | Age: 24
End: 2024-09-17
Attending: FAMILY MEDICINE
Payer: COMMERCIAL

## 2024-09-17 VITALS
DIASTOLIC BLOOD PRESSURE: 79 MMHG | OXYGEN SATURATION: 97 % | TEMPERATURE: 98.8 F | RESPIRATION RATE: 18 BRPM | SYSTOLIC BLOOD PRESSURE: 112 MMHG | HEART RATE: 86 BPM

## 2024-09-17 DIAGNOSIS — O21.9 NAUSEA/VOMITING IN PREGNANCY: Primary | ICD-10-CM

## 2024-09-17 PROCEDURE — 250N000009 HC RX 250: Performed by: ADVANCED PRACTICE MIDWIFE

## 2024-09-17 PROCEDURE — 96366 THER/PROPH/DIAG IV INF ADDON: CPT

## 2024-09-17 PROCEDURE — 258N000003 HC RX IP 258 OP 636: Performed by: ADVANCED PRACTICE MIDWIFE

## 2024-09-17 PROCEDURE — 96375 TX/PRO/DX INJ NEW DRUG ADDON: CPT

## 2024-09-17 PROCEDURE — 96365 THER/PROPH/DIAG IV INF INIT: CPT

## 2024-09-17 PROCEDURE — 250N000011 HC RX IP 250 OP 636: Performed by: ADVANCED PRACTICE MIDWIFE

## 2024-09-17 RX ORDER — HEPARIN SODIUM (PORCINE) LOCK FLUSH IV SOLN 100 UNIT/ML 100 UNIT/ML
5 SOLUTION INTRAVENOUS
Status: CANCELLED | OUTPATIENT
Start: 2024-09-18

## 2024-09-17 RX ORDER — ONDANSETRON 2 MG/ML
4 INJECTION INTRAMUSCULAR; INTRAVENOUS 2 TIMES WEEKLY
Status: DISCONTINUED | OUTPATIENT
Start: 2024-09-17 | End: 2024-09-17 | Stop reason: HOSPADM

## 2024-09-17 RX ORDER — DEXTROSE, SODIUM CHLORIDE, SODIUM LACTATE, POTASSIUM CHLORIDE, AND CALCIUM CHLORIDE 5; .6; .31; .03; .02 G/100ML; G/100ML; G/100ML; G/100ML; G/100ML
500 INJECTION, SOLUTION INTRAVENOUS ONCE
Status: COMPLETED | OUTPATIENT
Start: 2024-09-17 | End: 2024-09-17

## 2024-09-17 RX ORDER — HEPARIN SODIUM,PORCINE 10 UNIT/ML
5-20 VIAL (ML) INTRAVENOUS DAILY PRN
Status: CANCELLED | OUTPATIENT
Start: 2024-09-18

## 2024-09-17 RX ORDER — ONDANSETRON 2 MG/ML
4 INJECTION INTRAMUSCULAR; INTRAVENOUS 2 TIMES WEEKLY
Status: CANCELLED | OUTPATIENT
Start: 2024-09-18

## 2024-09-17 RX ORDER — DEXTROSE, SODIUM CHLORIDE, SODIUM LACTATE, POTASSIUM CHLORIDE, AND CALCIUM CHLORIDE 5; .6; .31; .03; .02 G/100ML; G/100ML; G/100ML; G/100ML; G/100ML
500 INJECTION, SOLUTION INTRAVENOUS ONCE
Status: CANCELLED | OUTPATIENT
Start: 2024-09-18 | End: 2024-09-18

## 2024-09-17 RX ADMIN — SODIUM CHLORIDE, SODIUM LACTATE, POTASSIUM CHLORIDE, CALCIUM CHLORIDE AND DEXTROSE MONOHYDRATE 500 ML: 5; 600; 310; 30; 20 INJECTION, SOLUTION INTRAVENOUS at 12:14

## 2024-09-17 RX ADMIN — THIAMINE HYDROCHLORIDE: 100 INJECTION, SOLUTION INTRAMUSCULAR; INTRAVENOUS at 12:25

## 2024-09-17 RX ADMIN — ONDANSETRON 4 MG: 2 INJECTION INTRAMUSCULAR; INTRAVENOUS at 12:12

## 2024-09-17 ASSESSMENT — PAIN SCALES - GENERAL: PAINLEVEL: NO PAIN (0)

## 2024-09-17 NOTE — PROGRESS NOTES
Infusion Nursing Note:    Pau Murphy presents today for OB fluids    Pre-meds: 4mg PIV zofran  .    Patient seen by provider today: No   present during visit today: Not Applicable.    Note: vitamin bag over 1hr.  Then the D5LR over 30mg.      Intravenous Access:  Peripheral IV placed.    Treatment Conditions:  Not Applicable.      Post Infusion Assessment:  Patient tolerated infusion without incident.  Site patent and intact, free from redness, edema or discomfort.  No evidence of extravasations.  Access discontinued per protocol.       Discharge Plan:   Discharge instructions reviewed with: Patient.  Patient and/or family verbalized understanding of discharge instructions and all questions answered.  AVS to patient via [employer].  Patient will return 9/19 for next appointment.   Patient discharged in stable condition accompanied by: self.  Departure Mode: Ambulatory.      Maggie Grider RN

## 2024-09-19 ENCOUNTER — INFUSION THERAPY VISIT (OUTPATIENT)
Dept: INFUSION THERAPY | Facility: CLINIC | Age: 24
End: 2024-09-19
Attending: ADVANCED PRACTICE MIDWIFE
Payer: COMMERCIAL

## 2024-09-19 VITALS
HEART RATE: 75 BPM | SYSTOLIC BLOOD PRESSURE: 118 MMHG | DIASTOLIC BLOOD PRESSURE: 74 MMHG | OXYGEN SATURATION: 97 % | RESPIRATION RATE: 18 BRPM | TEMPERATURE: 98.2 F

## 2024-09-19 DIAGNOSIS — O21.9 NAUSEA/VOMITING IN PREGNANCY: Primary | ICD-10-CM

## 2024-09-19 PROCEDURE — 96375 TX/PRO/DX INJ NEW DRUG ADDON: CPT

## 2024-09-19 PROCEDURE — 258N000003 HC RX IP 258 OP 636: Performed by: ADVANCED PRACTICE MIDWIFE

## 2024-09-19 PROCEDURE — 250N000009 HC RX 250: Performed by: ADVANCED PRACTICE MIDWIFE

## 2024-09-19 PROCEDURE — 96361 HYDRATE IV INFUSION ADD-ON: CPT

## 2024-09-19 PROCEDURE — 250N000011 HC RX IP 250 OP 636: Performed by: ADVANCED PRACTICE MIDWIFE

## 2024-09-19 PROCEDURE — 96365 THER/PROPH/DIAG IV INF INIT: CPT

## 2024-09-19 RX ORDER — DEXTROSE, SODIUM CHLORIDE, SODIUM LACTATE, POTASSIUM CHLORIDE, AND CALCIUM CHLORIDE 5; .6; .31; .03; .02 G/100ML; G/100ML; G/100ML; G/100ML; G/100ML
500 INJECTION, SOLUTION INTRAVENOUS ONCE
Status: COMPLETED | OUTPATIENT
Start: 2024-09-19 | End: 2024-09-19

## 2024-09-19 RX ORDER — DEXTROSE, SODIUM CHLORIDE, SODIUM LACTATE, POTASSIUM CHLORIDE, AND CALCIUM CHLORIDE 5; .6; .31; .03; .02 G/100ML; G/100ML; G/100ML; G/100ML; G/100ML
500 INJECTION, SOLUTION INTRAVENOUS ONCE
Status: CANCELLED | OUTPATIENT
Start: 2024-09-20 | End: 2024-09-20

## 2024-09-19 RX ORDER — ONDANSETRON 2 MG/ML
4 INJECTION INTRAMUSCULAR; INTRAVENOUS 2 TIMES WEEKLY
Status: DISCONTINUED | OUTPATIENT
Start: 2024-09-19 | End: 2024-09-19 | Stop reason: HOSPADM

## 2024-09-19 RX ORDER — ONDANSETRON 2 MG/ML
4 INJECTION INTRAMUSCULAR; INTRAVENOUS 2 TIMES WEEKLY
Status: CANCELLED | OUTPATIENT
Start: 2024-09-20

## 2024-09-19 RX ORDER — HEPARIN SODIUM,PORCINE 10 UNIT/ML
5-20 VIAL (ML) INTRAVENOUS DAILY PRN
Status: CANCELLED | OUTPATIENT
Start: 2024-09-20

## 2024-09-19 RX ORDER — HEPARIN SODIUM (PORCINE) LOCK FLUSH IV SOLN 100 UNIT/ML 100 UNIT/ML
5 SOLUTION INTRAVENOUS
Status: CANCELLED | OUTPATIENT
Start: 2024-09-20

## 2024-09-19 RX ADMIN — THIAMINE HYDROCHLORIDE: 100 INJECTION, SOLUTION INTRAMUSCULAR; INTRAVENOUS at 12:11

## 2024-09-19 RX ADMIN — SODIUM CHLORIDE, SODIUM LACTATE, POTASSIUM CHLORIDE, CALCIUM CHLORIDE AND DEXTROSE MONOHYDRATE 500 ML: 5; 600; 310; 30; 20 INJECTION, SOLUTION INTRAVENOUS at 13:17

## 2024-09-19 RX ADMIN — ONDANSETRON 4 MG: 2 INJECTION INTRAMUSCULAR; INTRAVENOUS at 12:06

## 2024-09-19 NOTE — PROGRESS NOTES
Infusion Nursing Note:  Pau Murphy presents today for IVF-multivitamin + 500 ml D5LR    Patient seen by provider today: No   present during visit today: Not Applicable.    Note: IV Zofran as pre-med. Emesis x 2 this morning.       Intravenous Access:  Peripheral IV placed.    Treatment Conditions:  Not Applicable.      Post Infusion Assessment:  Patient tolerated infusion without incident.  Blood return noted pre and post infusion.  Site patent and intact, free from redness, edema or discomfort.  No evidence of extravasations.  Access discontinued per protocol.       Discharge Plan:   Discharge instructions reviewed with: Patient.  Patient and/or family verbalized understanding of discharge instructions and all questions answered.  AVS to patient via [employer].  Patient will return 9/30/24 for next appointment.   Patient discharged in stable condition accompanied by: self.  Departure Mode: Ambulatory.      Leeanna Rivers RN

## 2024-09-23 ENCOUNTER — INFUSION THERAPY VISIT (OUTPATIENT)
Dept: INFUSION THERAPY | Facility: CLINIC | Age: 24
End: 2024-09-23
Attending: ADVANCED PRACTICE MIDWIFE
Payer: COMMERCIAL

## 2024-09-23 VITALS
HEART RATE: 61 BPM | TEMPERATURE: 98.4 F | DIASTOLIC BLOOD PRESSURE: 71 MMHG | SYSTOLIC BLOOD PRESSURE: 106 MMHG | OXYGEN SATURATION: 99 % | RESPIRATION RATE: 16 BRPM

## 2024-09-23 DIAGNOSIS — O21.9 NAUSEA/VOMITING IN PREGNANCY: Primary | ICD-10-CM

## 2024-09-23 PROCEDURE — 96361 HYDRATE IV INFUSION ADD-ON: CPT

## 2024-09-23 PROCEDURE — 258N000003 HC RX IP 258 OP 636: Performed by: ADVANCED PRACTICE MIDWIFE

## 2024-09-23 PROCEDURE — 250N000009 HC RX 250: Performed by: ADVANCED PRACTICE MIDWIFE

## 2024-09-23 PROCEDURE — 96375 TX/PRO/DX INJ NEW DRUG ADDON: CPT

## 2024-09-23 PROCEDURE — 250N000011 HC RX IP 250 OP 636: Performed by: ADVANCED PRACTICE MIDWIFE

## 2024-09-23 PROCEDURE — 96365 THER/PROPH/DIAG IV INF INIT: CPT

## 2024-09-23 RX ORDER — ONDANSETRON 2 MG/ML
4 INJECTION INTRAMUSCULAR; INTRAVENOUS 2 TIMES WEEKLY
Status: DISCONTINUED | OUTPATIENT
Start: 2024-09-23 | End: 2024-09-23 | Stop reason: HOSPADM

## 2024-09-23 RX ORDER — HEPARIN SODIUM,PORCINE 10 UNIT/ML
5-20 VIAL (ML) INTRAVENOUS DAILY PRN
Status: CANCELLED | OUTPATIENT
Start: 2024-09-24

## 2024-09-23 RX ORDER — DEXTROSE, SODIUM CHLORIDE, SODIUM LACTATE, POTASSIUM CHLORIDE, AND CALCIUM CHLORIDE 5; .6; .31; .03; .02 G/100ML; G/100ML; G/100ML; G/100ML; G/100ML
500 INJECTION, SOLUTION INTRAVENOUS ONCE
Status: COMPLETED | OUTPATIENT
Start: 2024-09-23 | End: 2024-09-23

## 2024-09-23 RX ORDER — ONDANSETRON 2 MG/ML
4 INJECTION INTRAMUSCULAR; INTRAVENOUS 2 TIMES WEEKLY
Status: CANCELLED | OUTPATIENT
Start: 2024-09-24

## 2024-09-23 RX ORDER — DEXTROSE, SODIUM CHLORIDE, SODIUM LACTATE, POTASSIUM CHLORIDE, AND CALCIUM CHLORIDE 5; .6; .31; .03; .02 G/100ML; G/100ML; G/100ML; G/100ML; G/100ML
500 INJECTION, SOLUTION INTRAVENOUS ONCE
Status: CANCELLED | OUTPATIENT
Start: 2024-09-24 | End: 2024-09-24

## 2024-09-23 RX ORDER — HEPARIN SODIUM (PORCINE) LOCK FLUSH IV SOLN 100 UNIT/ML 100 UNIT/ML
5 SOLUTION INTRAVENOUS
Status: CANCELLED | OUTPATIENT
Start: 2024-09-24

## 2024-09-23 RX ADMIN — THIAMINE HYDROCHLORIDE: 100 INJECTION, SOLUTION INTRAMUSCULAR; INTRAVENOUS at 10:40

## 2024-09-23 RX ADMIN — ONDANSETRON 4 MG: 2 INJECTION INTRAMUSCULAR; INTRAVENOUS at 10:40

## 2024-09-23 RX ADMIN — SODIUM CHLORIDE, SODIUM LACTATE, POTASSIUM CHLORIDE, CALCIUM CHLORIDE AND DEXTROSE MONOHYDRATE 500 ML: 5; 600; 310; 30; 20 INJECTION, SOLUTION INTRAVENOUS at 10:39

## 2024-09-23 NOTE — PROGRESS NOTES
Infusion Nursing Note:  Pau Murphy presents today for IV fluids + vitamin bag + IV zofran.    Patient seen by provider today: No   present during visit today: Not Applicable.    Note: N/A.      Intravenous Access:  Peripheral IV placed.    Treatment Conditions:  Not Applicable.      Post Infusion Assessment:  Patient tolerated infusion without incident.  Blood return noted pre and post infusion.  Site patent and intact, free from redness, edema or discomfort.  No evidence of extravasations.  Access discontinued per protocol.       Discharge Plan:   Discharge instructions reviewed with: Patient.  Patient and/or family verbalized understanding of discharge instructions and all questions answered.  AVS to patient via [employer].  Patient will return 9/30 for next appointment.   Patient discharged in stable condition accompanied by: self.  Departure Mode: Ambulatory.      Jennifer Gan RN

## 2024-09-26 ENCOUNTER — INFUSION THERAPY VISIT (OUTPATIENT)
Dept: INFUSION THERAPY | Facility: CLINIC | Age: 24
End: 2024-09-26
Attending: ADVANCED PRACTICE MIDWIFE
Payer: COMMERCIAL

## 2024-09-26 VITALS — SYSTOLIC BLOOD PRESSURE: 127 MMHG | OXYGEN SATURATION: 99 % | DIASTOLIC BLOOD PRESSURE: 77 MMHG | HEART RATE: 101 BPM

## 2024-09-26 DIAGNOSIS — O21.9 NAUSEA/VOMITING IN PREGNANCY: Primary | ICD-10-CM

## 2024-09-26 PROCEDURE — 250N000009 HC RX 250: Performed by: ADVANCED PRACTICE MIDWIFE

## 2024-09-26 PROCEDURE — 250N000011 HC RX IP 250 OP 636: Performed by: ADVANCED PRACTICE MIDWIFE

## 2024-09-26 PROCEDURE — 96375 TX/PRO/DX INJ NEW DRUG ADDON: CPT

## 2024-09-26 PROCEDURE — 258N000003 HC RX IP 258 OP 636: Performed by: ADVANCED PRACTICE MIDWIFE

## 2024-09-26 PROCEDURE — 96365 THER/PROPH/DIAG IV INF INIT: CPT

## 2024-09-26 RX ORDER — DEXTROSE, SODIUM CHLORIDE, SODIUM LACTATE, POTASSIUM CHLORIDE, AND CALCIUM CHLORIDE 5; .6; .31; .03; .02 G/100ML; G/100ML; G/100ML; G/100ML; G/100ML
500 INJECTION, SOLUTION INTRAVENOUS ONCE
Status: COMPLETED | OUTPATIENT
Start: 2024-09-26 | End: 2024-09-26

## 2024-09-26 RX ORDER — HEPARIN SODIUM,PORCINE 10 UNIT/ML
5-20 VIAL (ML) INTRAVENOUS DAILY PRN
Status: CANCELLED | OUTPATIENT
Start: 2024-09-30

## 2024-09-26 RX ORDER — DEXTROSE, SODIUM CHLORIDE, SODIUM LACTATE, POTASSIUM CHLORIDE, AND CALCIUM CHLORIDE 5; .6; .31; .03; .02 G/100ML; G/100ML; G/100ML; G/100ML; G/100ML
500 INJECTION, SOLUTION INTRAVENOUS ONCE
Status: CANCELLED | OUTPATIENT
Start: 2024-09-30 | End: 2024-09-30

## 2024-09-26 RX ORDER — ONDANSETRON 2 MG/ML
4 INJECTION INTRAMUSCULAR; INTRAVENOUS 2 TIMES WEEKLY
Status: CANCELLED | OUTPATIENT
Start: 2024-09-30

## 2024-09-26 RX ORDER — ONDANSETRON 2 MG/ML
4 INJECTION INTRAMUSCULAR; INTRAVENOUS 2 TIMES WEEKLY
Status: DISCONTINUED | OUTPATIENT
Start: 2024-09-26 | End: 2024-09-26 | Stop reason: HOSPADM

## 2024-09-26 RX ORDER — HEPARIN SODIUM (PORCINE) LOCK FLUSH IV SOLN 100 UNIT/ML 100 UNIT/ML
5 SOLUTION INTRAVENOUS
Status: CANCELLED | OUTPATIENT
Start: 2024-09-30

## 2024-09-26 RX ADMIN — SODIUM CHLORIDE, SODIUM LACTATE, POTASSIUM CHLORIDE, CALCIUM CHLORIDE AND DEXTROSE MONOHYDRATE 500 ML: 5; 600; 310; 30; 20 INJECTION, SOLUTION INTRAVENOUS at 08:17

## 2024-09-26 RX ADMIN — THIAMINE HYDROCHLORIDE: 100 INJECTION, SOLUTION INTRAMUSCULAR; INTRAVENOUS at 08:23

## 2024-09-26 RX ADMIN — ONDANSETRON 4 MG: 2 INJECTION INTRAMUSCULAR; INTRAVENOUS at 08:18

## 2024-09-26 ASSESSMENT — ANXIETY QUESTIONNAIRES
GAD7 TOTAL SCORE: 5
GAD7 TOTAL SCORE: 5
7. FEELING AFRAID AS IF SOMETHING AWFUL MIGHT HAPPEN: SEVERAL DAYS
8. IF YOU CHECKED OFF ANY PROBLEMS, HOW DIFFICULT HAVE THESE MADE IT FOR YOU TO DO YOUR WORK, TAKE CARE OF THINGS AT HOME, OR GET ALONG WITH OTHER PEOPLE?: SOMEWHAT DIFFICULT
GAD7 TOTAL SCORE: 5

## 2024-09-26 ASSESSMENT — EDINBURGH POSTNATAL DEPRESSION SCALE (EPDS)
I HAVE BEEN SO UNHAPPY THAT I HAVE BEEN CRYING: NO, NEVER
THINGS HAVE BEEN GETTING ON TOP OF ME: NO, MOST OF THE TIME I HAVE COPED QUITE WELL
I HAVE LOOKED FORWARD WITH ENJOYMENT TO THINGS: AS MUCH AS I EVER DID
I HAVE BLAMED MYSELF UNNECESSARILY WHEN THINGS WENT WRONG: NO, NEVER
I HAVE BEEN ANXIOUS OR WORRIED FOR NO GOOD REASON: YES, SOMETIMES
I HAVE BEEN ABLE TO LAUGH AND SEE THE FUNNY SIDE OF THINGS: AS MUCH AS I ALWAYS COULD
I HAVE FELT SCARED OR PANICKY FOR NO GOOD REASON: NO, NOT MUCH
TOTAL SCORE: 4
I HAVE BEEN SO UNHAPPY THAT I HAVE HAD DIFFICULTY SLEEPING: NOT AT ALL
THE THOUGHT OF HARMING MYSELF HAS OCCURRED TO ME: NEVER
I HAVE FELT SAD OR MISERABLE: NO, NOT AT ALL

## 2024-09-26 NOTE — PROGRESS NOTES
Infusion Nursing Note:  Pau Murphy presents today for Infuvite, IVF, Zofran.    Patient seen by provider today: No   present during visit today: Not Applicable.    Note: Pt reports she has seen a slight improvement in her hyperemesis since starting the IVF with vitamins. Reports she is vomiting only 1-2 times per day now as opposed to 4-5 times per day      Intravenous Access:  Peripheral IV placed.    Treatment Conditions:  Not Applicable.      Post Infusion Assessment:  Patient tolerated infusion without incident.  Blood return noted pre and post infusion.  Site patent and intact, free from redness, edema or discomfort.  No evidence of extravasations.  Access discontinued per protocol.       Discharge Plan:   AVS to patient via MYCHART.  Patient will return 9/30/24 for IVF/Zofran for next appointment.   Patient discharged in stable condition accompanied by: self.  Departure Mode: Ambulatory.      Cherie Arias RN

## 2024-09-27 ENCOUNTER — PRENATAL OFFICE VISIT (OUTPATIENT)
Dept: MIDWIFE SERVICES | Facility: CLINIC | Age: 24
End: 2024-09-27
Payer: COMMERCIAL

## 2024-09-27 VITALS
HEART RATE: 88 BPM | HEIGHT: 66 IN | BODY MASS INDEX: 28.49 KG/M2 | WEIGHT: 177.3 LBS | SYSTOLIC BLOOD PRESSURE: 94 MMHG | DIASTOLIC BLOOD PRESSURE: 62 MMHG

## 2024-09-27 DIAGNOSIS — Z34.02 ENCOUNTER FOR SUPERVISION OF NORMAL FIRST PREGNANCY IN SECOND TRIMESTER: Primary | ICD-10-CM

## 2024-09-27 DIAGNOSIS — Z34.80 SUPERVISION OF OTHER NORMAL PREGNANCY, ANTEPARTUM: ICD-10-CM

## 2024-09-27 DIAGNOSIS — Z14.1 CYSTIC FIBROSIS CARRIER: ICD-10-CM

## 2024-09-27 DIAGNOSIS — Z87.59 HISTORY OF HYPEREMESIS GRAVIDARUM: ICD-10-CM

## 2024-09-27 DIAGNOSIS — J45.20 MILD INTERMITTENT ASTHMA WITHOUT COMPLICATION: ICD-10-CM

## 2024-09-27 PROBLEM — O21.0 HYPEREMESIS AFFECTING PREGNANCY, ANTEPARTUM: Status: ACTIVE | Noted: 2022-04-18

## 2024-09-27 PROBLEM — Z79.899 CONTROLLED SUBSTANCE AGREEMENT SIGNED: Status: RESOLVED | Noted: 2021-09-21 | Resolved: 2024-09-27

## 2024-09-27 PROBLEM — J45.909 ASTHMA: Status: ACTIVE | Noted: 2024-09-27

## 2024-09-27 PROBLEM — Z30.430 ENCOUNTER FOR INSERTION OF INTRAUTERINE CONTRACEPTIVE DEVICE: Status: RESOLVED | Noted: 2022-12-28 | Resolved: 2024-09-27

## 2024-09-27 PROCEDURE — 99215 OFFICE O/P EST HI 40 MIN: CPT | Performed by: ADVANCED PRACTICE MIDWIFE

## 2024-09-27 RX ORDER — DEXTROSE, SODIUM CHLORIDE, SODIUM LACTATE, POTASSIUM CHLORIDE, AND CALCIUM CHLORIDE 5; .6; .31; .03; .02 G/100ML; G/100ML; G/100ML; G/100ML; G/100ML
1000 INJECTION, SOLUTION INTRAVENOUS ONCE
Status: CANCELLED | OUTPATIENT
Start: 2024-09-30 | End: 2024-09-30

## 2024-09-27 NOTE — PATIENT INSTRUCTIONS
"\"We hope you had a positive experience and that you can definitely recommend [facility] to your family and friends. You ll be receiving a survey soon and we look forward to hearing your feedback\".    [facility]- Contact information:  Appointment line and to get a hold of CNM in clinic Monday-Friday 8 am - 5 pm:  [phone].  There are some clinics with early start times (1st appointment 7:40 am) and others with evening hours (last appointment 6:20 pm).  Most are typically open from 8 am to 5 pm.    CNM on call answering service: [phone].  Specify your hospital of choice and leave a brief message for CNM;  will then page CNM who is on call at your specified hospital and you should receive a call back with 15 minutes.  Be sure that your ringer is audible and that you can accept blocked calls so that we can get back in touch with you! This number should be reserved for urgent needs if during the day, before 8 am, after 5 pm, weekends, holidays.    Contact the on-call CNM with warning signs, such as:  vaginal bleeding   Vaginal discharge and itching or pain and burning during urination  Leg/calf pain or swelling on one side  severe abdominal pain  nausea and vomiting more than 4-5 times a day, or if you are unable to keep anything down  fever more than 100.4 degrees F.     [place]  After each of your visits you are welcome to check [employer] for your visit summary including education and links to information relevant to your pregnancy and/or well woman care.   Find the \"Visits\" tab at the top of the page, you will see a list of recent visits and for each visit a for link for \"View After Visit Summary.\" View of your After Visit Summary will allow you to read our recommendations from your visit, review any education provided, and link to websites with useful information.   If you have any questions or difficulty navigating [facility], please feel free to " "contact us and we will do our best to direct you.        Touring the Maternity Care Center  At this time we are offering a virtual tour of the Maternity Care Centers at both [facility] and [facility]:   [facility] Maternity Care:     [email or web address]/the-[email or web address]--[facility] Maternity Care:   [email or web address]--[facility]       Meet the Midwives from [facility]  You are invited to an informational meet and greet with [facility]'s [facility] Certified Nurse-Midwives. Our free \"Meet the Midwives\" event is a great opportunity to learn about our midwives' philosophy and experience, the hospitals where we can assist with your birth, and answer questions you may have. Partners, friends, and family are welcome to attend. Currently, this is a virtual event.  Date  Last Thursday of every month at 7 pm.    Link to next (live) meeting  [email or web address]  To Join by Telephone (audio only) Call:   [phone] Phone Conference ID: [phone] #    Ultrasound Appointment:   Don't forget to schedule your ultrasound appointment around 20 weeks into your pregnancy. Your midwife will order the exam for you to schedule at [phone] with [facility] radiology locations or at the independent radiology clinic of your preference.      Why is dental care in pregnancy important?  During pregnancy, you are more likely to have problems with your teeth or gums. If you have an infection in your teeth or gums, the chance of your baby being premature (born early) or having low birth weight may be slightly higher than if your teeth and gums are healthy  Dental care is safe during pregnancy and important for the health of you and your baby.   What should you know before you see the dentist?  Make sure your dentist knows that you are " pregnant.  If medications for infection or for pain are needed, your dentist can prescribe ones that are safe for you and your baby.  Tell your dentist about any changes you have noticed since you became pregnant and about any medications r or supplements you are taking.  Routine x-rays should be avoided in pregnancy, but it may be necessary if there is a problem or an emergency.   Your body should be covered with a lead apron to protect you and your baby.  Dental work can be done safely at any point in pregnancy. If possible, it is best to delay treatments and pro- cedures until after the first trimester.    For more information on dental health in pregnancy: http://onlinelibrary.alvarenga.com/store/10.1111/jmwh.21291/asset/fmuc40162.pdf?v=1&t=qixp8z09&j=[email or web address]     Quickening:   Your Baby in the Second Trimester of Pregnancy  At the start of the second trimester, you will feel your baby's movements, which get stronger as the baby grows bigger. At the end of the fourth month, your baby weighs about five ounces. Her kidneys begin to produce urine. During visits to your health care provider, you will be able to hear your baby's heartbeat more clearly. Your baby can move and hear your voice.   By the end of the fifth month, you'll be able to feel light movements (called quickening) of your fetus. Your baby is sleeping and waking at regular intervals, and is more active than before. At this point, she is about nine inches long and weighs about one-half to one pound. During the sixth month, your baby's features become clearer. Eyebrows, eyelashes, and hair are developing. She also has finger and toe prints, and may be kicking strongly.  By the end of the second trimester, your baby weighs as much as two pounds and is about 11 inches long.         Gestational diabetes  Gestational diabetes develops during pregnancy (gestation). Like other types of diabetes, gestational diabetes affects how  your cells use sugar (glucose). Gestational diabetes causes high blood sugar that can affect your pregnancy and your baby's health.  Any pregnancy complication is concerning, but there's good news. Expectant moms can help control gestational diabetes by eating healthy foods, exercising and, if necessary, taking medication. Controlling blood sugar can prevent a difficult birth and keep you and your baby healthy.  In gestational diabetes, blood sugar usually returns to normal soon after delivery. But if you've had gestational diabetes, you're at risk for type 2 diabetes. You'll continue working with your health care team to monitor and manage your blood sugar.    Who is at risk?  This is a list of factors that increase the risk of developing gestational diabetes for women during pregnancy:      Overweight prior to pregnancy (20% or more over ideal body weight)      High risk ethnic group: , , ,       Impaired glucose tolerance or traces of glucose in the urine      Family history of diabetes      Previously giving birth to a baby over 9 lbs. or stillborn      Previous pregnancy with gestational diabetes    Prevention:  There are no guarantees when it comes to preventing gestational diabetes -- but the more healthy habits you can adopt before pregnancy, the better. If you've had gestational diabetes, these healthy choices may also reduce your risk of having it in future pregnancies or developing type 2 diabetes down the road.  Eat healthy foods. Choose foods high in fiber and low in fat and calories. Focus on fruits, vegetables and whole grains. Strive for variety to help you achieve your goals without compromising taste or nutrition. Watch portion sizes.   Keep active. Exercising before and during pregnancy can help protect you from developing gestational diabetes. Aim for 30 minutes of moderate activity on most days of the week. Take a brisk daily walk. Ride your bike. Swim  laps.  If you can't fit a single 30-minute workout into your day, several shorter sessions can do just as much good. Park in the distant lot when you run errands. Get off the bus one stop before you reach your destination. Every step you take increases your chances of staying healthy.  Lose excess pounds before pregnancy. Doctors don't recommend weight loss during pregnancy. But if you're planning to get pregnant, losing extra weight beforehand may help you have a healthier pregnancy.  Focus on permanent changes to your eating habits. Motivate yourself by remembering the long-term benefits of losing weight, such as a healthier heart, more energy and improved self-esteem.    Preventing Diabetes after Pregnancy:  It is estimated that 35-60 percent of women that have had gestational diabetes will develop type 2 diabetes in the future. It is also thought that children from these pregnancies have a greater chance of developing obesity and type 2 diabetes.    If you do have prediabetes or have risk factors for having diabetes, research shows that doing just two things can help you prevent or delay type 2 diabetes: Lose 5% to 7% of your body weight, which would be 10 to 14 pounds for a 200-pound person; and get at least 150 minutes each week of physical activity, such as brisk walking.        RESOURCES    Breastfeeding Information:  OUTPATIENT LACTATION RESOURCES    -Schedule a clinic appointment with a [facility] with dedicated clinic hours for breastfeeding assistance by calling [phone]. Breastfeeding clinic visits are at [facility] on , [facility] on  and [facility] on .     New Parent Connection:   [place] [facility], [address] [place] [address]  In-person meetings on  from 6 pm - 7:15 pm for parents of  to 9 months, at the same site.   All are free, drop-in, no registration required.    There are also free virtual  meetings ongoing on Tuesdays:  11:30 am - 12:30 pm for parents of newborns to 3 months  4:15 pm to 5:15 pm for parents of 3 to 9-month olds  For joining info parents should call Lizbeth Lauro at [phone]    -Attend a baby weigh in at [facility].  Lactation consultants are available to answer questions  [place]: Tuesdays 1:00 - 2:00  [facility]: Mondays 1:00 - 2:00   www.[employer]    -Attend one of the New Mama groups at [facility] in [facility].  [facility] also offers one-on-one in home and in office lactation consults.   www.BuscoTurno    -Attend a LeLeche League meeting.  Multiple groups in several locations throughout the [facility]. The meetings are no-cost and always informative breastfeeding education session through Intern[employer] La Leche League  Www.lllondas.org/    -Medication use while breastfeeding: http://toxnet.nlm.nih.gov/newtoxnet/lactmed.htm     Childbirth and Parenting Education:     Everyday Miracles:   https://www.everyday-miracles.org/    Free Video Series from [facility]: https://nursing.[facility]/academics/specialty-areas/nurse-midwifery/having-baby-prenatal-videos/having-baby-prenatal-and    Childbirth Education virtual (live) classes: www.Little Black Bag/classes  The Birth Hour: https://[employer]/online-childbirth-class/  BirthED: https://www.birthedmn.com/  [place] parenting center: http://[employer]/   (843) 275-BABY  [place]: (education, yoga & wellness) www.PNMsoft.[employer]  [facility]: www.BuscoTurno   Childbirth collective: (Parent topic nights)  www.childbirthcollective.org/  Hypnobabies:  www.hypnobabiestpinion-pinsciThe miqi.cn.com/  Hypnobirthing:  Http://hypnNewco LS15rtNandi Proteins.[employer]anda Graphics/  Hypnobirthing virtual class: www.Cameo/hypnobirthing    Information about doulas:  Childbirth collective: http://www.childbirthcollective.org/  Doulas of [place] (LANCE):  www.lance.org  [facility]  project: http://VesLabscitiesdoulaproject.com/     Early Childhood and Family Education (ECFE):  ECFE offers parents hands-on learning experiences that will nourish a lifetime of teachable moments.  http://ecfe.info/ecfe-home/    APPS and Podcasts:   Karelia  Loida Collazoture    Evidence Based Birth  The Birth Hour (for birth stories)   Birthful   Expectful   The Longest Shortest Time  PregnancyPodcast Rhoda Castellano  https://www.downtobirthshow.com/    Book Recommendations:   Lina David's Birthing From Within--first few chapters include a new-age tone, you may prefer to skip it and keep going, because there is good stuff later.  This book recommendation covers emotional preparation, but does cover coping with pain, and use of both pharmacological and nonpharmacological methods.    Guide to Childbirth by Sahara Hutson  Childbirth Without Fear by Natalie Samuel Read    Dr. Mahajan' The Pregnancy Book and The Birth Book--the pregnancy book goes month-by month    The Birth Partner by Leigh Chapa    Womanly Art of Breastfeeding by La Leche League International   Bestfeeding by Brii Gay--great pictures    Mothering Your Nursing Toddler, by Lisette Barreto.   Addresses dealing with so many of the challenging behaviors of a nursing toddler.  How Weaning Happens, by La Leche League.  Discusses weaning at all ages, from medically necessary weaning of an infant, all the way up to age 5 (or older), with why/why not, and strategies.  Very empowering book both for deciding to wean and deciding not to.    American College of Nurse-Midwives (ACNM) http://www.midwife.org/; look at the informational handouts at http://www.midwife.org/Share-With-Women     www.mymidwife.org    Mother to Baby (Medication and Herbal guidance in pregnancy): http://www.mothertobaby.org  Toll-Free Hotline: 351.115.6754  LactMed (Medication use while breastfeeding): http://toxnet.nlm.nih.gov/newtoxnet/lactmed.htm    Women's  "Health.gov:  http://www.womenshealth.gov/a-z-topics/index.html    American pregnancy association - http://americanpregnancy.org    Centering Pregnancy (group prenatal care option): http://centeringhealthcare.org    March of Dimes www.[employer]     FDA - Nutrition  www.mypyramid.gov  Under \"For Consumers,\" click on \"pregnant and breastfeeding women.\"      Centers for Disease Control and Prevention (CDC) - Vaccines : http://www.cdc.gov/vaccines/       When researching information on the web, question the validity of websites.  The Sporterpilot .gov, .edu and.org tend to be more reliable information.  If there are a lot of advertisements, be cautious of the information provided. Stay away from blogs and chat rooms please!     "

## 2024-09-27 NOTE — PROGRESS NOTES
PRENATAL VISIT   FIRST OBSTETRICAL EXAM - OB     Assessment / Impression   First prenatal visit at 15w1d  24 year old    Hyperemesis managed with IV fluids and PO medications   Last pap = 2024  Pre-pregnant BMI 31    Plan:   -Release obtained to get previously drawn labs including NIPT.  Will review and order any additional lab work needed at next visit  -Pt is not a candidate for drawing lead level per [facility] screening tool.   -Reviewed prenatal care schedule.   -Optimal nutrition and weight gain discussed. Pregnancy weight gain of 11-20 lbs (BMI 30.0-34.9) encouraged.  Discussed current weight loss r/t hyperemesis.  -Anticipatory guidance for common pregnancy questions and concerns reviewed.   -Danger s/sx for this trimester reviewed with patient.   -Pt had NIPT completed at [place], normal per pt report  -FOB completing carrier status testing for CF (pt is + carrier)  -IOB packet given and reviewed with patient.   -CNM services and hospital options reviewed; emergency and scheduling phone numbers given to patient.   -Because the patient does not have 1 high risk nor 2 or more moderate risk factors, low-dose aspirin not be initiated.  -Antepartum VTE risk factors absent.  -Referral given for fetal survey  -Return to clinic in 4 weeks or sooner as needed.    Total time: 20 minutes spent on the date of the encounter doing chart review, review of test results, patient visit and documentation.     Subjective:   Pau Murphy is a 24 year old  here today for her first obstetrical exam at 15w1d. Here with her daughter Randa.  She saw [facility] with her first pregnancy . Had one visit in [place] before discovering there were CNMs in [facility] and desires to establish/continue care here.  Expected date of delivery of Estimated Date of Delivery: Mar 20, 2025.    She has hyperemesis and is currently receiving IV fluids twice weekly which has been helpful.  Her symptoms were similar with  "Randa.  Currently vomits 1-2x/day on average, improved from 7-9 times/day.  Feels she is staying well hydrated.    Risk factors: pre-pregnancy obesity. Pregnancy Risk Factors: hyperemesis    The patient has the following high risk factors for preeclampsia: none.   The patient has the following moderate risk factors for preeclampsia: BMI greater than 30    The patient has the following antepartum risk factors for VTE (two or more risk factors, or 1 * risk factor, places patient at higher risk): none.    Clinical history/risk factors requiring antepartum OB consult: none.    Social History:   Education level: College Grad   Occupation:    Partners name: Laith   ?   OB History    Para Term  AB Living   2 1 1 0 0 1   SAB IAB Ectopic Multiple Live Births   0 0 0 0 1      # Outcome Date GA Lbr Sincere/2nd Weight Sex Type Anes PTL Lv   2 Current            1 Term 21 41w0d 06:35 / 00:46 3.29 kg (7 lb 4.1 oz) F Vag-Spont IV, EPI N MAXX      Name: Randa      Apgar1: 9  Apgar5: 9        History:   Past Medical History:   Diagnosis Date    Bipolar affective disorder (H)     One episode in high school, \"drug induced psychosis\"    Constipation     Depressive disorder     Uncomplicated asthma       Past Surgical History:   Procedure Laterality Date    EXTRACTION(S) DENTAL      ORTHOPEDIC SURGERY      had pins put in arm age 2      Family History   Problem Relation Age of Onset    Bipolar Disorder Mother     Hyperlipidemia Father     Impaired Fasting Glucose Father     Depression Sister     Cancer Maternal Grandfather     Diabetes Paternal Grandmother     No Known Problems Daughter       Social History     Tobacco Use    Smoking status: Never     Passive exposure: Never    Smokeless tobacco: Never   Vaping Use    Vaping status: Never Used   Substance Use Topics    Alcohol use: Not Currently     Alcohol/week: 0.0 standard drinks of alcohol    Drug use: Not Currently     Types: Marijuana, Cocaine    " "  Current Outpatient Medications   Medication Sig Dispense Refill    doxylamine (UNISOM) 25 MG TABS tablet Take 1 tablet (25 mg) by mouth at bedtime. Additional half tablet in the morning 90 tablet 3    ondansetron (ZOFRAN ODT) 4 MG ODT tab Take 1 tablet (4 mg) by mouth every 8 hours as needed for other (for persistent vomiting). 30 tablet 3    Prenatal Vit-Fe Fumarate-FA (PRENATAL MULTIVITAMIN  PLUS IRON) 27-1 MG TABS Take by mouth daily.      pyridOXINE (VITAMIN B6) 25 MG tablet Take 1 tablet (25 mg) by mouth 3 times daily. 90 tablet 3      Allergies   Allergen Reactions    Reglan [Metoclopramide] Other (See Comments)     EPS/psychosis         The patient's medical, surgical and family histories were reviewed and were not pertinent to this visit.     Pap smear: Last Pap: 5/2024    History of anxiety or depression:  Pt has been diagnosed with bipolar disorder following a hospital stay for a psychotic episode that she reports was drug induced.  She is not currently on any medications and has no concerns in regards to her mental state    Review of Systems   General: Fatigue but otherwise denies problem   Eyes: Denies problem   Ears/Nose/Throat: Denies problem   Cardiovascular: Denies problem   Respiratory: Denies problem   Gastrointestinal: Nausea without vomiting, otherwise negative   Genitourinary: Denies any discharge, vaginal bleeding or itchiness or any other problem   Musculoskeletal: Breast tenderness otherwise denies problem   Skin: Denies problem   Neurologic: Denies problem   Psychiatric: Denies problem   Endocrine: Denies problem   Heme/Lymphatic: Denies problem   Allergic/Immunologic: Denies problem           Objective:   Objective    Vitals:    09/27/24 1004   BP: 94/62   Pulse: 88   Weight: 80.4 kg (177 lb 4.8 oz)   Height: 1.683 m (5' 6.25\")        Physical Exam:   General Appearance: Alert, cooperative, no distress, appears stated age   HEENT: Normocephalic, without obvious abnormality, atraumatic. " Conjunctiva/corneas clear  Neck: Supple, symmetrical, trachea midline, no adenopathy.   Thyroid: not enlarged, symmetric, no tenderness/mass/nodules   Back: Symmetric, no curvature, ROM normal, no CVA tenderness   Lungs: Clear to auscultation bilaterally, respirations unlabored   Heart: Regular rate and rhythm, S1 and S2 normal, no murmur, rub, or gallop. No edema to lower extremities.   Abdomen: Gravid, soft, non-tender, bowel sounds active all four quadrants, no masses.   FHT: +150bpm   Musculoskeletal: Extremities normal, atraumatic, no cyanosis   Skin: Skin color, texture, turgor normal, no rashes or lesions   Lymph nodes: Cervical, supraclavicular, and axillary nodes normal   Neurologic: Alert and oriented x 3. Normal speech          Answers submitted by the patient for this visit:  Patient Health Questionnaire (G7) (Submitted on 9/26/2024)  KRISTA 7 TOTAL SCORE: 5

## 2024-09-30 ENCOUNTER — INFUSION THERAPY VISIT (OUTPATIENT)
Dept: INFUSION THERAPY | Facility: CLINIC | Age: 24
End: 2024-09-30
Attending: ADVANCED PRACTICE MIDWIFE
Payer: COMMERCIAL

## 2024-09-30 VITALS
TEMPERATURE: 98 F | OXYGEN SATURATION: 98 % | HEART RATE: 79 BPM | SYSTOLIC BLOOD PRESSURE: 117 MMHG | DIASTOLIC BLOOD PRESSURE: 74 MMHG

## 2024-09-30 DIAGNOSIS — O21.0 HYPEREMESIS AFFECTING PREGNANCY, ANTEPARTUM: Primary | ICD-10-CM

## 2024-09-30 PROCEDURE — 96375 TX/PRO/DX INJ NEW DRUG ADDON: CPT

## 2024-09-30 PROCEDURE — 96361 HYDRATE IV INFUSION ADD-ON: CPT

## 2024-09-30 PROCEDURE — 258N000003 HC RX IP 258 OP 636: Performed by: ADVANCED PRACTICE MIDWIFE

## 2024-09-30 PROCEDURE — 250N000011 HC RX IP 250 OP 636: Performed by: ADVANCED PRACTICE MIDWIFE

## 2024-09-30 PROCEDURE — 250N000009 HC RX 250: Performed by: ADVANCED PRACTICE MIDWIFE

## 2024-09-30 PROCEDURE — 96365 THER/PROPH/DIAG IV INF INIT: CPT

## 2024-09-30 RX ORDER — HEPARIN SODIUM (PORCINE) LOCK FLUSH IV SOLN 100 UNIT/ML 100 UNIT/ML
5 SOLUTION INTRAVENOUS
Status: CANCELLED | OUTPATIENT
Start: 2024-10-03

## 2024-09-30 RX ORDER — DEXTROSE, SODIUM CHLORIDE, SODIUM LACTATE, POTASSIUM CHLORIDE, AND CALCIUM CHLORIDE 5; .6; .31; .03; .02 G/100ML; G/100ML; G/100ML; G/100ML; G/100ML
1000 INJECTION, SOLUTION INTRAVENOUS ONCE
Status: COMPLETED | OUTPATIENT
Start: 2024-09-30 | End: 2024-09-30

## 2024-09-30 RX ORDER — ONDANSETRON 2 MG/ML
4 INJECTION INTRAMUSCULAR; INTRAVENOUS 2 TIMES WEEKLY
Status: CANCELLED | OUTPATIENT
Start: 2024-10-03

## 2024-09-30 RX ORDER — HEPARIN SODIUM,PORCINE 10 UNIT/ML
5-20 VIAL (ML) INTRAVENOUS DAILY PRN
Status: CANCELLED | OUTPATIENT
Start: 2024-10-03

## 2024-09-30 RX ORDER — DEXTROSE, SODIUM CHLORIDE, SODIUM LACTATE, POTASSIUM CHLORIDE, AND CALCIUM CHLORIDE 5; .6; .31; .03; .02 G/100ML; G/100ML; G/100ML; G/100ML; G/100ML
1000 INJECTION, SOLUTION INTRAVENOUS ONCE
Status: CANCELLED | OUTPATIENT
Start: 2024-10-03 | End: 2024-10-03

## 2024-09-30 RX ORDER — ONDANSETRON 2 MG/ML
4 INJECTION INTRAMUSCULAR; INTRAVENOUS 2 TIMES WEEKLY
Status: DISCONTINUED | OUTPATIENT
Start: 2024-09-30 | End: 2024-09-30 | Stop reason: HOSPADM

## 2024-09-30 RX ADMIN — THIAMINE HYDROCHLORIDE: 100 INJECTION, SOLUTION INTRAMUSCULAR; INTRAVENOUS at 12:50

## 2024-09-30 RX ADMIN — ONDANSETRON 4 MG: 2 INJECTION INTRAMUSCULAR; INTRAVENOUS at 11:52

## 2024-09-30 RX ADMIN — SODIUM CHLORIDE, SODIUM LACTATE, POTASSIUM CHLORIDE, CALCIUM CHLORIDE AND DEXTROSE MONOHYDRATE 1000 ML: 5; 600; 310; 30; 20 INJECTION, SOLUTION INTRAVENOUS at 11:46

## 2024-09-30 NOTE — PROGRESS NOTES
Infusion Nursing Note:  Pau Murphy presents today for 2 liters IVF and zofran.    Patient seen by provider today: No   present during visit today: Not Applicable.    Note: Previous orders were for 1.5L IVF, changed to 2 liters today and going forward.  Patient states she is starting to feel better, vomiting about once per day, previously 4-5 times per day.  Is able to tolerate some food and fluids.      Intravenous Access:  Peripheral IV placed.    Treatment Conditions:  Not Applicable.      Post Infusion Assessment:  Patient tolerated infusion without incident.  Blood return noted pre and post infusion.  Site patent and intact, free from redness, edema or discomfort.  No evidence of extravasations.  Access discontinued per protocol.       Discharge Plan:   AVS to patient via MYCHART.  Patient will return 10/3/24 for next appointment.   Patient discharged in stable condition accompanied by: self.  Departure Mode: Ambulatory.      Kaitlin Arzola RN

## 2024-10-02 LAB
% BASOPHILS (EXTERNAL): 0.4 % (ref 0–3)
% BASOPHILS (EXTERNAL): 0.4 % (ref 0–3)
% EOSINOPHILS (EXTERNAL): 1.7 % (ref 0–7)
% EOSINOPHILS (EXTERNAL): 1.7 % (ref 0–7)
% IMMATURE GRANULOCYTES (EXTERNAL): 0.3 %
% IMMATURE GRANULOCYTES (EXTERNAL): 0.3 %
% LYMPHOCYTES (EXTERNAL): 26.1 % (ref 20–44)
% LYMPHOCYTES (EXTERNAL): 26.1 % (ref 20–44)
% MONOCYTES (EXTERNAL): 5.5 % (ref 0–11)
% MONOCYTES (EXTERNAL): 5.5 % (ref 0–11)
% NEUTROPHILS (EXTERNAL): 66 % (ref 42–72)
% NEUTROPHILS (EXTERNAL): 66 % (ref 42–72)
ABO (EXTERNAL): NORMAL
ABSOLUTE BASOPHILS (EXTERNAL): 0.04 K/UL (ref 0–0.3)
ABSOLUTE BASOPHILS (EXTERNAL): 0.04 K/UL (ref 0–0.3)
ABSOLUTE EOSINOPHILS (EXTERNAL): 0.17 K/UL (ref 0–0.5)
ABSOLUTE EOSINOPHILS (EXTERNAL): 0.17 K/UL (ref 0–0.5)
ABSOLUTE IMMATURE GRANULOCYTES (EXTERNAL): 0.03 K/UL (ref 0–0.3)
ABSOLUTE IMMATURE GRANULOCYTES (EXTERNAL): 0.03 K/UL (ref 0–0.3)
ABSOLUTE LYMPHOCYTES (EXTERNAL): 2.7 K/UL (ref 0.9–2.9)
ABSOLUTE LYMPHOCYTES (EXTERNAL): 2.7 K/UL (ref 0.9–2.9)
ABSOLUTE MONOCYTES (EXTERNAL): 0.56 K/UL (ref 0–0.9)
ABSOLUTE MONOCYTES (EXTERNAL): 0.56 K/UL (ref 0–0.9)
ABSOLUTE NEUTROPHILS (EXTERNAL): 6.7 K/UL (ref 1.7–7)
ABSOLUTE NEUTROPHILS (EXTERNAL): 6.76 K/UL (ref 1.7–7)
APPEARANCE UR: CLEAR
APPEARANCE UR: CLEAR
BACTERIA URINE (EXTERNAL): ABNORMAL
BACTERIA URINE (EXTERNAL): ABNORMAL
BILIRUB UR QL: ABNORMAL
BILIRUB UR QL: ABNORMAL
BLOOD URINE (EXTERNAL): NEGATIVE
BLOOD URINE (EXTERNAL): NEGATIVE
COLOR UR: YELLOW
COLOR UR: YELLOW
ERYTHROCYTE [DISTWIDTH] IN BLOOD BY AUTOMATED COUNT: 11.9 % (ref 11.5–15)
ERYTHROCYTE [DISTWIDTH] IN BLOOD BY AUTOMATED COUNT: 11.9 % (ref 11.5–15.5)
GLUCOSE UR STRIP-MCNC: NEGATIVE MG/DL
GLUCOSE UR STRIP-MCNC: NEGATIVE MG/DL
HEMATOCRIT (EXTERNAL): 39 % (ref 33–51)
HEMATOCRIT (EXTERNAL): 39 % (ref 33–51)
HEMOGLOBIN (EXTERNAL): 13.4 GM/DL (ref 12–16)
HEMOGLOBIN (EXTERNAL): 13.4 GM/DL (ref 12–16)
HEP B SURFACE ABY (EXTERNAL): NEGATIVE
HEP B SURFACE AGN (EXTERNAL): NEGATIVE
HEPATITIS C ANTIBODY (EXTERNAL): NEGATIVE
HEPATITIS C ANTIBODY (EXTERNAL): NEGATIVE
HIV1+2 AB SERPL QL IA: NEGATIVE
HIV1+2 AB SERPL QL IA: NEGATIVE
KETONES UR QL STRIP: NEGATIVE
KETONES UR QL STRIP: NEGATIVE
LEUKOCYTE ESTERASE URINE (EXTERNAL): NEGATIVE
LEUKOCYTE ESTERASE URINE (EXTERNAL): NEGATIVE
MCH RBC QN AUTO: 29 PG (ref 26–34)
MCH RBC QN AUTO: 29 PG (ref 26–34)
MCHC RBC AUTO-ENTMCNC: 34 GM/DL (ref 32–36)
MCHC RBC AUTO-ENTMCNC: 34 GM/DL (ref 32–36)
MCV RBC AUTO: 83 FL (ref 80–100)
MCV RBC AUTO: 83 FL (ref 80–100)
NITRITES URINE (EXTERNAL): NEGATIVE
NITRITES URINE (EXTERNAL): NEGATIVE
PH UR: 5.5 [PH] (ref 5–8.5)
PH UR: 5.5 [PH] (ref 5–8.5)
PLATELET COUNT (EXTERNAL): 232 K/UL (ref 140–440)
PLATELET COUNT (EXTERNAL): 232 X10E3/UL (ref 140–440)
PROTEIN ALBUMIN UR (EXTERNAL): ABNORMAL
PROTEIN ALBUMIN UR (EXTERNAL): ABNORMAL
RAPID PLASMA REAGIN (EXTERNAL): NON REACTIVE
RBC # BLD AUTO: 4.7 M/UL (ref 4–5.2)
RBC # BLD AUTO: 4.7 M/UL (ref 4–5.2)
RBC URINE (EXTERNAL): ABNORMAL (ref 0–2)
RBC URINE (EXTERNAL): ABNORMAL (ref 0–2)
RH (EXTERNAL): NORMAL
RUBELLA ANTIBODY IGG (EXTERNAL): 20 IU/ML
RUBELLA ANTIBODY IGG (EXTERNAL): 20 IU/ML
SCAN LAB RESULTS (EXTERNAL): NORMAL
SCAN LAB RESULTS (EXTERNAL): NORMAL
SPECIFIC GRAVITY URINE (EXTERNAL): >=1.03 (ref 1–1.03)
SPECIFIC GRAVITY URINE (EXTERNAL): >=1.03 (ref 1–1.03)
SQUAMOUS EPITHELIAL URINE (EXTERNAL): ABNORMAL
SQUAMOUS EPITHELIAL URINE (EXTERNAL): ABNORMAL
TREPONEMA PALLIDUM ANTIBODY (EXTERNAL): NON REACTIVE
UROBILINOGEN (EXTERNAL): 1 (ref 0.2–1)
UROBILINOGEN (EXTERNAL): 1 (ref 0.2–1)
WBC COUNT (EXTERNAL): 10.23 K/UL (ref 4.5–11)
WBC COUNT (EXTERNAL): 10.23 X10E3/UL (ref 4.5–11.8)
WBC URINE (EXTERNAL): ABNORMAL (ref 0–5)
WBC URINE (EXTERNAL): ABNORMAL (ref 0–5)

## 2024-10-03 ENCOUNTER — INFUSION THERAPY VISIT (OUTPATIENT)
Dept: INFUSION THERAPY | Facility: CLINIC | Age: 24
End: 2024-10-03
Attending: ADVANCED PRACTICE MIDWIFE
Payer: COMMERCIAL

## 2024-10-03 VITALS
HEART RATE: 69 BPM | SYSTOLIC BLOOD PRESSURE: 111 MMHG | DIASTOLIC BLOOD PRESSURE: 64 MMHG | RESPIRATION RATE: 16 BRPM | TEMPERATURE: 98.1 F

## 2024-10-03 DIAGNOSIS — O21.0 HYPEREMESIS AFFECTING PREGNANCY, ANTEPARTUM: Primary | ICD-10-CM

## 2024-10-03 PROCEDURE — 96361 HYDRATE IV INFUSION ADD-ON: CPT

## 2024-10-03 PROCEDURE — 250N000009 HC RX 250: Performed by: ADVANCED PRACTICE MIDWIFE

## 2024-10-03 PROCEDURE — 250N000011 HC RX IP 250 OP 636: Performed by: ADVANCED PRACTICE MIDWIFE

## 2024-10-03 PROCEDURE — 96365 THER/PROPH/DIAG IV INF INIT: CPT

## 2024-10-03 PROCEDURE — 96375 TX/PRO/DX INJ NEW DRUG ADDON: CPT

## 2024-10-03 PROCEDURE — 258N000003 HC RX IP 258 OP 636: Performed by: ADVANCED PRACTICE MIDWIFE

## 2024-10-03 RX ORDER — DEXTROSE, SODIUM CHLORIDE, SODIUM LACTATE, POTASSIUM CHLORIDE, AND CALCIUM CHLORIDE 5; .6; .31; .03; .02 G/100ML; G/100ML; G/100ML; G/100ML; G/100ML
1000 INJECTION, SOLUTION INTRAVENOUS ONCE
Status: CANCELLED | OUTPATIENT
Start: 2024-10-07 | End: 2024-10-07

## 2024-10-03 RX ORDER — ONDANSETRON 2 MG/ML
4 INJECTION INTRAMUSCULAR; INTRAVENOUS 2 TIMES WEEKLY
Status: CANCELLED | OUTPATIENT
Start: 2024-10-07

## 2024-10-03 RX ORDER — DEXTROSE, SODIUM CHLORIDE, SODIUM LACTATE, POTASSIUM CHLORIDE, AND CALCIUM CHLORIDE 5; .6; .31; .03; .02 G/100ML; G/100ML; G/100ML; G/100ML; G/100ML
1000 INJECTION, SOLUTION INTRAVENOUS ONCE
Status: COMPLETED | OUTPATIENT
Start: 2024-10-03 | End: 2024-10-03

## 2024-10-03 RX ORDER — HEPARIN SODIUM (PORCINE) LOCK FLUSH IV SOLN 100 UNIT/ML 100 UNIT/ML
5 SOLUTION INTRAVENOUS
Status: CANCELLED | OUTPATIENT
Start: 2024-10-07

## 2024-10-03 RX ORDER — HEPARIN SODIUM,PORCINE 10 UNIT/ML
5-20 VIAL (ML) INTRAVENOUS DAILY PRN
Status: CANCELLED | OUTPATIENT
Start: 2024-10-07

## 2024-10-03 RX ORDER — ONDANSETRON 2 MG/ML
4 INJECTION INTRAMUSCULAR; INTRAVENOUS 2 TIMES WEEKLY
Status: DISCONTINUED | OUTPATIENT
Start: 2024-10-03 | End: 2024-10-03 | Stop reason: HOSPADM

## 2024-10-03 RX ADMIN — THIAMINE HYDROCHLORIDE: 100 INJECTION, SOLUTION INTRAMUSCULAR; INTRAVENOUS at 14:44

## 2024-10-03 RX ADMIN — SODIUM CHLORIDE, SODIUM LACTATE, POTASSIUM CHLORIDE, CALCIUM CHLORIDE AND DEXTROSE MONOHYDRATE 1000 ML: 5; 600; 310; 30; 20 INJECTION, SOLUTION INTRAVENOUS at 13:44

## 2024-10-03 RX ADMIN — ONDANSETRON 4 MG: 2 INJECTION INTRAMUSCULAR; INTRAVENOUS at 13:45

## 2024-10-03 NOTE — PROGRESS NOTES
Infusion Nursing Note:  Pau Murphy presents today for IV hydration (1L D5LR + 1L LR w/MVI).    Patient seen by provider today: No   present during visit today: Not Applicable.    Note: Clotilde reports that she continues to feel better, only vomiting 1-2 times per day.  She reports low appetite.    Intravenous Access:  Peripheral IV placed.    Treatment Conditions:  Not Applicable.    Post Infusion Assessment:  Patient tolerated infusion without incident.  Blood return noted pre and post infusion.  Site patent and intact, free from redness, edema or discomfort.  No evidence of extravasations.  Access discontinued per protocol.     Discharge Plan:   Discharge instructions reviewed with: Patient.  Patient and/or family verbalized understanding of discharge instructions and all questions answered.  AVS to patient via [employer].  Patient will return 10/15 for next appointment.   Patient discharged in stable condition accompanied by: self.  Departure Mode: Ambulatory.      Catrachito Delvalle RN

## 2024-10-04 ENCOUNTER — TELEPHONE (OUTPATIENT)
Dept: MIDWIFE SERVICES | Facility: CLINIC | Age: 24
End: 2024-10-04
Payer: COMMERCIAL

## 2024-10-04 LAB — SCANNED LAB RESULT: NORMAL
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
## 2024-10-07 ENCOUNTER — TELEPHONE (OUTPATIENT)
Dept: OBGYN | Facility: CLINIC | Age: 24
End: 2024-10-07
Payer: COMMERCIAL

## 2024-10-07 ENCOUNTER — TELEPHONE (OUTPATIENT)
Dept: MIDWIFE SERVICES | Facility: CLINIC | Age: 24
End: 2024-10-07
Payer: COMMERCIAL
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
## 2024-10-14 ENCOUNTER — TELEPHONE (OUTPATIENT)
Dept: MIDWIFE SERVICES | Facility: CLINIC | Age: 24
End: 2024-10-14
Payer: COMMERCIAL

## 2024-10-14 ENCOUNTER — MYC MEDICAL ADVICE (OUTPATIENT)
Dept: MIDWIFE SERVICES | Facility: CLINIC | Age: 24
End: 2024-10-14
Payer: COMMERCIAL

## 2024-10-14 NOTE — TELEPHONE ENCOUNTER
Notified patient's provider regarding the current shortage of IVF and to assess the need for continued hydration.      This is the response from Clover DICKINSON:This can be reduced to 1 L. Can you please have her reach out to schedule follow-up with the CNM team so we can discuss a plan moving forward? Previously she felt that IVF was the only thing that was helpful for her, but we need to establish a better long-term plan with the shortage. Thanks!     Notified the patient that we will keep her appointment for tomorrow 10/15 for 1liter IVF.  She prefers to get the vitamin bag.

## 2024-10-15 ENCOUNTER — INFUSION THERAPY VISIT (OUTPATIENT)
Dept: INFUSION THERAPY | Facility: CLINIC | Age: 24
End: 2024-10-15
Attending: ADVANCED PRACTICE MIDWIFE
Payer: COMMERCIAL

## 2024-10-15 VITALS
DIASTOLIC BLOOD PRESSURE: 75 MMHG | HEART RATE: 100 BPM | TEMPERATURE: 97.8 F | OXYGEN SATURATION: 96 % | SYSTOLIC BLOOD PRESSURE: 127 MMHG

## 2024-10-15 DIAGNOSIS — O21.0 HYPEREMESIS AFFECTING PREGNANCY, ANTEPARTUM: Primary | ICD-10-CM

## 2024-10-15 PROCEDURE — 250N000011 HC RX IP 250 OP 636: Performed by: ADVANCED PRACTICE MIDWIFE

## 2024-10-15 PROCEDURE — 258N000003 HC RX IP 258 OP 636: Performed by: ADVANCED PRACTICE MIDWIFE

## 2024-10-15 PROCEDURE — 250N000009 HC RX 250: Performed by: ADVANCED PRACTICE MIDWIFE

## 2024-10-15 PROCEDURE — 96375 TX/PRO/DX INJ NEW DRUG ADDON: CPT

## 2024-10-15 PROCEDURE — 96365 THER/PROPH/DIAG IV INF INIT: CPT

## 2024-10-15 RX ORDER — HEPARIN SODIUM,PORCINE 10 UNIT/ML
5-20 VIAL (ML) INTRAVENOUS DAILY PRN
Status: CANCELLED | OUTPATIENT
Start: 2024-10-17

## 2024-10-15 RX ORDER — ONDANSETRON 2 MG/ML
4 INJECTION INTRAMUSCULAR; INTRAVENOUS 2 TIMES WEEKLY
Status: CANCELLED | OUTPATIENT
Start: 2024-10-17

## 2024-10-15 RX ORDER — ONDANSETRON 2 MG/ML
4 INJECTION INTRAMUSCULAR; INTRAVENOUS 2 TIMES WEEKLY
Status: DISCONTINUED | OUTPATIENT
Start: 2024-10-15 | End: 2024-10-15 | Stop reason: HOSPADM

## 2024-10-15 RX ORDER — DEXTROSE, SODIUM CHLORIDE, SODIUM LACTATE, POTASSIUM CHLORIDE, AND CALCIUM CHLORIDE 5; .6; .31; .03; .02 G/100ML; G/100ML; G/100ML; G/100ML; G/100ML
1000 INJECTION, SOLUTION INTRAVENOUS ONCE
Status: CANCELLED | OUTPATIENT
Start: 2024-10-17 | End: 2024-10-17

## 2024-10-15 RX ORDER — HEPARIN SODIUM (PORCINE) LOCK FLUSH IV SOLN 100 UNIT/ML 100 UNIT/ML
5 SOLUTION INTRAVENOUS
Status: CANCELLED | OUTPATIENT
Start: 2024-10-17

## 2024-10-15 RX ADMIN — THIAMINE HYDROCHLORIDE: 100 INJECTION, SOLUTION INTRAMUSCULAR; INTRAVENOUS at 09:31

## 2024-10-15 RX ADMIN — ONDANSETRON 4 MG: 2 INJECTION INTRAMUSCULAR; INTRAVENOUS at 09:18

## 2024-10-15 NOTE — PROGRESS NOTES
"Infusion Nursing Note:  Pau Murphy presents today for IV vitamin bag/antiemetics  Patient seen by provider today: No   present during visit today: Not Applicable.    Note: Pt reports her hyperemesis is improving. Has 1-2 emesis/day typically, mixed in with an occ day here and there where she \"can't keep even water down\".    Still only able to give 1L hydration today due to the IVF shortage; pt chose to have vitamin bag      Intravenous Access:  Peripheral IV placed.    Treatment Conditions:  Not Applicable.      Post Infusion Assessment:  Patient tolerated infusion without incident.  Blood return noted pre and post infusion.  Site patent and intact, free from redness, edema or discomfort.  No evidence of extravasations.  Access discontinued per protocol.       Discharge Plan:   AVS to patient via MYCHART.  Patient will return 10/18/24 for IVF   Patient discharged in stable condition accompanied by: self.  Departure Mode: Ambulatory.      Cherie Arias RN   "

## 2024-10-18 ENCOUNTER — INFUSION THERAPY VISIT (OUTPATIENT)
Dept: INFUSION THERAPY | Facility: CLINIC | Age: 24
End: 2024-10-18
Attending: ADVANCED PRACTICE MIDWIFE
Payer: COMMERCIAL

## 2024-10-18 VITALS
SYSTOLIC BLOOD PRESSURE: 106 MMHG | HEART RATE: 73 BPM | DIASTOLIC BLOOD PRESSURE: 67 MMHG | OXYGEN SATURATION: 97 % | RESPIRATION RATE: 16 BRPM | TEMPERATURE: 98.1 F

## 2024-10-18 DIAGNOSIS — O21.0 HYPEREMESIS AFFECTING PREGNANCY, ANTEPARTUM: Primary | ICD-10-CM

## 2024-10-18 PROCEDURE — 258N000003 HC RX IP 258 OP 636: Performed by: ADVANCED PRACTICE MIDWIFE

## 2024-10-18 PROCEDURE — 96375 TX/PRO/DX INJ NEW DRUG ADDON: CPT

## 2024-10-18 PROCEDURE — 96365 THER/PROPH/DIAG IV INF INIT: CPT

## 2024-10-18 PROCEDURE — 250N000011 HC RX IP 250 OP 636: Performed by: ADVANCED PRACTICE MIDWIFE

## 2024-10-18 PROCEDURE — 250N000009 HC RX 250: Performed by: ADVANCED PRACTICE MIDWIFE

## 2024-10-18 RX ORDER — HEPARIN SODIUM (PORCINE) LOCK FLUSH IV SOLN 100 UNIT/ML 100 UNIT/ML
5 SOLUTION INTRAVENOUS
Status: CANCELLED | OUTPATIENT
Start: 2024-10-21

## 2024-10-18 RX ORDER — ONDANSETRON 2 MG/ML
4 INJECTION INTRAMUSCULAR; INTRAVENOUS 2 TIMES WEEKLY
Status: CANCELLED | OUTPATIENT
Start: 2024-10-21

## 2024-10-18 RX ORDER — ONDANSETRON 2 MG/ML
4 INJECTION INTRAMUSCULAR; INTRAVENOUS 2 TIMES WEEKLY
Status: DISCONTINUED | OUTPATIENT
Start: 2024-10-18 | End: 2024-10-18 | Stop reason: HOSPADM

## 2024-10-18 RX ORDER — HEPARIN SODIUM,PORCINE 10 UNIT/ML
5-20 VIAL (ML) INTRAVENOUS DAILY PRN
Status: CANCELLED | OUTPATIENT
Start: 2024-10-21

## 2024-10-18 RX ORDER — DEXTROSE, SODIUM CHLORIDE, SODIUM LACTATE, POTASSIUM CHLORIDE, AND CALCIUM CHLORIDE 5; .6; .31; .03; .02 G/100ML; G/100ML; G/100ML; G/100ML; G/100ML
1000 INJECTION, SOLUTION INTRAVENOUS ONCE
Status: CANCELLED | OUTPATIENT
Start: 2024-10-21 | End: 2024-10-21

## 2024-10-18 RX ADMIN — THIAMINE HYDROCHLORIDE: 100 INJECTION, SOLUTION INTRAMUSCULAR; INTRAVENOUS at 14:19

## 2024-10-18 RX ADMIN — ONDANSETRON 4 MG: 2 INJECTION INTRAMUSCULAR; INTRAVENOUS at 13:53

## 2024-10-18 ASSESSMENT — PAIN SCALES - GENERAL: PAINLEVEL: NO PAIN (0)

## 2024-10-18 NOTE — PROGRESS NOTES
Infusion Nursing Note:    Pau Murphy presents today for 1L of   lactated ringers 1,000 mL with Infuvite Adult 10 mL, thiamine 100 mg     Pre-Meds: 4mg PIV zofran     Patient seen by provider today: No   present during visit today: Not Applicable.    Note: Still throwing up about 1x per day, but really feels like she is getting better everyday.      Intravenous Access:  Peripheral IV placed.    Treatment Conditions:  Not Applicable.      Post Infusion Assessment:  Patient tolerated infusion without incident.  Site patent and intact, free from redness, edema or discomfort.  No evidence of extravasations.  Access discontinued per protocol.       Discharge Plan:   Discharge instructions reviewed with: Patient.  Patient and/or family verbalized understanding of discharge instructions and all questions answered.  AVS to patient via UniYuT.  Patient will return 10/21 for next appointment.   Patient discharged in stable condition accompanied by: self.  Departure Mode: Ambulatory.      Maggie Grider RN

## 2024-10-19 NOTE — PROGRESS NOTES
Plan extended until next CNM visit on 11/4/24. Plan to reassess need for continued IV hydration at that time. ALOK AhmadiM on 10/18/2024 at 10:09 PM

## 2024-10-21 ENCOUNTER — TELEPHONE (OUTPATIENT)
Dept: MIDWIFE SERVICES | Facility: CLINIC | Age: 24
End: 2024-10-21
Payer: COMMERCIAL

## 2024-10-21 ENCOUNTER — INFUSION THERAPY VISIT (OUTPATIENT)
Dept: INFUSION THERAPY | Facility: CLINIC | Age: 24
End: 2024-10-21
Attending: ADVANCED PRACTICE MIDWIFE
Payer: COMMERCIAL

## 2024-10-21 VITALS
SYSTOLIC BLOOD PRESSURE: 106 MMHG | OXYGEN SATURATION: 96 % | HEART RATE: 85 BPM | TEMPERATURE: 98.9 F | DIASTOLIC BLOOD PRESSURE: 70 MMHG | RESPIRATION RATE: 16 BRPM

## 2024-10-21 DIAGNOSIS — O21.0 HYPEREMESIS AFFECTING PREGNANCY, ANTEPARTUM: Primary | ICD-10-CM

## 2024-10-21 PROCEDURE — 96375 TX/PRO/DX INJ NEW DRUG ADDON: CPT

## 2024-10-21 PROCEDURE — 258N000003 HC RX IP 258 OP 636: Performed by: MIDWIFE

## 2024-10-21 PROCEDURE — 250N000011 HC RX IP 250 OP 636: Performed by: ADVANCED PRACTICE MIDWIFE

## 2024-10-21 PROCEDURE — 96365 THER/PROPH/DIAG IV INF INIT: CPT

## 2024-10-21 PROCEDURE — 250N000009 HC RX 250: Performed by: MIDWIFE

## 2024-10-21 PROCEDURE — 250N000011 HC RX IP 250 OP 636: Performed by: MIDWIFE

## 2024-10-21 RX ORDER — ONDANSETRON 2 MG/ML
4 INJECTION INTRAMUSCULAR; INTRAVENOUS 2 TIMES WEEKLY
Status: CANCELLED | OUTPATIENT
Start: 2024-10-24

## 2024-10-21 RX ORDER — ONDANSETRON 2 MG/ML
4 INJECTION INTRAMUSCULAR; INTRAVENOUS 2 TIMES WEEKLY
Status: CANCELLED | OUTPATIENT
Start: 2024-10-21

## 2024-10-21 RX ORDER — ONDANSETRON 2 MG/ML
4 INJECTION INTRAMUSCULAR; INTRAVENOUS 2 TIMES WEEKLY
Status: DISCONTINUED | OUTPATIENT
Start: 2024-10-21 | End: 2024-10-21 | Stop reason: HOSPADM

## 2024-10-21 RX ORDER — HEPARIN SODIUM,PORCINE 10 UNIT/ML
5-20 VIAL (ML) INTRAVENOUS DAILY PRN
Status: CANCELLED | OUTPATIENT
Start: 2024-10-24

## 2024-10-21 RX ORDER — HEPARIN SODIUM (PORCINE) LOCK FLUSH IV SOLN 100 UNIT/ML 100 UNIT/ML
5 SOLUTION INTRAVENOUS
Status: CANCELLED | OUTPATIENT
Start: 2024-10-24

## 2024-10-21 RX ADMIN — ONDANSETRON 4 MG: 2 INJECTION INTRAMUSCULAR; INTRAVENOUS at 12:45

## 2024-10-21 RX ADMIN — THIAMINE HYDROCHLORIDE: 100 INJECTION, SOLUTION INTRAMUSCULAR; INTRAVENOUS at 12:45

## 2024-10-21 NOTE — TELEPHONE ENCOUNTER
[facility] Call Center    Phone Message    May a detailed message be left on voicemail: yes     Reason for Call: Other: Jennifer from [facility] called stating that she sent a message to Clover Calvillo on Friday and she extended orders but didn't send Fluid orders for patient. Patient has a 12:30 appt today with infusion. Writer sent secure chat and was asked to send a TE and they stated that orders can be sent by 12:30. Please contact Jennifer at [phone] in regards to this message. Thank you      Action Taken: Other: Women's    Travel Screening: Not Applicable     Date of Service:

## 2024-10-21 NOTE — PROGRESS NOTES
Infusion Nursing Note:  Pau Murphy presents today for IV fluid vitamin bag + zofran  Patient seen by provider today: No   present during visit today: Not Applicable.    Note: N/A.      Intravenous Access:  Peripheral IV placed.    Treatment Conditions:  Not Applicable.      Post Infusion Assessment:  Patient tolerated infusion without incident.  Blood return noted pre and post infusion.  Site patent and intact, free from redness, edema or discomfort.  No evidence of extravasations.  Access discontinued per protocol.       Discharge Plan:   Discharge instructions reviewed with: Patient.  Patient and/or family verbalized understanding of discharge instructions and all questions answered.  AVS to patient via Drop MessagesT.  Patient will return 10/24 for next appointment.   Patient discharged in stable condition accompanied by: self.  Departure Mode: Ambulatory.      Jennifer Gan RN

## 2024-10-24 ENCOUNTER — INFUSION THERAPY VISIT (OUTPATIENT)
Dept: INFUSION THERAPY | Facility: CLINIC | Age: 24
End: 2024-10-24
Attending: ADVANCED PRACTICE MIDWIFE
Payer: COMMERCIAL

## 2024-10-24 VITALS — OXYGEN SATURATION: 96 % | DIASTOLIC BLOOD PRESSURE: 66 MMHG | SYSTOLIC BLOOD PRESSURE: 126 MMHG | HEART RATE: 79 BPM

## 2024-10-24 DIAGNOSIS — O21.0 HYPEREMESIS AFFECTING PREGNANCY, ANTEPARTUM: Primary | ICD-10-CM

## 2024-10-24 PROCEDURE — 250N000011 HC RX IP 250 OP 636: Performed by: ADVANCED PRACTICE MIDWIFE

## 2024-10-24 PROCEDURE — 96375 TX/PRO/DX INJ NEW DRUG ADDON: CPT

## 2024-10-24 PROCEDURE — 96365 THER/PROPH/DIAG IV INF INIT: CPT

## 2024-10-24 PROCEDURE — 258N000003 HC RX IP 258 OP 636: Performed by: ADVANCED PRACTICE MIDWIFE

## 2024-10-24 PROCEDURE — 250N000009 HC RX 250: Performed by: ADVANCED PRACTICE MIDWIFE

## 2024-10-24 RX ORDER — ONDANSETRON 2 MG/ML
4 INJECTION INTRAMUSCULAR; INTRAVENOUS 2 TIMES WEEKLY
Status: CANCELLED | OUTPATIENT
Start: 2024-10-28

## 2024-10-24 RX ORDER — ONDANSETRON 2 MG/ML
4 INJECTION INTRAMUSCULAR; INTRAVENOUS 2 TIMES WEEKLY
Status: DISCONTINUED | OUTPATIENT
Start: 2024-10-24 | End: 2024-10-24 | Stop reason: HOSPADM

## 2024-10-24 RX ORDER — HEPARIN SODIUM (PORCINE) LOCK FLUSH IV SOLN 100 UNIT/ML 100 UNIT/ML
5 SOLUTION INTRAVENOUS
Status: CANCELLED | OUTPATIENT
Start: 2024-10-28

## 2024-10-24 RX ORDER — HEPARIN SODIUM,PORCINE 10 UNIT/ML
5-20 VIAL (ML) INTRAVENOUS DAILY PRN
Status: CANCELLED | OUTPATIENT
Start: 2024-10-28

## 2024-10-24 RX ADMIN — THIAMINE HYDROCHLORIDE: 100 INJECTION, SOLUTION INTRAMUSCULAR; INTRAVENOUS at 11:26

## 2024-10-24 RX ADMIN — ONDANSETRON 4 MG: 2 INJECTION INTRAMUSCULAR; INTRAVENOUS at 11:23

## 2024-10-24 NOTE — PROGRESS NOTES
Infusion Nursing Note:  Pau Murphy presents today for Vitamin bag plus Zofran.    Patient seen by provider today: No   present during visit today: Not Applicable.    Note: Pt reports vomiting occurs about 1-2X/day still.  Dealing with constipation currently - takes Colace PRN.      Intravenous Access:  Peripheral IV placed.    Treatment Conditions:  Not Applicable.      Post Infusion Assessment:  Patient tolerated infusion without incident.  Blood return noted pre and post infusion.  Site patent and intact, free from redness, edema or discomfort.  No evidence of extravasations.  Access discontinued per protocol.       Discharge Plan:   AVS to patient via MYCHART.  Patient will return next week for hydration/antiemetics; pt to schedule    Patient discharged in stable condition accompanied by: self.  Departure Mode: Ambulatory.      Cherie Arias RN

## 2024-10-31 ENCOUNTER — INFUSION THERAPY VISIT (OUTPATIENT)
Dept: INFUSION THERAPY | Facility: CLINIC | Age: 24
End: 2024-10-31
Attending: ADVANCED PRACTICE MIDWIFE
Payer: COMMERCIAL

## 2024-10-31 VITALS
RESPIRATION RATE: 20 BRPM | DIASTOLIC BLOOD PRESSURE: 68 MMHG | SYSTOLIC BLOOD PRESSURE: 109 MMHG | HEART RATE: 65 BPM | OXYGEN SATURATION: 97 % | TEMPERATURE: 97.9 F

## 2024-10-31 DIAGNOSIS — O21.0 HYPEREMESIS AFFECTING PREGNANCY, ANTEPARTUM: Primary | ICD-10-CM

## 2024-10-31 PROCEDURE — 258N000003 HC RX IP 258 OP 636: Performed by: ADVANCED PRACTICE MIDWIFE

## 2024-10-31 PROCEDURE — 250N000011 HC RX IP 250 OP 636: Performed by: ADVANCED PRACTICE MIDWIFE

## 2024-10-31 PROCEDURE — 96365 THER/PROPH/DIAG IV INF INIT: CPT

## 2024-10-31 PROCEDURE — 250N000009 HC RX 250: Performed by: ADVANCED PRACTICE MIDWIFE

## 2024-10-31 RX ORDER — ONDANSETRON 2 MG/ML
4 INJECTION INTRAMUSCULAR; INTRAVENOUS 2 TIMES WEEKLY
Status: CANCELLED | OUTPATIENT
Start: 2024-11-04

## 2024-10-31 RX ORDER — HEPARIN SODIUM,PORCINE 10 UNIT/ML
5-20 VIAL (ML) INTRAVENOUS DAILY PRN
Status: CANCELLED | OUTPATIENT
Start: 2024-11-04

## 2024-10-31 RX ORDER — HEPARIN SODIUM (PORCINE) LOCK FLUSH IV SOLN 100 UNIT/ML 100 UNIT/ML
5 SOLUTION INTRAVENOUS
Status: CANCELLED | OUTPATIENT
Start: 2024-11-04

## 2024-10-31 RX ADMIN — THIAMINE HYDROCHLORIDE: 100 INJECTION, SOLUTION INTRAMUSCULAR; INTRAVENOUS at 11:34

## 2024-10-31 NOTE — PROGRESS NOTES
Infusion Nursing Note:  Pau Murphy presents today for 1L LR + vitamins.   .    Patient seen by provider today: No   present during visit today: Not Applicable.    Note: Patient declined Zofran.      Intravenous Access:  Peripheral IV placed.    Treatment Conditions:  Not Applicable.      Post Infusion Assessment:  Patient tolerated infusion without incident.  Blood return noted pre and post infusion.  Site patent and intact, free from redness, edema or discomfort.  No evidence of extravasations.  Access discontinued per protocol.       Discharge Plan:   AVS to patient via MYCHART.  Patient will return next week for next appointment.   Patient discharged in stable condition accompanied by: self.  Departure Mode: Ambulatory.      Jennifer García RN

## 2024-11-04 ENCOUNTER — HOSPITAL ENCOUNTER (OUTPATIENT)
Dept: ULTRASOUND IMAGING | Facility: CLINIC | Age: 24
Discharge: HOME | End: 2024-11-04
Attending: ADVANCED PRACTICE MIDWIFE | Admitting: ADVANCED PRACTICE MIDWIFE
Payer: COMMERCIAL

## 2024-11-04 ENCOUNTER — PRENATAL OFFICE VISIT (OUTPATIENT)
Dept: MIDWIFE SERVICES | Facility: CLINIC | Age: 24
End: 2024-11-04
Payer: COMMERCIAL

## 2024-11-04 ENCOUNTER — DOCUMENTATION ONLY (OUTPATIENT)
Dept: MIDWIFE SERVICES | Facility: CLINIC | Age: 24
End: 2024-11-04

## 2024-11-04 VITALS
HEIGHT: 66 IN | DIASTOLIC BLOOD PRESSURE: 60 MMHG | HEART RATE: 86 BPM | SYSTOLIC BLOOD PRESSURE: 114 MMHG | OXYGEN SATURATION: 98 % | BODY MASS INDEX: 29.12 KG/M2 | WEIGHT: 181.2 LBS

## 2024-11-04 DIAGNOSIS — O21.0 HYPEREMESIS AFFECTING PREGNANCY, ANTEPARTUM: ICD-10-CM

## 2024-11-04 DIAGNOSIS — Z34.02 ENCOUNTER FOR SUPERVISION OF NORMAL FIRST PREGNANCY IN SECOND TRIMESTER: Primary | ICD-10-CM

## 2024-11-04 DIAGNOSIS — Z34.02 ENCOUNTER FOR SUPERVISION OF NORMAL FIRST PREGNANCY IN SECOND TRIMESTER: ICD-10-CM

## 2024-11-04 PROBLEM — O35.EXX0 PYELECTASIS OF FETUS ON PRENATAL ULTRASOUND: Status: ACTIVE | Noted: 2024-11-04

## 2024-11-04 PROCEDURE — 76805 OB US >/= 14 WKS SNGL FETUS: CPT

## 2024-11-04 PROCEDURE — 99207 PR PRENATAL VISIT: CPT | Performed by: MIDWIFE

## 2024-11-04 NOTE — PROGRESS NOTES
Clotilde and Laith are here for ROBV at 20 4/7 weeks.  Feeling better, continues to have vomiting once a day, but feels she can decrease her infusions to once per week. She has demonstrated weight gain of 4 lbs. She is eating and drinking fluids.  She reports hx of multiple UTI's with last pregnancy, remains asymptomatic today.  Reports regular fetal movement, denies Uc's or change in vaginal discharge.   is still awaiting results for CF testing. Had ultrasound today, results not available at the time of this visit.  Reviewed 1 hour GCT next visit with hgb check.  RTC 4 weeks or PRN.

## 2024-11-07 ENCOUNTER — INFUSION THERAPY VISIT (OUTPATIENT)
Dept: INFUSION THERAPY | Facility: CLINIC | Age: 24
End: 2024-11-07
Attending: ADVANCED PRACTICE MIDWIFE
Payer: COMMERCIAL

## 2024-11-07 VITALS
TEMPERATURE: 97.3 F | RESPIRATION RATE: 22 BRPM | HEART RATE: 83 BPM | OXYGEN SATURATION: 98 % | DIASTOLIC BLOOD PRESSURE: 67 MMHG | SYSTOLIC BLOOD PRESSURE: 105 MMHG

## 2024-11-07 DIAGNOSIS — O21.0 HYPEREMESIS AFFECTING PREGNANCY, ANTEPARTUM: Primary | ICD-10-CM

## 2024-11-07 PROCEDURE — 250N000011 HC RX IP 250 OP 636: Performed by: ADVANCED PRACTICE MIDWIFE

## 2024-11-07 PROCEDURE — 258N000003 HC RX IP 258 OP 636: Performed by: ADVANCED PRACTICE MIDWIFE

## 2024-11-07 PROCEDURE — 96365 THER/PROPH/DIAG IV INF INIT: CPT

## 2024-11-07 PROCEDURE — 250N000009 HC RX 250: Performed by: ADVANCED PRACTICE MIDWIFE

## 2024-11-07 RX ORDER — ONDANSETRON 2 MG/ML
4 INJECTION INTRAMUSCULAR; INTRAVENOUS 2 TIMES WEEKLY
OUTPATIENT
Start: 2024-11-07

## 2024-11-07 RX ADMIN — THIAMINE HYDROCHLORIDE: 100 INJECTION, SOLUTION INTRAMUSCULAR; INTRAVENOUS at 11:12

## 2024-11-07 NOTE — PROGRESS NOTES
Infusion Nursing Note:  Pau Murphy presents today for Vitamin bag IVF.    Patient seen by provider today: No   present during visit today: Not Applicable.    Note: Patient declined Zofran. Patient reports only vomiting once today.      Intravenous Access:  Peripheral IV placed.    Treatment Conditions:  Not Applicable.      Post Infusion Assessment:  Patient tolerated infusion without incident.  Blood return noted pre and post infusion.  Site patent and intact, free from redness, edema or discomfort.  No evidence of extravasations.  Access discontinued per protocol.       Discharge Plan:   AVS to patient via MYCHART.  Patient will return next week for next appointment.   Patient discharged in stable condition accompanied by: self.  Departure Mode: Ambulatory.      Jennifer García RN

## 2024-11-19 ENCOUNTER — INFUSION THERAPY VISIT (OUTPATIENT)
Dept: INFUSION THERAPY | Facility: CLINIC | Age: 24
End: 2024-11-19
Attending: ADVANCED PRACTICE MIDWIFE
Payer: COMMERCIAL

## 2024-11-19 VITALS
TEMPERATURE: 98.1 F | HEART RATE: 82 BPM | DIASTOLIC BLOOD PRESSURE: 81 MMHG | OXYGEN SATURATION: 97 % | SYSTOLIC BLOOD PRESSURE: 117 MMHG | RESPIRATION RATE: 20 BRPM

## 2024-11-19 DIAGNOSIS — O21.0 HYPEREMESIS AFFECTING PREGNANCY, ANTEPARTUM: Primary | ICD-10-CM

## 2024-11-19 PROCEDURE — 258N000003 HC RX IP 258 OP 636: Performed by: ADVANCED PRACTICE MIDWIFE

## 2024-11-19 PROCEDURE — 250N000011 HC RX IP 250 OP 636: Performed by: ADVANCED PRACTICE MIDWIFE

## 2024-11-19 PROCEDURE — 96365 THER/PROPH/DIAG IV INF INIT: CPT

## 2024-11-19 PROCEDURE — 250N000009 HC RX 250: Performed by: ADVANCED PRACTICE MIDWIFE

## 2024-11-19 RX ORDER — ONDANSETRON 2 MG/ML
4 INJECTION INTRAMUSCULAR; INTRAVENOUS 2 TIMES WEEKLY
Status: DISCONTINUED | OUTPATIENT
Start: 2024-11-19 | End: 2024-11-19 | Stop reason: HOSPADM

## 2024-11-19 RX ORDER — ONDANSETRON 2 MG/ML
4 INJECTION INTRAMUSCULAR; INTRAVENOUS 2 TIMES WEEKLY
OUTPATIENT
Start: 2024-11-19

## 2024-11-19 RX ADMIN — THIAMINE HYDROCHLORIDE: 100 INJECTION, SOLUTION INTRAMUSCULAR; INTRAVENOUS at 11:15

## 2024-11-19 NOTE — PROGRESS NOTES
Infusion Nursing Note:  Pau Murphy presents today for IVF.    Patient seen by provider today: No   present during visit today: Not Applicable.    Note: IVF have been helping with dehydration. N/V stable, Clotilde is switching to IVF 1xweek.    Intravenous Access:  Peripheral IV placed.    Treatment Conditions:  Not Applicable.    Post Infusion Assessment:  Patient tolerated infusion without incident.  Blood return noted pre and post infusion.  Site patent and intact, free from redness, edema or discomfort.  No evidence of extravasations.  Access discontinued per protocol.     Discharge Plan:   Discharge instructions reviewed with: Patient.  Patient and/or family verbalized understanding of discharge instructions and all questions answered.  AVS to patient via When You WishT.  Patient will return 11/26 for next appointment.   Patient discharged in stable condition accompanied by: self.  Departure Mode: Ambulatory.    Jonathan Naranjo RN

## 2024-11-20 ENCOUNTER — MYC MEDICAL ADVICE (OUTPATIENT)
Dept: MIDWIFE SERVICES | Facility: CLINIC | Age: 24
End: 2024-11-20
Payer: COMMERCIAL

## 2024-11-21 NOTE — TELEPHONE ENCOUNTER
MyChart forms request: completed intermittent leave for diagnosis of HG with weekly infusion therapy.

## 2024-11-26 ENCOUNTER — INFUSION THERAPY VISIT (OUTPATIENT)
Dept: INFUSION THERAPY | Facility: CLINIC | Age: 24
End: 2024-11-26
Attending: ADVANCED PRACTICE MIDWIFE
Payer: COMMERCIAL

## 2024-11-26 VITALS
RESPIRATION RATE: 18 BRPM | OXYGEN SATURATION: 96 % | SYSTOLIC BLOOD PRESSURE: 125 MMHG | DIASTOLIC BLOOD PRESSURE: 81 MMHG | HEART RATE: 98 BPM | TEMPERATURE: 98.4 F

## 2024-11-26 DIAGNOSIS — O21.0 HYPEREMESIS AFFECTING PREGNANCY, ANTEPARTUM: Primary | ICD-10-CM

## 2024-11-26 PROCEDURE — 250N000009 HC RX 250: Performed by: ADVANCED PRACTICE MIDWIFE

## 2024-11-26 PROCEDURE — 96365 THER/PROPH/DIAG IV INF INIT: CPT

## 2024-11-26 PROCEDURE — 250N000011 HC RX IP 250 OP 636: Performed by: ADVANCED PRACTICE MIDWIFE

## 2024-11-26 PROCEDURE — 258N000003 HC RX IP 258 OP 636: Performed by: ADVANCED PRACTICE MIDWIFE

## 2024-11-26 RX ORDER — ONDANSETRON 2 MG/ML
4 INJECTION INTRAMUSCULAR; INTRAVENOUS 2 TIMES WEEKLY
OUTPATIENT
Start: 2024-11-26

## 2024-11-26 RX ADMIN — THIAMINE HYDROCHLORIDE: 200 INJECTION, SOLUTION INTRAMUSCULAR; INTRAVENOUS at 14:51

## 2024-11-26 NOTE — PROGRESS NOTES
Infusion Nursing Note:  Pau Murphy presents today for IV hydration (1L LR with vitamins).    Patient seen by provider today: No   present during visit today: Not Applicable.    Note: Clotilde reports she is feeling well today.  She states she has ongoing nausea, but is only vomiting every other day now.    Intravenous Access:  Peripheral IV placed.    Treatment Conditions:  Not Applicable.    Post Infusion Assessment:  Patient tolerated infusion without incident.  Blood return noted pre and post infusion.  Site patent and intact, free from redness, edema or discomfort.  No evidence of extravasations.  Access discontinued per protocol.     Discharge Plan:   Discharge instructions reviewed with: Patient.  Patient and/or family verbalized understanding of discharge instructions and all questions answered.  AVS to patient via [employer].  Patient will return 12/4 for next appointment.   Patient discharged in stable condition accompanied by: self.  Departure Mode: Ambulatory.      Catrachito Delvalle RN

## 2024-12-04 ENCOUNTER — INFUSION THERAPY VISIT (OUTPATIENT)
Dept: INFUSION THERAPY | Facility: CLINIC | Age: 24
End: 2024-12-04
Attending: ADVANCED PRACTICE MIDWIFE
Payer: COMMERCIAL

## 2024-12-04 VITALS
RESPIRATION RATE: 18 BRPM | SYSTOLIC BLOOD PRESSURE: 122 MMHG | DIASTOLIC BLOOD PRESSURE: 81 MMHG | TEMPERATURE: 97.1 F | HEART RATE: 94 BPM

## 2024-12-04 DIAGNOSIS — O21.0 HYPEREMESIS AFFECTING PREGNANCY, ANTEPARTUM: Primary | ICD-10-CM

## 2024-12-04 PROCEDURE — 250N000009 HC RX 250: Performed by: ADVANCED PRACTICE MIDWIFE

## 2024-12-04 PROCEDURE — 258N000003 HC RX IP 258 OP 636: Performed by: ADVANCED PRACTICE MIDWIFE

## 2024-12-04 PROCEDURE — 96365 THER/PROPH/DIAG IV INF INIT: CPT

## 2024-12-04 PROCEDURE — 250N000011 HC RX IP 250 OP 636: Performed by: ADVANCED PRACTICE MIDWIFE

## 2024-12-04 RX ORDER — ONDANSETRON 2 MG/ML
4 INJECTION INTRAMUSCULAR; INTRAVENOUS 2 TIMES WEEKLY
Status: CANCELLED | OUTPATIENT
Start: 2024-12-04

## 2024-12-04 RX ORDER — ONDANSETRON 2 MG/ML
4 INJECTION INTRAMUSCULAR; INTRAVENOUS 2 TIMES WEEKLY
OUTPATIENT
Start: 2024-12-04

## 2024-12-04 RX ADMIN — THIAMINE HYDROCHLORIDE: 200 INJECTION, SOLUTION INTRAMUSCULAR; INTRAVENOUS at 14:12

## 2024-12-04 NOTE — PROGRESS NOTES
Infusion Nursing Note:  Pau Murphy presents today for IVF.    Patient seen by provider today: No   present during visit today: Not Applicable.    Note: N/A.    Intravenous Access:  Peripheral IV placed.    Treatment Conditions:  Not Applicable.    Post Infusion Assessment:  Patient tolerated infusion without incident.  Blood return noted pre and post infusion.  Site patent and intact, free from redness, edema or discomfort.  No evidence of extravasations.  Access discontinued per protocol.     Discharge Plan:   Discharge instructions reviewed with: Patient.  Patient and/or family verbalized understanding of discharge instructions and all questions answered.  AVS to patient via [employer] Railroad[employer].  Patient will return 12/19 for next appointment.   Patient discharged in stable condition accompanied by: self.  Departure Mode: Ambulatory.    Jonathan Naranjo RN

## 2024-12-19 ENCOUNTER — INFUSION THERAPY VISIT (OUTPATIENT)
Dept: INFUSION THERAPY | Facility: CLINIC | Age: 24
End: 2024-12-19
Attending: ADVANCED PRACTICE MIDWIFE
Payer: COMMERCIAL

## 2024-12-19 VITALS — HEART RATE: 77 BPM | OXYGEN SATURATION: 98 % | DIASTOLIC BLOOD PRESSURE: 86 MMHG | SYSTOLIC BLOOD PRESSURE: 143 MMHG

## 2024-12-19 DIAGNOSIS — O21.0 HYPEREMESIS AFFECTING PREGNANCY, ANTEPARTUM: Primary | ICD-10-CM

## 2024-12-19 PROCEDURE — 250N000011 HC RX IP 250 OP 636: Performed by: ADVANCED PRACTICE MIDWIFE

## 2024-12-19 PROCEDURE — 250N000009 HC RX 250: Performed by: ADVANCED PRACTICE MIDWIFE

## 2024-12-19 PROCEDURE — 258N000003 HC RX IP 258 OP 636: Performed by: ADVANCED PRACTICE MIDWIFE

## 2024-12-19 RX ORDER — ONDANSETRON 2 MG/ML
4 INJECTION INTRAMUSCULAR; INTRAVENOUS 2 TIMES WEEKLY
OUTPATIENT
Start: 2024-12-19

## 2024-12-19 RX ORDER — ONDANSETRON 2 MG/ML
4 INJECTION INTRAMUSCULAR; INTRAVENOUS 2 TIMES WEEKLY
Status: DISCONTINUED | OUTPATIENT
Start: 2024-12-19 | End: 2024-12-19 | Stop reason: HOSPADM

## 2024-12-19 RX ADMIN — ONDANSETRON 4 MG: 2 INJECTION INTRAMUSCULAR; INTRAVENOUS at 12:52

## 2024-12-19 RX ADMIN — THIAMINE HYDROCHLORIDE: 200 INJECTION, SOLUTION INTRAMUSCULAR; INTRAVENOUS at 12:57

## 2024-12-19 NOTE — PROGRESS NOTES
Infusion Nursing Note:  Pau Murphy presents today for IVF/Zofran.    Patient seen by provider today: No   present during visit today: Not Applicable.    Note: Informed Clotilde that we have no further orders after today's infusion. She states she has an appt with her provider next week on Monday and will ask them to place new orders      Intravenous Access:  Peripheral IV placed.    Treatment Conditions:  Not Applicable.      Post Infusion Assessment:  Patient tolerated infusion without incident.  Blood return noted pre and post infusion.  Site patent and intact, free from redness, edema or discomfort.  No evidence of extravasations.  Access discontinued per protocol.       Discharge Plan:   AVS to patient via MYCHART.  Patient will return 12/26/24 for IVF/Zofran   Patient discharged in stable condition accompanied by: self.  Departure Mode: Ambulatory.      Cherie Arias RN

## 2024-12-23 ENCOUNTER — PRENATAL OFFICE VISIT (OUTPATIENT)
Dept: MIDWIFE SERVICES | Facility: CLINIC | Age: 24
End: 2024-12-23
Payer: COMMERCIAL

## 2024-12-23 VITALS
SYSTOLIC BLOOD PRESSURE: 114 MMHG | HEIGHT: 66 IN | OXYGEN SATURATION: 99 % | BODY MASS INDEX: 30.6 KG/M2 | HEART RATE: 95 BPM | DIASTOLIC BLOOD PRESSURE: 70 MMHG | WEIGHT: 190.4 LBS

## 2024-12-23 DIAGNOSIS — Z34.80 SUPERVISION OF OTHER NORMAL PREGNANCY, ANTEPARTUM: Primary | ICD-10-CM

## 2024-12-23 DIAGNOSIS — O21.0 HYPEREMESIS AFFECTING PREGNANCY, ANTEPARTUM: ICD-10-CM

## 2024-12-23 DIAGNOSIS — O35.EXX0 PYELECTASIS OF FETUS ON PRENATAL ULTRASOUND: ICD-10-CM

## 2024-12-23 LAB
ALBUMIN UR-MCNC: ABNORMAL MG/DL
APPEARANCE UR: CLEAR
BILIRUB UR QL STRIP: NEGATIVE
COLOR UR AUTO: YELLOW
GLUCOSE 1H P 50 G GLC PO SERPL-MCNC: 120 MG/DL (ref 70–129)
GLUCOSE UR STRIP-MCNC: NEGATIVE MG/DL
HGB BLD-MCNC: 12.1 G/DL (ref 11.7–15.7)
HGB UR QL STRIP: NEGATIVE
HOLD SPECIMEN: NORMAL
KETONES UR STRIP-MCNC: NEGATIVE MG/DL
LEUKOCYTE ESTERASE UR QL STRIP: ABNORMAL
NITRATE UR QL: NEGATIVE
PH UR STRIP: 7 [PH] (ref 5–8)
SP GR UR STRIP: 1.02 (ref 1–1.03)
UROBILINOGEN UR STRIP-ACNC: 0.2 E.U./DL

## 2024-12-23 PROCEDURE — 36415 COLL VENOUS BLD VENIPUNCTURE: CPT | Performed by: MIDWIFE

## 2024-12-23 PROCEDURE — 82950 GLUCOSE TEST: CPT | Performed by: MIDWIFE

## 2024-12-23 PROCEDURE — 99207 PR PRENATAL VISIT: CPT | Performed by: MIDWIFE

## 2024-12-23 PROCEDURE — 81003 URINALYSIS AUTO W/O SCOPE: CPT | Performed by: MIDWIFE

## 2024-12-23 PROCEDURE — 85018 HEMOGLOBIN: CPT | Performed by: MIDWIFE

## 2024-12-24 RX ORDER — ONDANSETRON 2 MG/ML
4 INJECTION INTRAMUSCULAR; INTRAVENOUS ONCE
Status: CANCELLED | OUTPATIENT
Start: 2024-12-24 | End: 2024-12-24

## 2024-12-24 RX ORDER — HEPARIN SODIUM,PORCINE 10 UNIT/ML
5-20 VIAL (ML) INTRAVENOUS DAILY PRN
Status: CANCELLED | OUTPATIENT
Start: 2024-12-24

## 2024-12-24 RX ORDER — HEPARIN SODIUM (PORCINE) LOCK FLUSH IV SOLN 100 UNIT/ML 100 UNIT/ML
5 SOLUTION INTRAVENOUS
Status: CANCELLED | OUTPATIENT
Start: 2024-12-24

## 2024-12-24 NOTE — PROGRESS NOTES
Flynn are here for ROBV at 27 4/7 weeks. Feeling better, continues to have daily nausea, now vomiting 2-3 times per week. Has continued on weekly IV therapy and reports this has helped. UA collected today to r/o dehydration.  Needs new orders for IV hydration and zofran once per week.  Orders placed.  Accepting of 1 hour GCT today and hgb.  Reviewed last US results which reveal mildly dilated renal pelves.  Recommend folllow up US in 3rd trimester between 28 and 30 weeks.  Referral for US provided.  If next US demonstrates ongoing dilation, will refer to [facility] for US.  Reviewed breast pump options, birth planning.  Considering epidural for pain relief.  Reviewed danger s/sx and how to contract CNM on call. RTC 4 weeks for ROBV.

## 2024-12-26 ENCOUNTER — INFUSION THERAPY VISIT (OUTPATIENT)
Dept: INFUSION THERAPY | Facility: CLINIC | Age: 24
End: 2024-12-26
Attending: ADVANCED PRACTICE MIDWIFE
Payer: COMMERCIAL

## 2024-12-26 VITALS
HEART RATE: 82 BPM | OXYGEN SATURATION: 94 % | SYSTOLIC BLOOD PRESSURE: 114 MMHG | RESPIRATION RATE: 16 BRPM | TEMPERATURE: 97 F | DIASTOLIC BLOOD PRESSURE: 73 MMHG

## 2024-12-26 DIAGNOSIS — O21.0 HYPEREMESIS AFFECTING PREGNANCY, ANTEPARTUM: Primary | ICD-10-CM

## 2024-12-26 PROCEDURE — 250N000011 HC RX IP 250 OP 636: Performed by: MIDWIFE

## 2024-12-26 PROCEDURE — 258N000003 HC RX IP 258 OP 636: Performed by: MIDWIFE

## 2024-12-26 RX ORDER — ONDANSETRON 2 MG/ML
4 INJECTION INTRAMUSCULAR; INTRAVENOUS ONCE
Status: CANCELLED | OUTPATIENT
Start: 2024-12-26 | End: 2024-12-26

## 2024-12-26 RX ORDER — HEPARIN SODIUM,PORCINE 10 UNIT/ML
5-20 VIAL (ML) INTRAVENOUS DAILY PRN
OUTPATIENT
Start: 2024-12-26

## 2024-12-26 RX ORDER — HEPARIN SODIUM (PORCINE) LOCK FLUSH IV SOLN 100 UNIT/ML 100 UNIT/ML
5 SOLUTION INTRAVENOUS
OUTPATIENT
Start: 2024-12-26

## 2024-12-26 RX ORDER — ONDANSETRON 2 MG/ML
4 INJECTION INTRAMUSCULAR; INTRAVENOUS ONCE
Status: COMPLETED | OUTPATIENT
Start: 2024-12-26 | End: 2024-12-26

## 2024-12-26 RX ADMIN — SODIUM CHLORIDE, POTASSIUM CHLORIDE, SODIUM LACTATE AND CALCIUM CHLORIDE 1000 ML: 600; 310; 30; 20 INJECTION, SOLUTION INTRAVENOUS at 12:51

## 2024-12-26 RX ADMIN — ONDANSETRON 4 MG: 2 INJECTION INTRAMUSCULAR; INTRAVENOUS at 12:51

## 2024-12-26 ASSESSMENT — PAIN SCALES - GENERAL: PAINLEVEL_OUTOF10: NO PAIN (0)

## 2024-12-26 NOTE — PROGRESS NOTES
Infusion Nursing Note:    Pau Murphy presents today for 1L of LR + 4mg IV zofran  .    Patient seen by provider today: No, was seen by OB on 12/23, orders updated.     present during visit today: Not Applicable.    Note: N/A.      Intravenous Access:  Peripheral IV placed.    Treatment Conditions:  Not Applicable.      Post Infusion Assessment:  Patient tolerated infusion without incident.  Site patent and intact, free from redness, edema or discomfort.  No evidence of extravasations.  Access discontinued per protocol.       Discharge Plan:   Discharge instructions reviewed with: Patient.  Patient and/or family verbalized understanding of discharge instructions and all questions answered.  AVS to patient via [employer].  Patient will return in a week for next appointment.   Patient discharged in stable condition accompanied by: self.  Departure Mode: Ambulatory.      Maggie Grider RN

## 2025-01-08 ENCOUNTER — INFUSION THERAPY VISIT (OUTPATIENT)
Dept: INFUSION THERAPY | Facility: CLINIC | Age: 25
End: 2025-01-08
Attending: ADVANCED PRACTICE MIDWIFE
Payer: COMMERCIAL

## 2025-01-08 VITALS
DIASTOLIC BLOOD PRESSURE: 70 MMHG | SYSTOLIC BLOOD PRESSURE: 117 MMHG | OXYGEN SATURATION: 98 % | TEMPERATURE: 98 F | RESPIRATION RATE: 16 BRPM | HEART RATE: 88 BPM

## 2025-01-08 DIAGNOSIS — O21.0 HYPEREMESIS AFFECTING PREGNANCY, ANTEPARTUM: Primary | ICD-10-CM

## 2025-01-08 PROCEDURE — 96375 TX/PRO/DX INJ NEW DRUG ADDON: CPT

## 2025-01-08 PROCEDURE — 96365 THER/PROPH/DIAG IV INF INIT: CPT

## 2025-01-08 PROCEDURE — 258N000003 HC RX IP 258 OP 636: Performed by: MIDWIFE

## 2025-01-08 PROCEDURE — 250N000011 HC RX IP 250 OP 636: Performed by: ADVANCED PRACTICE MIDWIFE

## 2025-01-08 PROCEDURE — 250N000011 HC RX IP 250 OP 636: Performed by: MIDWIFE

## 2025-01-08 PROCEDURE — 250N000009 HC RX 250: Performed by: MIDWIFE

## 2025-01-08 RX ORDER — HEPARIN SODIUM (PORCINE) LOCK FLUSH IV SOLN 100 UNIT/ML 100 UNIT/ML
5 SOLUTION INTRAVENOUS
OUTPATIENT
Start: 2025-01-09

## 2025-01-08 RX ORDER — HEPARIN SODIUM (PORCINE) LOCK FLUSH IV SOLN 100 UNIT/ML 100 UNIT/ML
5 SOLUTION INTRAVENOUS
Status: CANCELLED | OUTPATIENT
Start: 2025-01-08

## 2025-01-08 RX ORDER — ONDANSETRON 2 MG/ML
4 INJECTION INTRAMUSCULAR; INTRAVENOUS 2 TIMES WEEKLY
Status: DISCONTINUED | OUTPATIENT
Start: 2025-01-09 | End: 2025-01-08 | Stop reason: HOSPADM

## 2025-01-08 RX ORDER — HEPARIN SODIUM,PORCINE 10 UNIT/ML
5-20 VIAL (ML) INTRAVENOUS DAILY PRN
OUTPATIENT
Start: 2025-01-09

## 2025-01-08 RX ORDER — ONDANSETRON 2 MG/ML
4 INJECTION INTRAMUSCULAR; INTRAVENOUS 2 TIMES WEEKLY
OUTPATIENT
Start: 2025-01-09

## 2025-01-08 RX ORDER — HEPARIN SODIUM,PORCINE 10 UNIT/ML
5-20 VIAL (ML) INTRAVENOUS DAILY PRN
Status: CANCELLED | OUTPATIENT
Start: 2025-01-08

## 2025-01-08 RX ADMIN — THIAMINE HYDROCHLORIDE: 200 INJECTION, SOLUTION INTRAMUSCULAR; INTRAVENOUS at 13:50

## 2025-01-08 RX ADMIN — ONDANSETRON 4 MG: 2 INJECTION INTRAMUSCULAR; INTRAVENOUS at 13:47

## 2025-01-08 ASSESSMENT — PAIN SCALES - GENERAL: PAINLEVEL_OUTOF10: NO PAIN (0)

## 2025-01-08 NOTE — PROGRESS NOTES
Infusion Nursing Note:    Pau Murphy presents today for 1L of Vitamin bag + 4mg of IV zofran.      Patient seen by provider today: No     present during visit today: Not Applicable.    Note: New orders placed today as there were no orders for infusion today.      Intravenous Access:  Peripheral IV placed.    Treatment Conditions:  Not Applicable.      Post Infusion Assessment:  Patient tolerated infusion without incident.  Site patent and intact, free from redness, edema or discomfort.  No evidence of extravasations.  Access discontinued per protocol.       Discharge Plan:   Discharge instructions reviewed with: Patient.  Patient and/or family verbalized understanding of discharge instructions and all questions answered.  AVS to patient via [employer].  Patient will return next week for next appointment.   Patient discharged in stable condition accompanied by: self.  Departure Mode: Ambulatory.      Maggie Grider RN

## 2025-01-10 ENCOUNTER — HOSPITAL ENCOUNTER (OUTPATIENT)
Dept: ULTRASOUND IMAGING | Facility: CLINIC | Age: 25
Discharge: HOME | End: 2025-01-10
Attending: MIDWIFE | Admitting: RADIOLOGY
Payer: COMMERCIAL

## 2025-01-10 DIAGNOSIS — Z34.80 SUPERVISION OF OTHER NORMAL PREGNANCY, ANTEPARTUM: ICD-10-CM

## 2025-01-10 DIAGNOSIS — O35.EXX0 PYELECTASIS OF FETUS ON PRENATAL ULTRASOUND: ICD-10-CM

## 2025-01-10 PROCEDURE — 76816 OB US FOLLOW-UP PER FETUS: CPT

## 2025-01-12 PROBLEM — O35.EXX0 PYELECTASIS OF FETUS ON PRENATAL ULTRASOUND: Status: ACTIVE | Noted: 2024-11-04

## 2025-01-14 NOTE — PROGRESS NOTES
"Pau ELIAS Murphy \"Clotilde\" is here for KEVIN at 30w6d. Here with Laith and daughter.  Last IV infusion was on  and she received 1 L of vitamin bag and 4 mg IV Zofran. She has another IV infusion this afternoon. Feels weekly visits are going well and overall is feeling better and vomiting less times per week. Able to add a little more variety into diet. Shares that she tried to stop doing IV infusions in her third trimester with first baby and it got worse again. Her weight gain is low overall.  She had a follow up US on 1/10 showing \"Slight prominence of the bilateral fetal renal pelves measuring 3 mm on both sides unchanged from 2024. No other gross fetal morphologic abnormality.\" Discussed that typically it is more concerning to have dilation at >/=4 and with oligohydramnios (low fluid) but that we could continue to follow/recheck 32-34 weeks. Patient would like to schedule follow up US- orders placed and added on growth request as well due to low maternal weight gain.  Fetal movement is active.  Discussed Tdap vaccine and she declines today.   Also discussed RPR based on [facility] recommendations and declines.  32 week checklist done.  Feeding plans/breast pump: breast. Will check with insurance on breast pump RX options.  [place] Provider chosen: undecided.  PP BCM/family planning: Had Kyleena IUD in past. Uncertain this time, may want Paragard.  Birth plan/preferences: considering epidural.  Car seat: yes  Circumcision if boy: yes.  TWG: -0.726 kg (-1 lb 9.6 oz), and low overall.  Uterine size appropriate for dates.  RTC 2 weeks.      "

## 2025-01-15 ENCOUNTER — PRENATAL OFFICE VISIT (OUTPATIENT)
Dept: MIDWIFE SERVICES | Facility: CLINIC | Age: 25
End: 2025-01-15
Payer: COMMERCIAL

## 2025-01-15 ENCOUNTER — INFUSION THERAPY VISIT (OUTPATIENT)
Dept: INFUSION THERAPY | Facility: CLINIC | Age: 25
End: 2025-01-15
Attending: ADVANCED PRACTICE MIDWIFE
Payer: COMMERCIAL

## 2025-01-15 VITALS
BODY MASS INDEX: 31.08 KG/M2 | HEIGHT: 66 IN | OXYGEN SATURATION: 98 % | SYSTOLIC BLOOD PRESSURE: 115 MMHG | DIASTOLIC BLOOD PRESSURE: 70 MMHG | HEART RATE: 96 BPM | WEIGHT: 193.4 LBS

## 2025-01-15 VITALS
HEART RATE: 83 BPM | OXYGEN SATURATION: 100 % | TEMPERATURE: 98 F | RESPIRATION RATE: 20 BRPM | SYSTOLIC BLOOD PRESSURE: 115 MMHG | DIASTOLIC BLOOD PRESSURE: 75 MMHG

## 2025-01-15 DIAGNOSIS — Z34.80 SUPERVISION OF OTHER NORMAL PREGNANCY, ANTEPARTUM: Primary | ICD-10-CM

## 2025-01-15 DIAGNOSIS — O21.0 HYPEREMESIS AFFECTING PREGNANCY, ANTEPARTUM: ICD-10-CM

## 2025-01-15 DIAGNOSIS — O21.0 HYPEREMESIS AFFECTING PREGNANCY, ANTEPARTUM: Primary | ICD-10-CM

## 2025-01-15 DIAGNOSIS — O35.EXX0 PYELECTASIS OF FETUS ON PRENATAL ULTRASOUND: ICD-10-CM

## 2025-01-15 DIAGNOSIS — O26.10 LOW MATERNAL WEIGHT GAIN, UNSPECIFIED TRIMESTER: ICD-10-CM

## 2025-01-15 PROCEDURE — 250N000009 HC RX 250: Performed by: MIDWIFE

## 2025-01-15 PROCEDURE — 250N000011 HC RX IP 250 OP 636: Performed by: MIDWIFE

## 2025-01-15 PROCEDURE — 96365 THER/PROPH/DIAG IV INF INIT: CPT

## 2025-01-15 PROCEDURE — 258N000003 HC RX IP 258 OP 636: Performed by: MIDWIFE

## 2025-01-15 RX ORDER — HEPARIN SODIUM (PORCINE) LOCK FLUSH IV SOLN 100 UNIT/ML 100 UNIT/ML
5 SOLUTION INTRAVENOUS
OUTPATIENT
Start: 2025-01-16

## 2025-01-15 RX ORDER — ONDANSETRON 2 MG/ML
4 INJECTION INTRAMUSCULAR; INTRAVENOUS 2 TIMES WEEKLY
OUTPATIENT
Start: 2025-01-16

## 2025-01-15 RX ORDER — ONDANSETRON 2 MG/ML
4 INJECTION INTRAMUSCULAR; INTRAVENOUS 2 TIMES WEEKLY
Status: DISCONTINUED | OUTPATIENT
Start: 2025-01-16 | End: 2025-01-15 | Stop reason: HOSPADM

## 2025-01-15 RX ORDER — HEPARIN SODIUM,PORCINE 10 UNIT/ML
5-20 VIAL (ML) INTRAVENOUS DAILY PRN
OUTPATIENT
Start: 2025-01-16

## 2025-01-15 RX ADMIN — THIAMINE HYDROCHLORIDE: 200 INJECTION, SOLUTION INTRAMUSCULAR; INTRAVENOUS at 14:49

## 2025-01-15 ASSESSMENT — PAIN SCALES - GENERAL: PAINLEVEL_OUTOF10: NO PAIN (0)

## 2025-01-15 NOTE — PATIENT INSTRUCTIONS
"Learning About Birth Control After Childbirth  Birth control is any method used to prevent pregnancy. If you have vaginal sex without birth control, you could get pregnant--even if you haven't started having periods again. You're less likely to get pregnant while breastfeeding, but it's still possible. Finding birth control that works for you can help avoid an unplanned pregnancy.  There are many kinds of birth control. Each has pros and cons. Find what works for you. Talk to your doctor if you've just given birth or are breastfeeding.    Long-acting reversible contraception (LARC). These are placed inside your body by a doctor. They can prevent pregnancy for years.  Examples include:  An implant (hormonal).  Copper intrauterine device (IUD).  Hormonal IUDs.    Short-acting hormonal methods. These release hormones. Examples include:  Combination birth control pills (\"the pill\").  Skin patches.  A vaginal ring.  A shot.  Mini-pills. Choose progestin-only options soon after giving birth.    Barrier methods. Use these every time you have vaginal sex.  Examples include:  External (male) condoms.  Internal (female) condoms.  Diaphragms.  Cervical caps.  Sponges.    Spermicides. These kill sperm or stop sperm from moving. They can be gels, creams, foams, films, or tablets. Use them before vaginal sex.  Examples include:  Nonoxynol-9.  pH regulator gel.    Permanent birth control (sterilization). This can be an option if you're sure that you don't want to get pregnant later.  Examples include:  Vasectomy.  Having tubes tied (tubal ligation).    Emergency contraception. This is a backup method. Use it if you didn't use birth control or your birth control method failed.  Examples include:  Copper and hormonal IUDs.  Emergency contraceptive pills.    Fertility awareness. You'll learn when you are most likely to become pregnant (are fertile). You can avoid vaginal sex at that time.  It's also called:  Natural family " "planning.  The rhythm method.    Breastfeeding. This is most effective when all of these are true:  Your baby is younger than 6 months old.  You're breastfeeding and not bottle-feeding at all.  You aren't having periods.  Follow-up care is a key part of your treatment and safety. Be sure to make and go to all appointments, and call your doctor if you are having problems. It's also a good idea to know your test results and keep a list of the medicines you take.  Where can you learn more?  Go to https://www.[employer].net/patiented  Enter X408 in the search box to learn more about \"Learning About Birth Control After Childbirth.\"  Current as of: April 30, 2024  Content Version: 14.3    2024 [employer].   Care instructions adapted under license by your healthcare professional. If you have questions about a medical condition or this instruction, always ask your healthcare professional. [employer] disclaims any warranty or liability for your use of this information.    Weeks 32 to 34 of Your Pregnancy: Care Instructions    Decide whether you want to bank or donate your baby's umbilical cord blood. If you want to save this blood, you have to arrange for it ahead of time.   Decide about circumcision. Personal, Judaism, or cultural beliefs may play a role in your decision. You get to decide what you want for your baby.         Learn how to ease hemorrhoids.   Get more liquids, fruits, vegetables, and fiber in your diet.  Avoid sitting for too long.  Clean yourself with moist toilet paper. Or try witch hazel pads.  Try ice packs or warm sitz baths for discomfort.  Use hydrocortisone cream for pain or itching.  Ask your doctor about stool softeners.        Consider the benefits of breastfeeding.   It reduces your baby's risk of sudden infant death syndrome (SIDS).   babies are less likely to get certain infections. And they're less likely to be obese or get diabetes later in life.  It can " "lower your risk of breast and ovarian cancers and osteoporosis.  It saves you money.  Follow-up care is a key part of your treatment and safety. Be sure to make and go to all appointments, and call your doctor if you are having problems. It's also a good idea to know your test results and keep a list of the medicines you take.  Where can you learn more?  Go to https://www.[employer].net/patiented  Enter X711 in the search box to learn more about \"Weeks 32 to 34 of Your Pregnancy: Care Instructions.\"  Current as of: April 30, 2024  Content Version: 14.3    2024 [employer].   Care instructions adapted under license by your healthcare professional. If you have questions about a medical condition or this instruction, always ask your healthcare professional. [employer] disclaims any warranty or liability for your use of this information.    Circumcision in Infants: What to Expect at Home  Your Child's Recovery  After circumcision, your baby's penis may look red and swollen. It may have petroleum jelly and gauze on it. The gauze will likely come off when your baby urinates. Follow your doctor's directions about whether to put clean gauze back on your baby's penis or to leave the gauze off. If you need to remove gauze from the penis, use warm water to soak the gauze and gently loosen it.  The doctor may have used a Plastibell device to do the circumcision. If so, your baby will have a plastic ring around the head of the penis. The ring should fall off by itself in 10 to 12 days.  A thin, yellow film may form over the area the day after the procedure. This is part of the normal healing process. It should go away in a few days.  Your baby may seem fussy while the area heals. It may hurt for your baby to urinate. This pain often gets better in 3 or 4 days. But it may last for up to 2 weeks.  Even though your baby's penis will likely start to feel better after 3 or 4 days, it may look worse. The penis " often starts to look like it's getting better after about 7 to 10 days.  This care sheet gives you a general idea about how long it will take for your child to recover. But each child recovers at a different pace. Follow the steps below to help your child get better as quickly as possible.  How can you care for your child at home?  Activity    Let your baby rest as much as possible. Sleeping will help with recovery.     You can give your baby a sponge bath the day after surgery. Ask your doctor when it is okay to give your baby a bath.   Medicines    Your doctor will tell you if and when your child can restart any medicines. The doctor will also give you instructions about your child taking any new medicines.     Your doctor may recommend giving your baby acetaminophen (Tylenol) to help with pain after the procedure. Be safe with medicines. Give your child medicines exactly as prescribed. Call your doctor if you think your child is having a problem with a medicine.     Do not give your child two or more pain medicines at the same time unless the doctor told you to. Many pain medicines have acetaminophen, which is Tylenol. Too much acetaminophen (Tylenol) can be harmful.   Circumcision care    Always wash your hands before and after touching the circumcision area.     Gently wash your baby's penis with plain, warm water after each diaper change, and pat it dry. Do not use soap. Don't use hydrogen peroxide or alcohol. They can slow healing.     Do not try to remove the film that forms on the penis. The film will go away on its own.     Put plenty of petroleum jelly (such as Vaseline) on the circumcision area during each diaper change. This will prevent your baby's penis from sticking to the diaper while it heals.     Fasten your baby's diapers loosely so that there is less pressure on the penis while it heals.   Follow-up care is a key part of your child's treatment and safety. Be sure to make and go to all  "appointments, and call your doctor if your child is having problems. It's also a good idea to know your child's test results and keep a list of the medicines your child takes.  When should you call for help?   Call your doctor now or seek immediate medical care if:    Your baby has a fever over 100.4 F.     Your baby is extremely fussy or irritable, has a high-pitched cry, or refuses to eat.     Your baby does not have a wet diaper within 12 hours after the circumcision.     You find a spot of bleeding larger than a 2-inch [place] from the incision.     Your baby has signs of infection. Signs may include severe swelling; redness; a red streak on the shaft of the penis; or a thick, yellow discharge.   Watch closely for changes in your child's health, and be sure to contact your doctor if:    A Plastibell device was used for the circumcision and the ring has not fallen off after 10 to 12 days.   Where can you learn more?  Go to https://www.[employer].net/patiented  Enter S255 in the search box to learn more about \"Circumcision in Infants: What to Expect at Home.\"  Current as of: October 24, 2023  Content Version: 14.3    2024 [employer].   Care instructions adapted under license by your healthcare professional. If you have questions about a medical condition or this instruction, always ask your healthcare professional. [employer] disclaims any warranty or liability for your use of this information.    "

## 2025-01-15 NOTE — PROGRESS NOTES
Infusion Nursing Note:  Pauhe Murphy presents today for Vitamin bag IV fluids.    Patient seen by provider today: Yes: Jessica Mendez CNM    present during visit today: Not Applicable.    Note: Clotilde here feeling overall well. Declines need for IV zofran today. Seen in OB clinic this AM.      Intravenous Access:  Peripheral IV placed.    Treatment Conditions:  Not Applicable.      Post Infusion Assessment:  Patient tolerated infusion without incident.       Discharge Plan:   Discharge instructions reviewed with: Patient.  AVS to patient via [employer].  Patient will return 1/23/25 for next appointment.   Patient discharged in stable condition accompanied by: self.  Departure Mode: Ambulatory.      Liana Dietrich RN

## 2025-01-21 ENCOUNTER — MYC MEDICAL ADVICE (OUTPATIENT)
Dept: MIDWIFE SERVICES | Facility: CLINIC | Age: 25
End: 2025-01-21

## 2025-01-21 ENCOUNTER — PRENATAL OFFICE VISIT (OUTPATIENT)
Dept: MIDWIFE SERVICES | Facility: CLINIC | Age: 25
End: 2025-01-21
Payer: COMMERCIAL

## 2025-01-21 VITALS
HEIGHT: 66 IN | DIASTOLIC BLOOD PRESSURE: 70 MMHG | SYSTOLIC BLOOD PRESSURE: 120 MMHG | BODY MASS INDEX: 31.21 KG/M2 | HEART RATE: 84 BPM | WEIGHT: 194.2 LBS

## 2025-01-21 DIAGNOSIS — R03.0 ELEVATED BP WITHOUT DIAGNOSIS OF HYPERTENSION: Primary | ICD-10-CM

## 2025-01-21 DIAGNOSIS — O35.EXX0 PYELECTASIS OF FETUS ON PRENATAL ULTRASOUND: Primary | ICD-10-CM

## 2025-01-21 DIAGNOSIS — O35.EXX0 PYELECTASIS OF FETUS ON PRENATAL ULTRASOUND: ICD-10-CM

## 2025-01-21 LAB
ERYTHROCYTE [DISTWIDTH] IN BLOOD BY AUTOMATED COUNT: 13.3 % (ref 10–15)
HCT VFR BLD AUTO: 36.7 % (ref 35–47)
HGB BLD-MCNC: 12.5 G/DL (ref 11.7–15.7)
MCH RBC QN AUTO: 30.2 PG (ref 26.5–33)
MCHC RBC AUTO-ENTMCNC: 34.1 G/DL (ref 31.5–36.5)
MCV RBC AUTO: 89 FL (ref 78–100)
PLATELET # BLD AUTO: 206 10E3/UL (ref 150–450)
RBC # BLD AUTO: 4.14 10E6/UL (ref 3.8–5.2)
WBC # BLD AUTO: 11.6 10E3/UL (ref 4–11)

## 2025-01-21 PROCEDURE — 99207 PR PRENATAL VISIT: CPT | Performed by: MIDWIFE

## 2025-01-21 PROCEDURE — 80053 COMPREHEN METABOLIC PANEL: CPT | Performed by: MIDWIFE

## 2025-01-21 PROCEDURE — 84156 ASSAY OF PROTEIN URINE: CPT | Performed by: MIDWIFE

## 2025-01-21 PROCEDURE — 85027 COMPLETE CBC AUTOMATED: CPT | Performed by: MIDWIFE

## 2025-01-21 PROCEDURE — 36415 COLL VENOUS BLD VENIPUNCTURE: CPT | Performed by: MIDWIFE

## 2025-01-21 NOTE — PATIENT INSTRUCTIONS
"\"We hope you had a positive experience and that you can definitely recommend [facility] to your family and friends. You ll be receiving a survey soon and we look forward to hearing your feedback\".    [facility]  - Contact information:  Appointment line and to get a hold of CNM in clinic Monday-Friday 8 am - 5 pm:  [phone].  There are some clinics with early start times (1st appointment 7:40 am) and others with evening hours (last appointment 6:20 pm).  Most are typically open from 8 am to 5 pm.    CNM on call answering service: [phone].  Specify your hospital of choice and leave a brief message for CNM;  will then page CNM who is on call at your specified hospital and you should receive a call back with 15 minutes.  Be sure that your ringer is audible and that you can accept blocked calls so that we can get back in touch with you! This number should be reserved for urgent needs if during the day, before 8 am, after 5 pm, weekends, holidays.    Contact the on-call CNM with warning signs, such as:  vaginal bleeding   Vaginal discharge and itching or pain and burning during urination  Leg/calf pain or swelling on one side  severe abdominal pain  nausea and vomiting more than 4-5 times a day, or if you are unable to keep anything down  fever more than 100.4 degrees F.     [place]  After each of your visits you are welcome to check Sentrinsic for your visit summary including education and links to information relevant to your pregnancy and/or well woman care.   Find the \"Visits\" tab at the top of the page, you will see a list of recent visits and for each visit a for link for \"View After Visit Summary.\" View of your After Visit Summary will allow you to read our recommendations from your visit, review any education provided, and link to websites with useful information.   If you have any questions or difficulty navigating Teliris, please feel free to " "contact us and we will do our best to direct you.  Meet the Midwives from [facility]  You are invited to an informational meet and greet with [facility]'s [facility]ified Nurse-Midwives. Our free \"Meet the Midwives\" event is a great opportunity to learn about our midwives' philosophy and experience, the hospitals where we can assist with your birth, and answer questions you may have. Partners, friends, and family are welcome to attend. Currently, this is a virtual event.  Date  Last Thursday of every month at 7 pm.    Link to next (live) meeting  [email or web address]-[email or web address]  To Join by Telephone (audio only) Call:   [phone] Phone Conference ID: [phone] #      Touring the Maternity Care Center  At this time we are offering a virtual tour of the Maternity Care Centers at both [facility] and [facility]:   [facility] Maternity Care:   https://[facility].MyCosmik/locations/the-birthplace-at--[facility] Maternity Care:   https://Lust have it!Cerevellum [employer].MyCosmik/locations/[facility]    Scroll to the bottom of the page in this link if the above link does not work.      Breastfeeding and Birth Control  How do I decide what birth control method is best for me while I am breastfeeding?  Choosing a method of birth control is very personal. First, answer the following questions:     Do you want to have more children?   How much spacing between births do you want for your children?   Do you smoke or have you had any health problems, such as liver disease or a blood clot?  Talk about the answers to each of these questions with your health care provider to help you choose the best method for you.    Can I use breastfeeding as my birth control?  Using breastfeeding as your birth control (the lactational amenorrhea method) can be a good way to keep from getting pregnant in the first " months after the baby is born. Each time your baby nurses, your body releases a hormone called prolactin, which stops your body from making the hormones that cause you to ovulate (release an egg). If you are not ovulating, you cannot get pregnant.    The lactational amenorrhea method works only if:   you have not started your period yet.   you are breastfeeding only and not giving your baby any other food or drink.   you are breastfeeding at least every 4 hours during the day and every 6 hours at night.   your baby is less than 6 months old.  When any 1 of these 4 things is not happening, you no longer have good protection from getting pregnant, and you should use another form of birth control.    What birth control methods are safe for me to use while I breastfeed?    Methods without hormones  Methods without hormones do not affect you, your baby, or your breastfeeding.  Methods without hormones that are the most effective   The copper intrauterine contraceptive device (IUD) (ParaGard) is a small, T-shaped device that is in- serted into your uterus (womb) through the vagina and cervix. The copper IUD lasts for 10 years.   Sterilization (getting your tubes tied or your partner having a vasectomy) is very effective, but it is per- manent. You should choose sterilization only if you do not want to have more children.  A method without hormones that is effective   The lactational amenorrhea method described above is effective for the first 6 months.  Methods without hormones that are less effective   Natural family planning is monitoring your body for signs of ovulation and not having sex when you think you are ovulating. This method is reliable only if you are having regular periods every month.   Barrier methods (condoms, diaphragms, sponges, and spermicides) are used at the time you have sex. These methods are effective only if you use them correctly every time.    Methods with hormones  Birth control methods that  use hormones can be used while you are breastfeeding. They may have a small effect on lowering the amount of milk you make. All hormones will get into your breast milk in very small amounts, but there is no known harm to your baby from this small amount of hormone in breast milk.only methodsmethods use only 1 hormone, called progestin. You can start them right after your baby is born or wait 4 to 6 weeks to make sure your milk supply is good.   Progestin-only pills ( minipills ): If you like to take pills every day, you can use the minipill. In order forpill to work well, you have to take 1 at the same time each day. When you stop breastfeeding, you should start pills that have both estrogen and progestin because they are better at keeping you from get- ting pregnant.   Progestin IUD (Mirena): The progestin IUD is shaped and inserted into the uterus like the copper IUD. It works for up to 5 years. Both IUDs are usually inserted 4 to 6 weeks after the baby is born.  Progestin implant (Nexplanon): The progestin implant is a small matchstick-sized flexible katie. It is placed into the fatty tissue in the back of your arm. It works for up to 3 years.  Progestin shot (Depo-Provera): The progestin shot is given every 3 months.    estrogen and progestin methods  These methods use 2 hormones, called estrogen and progestin.     These methods increase your risk of a blood clot, which is already higher than normal after you have a baby. You should not use them until your baby is at least 6 weeks old. The combined methods are not recommended as the first choice for women who are breastfeeding. If a combined method is the one that you feel will be best for you to prevent getting pregnant, these methods are okay to use while breastfeeding.   Combined birth control pills: You take a pill each day.  Vaginal ring (NuvaRing): The ring is worn in the vagina for 3 weeks then left out for 1 week before youin a new ring.  Patch (Ortho  Loreta): The patch is placed on your skin and changed every week for 3 weeks then left off forweek before putting a new patch on a different area of your skin.    Pediatric Care Providers at [facility]:    Choosing the right provider is one of the most important decisions you ll make about your health care. We can help you find the right one. Remember, you re looking for a provider you can trust and work with to improve your health and well-being, so take time to think about what you need. Depending on how complicated your health care needs are, you may need to see more than one type of provider.    Primary Care Providers: You ll see a primary care provider first for most health issues. They ll work with you to get your recommended screenings, help you manage chronic conditions, and refer you to other types of providers if you need them. Your primary care provider may be called a family physician or doctor, internist, general practitioner, nurse practitioner, or physician s assistant. Your child or teenager s provider may be called a pediatrician.  Specialists: You ll see a specialist for certain services or to treat specific conditions. Specialists include cardiologists, oncologists, psychologists, allergists, podiatrists, and orthopedists. You may need a referral from your primary care provider before you go to a specialist in order for your health plan to pay for your visit.\Rosaere are some tips for finding a provider where you live:  If you already have a provider you like and want to keep working with, call their office and ask if they accept your coverage.  Call your insurance company or state Medicaid and CHIP program. Look at their website or check your member handbook to find providers in your network who take your health coverage.  Ask your friends or family if they have providers they like and use these tools to compare health care providers in your area.    Family Medicine at  [facility]:      https://www.[place].org/specialties/family-medicine    Many of our families enjoy all seeing the same doctor, who comes to know the whole family very well. We base our practice on the knowledge of the patient in the context of family and community.  WHY CHOOSE A FAMILY MEDICINE PHYSICIAN?  Ability of the whole family to see the same doctor  Focus on the whole person, including physical and emotional health  A personal relationship with their doctor that is nurtured over time  Respect for individual and family beliefs and values  No need to change primary care providers when a certain age is reached  Coordination of care when other health care services are needed    Pediatrics at  [facility]:   https://www.[place].org/specialties/pediatric-care    Through a teaching affiliation with the [facility], [facility] staff keeps current on new developments in the field of pediatrics.     Everything we do centers around caring for children. We place special emphasis on wellness and prevention.pediatric care team includes a team of pediatricians and certified nurse practitioners who provide care to pediatric and adolescent patients ages 0 to 18, and some up to the age of 26. We offer preventive health maintenance for healthy children as well the diagnosis and treatment of common and chronic illnesses and injuries. In addition, we also offer several pediatric specialists who focus on adolescent health issues and developmental and behavioral issues.    Circumcision    Educational video:     [email or web address]    For More Information  American Academy of Family Physicians: Circumcision  http://familydoctor.org/familydoctor/en/pregnancy-newborns/caring-for-newborns/infant- care/circumcision.html  MedlinePlus: Circumcision (includes a slide show on the procedure)  www.nlm.nih.gov/medlineplus/circumcision.html  American Academy of Pediatrics:  Policy statement on circumcision  Http://pediatrics.aappublications.org/content/130/3/e756.abstract      Childbirth and Parenting Education:     Everyday Miracles:   https://www.everyday-miracles.org/    Free Video Series from [facility]: https://nursing.[facility]/academics/specialty-areas/nurse-midwifery/having-baby-prenatal-videos/having-baby-prenatal-and    Childbirth Education virtual (live) classes: www.Tauntr/classes  The Birth Hour: https://Hit Systems/online-childbirth-class/  BirthED: https://www.birthedmn.com/  DAMIAN parenting center: http://ezTaxi/   (118) 333-TBZQ  Blooma: (education, yoga & wellness) www.RealLifeConnect  Enlightened Mama: www.enlightenedmama.[facility]   Childbirth collective: (Parent topic nights)  www.childbirthcollective.org/  Hypnobabies:  www.hypnobabiestExplorys.[facility]/  Hypnobirthing:  Http://Alekto.[facility]/  Hypnobirthing virtual class: www.B[employer]C Technologies/hypnobirthing    Information about doulas:  Childbirth collective: http://www.childbirthcollective.org/  Doulas of [place] (LANCE):  www.lance.org  [facility]  project: http://twincitiesdoulaproject.com/     Early Childhood and Family Education (ECFE):  ECFE offers parents hands-on learning experiences that will nourish a lifetime of teachable moments.  http://ecfe.info/ecfe-home/    APPS and Podcasts:   Perlita Mariscal Nurture    Evidence Based Birth  The Birth Hour (for birth stories)   Birthful   Expectful   The Longest Shortest Time  PregnancyPodcjerson Castellano  https://www.downtobirthshow.com/    Book Recommendations:   Lina Vacaville's Birthing From Within--first few chapters include a new-age tone, you may prefer to skip it and keep going, because there is good stuff later.  This book recommendation covers emotional preparation, but does cover coping with pain, and use of both pharmacological and nonpharmacological methods.    Guide to Childbirth by Sahara May  "Haresh  Childbirth Without Fear by Natalie Samuel Read    Dr. Mahajan' The Pregnancy Book and The Birth Book--the pregnancy book goes month-by month    The Birth Partner by Leigh Chapa    Womanly Art of Breastfeeding by La Leche League International   Bestfeeding by Brii Gay--great pictures    Mothering Your Nursing Toddler, by Lisette Barreto.   Addresses dealing with so many of the challenging behaviors of a nursing toddler.  How Weaning Happens, by La Leche League.  Discusses weaning at all ages, from medically necessary weaning of an infant, all the way up to age 5 (or older), with why/why not, and strategies.  Very empowering book both for deciding to wean and deciding not to.    American College of Nurse-Midwives (ACNM) http://www.midwife.org/; look at the informational handouts at http://www.midwife.org/Share-With-Women     www.mymidwife.org    Mother to Baby (Medication and Herbal guidance in pregnancy): http://www.mothertobaby.org  Toll-Free Hotline: 219.628.6313  LactMed (Medication use while breastfeeding): http://toxnet.nlm.nih.gov/newtoxnet/lactmed.htm    Women's Health.gov:  http://www.womenshealth.gov/a-z-topics/index.html    American pregnancy association - http://americanpregnancy.org    Centering Pregnancy (group prenatal care option): http://centeringhealthcare.org    March of Dimes www.[employer].com     FDA - Nutrition  www.mypyramid.gov  Under \"For Consumers,\" click on \"pregnant and breastfeeding women.\"      Centers for Disease Control and Prevention (CDC) - Vaccines : http://www.cdc.gov/vaccines/       When researching information on the web, question the validity of websites.  The domains .gov, .edu and.org tend to be more reliable information.  If there are a lot of advertisements, be cautious of the information provided. Stay away from blogs and chat rooms please!     Virtual Breastfeeding Support:    During this time of isolation, breastfeeding families need even more community! " " Here are some area organizations offering virtual support groups for breastfeeding:    [employer] Support Group,  at 10:30 am   Run by MCKENNA Babin of [facility] [employer] [facility] [facility]   Go to [employer] [facility] [facility] Facebook page and click on \"events\" for link   https://www.facebook.com/events/184140188291885/  [facility] Milk Hour,  at 2:30 pm    Run by MCKENNA Rg   Go to [facility] FB page and send message to get link   https://www.Pilgrim Software.com/[facility] Centerundations/  [facility] holding virtual meetings the first Tuesday of each month, 8-9 pm, and the   Third Saturday, 10 - 11 am.  Go to [facility] [place] FB page; message to get link https://www.Pilgrim Software.Dianwoba/LLLofGoldRamu/?hc_location=[facility] offers a Lactation Lounge every Friday 12pm - 1pm, run by Kesha [facility] Leader   Sign up via link at https://www.JRD Communication/cbe-lactation  [facility] is offering virtual support groups every Monday, 10:30 am - 12 pm, run by nurse IBCLC   Https://www.facebook.com/events/172057986189592/    Prenatal Breastfeeding Classes:      [facility] is offering virtual breastfeeding and  care classes:  https://www.JRD Communication/education-workshops  BirthEd childbirth and breastfeeding education offering virtual prenatal breastfeeding classes  https://www.birthedmn.com/workshops    Breastfeeding clinic visits are at [facility] on , [facility] on  and [facility] on . Call to schedule, [phone].    New Parent Connection:   Marlyn Sow, [address] [facility]  In-person meetings on  from 6 pm - 7:15 pm for parents of  to 9 months, at the same site.   All are free, drop-in, no registration required.    There are also free virtual meetings ongoing on " Tuesdays:  11:30 am - 12:30 pm for parents of newborns to 3 months  4:15 pm to 5:15 pm for parents of 3 to 9-month olds  For joining info parents should call Lizbeth Restrepo at [phone]

## 2025-01-21 NOTE — PROGRESS NOTES
"Pau is a  at 31w5d who presents to clinic today for a routine prenatal visit. with BP check. Called and spoke to on kera midwife with concerns about headache since this morning and some increased swelling in her hands. Feeling baby move, denies contractions or change in vaginal discharge.     Patient Vitals for the past 24 hrs:   BP Pulse Height Weight   25 1138 120/70 84 1.683 m (5' 6.25\") 88.1 kg (194 lb 3.2 oz)       Exam:  see prenatal flowsheet      The following topics were covered in today's visit:  Reviewed S/S of pre eclampsia and when to call prn. Advised to increase oral fluids. Has next IV infusion next week. Taking Unisom and B6 nightly which helps with nausea. Enc if she has a headache  to take Tylenol 1000mg orally, increase water intake, consider taking caffeine and rest. RTC in 1 week or prn.  Labs today Comprehensive panel, CBC, Protein urine ratio.  Has number for on call midwife and knows when/why to call prn.   "

## 2025-01-21 NOTE — TELEPHONE ENCOUNTER
PHONE CALL:  Clotilde reaches out with the following NXTMt message:    Jeremy Steeleney,      I wanted to reach out with a few concerns I ve been having, as I ve read that these might be symptoms of preeclampsia:                     I ve been experiencing headaches and had a couple of dizzy spells recently.                     The other morning, I noticed my urine was cloudy, which I read could indicate protein in my urine.                     That said, my blood pressure has been normal during my weekly IV appointments.     I m wondering if these symptoms are something I can wait to discuss at my next appointment or if I should come in sooner to have them checked out. Please let me know what you recommend.     Thank you,    [facility] called to speak with Clotilde:  Reports she does have a headache now (forehead), which started this morning.  Has been having headaches on/off over the past week, but thought it was related to the cold she was having.    Has had some intermittent dizziness.  Admits gets dizzy with some frequency related to the hyperemesis, but it has happened a few times when she's not throwing up.  Has also happened when she's in a hot shower or stands up too fast. No visual changes right now.  Mild edema to hands (couldn't get wedding ring on yesterday).  No obvious edema change in ankles or legs.  Face is appearing ortega lately as finally gaining weight, so not sure if is swollen or not.  Doesn't sound to have epigastric pain, but does have discomfort RUQ occasionally, but after asked questions is likely r/t fetal movement in that area.    BP was normal a week ago in clinic.   Baby moving normally.      CN recommends clinic visit today to evaluate Clotilde's BP.  If there is concern during that visit, will transition her to the Birth Place for further evaluation.  Clotilde agrees and will arrive to clinic in the next hour.

## 2025-01-22 LAB
ALBUMIN MFR UR ELPH: 6.1 MG/DL
ALBUMIN SERPL BCG-MCNC: 3.5 G/DL (ref 3.5–5.2)
ALP SERPL-CCNC: 106 U/L (ref 40–150)
ALT SERPL W P-5'-P-CCNC: 12 U/L (ref 0–50)
ANION GAP SERPL CALCULATED.3IONS-SCNC: 12 MMOL/L (ref 7–15)
AST SERPL W P-5'-P-CCNC: 13 U/L (ref 0–45)
BILIRUB SERPL-MCNC: 0.3 MG/DL
BUN SERPL-MCNC: 6.2 MG/DL (ref 6–20)
CALCIUM SERPL-MCNC: 9.3 MG/DL (ref 8.8–10.4)
CHLORIDE SERPL-SCNC: 105 MMOL/L (ref 98–107)
CREAT SERPL-MCNC: 0.54 MG/DL (ref 0.51–0.95)
CREAT UR-MCNC: 58.2 MG/DL
EGFRCR SERPLBLD CKD-EPI 2021: >90 ML/MIN/1.73M2
GLUCOSE SERPL-MCNC: 79 MG/DL (ref 70–99)
HCO3 SERPL-SCNC: 19 MMOL/L (ref 22–29)
POTASSIUM SERPL-SCNC: 4.1 MMOL/L (ref 3.4–5.3)
PROT SERPL-MCNC: 6.1 G/DL (ref 6.4–8.3)
PROT/CREAT 24H UR: 0.1 MG/MG CR (ref 0–0.2)
SODIUM SERPL-SCNC: 136 MMOL/L (ref 135–145)

## 2025-01-23 ENCOUNTER — INFUSION THERAPY VISIT (OUTPATIENT)
Dept: INFUSION THERAPY | Facility: CLINIC | Age: 25
End: 2025-01-23
Attending: ADVANCED PRACTICE MIDWIFE
Payer: COMMERCIAL

## 2025-01-23 VITALS
OXYGEN SATURATION: 98 % | RESPIRATION RATE: 18 BRPM | SYSTOLIC BLOOD PRESSURE: 118 MMHG | TEMPERATURE: 98.1 F | DIASTOLIC BLOOD PRESSURE: 75 MMHG | HEART RATE: 95 BPM

## 2025-01-23 DIAGNOSIS — O21.0 HYPEREMESIS AFFECTING PREGNANCY, ANTEPARTUM: Primary | ICD-10-CM

## 2025-01-23 PROCEDURE — 258N000003 HC RX IP 258 OP 636: Performed by: MIDWIFE

## 2025-01-23 PROCEDURE — 250N000011 HC RX IP 250 OP 636: Performed by: MIDWIFE

## 2025-01-23 PROCEDURE — 250N000009 HC RX 250: Performed by: MIDWIFE

## 2025-01-23 RX ORDER — HEPARIN SODIUM (PORCINE) LOCK FLUSH IV SOLN 100 UNIT/ML 100 UNIT/ML
5 SOLUTION INTRAVENOUS
OUTPATIENT
Start: 2025-01-23

## 2025-01-23 RX ORDER — HEPARIN SODIUM,PORCINE 10 UNIT/ML
5-20 VIAL (ML) INTRAVENOUS DAILY PRN
OUTPATIENT
Start: 2025-01-23

## 2025-01-23 RX ORDER — ONDANSETRON 2 MG/ML
4 INJECTION INTRAMUSCULAR; INTRAVENOUS 2 TIMES WEEKLY
OUTPATIENT
Start: 2025-01-23

## 2025-01-23 RX ADMIN — THIAMINE HYDROCHLORIDE: 200 INJECTION, SOLUTION INTRAMUSCULAR; INTRAVENOUS at 14:14

## 2025-01-23 NOTE — PROGRESS NOTES
Infusion Nursing Note:  Pau Murphy presents today for IVF.    Patient seen by provider today: No   present during visit today: Not Applicable.    Note: N/A.      Intravenous Access:  Peripheral IV placed.    Treatment Conditions:  Not Applicable.      Post Infusion Assessment:  Patient tolerated infusion without incident.  Blood return noted pre and post infusion.  Site patent and intact, free from redness, edema or discomfort.  No evidence of extravasations.  Access discontinued per protocol.       Discharge Plan:   Discharge instructions reviewed with: Patient.  Patient and/or family verbalized understanding of discharge instructions and all questions answered.  AVS to patient via DisqusT.  Patient will return 1/30/25 for next appointment.   Patient discharged in stable condition accompanied by: self.  Departure Mode: Ambulatory.      Leeanna Rivers RN

## 2025-01-29 ENCOUNTER — APPOINTMENT (OUTPATIENT)
Dept: LAB | Facility: CLINIC | Age: 25
End: 2025-01-29
Payer: COMMERCIAL

## 2025-01-29 ENCOUNTER — PRENATAL OFFICE VISIT (OUTPATIENT)
Dept: MIDWIFE SERVICES | Facility: CLINIC | Age: 25
End: 2025-01-29
Payer: COMMERCIAL

## 2025-01-29 VITALS
WEIGHT: 197 LBS | SYSTOLIC BLOOD PRESSURE: 110 MMHG | BODY MASS INDEX: 31.66 KG/M2 | OXYGEN SATURATION: 97 % | HEIGHT: 66 IN | HEART RATE: 95 BPM | DIASTOLIC BLOOD PRESSURE: 66 MMHG

## 2025-01-29 DIAGNOSIS — Z34.80 SUPERVISION OF OTHER NORMAL PREGNANCY, ANTEPARTUM: Primary | ICD-10-CM

## 2025-01-29 DIAGNOSIS — O35.EXX0 PYELECTASIS OF FETUS ON PRENATAL ULTRASOUND: ICD-10-CM

## 2025-01-29 LAB — T PALLIDUM AB SER QL: NONREACTIVE

## 2025-01-29 PROCEDURE — 36415 COLL VENOUS BLD VENIPUNCTURE: CPT | Performed by: ADVANCED PRACTICE MIDWIFE

## 2025-01-29 PROCEDURE — 99207 PR PRENATAL VISIT: CPT | Performed by: ADVANCED PRACTICE MIDWIFE

## 2025-01-29 PROCEDURE — 86780 TREPONEMA PALLIDUM: CPT | Performed by: ADVANCED PRACTICE MIDWIFE

## 2025-01-29 NOTE — PATIENT INSTRUCTIONS
"\"We hope you had a positive experience and that you can definitely recommend [facility] to your family and friends. You ll be receiving a survey soon and we look forward to hearing your feedback\".    [facility] [place] [facility]  - Contact information:  Appointment line and to get a hold of CNM in clinic Monday-Friday 8 am - 5 pm:  [phone].  There are some clinics with early start times (1st appointment 7:40 am) and others with evening hours (last appointment 6:20 pm).  Most are typically open from 8 am to 5 pm.    CNM on call answering service: [phone].  Specify your hospital of choice and leave a brief message for CNM;  will then page CNM who is on call at your specified hospital and you should receive a call back with 15 minutes.  Be sure that your ringer is audible and that you can accept blocked calls so that we can get back in touch with you! This number should be reserved for urgent needs if during the day, before 8 am, after 5 pm, weekends, holidays.    Contact the on-call CNM with warning signs, such as:  vaginal bleeding   Vaginal discharge and itching or pain and burning during urination  Leg/calf pain or swelling on one side  severe abdominal pain  nausea and vomiting more than 4-5 times a day, or if you are unable to keep anything down  fever more than 100.4 degrees F.     The New York Timeshart  After each of your visits you are welcome to check PermissionTV for your visit summary including education and links to information relevant to your pregnancy and/or well woman care.   Find the \"Visits\" tab at the top of the page, you will see a list of recent visits and for each visit a for link for \"View After Visit Summary.\" View of your After Visit Summary will allow you to read our recommendations from your visit, review any education provided, and link to websites with useful information.   If you have any questions or difficulty navigating [place], please feel free to " "contact us and we will do our best to direct you.  Meet the Midwives from [facility]  You are invited to an informational meet and greet with [facility] Certified Nurse-Midwives. Our free \"Meet the Midwives\" event is a great opportunity to learn about our midwives' philosophy and experience, the hospitals where we can assist with your birth, and answer questions you may have. Partners, friends, and family are welcome to attend. Currently, this is a virtual event.  Date  Last Thursday of every month at 7 pm.    Link to next (live) meeting  [email or web address]  To Join by Telephone (audio only) Call:   [phone] Phone Conference ID: [phone] #      Touring the Maternity Care Center  At this time we are offering a virtual tour of the Maternity Care Centers at both [facility] and [facility]:   [facility] Maternity Care:   https://[facility].UniversityLyfe/locations/the-birthplace-at--[facility] Maternity Care:   [email or web address]/[email or web address]    Scroll to the bottom of the page in this link if the above link does not work.      Breastfeeding and Birth Control  How do I decide what birth control method is best for me while I am breastfeeding?  Choosing a method of birth control is very personal. First, answer the following questions:     Do you want to have more children?   How much spacing between births do you want for your children?   Do you smoke or have you had any health problems, such as liver disease or a blood clot?  Talk about the answers to each of these questions with your health care provider to help you choose the best method for you.    Can I use breastfeeding as my birth control?  Using breastfeeding as your birth control (the lactational amenorrhea method) can be a good way to keep from getting pregnant in the first " months after the baby is born. Each time your baby nurses, your body releases a hormone called prolactin, which stops your body from making the hormones that cause you to ovulate (release an egg). If you are not ovulating, you cannot get pregnant.    The lactational amenorrhea method works only if:   you have not started your period yet.   you are breastfeeding only and not giving your baby any other food or drink.   you are breastfeeding at least every 4 hours during the day and every 6 hours at night.   your baby is less than 6 months old.  When any 1 of these 4 things is not happening, you no longer have good protection from getting pregnant, and you should use another form of birth control.    What birth control methods are safe for me to use while I breastfeed?    Methods without hormones  Methods without hormones do not affect you, your baby, or your breastfeeding.  Methods without hormones that are the most effective   The copper intrauterine contraceptive device (IUD) (ParaGard) is a small, T-shaped device that is in- serted into your uterus (womb) through the vagina and cervix. The copper IUD lasts for 10 years.   Sterilization (getting your tubes tied or your partner having a vasectomy) is very effective, but it is per- manent. You should choose sterilization only if you do not want to have more children.  A method without hormones that is effective   The lactational amenorrhea method described above is effective for the first 6 months.  Methods without hormones that are less effective   Natural family planning is monitoring your body for signs of ovulation and not having sex when you think you are ovulating. This method is reliable only if you are having regular periods every month.   Barrier methods (condoms, diaphragms, sponges, and spermicides) are used at the time you have sex. These methods are effective only if you use them correctly every time.    Methods with hormones  Birth control methods that  use hormones can be used while you are breastfeeding. They may have a small effect on lowering the amount of milk you make. All hormones will get into your breast milk in very small amounts, but there is no known harm to your baby from this small amount of hormone in breast milk.only methodsmethods use only 1 hormone, called progestin. You can start them right after your baby is born or wait 4 to 6 weeks to make sure your milk supply is good.   Progestin-only pills ( minipills ): If you like to take pills every day, you can use the minipill. In order forpill to work well, you have to take 1 at the same time each day. When you stop breastfeeding, you should start pills that have both estrogen and progestin because they are better at keeping you from get- ting pregnant.   Progestin IUD (Mirena): The progestin IUD is shaped and inserted into the uterus like the copper IUD. It works for up to 5 years. Both IUDs are usually inserted 4 to 6 weeks after the baby is born.  Progestin implant (Nexplanon): The progestin implant is a small matchstick-sized flexible katie. It is placed into the fatty tissue in the back of your arm. It works for up to 3 years.  Progestin shot (Depo-Provera): The progestin shot is given every 3 months.    estrogen and progestin methods  These methods use 2 hormones, called estrogen and progestin.     These methods increase your risk of a blood clot, which is already higher than normal after you have a baby. You should not use them until your baby is at least 6 weeks old. The combined methods are not recommended as the first choice for women who are breastfeeding. If a combined method is the one that you feel will be best for you to prevent getting pregnant, these methods are okay to use while breastfeeding.   Combined birth control pills: You take a pill each day.  Vaginal ring (NuvaRing): The ring is worn in the vagina for 3 weeks then left out for 1 week before youin a new ring.  Patch (Ortho  Loreta): The patch is placed on your skin and changed every week for 3 weeks then left off forweek before putting a new patch on a different area of your skin.    Pediatric Care Providers at [facility]:    Choosing the right provider is one of the most important decisions you ll make about your health care. We can help you find the right one. Remember, you re looking for a provider you can trust and work with to improve your health and well-being, so take time to think about what you need. Depending on how complicated your health care needs are, you may need to see more than one type of provider.    Primary Care Providers: You ll see a primary care provider first for most health issues. They ll work with you to get your recommended screenings, help you manage chronic conditions, and refer you to other types of providers if you need them. Your primary care provider may be called a family physician or doctor, internist, general practitioner, nurse practitioner, or physician s assistant. Your child or teenager s provider may be called a pediatrician.  Specialists: You ll see a specialist for certain services or to treat specific conditions. Specialists include cardiologists, oncologists, psychologists, allergists, podiatrists, and orthopedists. You may need a referral from your primary care provider before you go to a specialist in order for your health plan to pay for your visit.\Rosaere are some tips for finding a provider where you live:  If you already have a provider you like and want to keep working with, call their office and ask if they accept your coverage.  Call your insurance company or state Medicaid and CHIP program. Look at their website or check your member handbook to find providers in your network who take your health coverage.  Ask your friends or family if they have providers they like and use these tools to compare health care providers in your area.    Family Medicine at  [facility]:      https://www.[place].org/specialties/family-medicine    Many of our families enjoy all seeing the same doctor, who comes to know the whole family very well. We base our practice on the knowledge of the patient in the context of family and community.  WHY CHOOSE A FAMILY MEDICINE PHYSICIAN?  Ability of the whole family to see the same doctor  Focus on the whole person, including physical and emotional health  A personal relationship with their doctor that is nurtured over time  Respect for individual and family beliefs and values  No need to change primary care providers when a certain age is reached  Coordination of care when other health care services are needed    Pediatrics at  [facility]:   https://www.[place].org/specialties/pediatric-care    Through a teaching affiliation with the [facility], [facility] staff keeps current on new developments in the field of pediatrics.     Everything we do centers around caring for children. We place special emphasis on wellness and prevention.pediatric care team includes a team of pediatricians and certified nurse practitioners who provide care to pediatric and adolescent patients ages 0 to 18, and some up to the age of 26. We offer preventive health maintenance for healthy children as well the diagnosis and treatment of common and chronic illnesses and injuries. In addition, we also offer several pediatric specialists who focus on adolescent health issues and developmental and behavioral issues.    Circumcision    Educational video:     [email or web address]    For More Information  American Academy of Family Physicians: Circumcision  http://familydoctor.org/familydoctor/en/pregnancy-newborns/caring-for-newborns/infant- care/circumcision.html  MedlinePlus: Circumcision (includes a slide show on the procedure)  www.nlm.nih.gov/medlineplus/circumcision.html  American Academy of Pediatrics:  Policy statement on circumcision  Http://pediatrics.aappublications.org/content/130/3/e756.abstract      Childbirth and Parenting Education:     Everyday Miracles:   https://www.everyday-miracles.org/    Free Video Series from [facility]: https://nursing.[facility]/academics/specialty-areas/nurse-midwifery/having-baby-prenatal-videos/having-baby-prenatal-and    Childbirth Education virtual (live) classes: www.Getit InfoServices/classes  The Birth Hour: https://BioElectronics/online-childbirth-class/  BirthED: https://www.birthedmn.com/  DAMAIN parenting center: http://fl3ur/   (481) 527-FSUS  [place]: (education, yoga & wellness) www.GoHome  Enlightened Mama: www.enlightenedmama.Dato Capital   Childbirth collective: (Parent topic nights)  www.childbirthcollective.org/  Hypnobabies:  www.hypnobabiestGushcloud.Dato Capital/  Hypnobirthing:  Http://PlanGrid.Dato Capital/  Hypnobirthing virtual class: www.Torsion Mobile/hypnobirthing    Information about doulas:  Childbirth collective: http://www.childbirthcollective.org/  Doulas of [place] (LANCE):  www.lance.org  [facility]  project: http://twincitiesdoulaproject.com/     Early Childhood and Family Education (ECFE):  ECFE offers parents hands-on learning experiences that will nourish a lifetime of teachable moments.  http://ecfe.info/ecfe-home/    APPS and Podcasts:   Perlita Mariscal Nurture    Evidence Based Birth  The Birth Hour (for birth stories)   Birthful   Expectful   The Longest Shortest Time  PregnancyPodcjerson Castellano  https://www.downtobirthshow.com/    Book Recommendations:   Lina Bedford's Birthing From Within--first few chapters include a new-age tone, you may prefer to skip it and keep going, because there is good stuff later.  This book recommendation covers emotional preparation, but does cover coping with pain, and use of both pharmacological and nonpharmacological methods.    Guide to Childbirth by Sahara May  "Haresh  Childbirth Without Fear by Natalie Samuel Read    Dr. Mahajan' The Pregnancy Book and The Birth Book--the pregnancy book goes month-by month    The Birth Partner by Leigh Chapa    Womanly Art of Breastfeeding by La Leche League International   Bestfeeding by Brii Gay--great pictures    Mothering Your Nursing Toddler, by Lisette Barreto.   Addresses dealing with so many of the challenging behaviors of a nursing toddler.  How Weaning Happens, by La Leche League.  Discusses weaning at all ages, from medically necessary weaning of an infant, all the way up to age 5 (or older), with why/why not, and strategies.  Very empowering book both for deciding to wean and deciding not to.    American College of Nurse-Midwives (ACNM) http://www.midwife.org/; look at the informational handouts at http://www.midwife.org/Share-With-Women     www.mymidwife.org    Mother to Baby (Medication and Herbal guidance in pregnancy): http://www.mothertobaby.org  Toll-Free Hotline: 891.243.3745  LactMed (Medication use while breastfeeding): http://toxnet.nlm.nih.gov/newtoxnet/lactmed.htm    Women's Health.gov:  http://www.womenshealth.gov/a-z-topics/index.html    American pregnancy association - http://americanpregnancy.org    Centering Pregnancy (group prenatal care option): http://centeringhealthcare.org    March of Dimes www.Cover.com     FDA - Nutrition  www.mypyramid.gov  Under \"For Consumers,\" click on \"pregnant and breastfeeding women.\"      Centers for Disease Control and Prevention (CDC) - Vaccines : http://www.cdc.gov/vaccines/       When researching information on the web, question the validity of websites.  The domains .gov, .edu and.org tend to be more reliable information.  If there are a lot of advertisements, be cautious of the information provided. Stay away from blogs and chat rooms please!     Virtual Breastfeeding Support:    During this time of isolation, breastfeeding families need even more community! " " Here are some area organizations offering virtual support groups for breastfeeding:    [employer] Support Group,  at 10:30 am   Run by MCKENNA Babin of [employer] [facility] [facility]   Go to [employer] [facility] Facebook page and click on \"events\" for link   https://www.facebook.com/events/972062930214139/  [facility] Milk Hour,  at 2:30 pm    Run by MCKENNA Rg   Go to [facility] FB page and send message to get link   https://www.Teranode.com/[facility] Barnes[facility] Hospitalundations/  [facility] holding virtual meetings the first Tuesday of each month, 8-9 pm, and the   Third Saturday, 10 - 11 am.  Go to [facility] and [place] FB page; message to get link https://www.Teranode.[place]/LLLofGoldRamu/?hc_location=[facility] offers a Lactation Lounge every Friday 12pm - 1pm, run by [facility] Leader   Sign up via link at https://www.Holaira/cbe-lactation  [facility] is offering virtual support groups every Monday, 10:30 am - 12 pm, run by nurse IBCLC   Https://www.facebook.com/events/236394723073801/    Prenatal Breastfeeding Classes:      [facility] is offering virtual breastfeeding and  care classes:  https://www.Holaira/education-workshops  BirthEd childbirth and breastfeeding education offering virtual prenatal breastfeeding classes  https://www.birthedmn.com/workshops    Breastfeeding clinic visits are at [facility] on , [facility] on  and [facility] on . Call to schedule, [phone].    New Parent Connection:   Marlyn Sow, [address] [facility]  In-person meetings on  from 6 pm - 7:15 pm for parents of  to 9 months, at the same site.   All are free, drop-in, no registration required.    There are also free virtual meetings ongoing on " Tuesdays:  11:30 am - 12:30 pm for parents of newborns to 3 months  4:15 pm to 5:15 pm for parents of 3 to 9-month olds  For joining info parents should call Lizbeth Restrepo at [phone]

## 2025-01-29 NOTE — PROGRESS NOTES
Nausea improving some, only vomiting a few times a week unless she forgets unisom/b6.  Continues weekly infusions.  Getting ready for baby at home.  Denies concerns.  Active baby.  Denies VB/LOF/ctx.  Has looked into getting a breast pump from insurance, will reach out if she needs Rx.  RPR today.  Denies RSV vaccine.

## 2025-01-30 ENCOUNTER — INFUSION THERAPY VISIT (OUTPATIENT)
Dept: INFUSION THERAPY | Facility: CLINIC | Age: 25
End: 2025-01-30
Attending: ADVANCED PRACTICE MIDWIFE
Payer: COMMERCIAL

## 2025-01-30 VITALS
OXYGEN SATURATION: 98 % | HEART RATE: 85 BPM | TEMPERATURE: 97.4 F | DIASTOLIC BLOOD PRESSURE: 72 MMHG | SYSTOLIC BLOOD PRESSURE: 123 MMHG | RESPIRATION RATE: 18 BRPM

## 2025-01-30 DIAGNOSIS — O21.0 HYPEREMESIS AFFECTING PREGNANCY, ANTEPARTUM: Primary | ICD-10-CM

## 2025-01-30 PROCEDURE — 250N000009 HC RX 250: Performed by: MIDWIFE

## 2025-01-30 PROCEDURE — 250N000011 HC RX IP 250 OP 636: Performed by: MIDWIFE

## 2025-01-30 PROCEDURE — 250N000011 HC RX IP 250 OP 636: Performed by: ADVANCED PRACTICE MIDWIFE

## 2025-01-30 PROCEDURE — 258N000003 HC RX IP 258 OP 636: Performed by: MIDWIFE

## 2025-01-30 RX ORDER — ONDANSETRON 2 MG/ML
4 INJECTION INTRAMUSCULAR; INTRAVENOUS 2 TIMES WEEKLY
Status: DISCONTINUED | OUTPATIENT
Start: 2025-01-30 | End: 2025-01-30 | Stop reason: HOSPADM

## 2025-01-30 RX ORDER — ONDANSETRON 2 MG/ML
4 INJECTION INTRAMUSCULAR; INTRAVENOUS 2 TIMES WEEKLY
OUTPATIENT
Start: 2025-01-30

## 2025-01-30 RX ADMIN — THIAMINE HYDROCHLORIDE: 200 INJECTION, SOLUTION INTRAMUSCULAR; INTRAVENOUS at 13:48

## 2025-01-30 RX ADMIN — ONDANSETRON 4 MG: 2 INJECTION INTRAMUSCULAR; INTRAVENOUS at 13:47

## 2025-01-30 NOTE — PROGRESS NOTES
Infusion Nursing Note:  Pau GRIFFIN Katherine presents today for IVF and zofran  Patient seen by provider today: No   present during visit today: Not Applicable.    Note: N/A.      Intravenous Access:  Peripheral IV placed.    Treatment Conditions:  Not Applicable.      Post Infusion Assessment:  Patient tolerated infusion without incident.  Blood return noted pre and post infusion.  Site patent and intact, free from redness, edema or discomfort.  No evidence of extravasations.  Access discontinued per protocol.       Discharge Plan:   Discharge instructions reviewed with: Patient.  Patient and/or family verbalized understanding of discharge instructions and all questions answered.  AVS to patient via CEDUT.  Patient will return 2/3/25 for next appointment.   Patient discharged in stable condition accompanied by: self.  Departure Mode: Ambulatory.      Precious Chambers RN

## 2025-02-03 ENCOUNTER — INFUSION THERAPY VISIT (OUTPATIENT)
Dept: INFUSION THERAPY | Facility: CLINIC | Age: 25
End: 2025-02-03
Attending: ADVANCED PRACTICE MIDWIFE
Payer: COMMERCIAL

## 2025-02-03 VITALS
OXYGEN SATURATION: 97 % | HEART RATE: 94 BPM | DIASTOLIC BLOOD PRESSURE: 79 MMHG | SYSTOLIC BLOOD PRESSURE: 123 MMHG | TEMPERATURE: 97.3 F | RESPIRATION RATE: 18 BRPM

## 2025-02-03 DIAGNOSIS — O21.0 HYPEREMESIS AFFECTING PREGNANCY, ANTEPARTUM: Primary | ICD-10-CM

## 2025-02-03 PROCEDURE — 96375 TX/PRO/DX INJ NEW DRUG ADDON: CPT

## 2025-02-03 PROCEDURE — 250N000009 HC RX 250: Performed by: MIDWIFE

## 2025-02-03 PROCEDURE — 250N000011 HC RX IP 250 OP 636: Performed by: MIDWIFE

## 2025-02-03 PROCEDURE — 250N000011 HC RX IP 250 OP 636: Performed by: ADVANCED PRACTICE MIDWIFE

## 2025-02-03 PROCEDURE — 258N000003 HC RX IP 258 OP 636: Performed by: MIDWIFE

## 2025-02-03 PROCEDURE — 96365 THER/PROPH/DIAG IV INF INIT: CPT

## 2025-02-03 RX ORDER — ONDANSETRON 2 MG/ML
4 INJECTION INTRAMUSCULAR; INTRAVENOUS 2 TIMES WEEKLY
Status: DISCONTINUED | OUTPATIENT
Start: 2025-02-03 | End: 2025-02-03 | Stop reason: HOSPADM

## 2025-02-03 RX ORDER — ONDANSETRON 2 MG/ML
4 INJECTION INTRAMUSCULAR; INTRAVENOUS 2 TIMES WEEKLY
Status: CANCELLED | OUTPATIENT
Start: 2025-02-03

## 2025-02-03 RX ADMIN — THIAMINE HYDROCHLORIDE: 200 INJECTION, SOLUTION INTRAMUSCULAR; INTRAVENOUS at 13:13

## 2025-02-03 RX ADMIN — ONDANSETRON 4 MG: 2 INJECTION INTRAMUSCULAR; INTRAVENOUS at 13:09

## 2025-02-03 NOTE — PROGRESS NOTES
Infusion Nursing Note:  Pau Murphy presents today for IVF and Zofran.    Patient seen by provider today: No   present during visit today: Not Applicable.    Note: N/A.      Intravenous Access:  Peripheral IV placed.    Treatment Conditions:  Not Applicable.      Post Infusion Assessment:  Patient tolerated infusion without incident.  Blood return noted pre and post infusion.  Site patent and intact, free from redness, edema or discomfort.  No evidence of extravasations.  Access discontinued per protocol.       Discharge Plan:   Discharge instructions reviewed with: Patient.  Patient and/or family verbalized understanding of discharge instructions and all questions answered.  AVS to patient via LayerBoomT.  Patient will return 2/10 for next appointment.   Patient discharged in stable condition accompanied by: self.  Departure Mode: Ambulatory.      Farnaz Yusuf RN

## 2025-02-07 ENCOUNTER — HOSPITAL ENCOUNTER (OUTPATIENT)
Dept: ULTRASOUND IMAGING | Facility: CLINIC | Age: 25
Discharge: HOME | End: 2025-02-07
Attending: ADVANCED PRACTICE MIDWIFE
Payer: COMMERCIAL

## 2025-02-07 DIAGNOSIS — Z34.80 SUPERVISION OF OTHER NORMAL PREGNANCY, ANTEPARTUM: ICD-10-CM

## 2025-02-07 DIAGNOSIS — O35.EXX0 PYELECTASIS OF FETUS ON PRENATAL ULTRASOUND: ICD-10-CM

## 2025-02-07 PROCEDURE — 76816 OB US FOLLOW-UP PER FETUS: CPT

## 2025-02-10 ENCOUNTER — INFUSION THERAPY VISIT (OUTPATIENT)
Dept: INFUSION THERAPY | Facility: CLINIC | Age: 25
End: 2025-02-10
Attending: ADVANCED PRACTICE MIDWIFE
Payer: COMMERCIAL

## 2025-02-10 VITALS
OXYGEN SATURATION: 97 % | DIASTOLIC BLOOD PRESSURE: 81 MMHG | SYSTOLIC BLOOD PRESSURE: 128 MMHG | HEART RATE: 81 BPM | TEMPERATURE: 97.2 F

## 2025-02-10 DIAGNOSIS — O21.0 HYPEREMESIS AFFECTING PREGNANCY, ANTEPARTUM: Primary | ICD-10-CM

## 2025-02-10 PROCEDURE — 96365 THER/PROPH/DIAG IV INF INIT: CPT

## 2025-02-10 PROCEDURE — 250N000011 HC RX IP 250 OP 636: Performed by: ADVANCED PRACTICE MIDWIFE

## 2025-02-10 PROCEDURE — 258N000003 HC RX IP 258 OP 636: Performed by: MIDWIFE

## 2025-02-10 PROCEDURE — 250N000009 HC RX 250: Performed by: MIDWIFE

## 2025-02-10 PROCEDURE — 250N000011 HC RX IP 250 OP 636: Performed by: MIDWIFE

## 2025-02-10 PROCEDURE — 96375 TX/PRO/DX INJ NEW DRUG ADDON: CPT

## 2025-02-10 RX ORDER — ONDANSETRON 2 MG/ML
8 INJECTION INTRAMUSCULAR; INTRAVENOUS EVERY 6 HOURS PRN
Status: CANCELLED
Start: 2025-02-10

## 2025-02-10 RX ORDER — HEPARIN SODIUM,PORCINE 10 UNIT/ML
5-20 VIAL (ML) INTRAVENOUS DAILY PRN
Status: CANCELLED | OUTPATIENT
Start: 2025-02-10

## 2025-02-10 RX ORDER — HEPARIN SODIUM (PORCINE) LOCK FLUSH IV SOLN 100 UNIT/ML 100 UNIT/ML
5 SOLUTION INTRAVENOUS
Status: CANCELLED | OUTPATIENT
Start: 2025-02-10

## 2025-02-10 RX ORDER — ONDANSETRON 2 MG/ML
8 INJECTION INTRAMUSCULAR; INTRAVENOUS EVERY 6 HOURS PRN
Status: DISCONTINUED | OUTPATIENT
Start: 2025-02-10 | End: 2025-02-10 | Stop reason: HOSPADM

## 2025-02-10 RX ORDER — ONDANSETRON 2 MG/ML
8 INJECTION INTRAMUSCULAR; INTRAVENOUS EVERY 6 HOURS PRN
Start: 2025-02-10

## 2025-02-10 RX ADMIN — THIAMINE HYDROCHLORIDE: 200 INJECTION, SOLUTION INTRAMUSCULAR; INTRAVENOUS at 09:50

## 2025-02-10 RX ADMIN — ONDANSETRON 4 MG: 2 INJECTION INTRAMUSCULAR; INTRAVENOUS at 09:41

## 2025-02-10 NOTE — PROGRESS NOTES
Infusion Nursing Note:  Pau Murphy presents today for Vitamin bag/Zofran.    Patient seen by provider today: No   present during visit today: Not Applicable.    Note: Zofran order updated in hydration plan today. 8mg dose ordered. Clotilde prefers 4mg dose, as she states the 8mg dose makes her too drowsy    Reports the weekly hydration is still helpful. Does not vomit at home anymore but does still have intermittent nausea. Does best if she takes her Unisom and B6 at bedtime      Intravenous Access:  Peripheral IV placed.    Treatment Conditions:  Not Applicable.      Post Infusion Assessment:  Patient tolerated infusion without incident.  Blood return noted pre and post infusion.  Site patent and intact, free from redness, edema or discomfort.  No evidence of extravasations.  Access discontinued per protocol.       Discharge Plan:   AVS to patient via THE NOCKLISTHART.  Patient will return 2/17/25 for IVF   Patient discharged in stable condition accompanied by: self.  Departure Mode: Ambulatory.      Cherie Arias RN

## 2025-02-10 NOTE — PROGRESS NOTES
"S: Feels well overall but tired.  Baby active. Denies uterine cramping, vaginal bleeding or leaking of fluid. Just got a promotion at work and is feeling a little stressed. Review US with persistent pyelectasis. Reports infusions are going well and has no complaints.    O: Vitals: /74 (BP Location: Right arm, Patient Position: Chair, Cuff Size: Adult Regular)   Pulse 80   Ht 1.683 m (5' 6.25\")   Wt 90.7 kg (200 lb)   LMP 06/13/2024 (Approximate)   SpO2 99%   BMI 32.04 kg/m    BMI= Body mass index is 32.04 kg/m .  Exam:  Constitutional: healthy, alert and no distress  Respiratory: respirations even and unlabored  Gastrointestinal: Abdomen soft, non-tender. Fundus measures appropriate for gestational age. Fetal heart tones hear without difficulty and within normal limits    [place] Depression Scale  Thoughts of Harming Self: (Patient-Rptd) Never  Total Score: (Patient-Rptd) 5  GAD7 score: 4  : Deferred  Psychiatric: mentation appears normal and affect normal/bright  A:     ICD-10-CM    1. Hyperemesis affecting pregnancy, antepartum  O21.0       2. Pyelectasis of fetus on prenatal ultrasound  O35.EXX0         P: Discussed GBS screen for next visit.   Encouraged patient to call with any questions or concerns.  Return to clinic 1 weeks    ALOK Morris CNM    Answers submitted by the patient for this visit:  Patient Health Questionnaire (G7) (Submitted on 2/13/2025)  KRISTA 7 TOTAL SCORE: 4    "

## 2025-02-13 ENCOUNTER — PRENATAL OFFICE VISIT (OUTPATIENT)
Dept: MIDWIFE SERVICES | Facility: CLINIC | Age: 25
End: 2025-02-13
Payer: COMMERCIAL

## 2025-02-13 VITALS
SYSTOLIC BLOOD PRESSURE: 108 MMHG | HEIGHT: 66 IN | DIASTOLIC BLOOD PRESSURE: 74 MMHG | HEART RATE: 80 BPM | BODY MASS INDEX: 32.14 KG/M2 | OXYGEN SATURATION: 99 % | WEIGHT: 200 LBS

## 2025-02-13 DIAGNOSIS — O21.0 HYPEREMESIS AFFECTING PREGNANCY, ANTEPARTUM: Primary | ICD-10-CM

## 2025-02-13 DIAGNOSIS — O35.EXX0 PYELECTASIS OF FETUS ON PRENATAL ULTRASOUND: ICD-10-CM

## 2025-02-13 ASSESSMENT — EDINBURGH POSTNATAL DEPRESSION SCALE (EPDS)
I HAVE LOOKED FORWARD WITH ENJOYMENT TO THINGS: AS MUCH AS I EVER DID
I HAVE BEEN ANXIOUS OR WORRIED FOR NO GOOD REASON: HARDLY EVER
TOTAL SCORE: 5
I HAVE FELT SCARED OR PANICKY FOR NO GOOD REASON: NO, NOT MUCH
THINGS HAVE BEEN GETTING ON TOP OF ME: NO, MOST OF THE TIME I HAVE COPED QUITE WELL
I HAVE FELT SCARED OR PANICKY FOR NO GOOD REASON: NO, NOT MUCH
I HAVE BEEN SO UNHAPPY THAT I HAVE HAD DIFFICULTY SLEEPING: NOT VERY OFTEN
I HAVE LOOKED FORWARD WITH ENJOYMENT TO THINGS: AS MUCH AS I EVER DID
I HAVE FELT SAD OR MISERABLE: NO, NOT AT ALL
I HAVE BEEN SO UNHAPPY THAT I HAVE HAD DIFFICULTY SLEEPING: NOT VERY OFTEN
THINGS HAVE BEEN GETTING ON TOP OF ME: NO, MOST OF THE TIME I HAVE COPED QUITE WELL
THE THOUGHT OF HARMING MYSELF HAS OCCURRED TO ME: NEVER
I HAVE BEEN ANXIOUS OR WORRIED FOR NO GOOD REASON: HARDLY EVER
I HAVE BEEN SO UNHAPPY THAT I HAVE BEEN CRYING: NO, NEVER
I HAVE BEEN ABLE TO LAUGH AND SEE THE FUNNY SIDE OF THINGS: AS MUCH AS I ALWAYS COULD
I HAVE BLAMED MYSELF UNNECESSARILY WHEN THINGS WENT WRONG: NOT VERY OFTEN
I HAVE BEEN ABLE TO LAUGH AND SEE THE FUNNY SIDE OF THINGS: AS MUCH AS I ALWAYS COULD
I HAVE BLAMED MYSELF UNNECESSARILY WHEN THINGS WENT WRONG: NOT VERY OFTEN
I HAVE BEEN SO UNHAPPY THAT I HAVE BEEN CRYING: NO, NEVER
THE THOUGHT OF HARMING MYSELF HAS OCCURRED TO ME: NEVER
I HAVE FELT SAD OR MISERABLE: NO, NOT AT ALL

## 2025-02-13 ASSESSMENT — ANXIETY QUESTIONNAIRES
IF YOU CHECKED OFF ANY PROBLEMS ON THIS QUESTIONNAIRE, HOW DIFFICULT HAVE THESE PROBLEMS MADE IT FOR YOU TO DO YOUR WORK, TAKE CARE OF THINGS AT HOME, OR GET ALONG WITH OTHER PEOPLE: SOMEWHAT DIFFICULT
7. FEELING AFRAID AS IF SOMETHING AWFUL MIGHT HAPPEN: NOT AT ALL
3. WORRYING TOO MUCH ABOUT DIFFERENT THINGS: SEVERAL DAYS
4. TROUBLE RELAXING: NOT AT ALL
2. NOT BEING ABLE TO STOP OR CONTROL WORRYING: SEVERAL DAYS
1. FEELING NERVOUS, ANXIOUS, OR ON EDGE: SEVERAL DAYS
6. BECOMING EASILY ANNOYED OR IRRITABLE: SEVERAL DAYS
GAD7 TOTAL SCORE: 4
7. FEELING AFRAID AS IF SOMETHING AWFUL MIGHT HAPPEN: NOT AT ALL
GAD7 TOTAL SCORE: 4
GAD7 TOTAL SCORE: 4
8. IF YOU CHECKED OFF ANY PROBLEMS, HOW DIFFICULT HAVE THESE MADE IT FOR YOU TO DO YOUR WORK, TAKE CARE OF THINGS AT HOME, OR GET ALONG WITH OTHER PEOPLE?: SOMEWHAT DIFFICULT
5. BEING SO RESTLESS THAT IT IS HARD TO SIT STILL: NOT AT ALL

## 2025-02-14 ENCOUNTER — HOSPITAL ENCOUNTER (OUTPATIENT)
Facility: CLINIC | Age: 25
Discharge: HOME | End: 2025-02-15
Attending: MIDWIFE | Admitting: MIDWIFE
Payer: COMMERCIAL

## 2025-02-14 VITALS
SYSTOLIC BLOOD PRESSURE: 115 MMHG | DIASTOLIC BLOOD PRESSURE: 67 MMHG | TEMPERATURE: 98.2 F | RESPIRATION RATE: 16 BRPM | WEIGHT: 200 LBS | BODY MASS INDEX: 32.14 KG/M2 | HEIGHT: 66 IN

## 2025-02-14 DIAGNOSIS — O35.EXX0 PYELECTASIS OF FETUS ON PRENATAL ULTRASOUND: ICD-10-CM

## 2025-02-14 PROBLEM — Z36.89 ENCOUNTER FOR TRIAGE IN PREGNANT PATIENT: Status: ACTIVE | Noted: 2025-02-14

## 2025-02-14 PROCEDURE — 87653 STREP B DNA AMP PROBE: CPT | Performed by: MIDWIFE

## 2025-02-14 PROCEDURE — 84112 EVAL AMNIOTIC FLUID PROTEIN: CPT | Performed by: MIDWIFE

## 2025-02-14 RX ORDER — LIDOCAINE 40 MG/G
CREAM TOPICAL
Status: DISCONTINUED | OUTPATIENT
Start: 2025-02-14 | End: 2025-02-15 | Stop reason: HOSPADM

## 2025-02-14 ASSESSMENT — ACTIVITIES OF DAILY LIVING (ADL): ADLS_ACUITY_SCORE: 16

## 2025-02-15 ENCOUNTER — APPOINTMENT (OUTPATIENT)
Dept: ULTRASOUND IMAGING | Facility: CLINIC | Age: 25
End: 2025-02-15
Attending: MIDWIFE
Payer: COMMERCIAL

## 2025-02-15 ENCOUNTER — HOSPITAL ENCOUNTER (OUTPATIENT)
Facility: CLINIC | Age: 25
End: 2025-02-15
Admitting: MIDWIFE
Payer: COMMERCIAL

## 2025-02-15 ENCOUNTER — HOSPITAL ENCOUNTER (EMERGENCY)
Facility: CLINIC | Age: 25
End: 2025-02-15
Payer: COMMERCIAL

## 2025-02-15 ENCOUNTER — TELEPHONE (OUTPATIENT)
Dept: MIDWIFE SERVICES | Facility: CLINIC | Age: 25
End: 2025-02-15

## 2025-02-15 VITALS
RESPIRATION RATE: 16 BRPM | SYSTOLIC BLOOD PRESSURE: 114 MMHG | HEIGHT: 66 IN | HEART RATE: 93 BPM | DIASTOLIC BLOOD PRESSURE: 70 MMHG | TEMPERATURE: 98.3 F | BODY MASS INDEX: 32.14 KG/M2 | WEIGHT: 200 LBS

## 2025-02-15 DIAGNOSIS — O35.EXX0 PYELECTASIS OF FETUS ON PRENATAL ULTRASOUND: Primary | ICD-10-CM

## 2025-02-15 LAB
ALBUMIN UR-MCNC: 20 MG/DL
APPEARANCE UR: ABNORMAL
BACTERIA #/AREA URNS HPF: ABNORMAL /HPF
BILIRUB UR QL STRIP: NEGATIVE
CLUE CELLS: ABNORMAL
COLOR UR AUTO: YELLOW
CRYSTALS AMN MICRO: NORMAL
GLUCOSE UR STRIP-MCNC: NEGATIVE MG/DL
HGB UR QL STRIP: NEGATIVE
KETONES UR STRIP-MCNC: NEGATIVE MG/DL
LEUKOCYTE ESTERASE UR QL STRIP: ABNORMAL
MUCOUS THREADS #/AREA URNS LPF: PRESENT /LPF
NITRATE UR QL: NEGATIVE
PH UR STRIP: 6.5 [PH] (ref 5–7)
RBC URINE: 1 /HPF
RUPTURE OF FETAL MEMBRANES BY ROM PLUS: NEGATIVE
RUPTURE OF FETAL MEMBRANES BY ROM PLUS: NEGATIVE
SP GR UR STRIP: 1.02 (ref 1–1.03)
SQUAMOUS EPITHELIAL: 10 /HPF
TRICHOMONAS, WET PREP: ABNORMAL
UROBILINOGEN UR STRIP-MCNC: <2 MG/DL
WBC URINE: 8 /HPF
WBC'S/HIGH POWER FIELD, WET PREP: ABNORMAL
YEAST, WET PREP: ABNORMAL

## 2025-02-15 PROCEDURE — G0463 HOSPITAL OUTPT CLINIC VISIT: HCPCS

## 2025-02-15 PROCEDURE — 81001 URINALYSIS AUTO W/SCOPE: CPT | Performed by: MIDWIFE

## 2025-02-15 PROCEDURE — 84112 EVAL AMNIOTIC FLUID PROTEIN: CPT | Performed by: MIDWIFE

## 2025-02-15 PROCEDURE — 250N000011 HC RX IP 250 OP 636: Performed by: MIDWIFE

## 2025-02-15 PROCEDURE — 87210 SMEAR WET MOUNT SALINE/INK: CPT | Performed by: MIDWIFE

## 2025-02-15 PROCEDURE — 99215 OFFICE O/P EST HI 40 MIN: CPT | Performed by: MIDWIFE

## 2025-02-15 PROCEDURE — 87086 URINE CULTURE/COLONY COUNT: CPT | Performed by: MIDWIFE

## 2025-02-15 PROCEDURE — 250N000013 HC RX MED GY IP 250 OP 250 PS 637: Performed by: MIDWIFE

## 2025-02-15 PROCEDURE — 76815 OB US LIMITED FETUS(S): CPT

## 2025-02-15 RX ORDER — ACETAMINOPHEN 325 MG/1
975 TABLET ORAL EVERY 6 HOURS PRN
Status: DISCONTINUED | OUTPATIENT
Start: 2025-02-15 | End: 2025-02-16 | Stop reason: HOSPADM

## 2025-02-15 RX ORDER — LIDOCAINE 40 MG/G
CREAM TOPICAL
Status: DISCONTINUED | OUTPATIENT
Start: 2025-02-15 | End: 2025-02-16 | Stop reason: HOSPADM

## 2025-02-15 RX ORDER — ONDANSETRON 4 MG/1
4 TABLET, ORALLY DISINTEGRATING ORAL EVERY 6 HOURS PRN
Status: DISCONTINUED | OUTPATIENT
Start: 2025-02-15 | End: 2025-02-16 | Stop reason: HOSPADM

## 2025-02-15 RX ADMIN — ACETAMINOPHEN 975 MG: 325 TABLET ORAL at 19:14

## 2025-02-15 RX ADMIN — ONDANSETRON 4 MG: 4 TABLET, ORALLY DISINTEGRATING ORAL at 19:15

## 2025-02-15 ASSESSMENT — ACTIVITIES OF DAILY LIVING (ADL)
ADLS_ACUITY_SCORE: 16

## 2025-02-15 NOTE — PROGRESS NOTES
"Pt presents to triage with c/o \"big gush\" x2 at 2130pm tonight and swollen left foot. Pt reports clear fluid but no longer leaking. Pt reports having intercourse tonight and occasional david long. Pt denies VB. +FM. Pt denies headache, visual disturbances, or RUQ pain. SOURAV Spann CNM at bedside. ROM plus and GBS swab collected and sent to lab.   "

## 2025-02-15 NOTE — PROGRESS NOTES
Data: Patient assessed in the Birthplace for leaking vaginal fluid. Cervix 0.5 cm dilated and 50% effaced. Fetal station -2. Membranes intact. Contractions are not present. See flowsheets for fetal assessment documentation.     Action: Presumed adequate fetal oxygenation documented. Discharge instructions reviewed. Patient instructed to report change in fetal movement, vaginal leaking of fluid or bleeding, abdominal pain, or any concerns related to the pregnancy to provider/clinic.      Response: Orders to discharge home per Delma Spann CNM. Patient verbalized understanding of education and agreement with plan. Discharged to home at 0026.         MELINDA RAIN RN on 2/15/2025 at 12:26 AM

## 2025-02-15 NOTE — TELEPHONE ENCOUNTER
Phone note:    Called CNM on call with concern of possible leaking fluid vaginally.  She was triaged at  early this AM for fluid leaking, ROM plus was negative.  Ferning or pooling not completed this AM.  She was discharged to home this morning.  This AM she noted some fluid when sitting up.  Throughout the day she has noted underwear to be damp all day.  Not noticing gushes of fluid since this AM.  Noting some david long contractions, but nothing consistent.  Baby is moving normally.  Denies vaginal bleeding.  Is concerned that she continues to leak amniotic fluid. Advised an assessment at [facility].  She is agreeable, she will coordinate childcare and arrive to [facility] by 6pm.       ALOK Artis, KAYLA

## 2025-02-15 NOTE — PROGRESS NOTES
"Outpatient/Triage Note:    Patient Name:  Pau Murphy  :      2000  MRN:      [id number]      Assessment:   @ 35w1d here for evaluation of leaking of vaginal fluid.     Plan:   - Discharge to home undelivered. Reviewed warning signs including decreased fetal movement, leaking of fluid, vaginal bleeding, or signs of labor. Reviewed how to contact on-call CNM. Follow-up in clinic with CNM as scheduled or sooner as needed. All questions answered. Agrees with plan.     Subjective:  Pau Murphy is a 24 year old  at 35 weeks, with an EDC of 3/20/25 who presented to [facility] for evaluation of leaking of fluid. She felt a gush of  fluid during intercourse tonight with her . Went to bathroom and urinated then felt more fluid come out that looked clear, no bleeding noted. No leaking since these gushes at approx 2130. Was up to the bathroom here after cervical exam and felt some fluid/discharge come out again. Put on a pad and will continue to monitor for any gushes of fluid at home. Call back prn.  Denies bleeding, or changes in fetal movement.     Objective:  Vital signs: /67   Temp 98.2  F (36.8  C) (Oral)   Resp 16   Ht 1.676 m (5' 6\")   Wt 90.7 kg (200 lb)   LMP 2024 (Approximate)   BMI 32.28 kg/m    FHR: category I  Uterine contractions: none    Physical Exam:   Abdomen: SIUP, vertex by Leopold's, abdomen non-tender  SVE: FT/50%/post/soft/-2    Legs: no edema, normal DTR, moves all freely    Temp:  [98.2  F (36.8  C)] 98.2  F (36.8  C)  Resp:  [16] 16  BP: (115)/(67) 115/67    Results:  ROM plus=NEGATIVE  GBS=pending    Provider:Delma Spann DNP,APRN,CNM    TT with patient 25 mn >50% time spent counseling.        "

## 2025-02-15 NOTE — DISCHARGE INSTRUCTIONS
EARLY LABOR DISCHARGE INSTRUCTIONS    You were seen for: Rule Out Ruptured Membranes Assessment  You had (Test or Medicine): ROM+    Refer to Any Day Now handout for tips on how to labor at home    WHEN TO CALL YOUR PROVIDER:  If this is your first baby: Your contractions (tightening) are 5 minutes apart, last more than 1 minute, and have been consistently getting stronger for 1 hour or more  If this is your second baby or beyond: Your contractions are less than 10 minutes apart and have been consistently getting stronger for 1 hour or more  Feeling your baby move less than usual  Temperature of 100.4 F (38 C) or higher  New fluid leaking from your vagina  Other signs your provider asked you to look for in your body  Vaginal bleeding (bright red blood)  Swelling in your face or more swelling in your hands or legs  Headaches that don't get better after taking Tylenol (acetaminophen)  Changes in your vision (blurry or seeing spots or stars)  Nausea (sick to your stomach) and vomiting (throwing up)  Heartburn that doesn't go away  Sudden, bad belly pain that is unlike your contractions    FOLLOW UP:  As scheduled in the clinic    Provider/clinic number: [phone] option 1

## 2025-02-16 LAB — GP B STREP DNA SPEC QL NAA+PROBE: NEGATIVE

## 2025-02-16 NOTE — DISCHARGE INSTRUCTIONS
EARLY LABOR DISCHARGE INSTRUCTIONS    You were seen for: Labor Assessment  You had (Test or Medicine): ROM+, fern, Wet Prep, fetal monitoring, ultrasound    Refer to Any Day Now handout for tips on how to labor at home    WHEN TO CALL YOUR PROVIDER:  If this is your first baby: Your contractions (tightening) are 5 minutes apart, last more than 1 minute, and have been consistently getting stronger for 1 hour or more  If this is your second baby or beyond: Your contractions are less than 10 minutes apart and have been consistently getting stronger for 1 hour or more  Feeling your baby move less than usual  Temperature of 100.4 F (38 C) or higher  New fluid leaking from your vagina  Other signs your provider asked you to look for in your body  Vaginal bleeding (bright red blood)  Swelling in your face or more swelling in your hands or legs  Headaches that don't get better after taking Tylenol (acetaminophen)  Changes in your vision (blurry or seeing spots or stars)  Nausea (sick to your stomach) and vomiting (throwing up)  Heartburn that doesn't go away  Sudden, bad belly pain that is unlike your contractions    IF YOU ARRIVED WITH YOUR WATER ALREADY BROKEN (MEMBRANES RUPTURED):  Avoid placing anything in vagina, including intercourse (sex)  Check your temperature every 3 hours when awake  Call your provider/clinic if:  Your temperature is 100.4 F (38 C) or higher  Your fluid becomes not clear or is smelly  Your baby is moving less than usual  You don't go into labor within 24 hours of your water breaking  You have other concerns  Follow up plan: Call clinic on Monday to schedule an appointment this week to be seen.       FOLLOW UP:  Make an appointment to be seen on This upcoming week.    Provider/clinic number: [phone], option 1 for [facility] on-call provider.

## 2025-02-16 NOTE — PROGRESS NOTES
Pt arrived to [facility] c/o continued leaking of fluid. CNM aware. . 35.2wks. pt brought to room , placed on EFM. NST reactive, category I. Uterus soft, non tender. VSS. Orders placed.

## 2025-02-16 NOTE — PROGRESS NOTES
Data: Patient assessed in the [facility] for leaking vaginal fluid. Cervix 0.5 cm dilated and 50% effaced. Fetal station -2. Membranes intact. Contractions are present. Contactions are  , occasional minutes apart, and last 40-60 seconds. Uterine assessment is mild by palpation during contractions and soft by palpation at rest. See flowsheets for fetal assessment documentation.     Action: Presumed adequate fetal oxygenation documented. Discharge instructions reviewed. Patient instructed to report change in fetal movement, vaginal leaking of fluid or bleeding, abdominal pain, or any concerns related to the pregnancy to provider/clinic.      Response: Orders to discharge home per Delma Spann CNM. Patient verbalized understanding of education and agreement with plan. Discharged to home at 2200.         ROM+ and fern are negative for amniotic fluid. Ultrasound with ROSELYN 19. Plan for pt to discharge to home with plans to call clinic on Monday to schedule an appointment this week. Pt agreeable with plan. Discharged to home with support person.

## 2025-02-16 NOTE — PROGRESS NOTES
"Outpatient/Triage Note:    Patient Name:  Pau Murphy  :      2000  MRN:      [id number]      Assessment:   @ 35w2d here for evaluation of vaginal discharge/leaking of fluid.     Plan:   -Wet prep, UA, ROM plus, Fern (X2) completed  - Discharge to home undelivered. Reviewed warning signs including decreased fetal movement, leaking of fluid, vaginal bleeding, or signs of labor. Reviewed how to contact on-call CNM. Follow-up in clinic this week with CNM or sooner if needed will call the midwife on call to update. All questions answered. Agrees with plan.     Subjective:  Pau Murphy is a 24 year old  at 35w2d weeks, with an EDC of 3/20/25 who presented to [facility] for evaluation of leaking of fluid from vagina. Denies bleeding, or changes in fetal movement. Was evaluated less than 24 hours ago (noticed first gush of fluid around 2130 on ) and discharge home undelivered after negative ROM plus, GBS pending. Pt reports having a total of 4 small gushes today and some leaking in between but not enough to soak a pad.\" Something just feels off\" During exam in triage while swabs being obtained for lab a gush of clear fluid was noted on the chux pad. When the Fern, ROM plus and wet prep results were all negative a sterile speculum was inserted and no pooling noted, another specimen obtained and reviewed by IHOB and found to be Fern negative as well. Ultrasound confirmed cephalic presentation and ROSELYN of 19. With this data it was concluded that the BOW remains intact so the patient will be discharged home again.     Objective:  Vital signs: /70 (BP Location: Right arm, Patient Position: Semi-Saleh's, Cuff Size: Adult Regular)   Pulse 93   Temp 98.3  F (36.8  C) (Oral)   Resp 16   Ht 1.676 m (5' 6\")   Wt 90.7 kg (200 lb)   LMP 2024 (Approximate)   BMI 32.28 kg/m    FHR: category I tracing  Uterine contractions: mild, irreg    Physical Exam:   Abdomen: SIUP, vertex by " Leopold's, abdomen non-tender  SVE: 0.5cm/50%/post/soft  Sterile speculum exam: no pooling noted  Legs: no edema, moves all freely    Temp:  [98.2  F (36.8  C)-98.3  F (36.8  C)] 98.3  F (36.8  C)  Pulse:  [93] 93  Resp:  [16] 16  BP: (114-115)/(67-70) 114/70  No intake or output data in the 24 hours ending 02/15/25 6765    Results:  Fern NEGATIVE (X2)  ROM plus NEGATIVE (x2 from last night and tonight)  Wet prep all NEGATIVE  UA 1+ protein, leuk esterase present few bacteria  (specimen sent for urine culture)    Provider:Delma Spann DNP,APRN,CNM    TT with patient 45mn >50% time spent counseling.

## 2025-02-16 NOTE — PROGRESS NOTES
RN assumed care from Nicole FOURNIER RN    CNM at bedside, ROM+, fern and wet prep collected. Cervical exam 0.5/50/-2 with visible fluid on chux when pt got up to use the restroom.

## 2025-02-17 ENCOUNTER — INFUSION THERAPY VISIT (OUTPATIENT)
Dept: INFUSION THERAPY | Facility: CLINIC | Age: 25
End: 2025-02-17
Attending: ADVANCED PRACTICE MIDWIFE
Payer: COMMERCIAL

## 2025-02-17 VITALS
HEART RATE: 75 BPM | OXYGEN SATURATION: 99 % | SYSTOLIC BLOOD PRESSURE: 113 MMHG | DIASTOLIC BLOOD PRESSURE: 73 MMHG | TEMPERATURE: 98 F | RESPIRATION RATE: 16 BRPM

## 2025-02-17 DIAGNOSIS — O21.0 HYPEREMESIS AFFECTING PREGNANCY, ANTEPARTUM: Primary | ICD-10-CM

## 2025-02-17 LAB — BACTERIA UR CULT: NORMAL

## 2025-02-17 PROCEDURE — 250N000011 HC RX IP 250 OP 636: Performed by: MIDWIFE

## 2025-02-17 PROCEDURE — 250N000009 HC RX 250: Performed by: MIDWIFE

## 2025-02-17 PROCEDURE — 96365 THER/PROPH/DIAG IV INF INIT: CPT

## 2025-02-17 PROCEDURE — 96375 TX/PRO/DX INJ NEW DRUG ADDON: CPT

## 2025-02-17 PROCEDURE — 250N000011 HC RX IP 250 OP 636: Performed by: ADVANCED PRACTICE MIDWIFE

## 2025-02-17 PROCEDURE — 258N000003 HC RX IP 258 OP 636: Performed by: MIDWIFE

## 2025-02-17 RX ORDER — ONDANSETRON 2 MG/ML
8 INJECTION INTRAMUSCULAR; INTRAVENOUS EVERY 6 HOURS PRN
Status: DISCONTINUED | OUTPATIENT
Start: 2025-02-17 | End: 2025-02-17 | Stop reason: HOSPADM

## 2025-02-17 RX ORDER — ONDANSETRON 2 MG/ML
8 INJECTION INTRAMUSCULAR; INTRAVENOUS EVERY 6 HOURS PRN
Start: 2025-02-17

## 2025-02-17 RX ADMIN — ONDANSETRON 8 MG: 2 INJECTION INTRAMUSCULAR; INTRAVENOUS at 13:41

## 2025-02-17 RX ADMIN — THIAMINE HYDROCHLORIDE: 200 INJECTION, SOLUTION INTRAMUSCULAR; INTRAVENOUS at 13:44

## 2025-02-17 NOTE — PROGRESS NOTES
"S: Here for follow-up related to triage evaluation on February 15 for leaking of fluid.  Was noted not to have ruptured membranes and was GBS negative.  Feels ok, not getting much sleep. Baby active. Denies uterine cramping, vaginal bleeding or leaking of fluid. No headache, increase in edema, no epigastric pain. Is celebrating birthday with twin sister and mother that are coming from [place].    O: Vitals: /70 (BP Location: Right arm, Patient Position: Chair, Cuff Size: Adult Regular)   Pulse 90   Ht 1.676 m (5' 6\")   Wt 92.1 kg (203 lb 1.6 oz)   LMP 06/13/2024 (Approximate)   SpO2 97%   BMI 32.78 kg/m    BMI= Body mass index is 32.78 kg/m .  Exam:  Constitutional: healthy, alert and no distress  Respiratory: respirations even and unlabored  Gastrointestinal: Abdomen soft, non-tender. Fundus measures appropriate for gestational age. Fetal heart tones hear without difficulty and within normal limits  : Deferred  Psychiatric: mentation appears normal and affect normal/bright  A:     ICD-10-CM    1. Supervision of other normal pregnancy, antepartum  Z34.80 Hemoglobin        P: Labor signs and symptoms discussed, aware of numbers to call  Discussed warning signs of PIH/preeclampsia and patient will monitor.  GBS screen completed on 2/14 and was negative  Encouraged patient to call with any questions or concerns.  Return to clinic 1 weeks    ALOK Morris CNM              "

## 2025-02-17 NOTE — PATIENT INSTRUCTIONS
Weeks 34 to 36 of Your Pregnancy: Care Instructions  Your belly has grown quite large. It's almost time to give birth! Your baby's lungs are almost ready to breathe air. The skull bones are firm enough to protect your baby's head. But they're soft enough to move down through the birth canal.    You might be wondering what to expect during labor. Because each birth is different, there's no way to know exactly what childbirth will be like for you. Talk to your doctor or midwife about any concerns you have.   You'll probably have a test for group B streptococcus (GBS). GBS is bacteria that can live in the vagina and rectum. GBS can make your baby sick after birth. If you test positive, you'll get antibiotics during labor.     Choose what type of pain relief you would like during labor.  You can choose from a few types, including medicine and non-medicine options. You may want to use several types of pain relief.     Know how medicines can help with pain during labor.  Some medicines lower anxiety and help with some of the pain. Others make your lower body numb so that you will feel less pain.     Tell your doctor about your pain medicine choice.  Do this before you start labor or very early in your labor. You may be able to change your mind during labor.     Learn about the stages of labor.    The first stage includes the early (latent) and active phases of labor. Contractions start in early labor. During active labor, contractions get stronger, last longer, and happen more often. And the cervix opens more rapidly.  The second stage starts when you're ready to push. During this stage, your baby is born.  During the third stage, you'll usually have a few more contractions to push out the placenta.   Follow-up care is a key part of your treatment and safety. Be sure to make and go to all appointments, and call your doctor if you are having problems. It's also a good idea to know your test results and keep a list of the  "medicines you take.  Where can you learn more?  Go to https://www.[employer].net/patiented  Enter B912 in the search box to learn more about \"Weeks 34 to 36 of Your Pregnancy: Care Instructions.\"  Current as of: April 30, 2024  Content Version: 14.3    2024 [employer].   Care instructions adapted under license by your healthcare professional. If you have questions about a medical condition or this instruction, always ask your healthcare professional. [employer] disclaims any warranty or liability for your use of this information.    "

## 2025-02-17 NOTE — PROGRESS NOTES
Infusion Nursing Note:  Pau Murphy presents today for IVF, Zofran.    Patient seen by provider today: No   present during visit today: Not Applicable.    Note: N/A.      Intravenous Access:  Peripheral IV placed.    Treatment Conditions:  Not Applicable.      Post Infusion Assessment:  Patient tolerated infusion without incident.  Blood return noted pre and post infusion.  Site patent and intact, free from redness, edema or discomfort.  No evidence of extravasations.  Access discontinued per protocol.       Discharge Plan:   Discharge instructions reviewed with: Patient.  Patient and/or family verbalized understanding of discharge instructions and all questions answered.  AVS to patient via iPling[employer].  Patient will return 2/24 for next appointment.   Patient discharged in stable condition accompanied by: self.  Departure Mode: Ambulatory.      Farnaz Yusuf RN

## 2025-02-20 ENCOUNTER — PRENATAL OFFICE VISIT (OUTPATIENT)
Dept: MIDWIFE SERVICES | Facility: CLINIC | Age: 25
End: 2025-02-20
Payer: COMMERCIAL

## 2025-02-20 VITALS
HEIGHT: 66 IN | OXYGEN SATURATION: 97 % | WEIGHT: 203.1 LBS | HEART RATE: 90 BPM | BODY MASS INDEX: 32.64 KG/M2 | DIASTOLIC BLOOD PRESSURE: 70 MMHG | SYSTOLIC BLOOD PRESSURE: 110 MMHG

## 2025-02-20 DIAGNOSIS — Z34.80 SUPERVISION OF OTHER NORMAL PREGNANCY, ANTEPARTUM: Primary | ICD-10-CM

## 2025-02-20 LAB — HGB BLD-MCNC: 12.1 G/DL (ref 11.7–15.7)

## 2025-02-22 ENCOUNTER — TELEPHONE (OUTPATIENT)
Dept: OBGYN | Facility: CLINIC | Age: 25
End: 2025-02-22
Payer: COMMERCIAL

## 2025-02-22 DIAGNOSIS — O35.EXX0 PYELECTASIS OF FETUS ON PRENATAL ULTRASOUND: Primary | ICD-10-CM

## 2025-02-23 NOTE — TELEPHONE ENCOUNTER
"PHONE CALL:  Clotilde calls with questions about possible leaking fluid.    States that she was having some discomfort in her low belly and back this evening (notes it was more one-sided discomfort than full low back or full low belly).  She took a bath to help with the discomfort.  When got out of the bath she sat on the toilet to urinate.  As urinating noticed some other fluid come out.  This happened at the same time as she urinated, but reports it seemed \"different\".  The fluid that came out was clear in color and went into the toilet.  She then got dressed, wearing boxer shorts on the bottom.  About 5 min later she had another small leaking.  There was no sensation of a warm gushes, fluid was not running down legs, rather there was just a small dampness on her underwear.  This happened at about 11PM.    Reports had this same situation happen last weekend.  At that time came into be evaluated at the hospital.  It sounds like all samples were negative (no rupture of membranes and no vaginal infection found).  Didn't have any leaking of fluid throughout the week either.      Discussed options.  Clotilde states she'd rather observe at home over the next 1-2 hours instead of coming in to the hospital now for evaluation for possible ROM.  She just put a pad on, and will call back with any leaking, discharge, or other.  Careful education done about the different ways that SROM can present, and to have a low threshold to call CNM back.  Educated that we would want her to be inpatient if ROM has happened due to  status.  GBS was completed a week ago, and negative.    No bleeding.  No contractions now.  No pain.  Baby has been moving normally.    All questions answered.  Patient to call back as needed.  "

## 2025-02-24 ENCOUNTER — INFUSION THERAPY VISIT (OUTPATIENT)
Dept: INFUSION THERAPY | Facility: CLINIC | Age: 25
End: 2025-02-24
Attending: ADVANCED PRACTICE MIDWIFE
Payer: COMMERCIAL

## 2025-02-24 VITALS
OXYGEN SATURATION: 98 % | DIASTOLIC BLOOD PRESSURE: 70 MMHG | HEART RATE: 79 BPM | TEMPERATURE: 97.7 F | SYSTOLIC BLOOD PRESSURE: 116 MMHG | RESPIRATION RATE: 16 BRPM

## 2025-02-24 DIAGNOSIS — O21.0 HYPEREMESIS AFFECTING PREGNANCY, ANTEPARTUM: Primary | ICD-10-CM

## 2025-02-24 PROCEDURE — 96365 THER/PROPH/DIAG IV INF INIT: CPT

## 2025-02-24 PROCEDURE — 250N000011 HC RX IP 250 OP 636: Performed by: ADVANCED PRACTICE MIDWIFE

## 2025-02-24 PROCEDURE — 96375 TX/PRO/DX INJ NEW DRUG ADDON: CPT

## 2025-02-24 PROCEDURE — 250N000009 HC RX 250: Performed by: MIDWIFE

## 2025-02-24 PROCEDURE — 258N000003 HC RX IP 258 OP 636: Performed by: MIDWIFE

## 2025-02-24 PROCEDURE — 250N000011 HC RX IP 250 OP 636: Performed by: MIDWIFE

## 2025-02-24 RX ORDER — FAMOTIDINE 10 MG
10 TABLET ORAL DAILY
COMMUNITY

## 2025-02-24 RX ORDER — ONDANSETRON 2 MG/ML
8 INJECTION INTRAMUSCULAR; INTRAVENOUS EVERY 6 HOURS PRN
Status: CANCELLED
Start: 2025-02-24

## 2025-02-24 RX ORDER — ONDANSETRON 2 MG/ML
8 INJECTION INTRAMUSCULAR; INTRAVENOUS EVERY 6 HOURS PRN
Status: DISCONTINUED | OUTPATIENT
Start: 2025-02-24 | End: 2025-02-24 | Stop reason: HOSPADM

## 2025-02-24 RX ADMIN — ONDANSETRON 4 MG: 2 INJECTION INTRAMUSCULAR; INTRAVENOUS at 13:50

## 2025-02-24 RX ADMIN — THIAMINE HYDROCHLORIDE: 200 INJECTION, SOLUTION INTRAMUSCULAR; INTRAVENOUS at 13:49

## 2025-02-24 NOTE — PROGRESS NOTES
Infusion Nursing Note:  Note:   - Assumed care from MEGA Bernal. Pt had no complaints through infusion.  - Pt requested only 4mg zofran.   - Message sent to Kusum Faustin & Lubna Perez to discuss and add more orders if neccessary.     Post Infusion Assessment:  Patient tolerated infusion without incident.  Blood return noted pre and post infusion.  Site patent and intact, free from redness, edema or discomfort.  No evidence of extravasations.  Access discontinued per protocol.       Discharge Plan:   Discharge instructions reviewed with: Patient.  Patient and/or family verbalized understanding of discharge instructions and all questions answered.  AVS to patient via [employer].  Patient will return 3/3/25 for next appointment.   Patient discharged in stable condition accompanied by: self.  Departure Mode: Ambulatory.      Precious Chambers RN

## 2025-02-24 NOTE — PROGRESS NOTES
Infusion Nursing Note:  Pau Murphy presents today for Zofran+IV hydration.    Patient seen by provider today: No   present during visit today: Not Applicable.    Note: Clotilde reports vomiting twice this week.    Intravenous Access:  Peripheral IV placed.    Treatment Conditions:  Not Applicable.      Gabe Castillo RN

## 2025-02-26 ENCOUNTER — PRENATAL OFFICE VISIT (OUTPATIENT)
Dept: MIDWIFE SERVICES | Facility: CLINIC | Age: 25
End: 2025-02-26
Payer: COMMERCIAL

## 2025-02-26 VITALS
DIASTOLIC BLOOD PRESSURE: 72 MMHG | HEART RATE: 91 BPM | BODY MASS INDEX: 33.41 KG/M2 | WEIGHT: 207 LBS | OXYGEN SATURATION: 98 % | SYSTOLIC BLOOD PRESSURE: 118 MMHG

## 2025-02-26 DIAGNOSIS — O35.EXX0 PYELECTASIS OF FETUS ON PRENATAL ULTRASOUND: ICD-10-CM

## 2025-02-26 DIAGNOSIS — N89.8 VAGINAL DISCHARGE: Primary | ICD-10-CM

## 2025-02-26 PROBLEM — Z36.89 ENCOUNTER FOR TRIAGE IN PREGNANT PATIENT: Status: RESOLVED | Noted: 2025-02-14 | Resolved: 2025-02-26

## 2025-02-26 LAB
CLUE CELLS: ABNORMAL
RUPTURE OF FETAL MEMBRANES BY ROM PLUS: NEGATIVE
TRICHOMONAS, WET PREP: ABNORMAL
WBC'S/HIGH POWER FIELD, WET PREP: ABNORMAL
YEAST, WET PREP: ABNORMAL

## 2025-02-26 PROCEDURE — 3078F DIAST BP <80 MM HG: CPT | Performed by: ADVANCED PRACTICE MIDWIFE

## 2025-02-26 PROCEDURE — 84112 EVAL AMNIOTIC FLUID PROTEIN: CPT | Performed by: ADVANCED PRACTICE MIDWIFE

## 2025-02-26 PROCEDURE — 3074F SYST BP LT 130 MM HG: CPT | Performed by: ADVANCED PRACTICE MIDWIFE

## 2025-02-26 PROCEDURE — 99207 PR PRENATAL VISIT: CPT | Performed by: ADVANCED PRACTICE MIDWIFE

## 2025-02-26 PROCEDURE — 87210 SMEAR WET MOUNT SALINE/INK: CPT | Performed by: ADVANCED PRACTICE MIDWIFE

## 2025-02-26 PROCEDURE — 99212 OFFICE O/P EST SF 10 MIN: CPT | Mod: 25 | Performed by: ADVANCED PRACTICE MIDWIFE

## 2025-02-26 PROCEDURE — 0502F SUBSEQUENT PRENATAL CARE: CPT | Performed by: ADVANCED PRACTICE MIDWIFE

## 2025-02-26 NOTE — PATIENT INSTRUCTIONS
"\"We hope you had a positive experience and that you can definitely recommend [facility] to your family and friends. You ll be receiving a survey soon and we look forward to hearing your feedback\".    [facility]  - Contact information:  Appointment line and to get a hold of CNM in clinic Monday-Friday 8 am - 5 pm:  [phone].  There are some clinics with early start times (1st appointment 7:40 am) and others with evening hours (last appointment 6:20 pm).  Most are typically open from 8 am to 5 pm.    CNM on call answering service: [phone].  Specify your hospital of choice and leave a brief message for CNM;  will then page CNM who is on call at your specified hospital and you should receive a call back with 15 minutes.  Be sure that your ringer is audible and that you can accept blocked calls so that we can get back in touch with you! This number should be reserved for urgent needs if during the day, before 8 am, after 5 pm, weekends, holidays.    Contact the on-call CNM with warning signs, such as:  vaginal bleeding   Vaginal discharge and itching or pain and burning during urination  Leg/calf pain or swelling on one side  severe abdominal pain  nausea and vomiting more than 4-5 times a day, or if you are unable to keep anything down  fever more than 100.4 degrees F.     Moz[facility]art  After each of your visits you are welcome to check GoGuide for your visit summary including education and links to information relevant to your pregnancy and/or well woman care.   Find the \"Visits\" tab at the top of the page, you will see a list of recent visits and for each visit a for link for \"View After Visit Summary.\" View of your After Visit Summary will allow you to read our recommendations from your visit, review any education provided, and link to websites with useful information.   If you have any questions or difficulty navigating Dmailer, please feel free to " "contact us and we will do our best to direct you.  Meet the Midwives from [facility]  You are invited to an informational meet and greet with [facility] Certified Nurse-Midwives. Our free \"Meet the Midwives\" event is a great opportunity to learn about our midwives' philosophy and experience, the hospitals where we can assist with your birth, and answer questions you may have. Partners, friends, and family are welcome to attend. Currently, this is a virtual event.  Date  Last Thursday of every month at 7 pm.    Link to next (live) meeting  [email or web address]  To Join by Telephone (audio only) Call:   [phone] Phone Conference ID: [phone] #    Touring the Maternity Care Center  At this time we are offering a virtual tour of the Maternity Care Centers at both [facility] and [facility]:   [facility] Maternity Care:   https://Level[facility] Hospital[facility] K[facility] [employer]./locations/the-birthplace-at--[facility] Maternity Care:   [email or web address]://[email or web address].[employer]./locations/[facility]    Scroll to the bottom of this hyperlink if the above link does not work      Postpartum Depression  The first weeks of caring for a  baby are more than a full-time job. Although it is often a happy time, your feelings and moods may not be what you expected. Many women experience  baby blues.  Here are some tips to help you understand when feelings of sadness are normal, and when you should call your health care provider.    What are the baby blues?  As many as 3 of every 4 women will have short periods of mood swings, crying, or feeling cranky or restless during the first weeks after birth. These feelings can be worse when you are tired or anxious. Women who have the baby blues often say they feel like crying but don t know why. Baby blues usually happen in the first or second week " postpartum (after you give birth) and last less than a week. If you are not sleeping, becoming more upset, don t feel like you can take care of your baby, or your sadness lasts 2 weeks or more, call your health care provider.    What is postpartum depression?  About one in every 5 women will develop depression during the first few months postpartum that may be mild, moderate, or severe. Women who have postpartum depression may have some of these symptoms:  Feeling guilty   Not able to enjoy your baby and feeling like you are not bonding with your baby    Not able to sleep, even when the baby is sleeping  Sleeping too much and feeling too tired to get out of bed  Feeling overwhelmed and not able to do what you need to during the day  Not able to concentrate  Don t feel like eating  Feeling like you are not normal or not yourself anymore  Not able to make decisions  Feeling like a failure as a mother  Feeling lonely or all alone  Thinking your baby might be better off without you  If you have any of these symptoms, call your health care provider!    Which symptoms of postpartum depression are dangerous?  Sometimes a woman with postpartum depression will have thoughts of harming herself or her baby. If you find yourself thinking about hurting yourself or your baby, call your health care provider immediately.    MOTHERHOOD: THE EARLY DAYS  You prepare for the birth of your baby for many months during pregnancy, and then the first months at home after your baby is born can be a quiet, gentle time of getting to know this new person who has come to live in your home. But for most women it is not all quiet or sweet. And for some women it is a very hard time.  What Can I Expect in the First Few Months After the Baby Comes?  New mothers and their families face many challenges in the first few months:  Your body and your hormones have to get back to normal.  You and the baby will be learning to breastfeed.  Babies only sleep a  few hours at a time. The entire family will have a hard time getting enough sleep.  You and your family need to learn how to parent this new family member.  If you have a partner, you have to figure out how to stay together as a couple and maybe even start to have sex again.  You may have to figure out how to keep from getting pregnant again right away.  You may need to return to work and find day care.    How Long Will it Take for My Body to Get Back to Normal?  Some changes will occur quickly. Others will not occur as quickly.  Your uterus, cervix, and vagina will all shrink to their nonpregnant size in about 2 weeks. Your vagina may be tender and dry for a few months--especially if you are breastfeeding.  If you had stitches or hemorrhoids, your   bottom   will be sore for 2 weeks or more.  For some women who have problems urinating, it can take several months for you to be able to hold your urine when you cough or sneeze or suddenly  something heavy.  Your breast milk will   come in   2 to 3 days after the birth of your baby. It will take 6 to 8 weeks for you and the baby to get the hang of breastfeeding and find a pattern. During these first weeks, you can have engorged breasts at times and often leak milk.  Your stomach and intestines all have to fall back into place. You may have a lot of gas for a few weeks.  You may be constipated--especially if you are breastfeeding.  Your stretched stomach muscles can recover in a few weeks, but for some women it takes longer--6 months or a year--to recover.  If you had a  delivery, you may have pain or numbness around the incision for 6 months or more.  Losing the weight you gained during pregnancy will probably take 6 months to a year. Have patience! It took 40 weeks to get here. Give yourself 40 weeks to get back.    What Can I Expect When My Hormones Change?  About 75% of all women will get the   blues.   This usually starts about 3 days after the birth  of your baby. You may cry easily and feel very, very tired. A few women become very depressed. If you had a  delivery or your new baby was sick, you are at a higher risk for depression.  Call your health care provider right away if you cannot care for yourself or your baby, if you feel very nervous or worried, if you cannot stop crying, or if you are having thoughts of hurting yourself or your baby.    Taking Care of Yourself  While you are still pregnant:  Talk with your partner and your family about the time ahead. Arrange for someone to help you during the first weeks at home if you can.  Talk with your health care provider about birth control options and make a plan before the baby comes.  If you are worried about how to parent a , take parenting classes. You will learn a lot about how babies act and you will make some friends who are going through the same thing at the same time. Most [facility] have these classes.  Arrange for someone to help with baby care if you can.  After the baby comes:  Ask for help. Let other people do the cooking and cleaning and run the house. Focus on yourself and your baby.  Sleep whenever you can. Try not to be tempted to   get some things done   when the baby sleeps. This is your time to sleep, too.  Drink lots of water. You will need at least 6 big glasses of water everyday to avoid constipation and make enough breast milk. Every time you sit down to breastfeed, have a big glass of water with you to drink while you are nursing.  Eat lots of vegetables and fruit. You will need lots of vitamins and fiber to help your body get back to normal. This will also help you avoid constipation.  Go outside and walk. Babies can go outside even if it is very cold. Fresh air and sunshine will do you both good.  Take sitz baths. Put about 6 inches of warm water in your bathtub and sit in there for 15 minutes 2 to 3 times a day. This will help your   bottom   heal more quickly. It  will also give you 15 minutes of private time!  Talk to other mothers. Join a new parents group. Call Antione and go to [facility] meetings if you are breastfeeding.     With your partner:  Keep talking. Share the experience.  Spend time alone. Even a 30-minute walk can be a date.  Start a birth control method. You can get pregnant before you even have a period. It is very important to use birth control if you do not want to get pregnant again right away.  When you have sex, use a lubricant. A lot of lubricant! Take it slow.  The first few months after a baby comes can be a lot like floating in a jar of honey--very sweet and henderson, but very sticky, too. Take time to enjoy the good parts. Remind yourself that this time will pass. Bon voyage!    FOR MORE INFORMATION  For questions about depression during and after pregnancy:  http://www.womenshealth.gov/publications/our-publications/fact-sheet/depression-pregnancy.html   After birth: The first 6 weeks:  http://www.Tongda/Post-Birth-and-Recovery   Breastfeeding resources:  http://www.Thuuz.org/health-info/getting-breastfeeding-off-to-a-good-start/    Preparing for your baby:       Car Seat Clinics:  https://dps.[place].gov/divisions/ots/child-passenger-safety/Pages/car-seat-checks.aspx    [place] Safety [place]: http://www.[place]afetycouncil.org/family/carseatindex.cfm    Child Passenger Safety: Buckle Up Right    When child safety seats and safety belts are used correctly, they can reduce the risk of death by up to 70%. But finding the right combination can be confusing.  How do you know if you should be using an infant-only seat or a convertible seat?  What are booster seats and why do kids need them until they're eight years old or are four feet nine inches tall?  How do you know when a child is ready for your car's safety belt/shoulder strap?  The American Academy of Pediatrics (AAP) recommends that all infants and toddlers should ride  in a Rear-Facing Car Safety Seat until they are two (2) years of age or until they reach the highest weight or height allowed by their car safety seat's .    A child who is both under age 8 and shorter than 4 feet 9 inches is required to be fastened in a child safety seat that meets federal safety standards. Under this law, a child cannot use a seat belt alone until they are age 8, or 4 feet 9 inches tall. It is recommended to keep a child in a booster based on their height rather than their age. Check the instruction book or label of the child safety seat to be sure it is the right seat for your child's weight and height.    www.CarSeatsMadeSimple.org    Car Seat Recycling, -    Get free expert help in a [place] community near you:  [place] Child Passenger Safety Checkup Clinic Calendar    How to Find the Right Car Seat (NHTSA)  Safe Kids [place]    Print Materials  Basic Car Seat Safety checklist  Don't Skip a Step brochure (English); (Armenian); (Hong Konger)  Good Going! Adventures in Safety magazine  Fact Sheets  Booster Seat Safety  Outdated and Used Child Safety Seats  A Parents' Checklist: Traffic Safety  Driving Your Child to School  Occupant Protection (Safety Belts and Child Safety Seats): Frequently Asked Questions; Misconceptions and Myths; [place] Laws  Air Bags: How Do Air Bags Work; Frequently Asked Questions      Immunizations:  http://www.cdc.gov/vaccines/schedules/easy-to-read/child.html    [place] Screening Program  Http://www.health.[facility].mn.us/newbornscreening/  [place] newborns are tested soon after birth for more than 50 hidden, rare disorders, including hearing loss and critical congenital heart disease (CCHD). This site provides resources and information for families and providers.    Ask your health care provider about vaccinations you may need following delivery. By now, you should have received a Tdap immunization to protect against pertussis or whooping cough.  Fathers and family members who will be in close contact with the baby should also receive a Tdap shot at least two weeks before the expected birth of the baby if they have not had a Td (tetanus) shot for at least two years.    Your midwife may offer to check your cervix for changes. If you are past your due date, discuss the next steps leading to delivery with your midwife. If you don't start labor on your own by 41 or 42 weeks, your midwife may recommend giving you medicines to ripen your cervix and start labor.  Induction of labor: http://onlinelibrary.alvarenga.com/store/10.1016/j.jmwh.2008.04.018/asset/j.jmwh.2008.04.018.pdf?v=1&t=oamd8uwx&n=[email or web address]    Tell your midwife or physician how you plan to feed your baby (breast or bottle), who you have chosen to do pediatric care for your baby, and if you have a boy, whether you have chosen to have him circumcised. You will need a car seat correctly installed in your vehicle to bring your baby home. As you start to set up the nursery at home for your baby, make sure the crib is safe. The mattress needs to fit snugly against the edges of the crib. If you can fit a soda can between the bars, they are too far apart and can allow the baby's head to caught between them.    Learn about infant care and feeding, including information about infant CPR. We recommend that you put your baby to sleep on his or her back to reduce the chance of Sudden Infant Death Syndrome (SIDS). To maintain a healthy environment in which your child can grow, it's best to keep your home smoke-free. By preparing ahead, your transition into parenthood will go smoothly for you and your baby.    Your midwife will want to see you for a checkup 2 to 6 weeks after delivery.      Making Plans for Feeding My Baby    By this point, you probably have read a lot about feeding your baby.  Breastfeed or formula? Each mother s decision is her own and [facility] Nurse Midwives  [facility] respects you and your choices. We ve gathered information on both breastfeeding and formula feeding to help with your decision. Talking with your physician or nurse-midwife can also help in your decision.  However you plan to feed your baby, [facility] [place] encourage rooming in with your baby, skin-to-skin contact and feeding your baby based on his or her cues.    Skin-to-skin contact  Being close to mom helps your baby adjust to life outside of the womb.  It helps your baby regulate their body temperature, heart rate, and breathing.  Your baby will usually be placed skin-to-skin immediately following birth or as soon as possible, if medical intervention is needed.    Rooming-In  Having your baby stay with you in your room is called  rooming-in .  Keeping your baby in your room helps you to learn how to care for your baby by getting to know your baby s cues, body rhythms and sleep cycle.       Cue-based feeding  Cues (signals) are baby s way of telling you what he or she wants.  When you learn your infant s cues, you know how to care for and feed your baby.   Feeding cues are the licking and smacking of lips, bringing their fist to their mouth, and a reflex called  rooting - where baby turns and opens his or her mouth, searching for the breast or bottle.  Crying is a late feeding cue.  Babies can feed frequently, often at least 8 times in 24 hours.  Breastfeeding facts  Breast milk is the best source of nutrition for your baby and is available at birth. In the first couple of days, your milk volume is already starting to increase, though it may not be noticeable. Breastfeed frequently to increase your milk supply. Within three to five days, you will begin to notice larger milk volumes. An increase in breast size, heaviness and firmness are often described as the milk  coming in.  Frequent breastfeeding can help breasts from getting overly firm and painful. You will know the baby  is getting enough milk if your baby is having wet and dirty diapers and gaining weight.     If your goal is to exclusively breastfeed, it is important to not use any formula or artificial nipples (including bottles and pacifiers) while your baby is learning to breastfeed.  While it may seem like an  easy  option to give your baby a bottle, formula should only be given if there is a medical reason for your baby to have it.    Positioning and attachment   Get comfortable.  Use pillows as needed to support your arms and baby.  Hold baby close at the level of your breast, facing you in a tummy to tummy position.  Skin to skin helps with this.  Position the baby with his or her nose by the nipple.  There should be a straight line from baby s ear to shoulder to hips.  Tickle your baby s lips or wait for baby to open mouth wide, bring baby to breast by leading with the chin.  Aim the nipple at the roof of baby s mouth.  A rapid sucking pattern is followed by longer, drawing pattern with occasional swallows heard.  When baby is correctly latched, your nipple and much of the areola are pulled well into baby s mouth.      Returning to work or school  Focus on a good start to breastfeeding.  Many women continue to provide breastmilk for their baby when they return to work or school.  Making plans about where to pump and store milk can make the transition go well.  Talk with other mothers who have also returned to work or school for tips and support.  Your employer s Human Resource department may be a resource as well.     Returning to work or school: (continued)   babies can mean fewer  sick  days for you.  A quality breast pump will also save time and add comfort.  Check with your insurance prior to giving birth for breast pump coverage.  Many insurance companies include a pump within your benefits.  Wait until your baby is at least three weeks old to introduce a bottle for the first time.  Have someone besides you  give the bottle.  Breastfeed when you are with your baby. Reserve your bottles of breast milk for when you are away.   Your breasts will need to be  emptied  either by your baby or a pump.  Plan to pump at least twice in an eight hour day.  If you cannot pump at work, continue breastfeeding at home. Any amount of breast milk is worth giving to your baby.    Formula feeding facts  If you are planning to use formula to feed your baby, you will want to make some preparations ahead of time. Talk to your doctor or nurse-midwife about what type of formula to use. Some are iron-fortified, meaning they have extra iron in them. You will want to purchase formula and bottles before your baby is born to be sure you are ready after you return from the hospital. The [facility] do not provide formula samples to take home.    Be sure to follow formula mixing directions closely. Regular milk in the dairy case at the grocery store should not be given to babies under 1 year old. Baby formula is sold in several forms including:  Ready-to-use. This is the most expensive, but no mixing is necessary.  Concentrated liquid. This is less expensive than ready-to-use and you mix with water.  Powder. This is the least expensive. You mix one level scoop of powdered formula with two ounces of water and stir well.    Most babies need 2.5 ounces of formula per pound of body weight each day. This means an 8-pound baby may drink about 20 ounces of formula a day; however, this is just an estimate. The most important thing is to pay attention to your baby s cues.  If your baby is always fussy, needs more iron or has certain food allergies, your physician may suggest you change your baby s formula to a different kind.     How do I warm my baby s bottles?  You may feed your baby a bottle without warming it first. It is OK for the breast milk or formula to be cool or room temperature. If your baby seems to prefer it warmed, you can put the  filled bottle in a container of warm water and let it stand for a few minutes. Check the temperature of the liquid on your skin before feeding it to your baby; to be sure it isn t too hot. Do not heat bottles in the microwave. Microwaves heat food and liquids unevenly, and this can cause hot spots that can burn your baby.    How do I clean and sterilize bottles?  Sterilize bottles and nipples before you use them for the first time. You can do this by putting them in boiling water for 5 minutes. After that first time, you can wash them in hot and soapy water. Rinse them carefully to be sure there is no soap left on them. You can also wash them in the .    Childbirth and Parenting Education:       Everyday Miracles:   https://www.everyday-miracles.org/    Free Video Series from [facility]: https://nursing.[facility]/academics/specialty-areas/nurse-midwifery/having-baby-prenatal-videos/having-baby-prenatal-and    Childbirth Education virtual (live) classes: www.Ryonet/classes  The Birth Hour: https://Novalux/online-childbirth-class/  BirthED: https://www.birthedmn.com/  DAMIAN parenting center: http://[facility] Psychiatric HospitalControl de Pacientes/   ([phone] 685-BABY  Blooma: (education, yoga & wellness) www.[place] Depollution  Enlightened Mama: www.enlightenedmama.Viblio   Childbirth collective: (Parent topic nights)  www.childbirthcollective.org/  Hypnobabies:  www.hypnobabiestwincities.com/  Hypnobirthing:  Http://hypnobirthing.Viblio/  Hypnobirthing virtual class: www.28msec/hypnobirthing    Information about doulas:  Childbirth collective: http://www.childbirthcollective.org/  Doulas of [place] (LANCE):  www.lance.org  [facility]  project: http://Canvastiesdoulaproject.com/     Early Childhood and Family Education (ECFE):  ECFE offers parents hands-on learning experiences that will nourish a lifetime of teachable moments.  http://ecfe.info/ecfe-home/    APPS and Podcasts:   Ovia  Glow  Nurture    Evidence Based Birth  The Birth Hour (for birth stories)   Birthful   Expectful   The Longest Shortest Time  PregnancyPodcast Rhoda Castellano  https://www.downtobirthshow.com/    Book Recommendations:   Lina David's Birthing From Within--first few chapters include a new-age tone, you may prefer to skip it and keep going, because there is good stuff later.  This book recommendation covers emotional preparation, but does cover coping with pain, and use of both pharmacological and nonpharmacological methods.      Guide to Childbirth by Sahara Hutson  Childbirth Without Fear by Natalie Samuel Read    Dr. Mahajan' The Pregnancy Book and The Birth Book--the pregnancy book goes month-by month    The Birth Partner by Leigh Chapa    Womanly Art of Breastfeeding by La Leche League International   Bestfeeding by Brii Gay--great pictures    Mothering Your Nursing Toddler, by Lisette Barreto.   Addresses dealing with so many of the challenging behaviors of a nursing toddler.  How Weaning Happens, by La Leche League.  Discusses weaning at all ages, from medically necessary weaning of an infant, all the way up to age 5 (or older), with why/why not, and strategies.  Very empowering book both for deciding to wean and deciding not to.    American College of Nurse-Midwives (ACNM) http://www.midwife.org/; look at the informational handouts at http://www.midwife.org/Share-With-Women     www.mymidwife.org    Mother to Baby (Medication and Herbal guidance in pregnancy): http://www.mothertobaby.org  Toll-Free Hotline: 281.209.6795  LactMed (Medication use while breastfeeding): http://toxnet.nlm.nih.gov/newtoxnet/lactmed.htm    Women's Health.gov:  http://www.womenshealth.gov/a-z-topics/index.html    American pregnancy association - http://americanpregnancy.org    Centering Pregnancy (group prenatal care option): http://centeringhealthcare.org    March of Dimes www.SinDelantal.Wavestream     FDA - Nutrition  www.mypyramid.gov   "Under \"For Consumers,\" click on \"pregnant and breastfeeding women.\"      Centers for Disease Control and Prevention (CDC) - Vaccines : http://www.cdc.gov/vaccines/       When researching information on the web, question the validity of websites.  The domains .gov, .edu and.org tend to be more reliable information.  If there are a lot of advertisements, be cautious of the information provided. Stay away from blogs and chat rooms please!     [facility] Breastfeeding Support    Early Childhood Family [place] [address] provides a free, drop-in class/breastfeeding support group, facilitated by a lactation consultant and .  During the group you can connect with other parents, weigh your baby, ask questions about feeding and baby development, and more.  You do not need to register.  Fall in-person meetings will begin on , are for parents of babies from birth to 9 months, and will meet on Monday evenings from 6 - 7:15 pm in [facility], which is at [address] [place] [address].  [place] [address] also offers virtual group meetings with a lactation consultant/.  These take place on , from 11:30 am - 12:30 pm for parents of newborns to 3-month-olds, and from 4:15 - 5:15 for parents of 3 - 9 - month olds.  To get the meeting link contact Lizbeth Restrepo at [phone].    [facility] offers a free, drop-in breastfeeding support group facilitated by MCKENNA Villanueva.   at [facility] [place] [facility] [address], [place].  A scale is available to check baby weights, if desired.  [place] also has a variety of new parent classes:  (cost for registration)  https://[facility].Datapipe/classes/    [facility] Lactation unge facilitated by MCKENNA Herrera:  Free, drop-in support group for breastfeeding, with baby scale available if needed.  Meets at [facility], second Tuesday of each month, 10 am - 12 pm.  Text [phone] " "for info.    [facility] Cafe Support Group, Tuesdays at 10:30 am   Run by Chetna Kenny, MCKENNA of [facility]   Go to [facility] Facebook page, click on \"events\" for link:   [email or web address]    [facility] breastfeeding support community:  free, drop-in breastfeeding support facilitated by Certified Lactation Counselors, open Mondays and Wednesdays from 1 pm - 5 pm.  In [place]:  [facility] [address]:   [facility].org    [facility] Milk Hour, Thursdays at 2:30 pm    Run by Tha Navas, JOSECLC  Go to [facility] FB page and send message to get link   Https://[email or web address]/    Zackary Balderrama:  http://www.londas.org/    [facility] offers a Lactation Lounge every Friday 12pm - 1pm, run by Kesha Conn, Zackary Balderrama Leader.  Sign up via link at Koa.la/cbe-lactation   [email or web address]://www.Koa.la/cbe-lactation    [facility] is offering virtual support groups every Monday, 10:30 am - 12 pm, run by RN IBCLC: Https://[email or web address]/    Culturally-Specific Breastfeeding Support:     For Hmong Families:   The Hmong Breastfeeding Coalition is a wonderful support for [place] Hmong women who are breastfeeding.  They are best found on Facebook.    for Black families:    Chocolate Milk Club:  http://www.Smart EcosystemslsmidMatter.io.com/chocolate-milk-club/  Email: William@BrandCont    R.O.S.E. = Reaching our Sisters Everywhere  Http://www.breastfeedingrose.org/    Club Mom breastfeeding support for Black mothers:  Contact Greg Mercado  Phone: [phone]   Email:  [email or web address].     Guera Bocanegra  Phone: [phone]   Email:  [email or web address].    Club Dad parent support for Black fathers:   Contact Kang Cardona   Phone: [phone]   Email:  " "[email or web address].    For Native/Indigenous Families:    https://www.WorldViz.com/groups/nitimelda.gimashkikinaan     Additional Resources:  La Leche iSSimplejesus is an international, nonprofit, nonsectarian organization offering information, education, and support to mothers who want to breast-feed their babies. Local groups offer phone help and monthly meetings. Visit Do It In Person.org or Five minutes.org and us the  Find local support  drop down menu or click on the  Resources  tab.    [place] Breastfeeding Resources: 9-848-551-BABY (2229) toll free    National Breastfeeding Help Line trained breastfeeding peer counselors can help answer common breast-feeding questions by phone. Monday-Friday: English/Romanian  [phone]- [phone] toll free, [phone] (TTY)    Virtual Breastfeeding Support:    During this time of isolation, breastfeeding families need even more community!  Here are some area organizations offering virtual support groups for breastfeeding:    [facility] Cafe Support Group, Tuesdays at 10:30 am   Run by MCKENNA Babin of [facility]   Go to [facility] Facebook page and click on \"events\" for link   https://www.[email or web address]/  [facility] Milk Hour, Thursdays at 2:30 pm    Run by MCKENNA Rg   Go to [facility] FB page and send message to get link   https://www.WorldViz.com/healthfoundations/  [facility] holding virtual meetings the first Tuesday of each month, 8-9 pm, and the   Third Saturday, 10 - 11 am.  Go to [facility] FB page; message to get link [email or web address]_[email or web address]  [place] offers a Lactation Lounge every Friday 12pm - 1pm, run by Ishan RomoChildress Regional Medical Centerjesus Leader   Sign up via link at [email or web address]://www.[employer]/cbe-lactation  [place] " [facility] is offering virtual support groups every Monday, 10:30 am - 12 pm, run by nurse MCKENNA   Https://www.[email or web address]/    Prenatal Breastfeeding Classes:      [employer] is offering virtual breastfeeding and  care classes:  https://www.[employer]/education-workshops  [employer] childbirth and breastfeeding education offering virtual prenatal breastfeeding classes  https://www.birthedmn.com/workshops

## 2025-02-26 NOTE — PROGRESS NOTES
Reports a gush of fluid Saturday with no leaking since.  Has noticed increased discharge overall.  It not needing to wear a pad.  Denies vaginal itching/burning/odor.  Some BH ctx and sharp vaginal pains intermittently.  Sterile speculum reveals closed thick cervix, homogenous white discharge present.  Negative cough response.  ROM+ collected and sent as well as wet prep.  LOW suspicion for ROM.  Reviewed labor precautions/WTC.  GBS negative.  Vomiting has been infrequent--about 2x/week.  Feels she is able to keep most food/fluids down. Will discontinue IV infusion appts.    10 minutes spent on evaluating/discussing/documenting vaginal discharge in addition to normal prenatal visit

## 2025-03-05 ENCOUNTER — PRENATAL OFFICE VISIT (OUTPATIENT)
Dept: MIDWIFE SERVICES | Facility: CLINIC | Age: 25
End: 2025-03-05
Payer: COMMERCIAL

## 2025-03-05 VITALS
DIASTOLIC BLOOD PRESSURE: 70 MMHG | HEART RATE: 100 BPM | BODY MASS INDEX: 32.78 KG/M2 | WEIGHT: 204 LBS | HEIGHT: 66 IN | SYSTOLIC BLOOD PRESSURE: 120 MMHG

## 2025-03-05 DIAGNOSIS — O35.EXX0 PYELECTASIS OF FETUS ON PRENATAL ULTRASOUND: ICD-10-CM

## 2025-03-05 PROCEDURE — 3074F SYST BP LT 130 MM HG: CPT | Performed by: MIDWIFE

## 2025-03-05 PROCEDURE — 3078F DIAST BP <80 MM HG: CPT | Performed by: MIDWIFE

## 2025-03-05 PROCEDURE — 0502F SUBSEQUENT PRENATAL CARE: CPT | Performed by: MIDWIFE

## 2025-03-05 PROCEDURE — 99207 PR PRENATAL VISIT: CPT | Performed by: MIDWIFE

## 2025-03-05 NOTE — PATIENT INSTRUCTIONS
"\"We hope you had a positive experience and that you can definitely recommend [facility] to your family and friends. You ll be receiving a survey soon and we look forward to hearing your feedback\".    [facility] - Contact information:  Appointment line and to get a hold of CNM in clinic Monday-Friday 8 am - 5 pm:  [phone].  There are some clinics with early start times (1st appointment 7:40 am) and others with evening hours (last appointment 6:20 pm).  Most are typically open from 8 am to 5 pm.    CNM on call answering service: [phone].  Specify your hospital of choice and leave a brief message for CNM;  will then page CNM who is on call at your specified hospital and you should receive a call back with 15 minutes.  Be sure that your ringer is audible and that you can accept blocked calls so that we can get back in touch with you! This number should be reserved for urgent needs if during the day, before 8 am, after 5 pm, weekends, holidays.    Contact the on-call CNM with warning signs, such as:  vaginal bleeding   Vaginal discharge and itching or pain and burning during urination  Leg/calf pain or swelling on one side  severe abdominal pain  nausea and vomiting more than 4-5 times a day, or if you are unable to keep anything down  fever more than 100.4 degrees F.     QQTechnologyhart  After each of your visits you are welcome to check [facility] for your visit summary including education and links to information relevant to your pregnancy and/or well woman care.   Find the \"Visits\" tab at the top of the page, you will see a list of recent visits and for each visit a for link for \"View After Visit Summary.\" View of your After Visit Summary will allow you to read our recommendations from your visit, review any education provided, and link to websites with useful information.   If you have any questions or difficulty navigating BookFresh, please feel free to " "contact us and we will do our best to direct you.    Meet the Midwives from [facility]  You are invited to an informational meet and greet with [facility] Certified Nurse-Midwives. Our free \"Meet the Midwives\" event is a great opportunity to learn about our midwives' philosophy and experience, the hospitals where we can assist with your birth, and answer questions you may have. Partners, friends, and family are welcome to attend. Currently, this is a virtual event.  Dates: Last Thursday of every month at 7 pm.    Link to next (live) meeting  [email or web address]  To Join by Telephone (audio only) Call:   [phone] Phone Conference ID: [phone] #      Touring the Maternity Care Center  At this time we are offering a virtual tour of the Maternity Care Centers at both [facility] and [facility]:   [facility] Maternity Care:   https://[facility].org/locations/the-[facility]place-[facility]:   https://[facility].org/locations/[facility]    Scroll to the bottom of this hyperlink if the above link does not work      Childbirth and Parenting Education:     Everyday Miracles:   https://www.everyday-miracles.org/    Free Video Series from [facility]: https://nursing.[facility]/academics/specialty-areas/nurse-midwifery/having-baby-prenatal-videos/having-baby-prenatal-and    Childbirth Education virtual (live) classes: www.Yaoota.com/classes  The Birth Hour: https://Protea Medical/online-childbirth-class/  BirthED: https://www.birthedmn.com/  DAMIAN parenting center: http://ammapareQualaris Healthcare Solutions.mSilica/   (424) 514-BABY  Blo[place]: (education, yoga & wellness) www.Myer.mSilica  Enlightened Mama: www.enlightenedmama.com   Childbirth collective: (Parent topic nights)  www.childbirthcollective.org/  Hypnobabies:  " www.BrowsaritybiestValkeecities.Mersana Therapeutics/  Hypnobirthing:  Http://hypnCrocodile Gold.Mersana Therapeutics/  Hypnobirthing virtual class: www.MobileDay.Mersana Therapeutics/hypnobirthing    Information about doulas:  Childbirth collective: http://www.childbirthcollective.org/  Doulas of [place] (LANCE):  www.lance.org  Celery  project: http://Preview NetworksdoH2Sonicsject.Mersana Therapeutics/     Early Childhood and Family Education (ECFE):  ECFE offers parents hands-on learning experiences that will nourish a lifetime of teachable moments.  http://ecfe.info/ecfe-home/    APPS and Podcasts:   Perlita Mariscal Nurture    Evidence Based Birth  The Birth Hour (for birth stories)   Birthful   Expectful   The Longest Shortest Time  PregnancyPodcast Rhodanabeel Castellano  https://www.downtobirthshow.com/    Book Recommendations:   Lina Cincinnati's Birthing From Within--first few chapters include a new-age tone, you may prefer to skip it and keep going, because there is good stuff later.  This book recommendation covers emotional preparation, but does cover coping with pain, and use of both pharmacological and nonpharmacological methods.    Guide to Childbirth by Sahara Hutson  Childbirth Without Fear by Natalie Samuel Read    Dr. Mahajan' The Pregnancy Book and The Birth Book--the pregnancy book goes month-by month    The Birth Partner by Leigh Chapa    Womanly Art of Breastfeeding by La Leche League International   Bestfeeding by Brii Gay--great pictures    Mothering Your Nursing Toddler, by Lisette Barreto.   Addresses dealing with so many of the challenging behaviors of a nursing toddler.  How Weaning Happens, by La Leche League.  Discusses weaning at all ages, from medically necessary weaning of an infant, all the way up to age 5 (or older), with why/why not, and strategies.  Very empowering book both for deciding to wean and deciding not to.    American College of Nurse-Midwives (ACNM) http://www.midwife.org/; look at the informational handouts at  "http://www.midwife.org/Share-With-Women     www.mymidwife.org    Mother to Baby (Medication and Herbal guidance in pregnancy): http://www.mothertobaby.org  Toll-Free Hotline: 372.975.2787  LactMed (Medication use while breastfeeding): http://toxnet.nlm.nih.gov/newtoxnet/lactmed.htm    Women's Health.gov:  http://www.womenshealth.gov/a-z-topics/index.html    American pregnancy association - http://americanpregnancy.org    Centering Pregnancy (group prenatal care option): http://centeringhealthcare.org    March of Dimes www.Kanari.Teraco Data Environments     FDA - Nutrition  www.mypyramid.gov  Under \"For Consumers,\" click on \"pregnant and breastfeeding women.\"      Centers for Disease Control and Prevention (CDC) - Vaccines : http://www.cdc.gov/vaccines/       When researching information on the web, question the validity of websites.  The CDEL .gov, .edu and.org tend to be more reliable information.  If there are a lot of advertisements, be cautious of the information provided. Stay away from blogs and chat rooms please!     Making Plans for Feeding My Baby    By this point, you probably have read a lot about feeding your baby.  Breastfeed or formula? Each parent's decision is their own and [facility] respects you and your choices. We ve gathered information on both breastfeeding and formula feeding to help with your decision. Talking with your nurse-midwife can also help in your decision.  However you plan to feed your baby, [facility] encourage rooming in with your baby, skin-to-skin contact and feeding your baby based on his or her cues.    Skin-to-skin contact  Being close to mom helps your baby adjust to life outside of the womb.  It helps your baby regulate their body temperature, heart rate, and breathing.  Your baby will usually be placed skin-to-skin immediately following birth or as soon as possible, if medical intervention is needed.    Rooming-In  Having " your baby stay with you in your room is called  rooming-in .  Keeping your baby in your room helps you to learn how to care for your baby by getting to know your baby s cues, body rhythms and sleep cycle.       Cue-based feeding  Cues (signals) are baby s way of telling you what he or she wants.  When you learn your infant s cues, you know how to care for and feed your baby.   Feeding cues are the licking and smacking of lips, bringing their fist to their mouth, and a reflex called  rooting - where baby turns and opens his or her mouth, searching for the breast or bottle.  Crying is a late feeding cue.  Babies can feed frequently, often at least 8 times in 24 hours.    Breastfeeding facts  Breast milk is the best source of nutrition for your baby and is available at birth. In the first couple of days, your milk volume is already starting to increase, though it may not be noticeable. Breastfeed frequently to increase your milk supply. Within three to five days, you will begin to notice larger milk volumes. An increase in breast size, heaviness and firmness are often described as the milk  coming in.  Frequent breastfeeding can help breasts from getting overly firm and painful. You will know the baby is getting enough milk if your baby is having wet and dirty diapers and gaining weight.     If your goal is to exclusively breastfeed, it is important to not use any formula or artificial nipples (including bottles and pacifiers) while your baby is learning to breastfeed.  While it may seem like an  easy  option to give your baby a bottle, formula should only be given if there is a medical reason for your baby to have it.      Positioning and attachment   Get comfortable.  Use pillows as needed to support your arms and baby.  Hold baby close at the level of your breast, facing you in a tummy to tummy position.  Skin to skin helps with this.  Position the baby with his or her nose by the nipple.  There should be a straight  line from baby s ear to shoulder to hips.  Tickle your baby s lips or wait for baby to open mouth wide, bring baby to breast by leading with the chin.  Aim the nipple at the roof of baby s mouth.  A rapid sucking pattern is followed by longer, drawing pattern with occasional swallows heard.  When baby is correctly latched, your nipple and much of the areola are pulled well into baby s mouth.      Returning to work or school  Focus on a good start to breastfeeding.  Many women continue to provide breastmilk for their baby when they return to work or school.  Making plans about where to pump and store milk can make the transition go well.  Talk with other mothers who have also returned to work or school for tips and support.  Your employer s Human Resource department may be a resource as well.     Returning to work or school: (continued)   babies can mean fewer  sick  days for you.  A quality breast pump will also save time and add comfort.  Check with your insurance prior to giving birth for breast pump coverage.  Many insurance companies include a pump within your benefits.  Wait until your baby is at least three weeks old to introduce a bottle for the first time.  Have someone besides you give the bottle.  Breastfeed when you are with your baby. Reserve your bottles of breast milk for when you are away.   Your breasts will need to be  emptied  either by your baby or a pump.  Plan to pump at least twice in an eight hour day.  If you cannot pump at work, continue breastfeeding at home. Any amount of breast milk is worth giving to your baby.    Formula feeding facts  If you are planning to use formula to feed your baby, you will want to make some preparations ahead of time. Talk to your doctor or nurse-midwife about what type of formula to use. Some are iron-fortified, meaning they have extra iron in them. You will want to purchase formula and bottles before your baby is born to be sure you are ready after  you return from the hospital. [facility] do not provide formula samples to take home.    Be sure to follow formula mixing directions closely. Regular milk in the dairy case at the grocery store should not be given to babies under 1 year old. Baby formula is sold in several forms including:  Ready-to-use. This is the most expensive, but no mixing is necessary.  Concentrated liquid. This is less expensive than ready-to-use and you mix with water.  Powder. This is the least expensive. You mix one level scoop of powdered formula with two ounces of water and stir well.    Most babies need 2.5 ounces of formula per pound of body weight each day. This means an 8-pound baby may drink about 20 ounces of formula a day; however, this is just an estimate. The most important thing is to pay attention to your baby s cues.  If your baby is always fussy, needs more iron or has certain food allergies, your physician may suggest you change your baby s formula to a different kind.     How do I warm my baby s bottles?  You may feed your baby a bottle without warming it first. It is OK for the breast milk or formula to be cool or room temperature. If your baby seems to prefer it warmed, you can put the filled bottle in a container of warm water and let it stand for a few minutes. Check the temperature of the liquid on your skin before feeding it to your baby; to be sure it isn t too hot. Do not heat bottles in the microwave. Microwaves heat food and liquids unevenly, and this can cause hot spots that can burn your baby.    How do I clean and sterilize bottles?  Sterilize bottles and nipples before you use them for the first time. You can do this by putting them in boiling water for 5 minutes. After that first time, you can wash them in hot and soapy water. Rinse them carefully to be sure there is no soap left on them. You can also wash them in the .    Breastfeeding/Chestfeeding Support  Contact  us  Breastfeeding/chestfeeding is the natural and healthy way for parents to feed their babies and provides the best source of nutrition in most cases to infants. Breast milk has health benefits for mom, too. The American Academy of Pediatrics recommends exclusively breastfeeding for 6 months for optimal growth and development and breastfeeding up to at least a year.  Benefits to baby:  Easy digestion, less diarrhea and constipation, breast milk is easy to digest.  Lots of bonding with parent.  Less likely to have asthma.  Less ear infections and respiratory infections.  Less likely to have Type 2 Diabetes.  Less likely to become obese.  Lower risk of Sudden Infant Death Syndrome (SIDS).  Benefits to breastfeeding/chestfeeding parent:  Helps with weight loss.  Lower risk of ovarian and breast cancer, Type 2 diabetes and heart disease.  /chestfed babies are easy to take on trips. Just grab the diapers and go!  Breastfeeding/chestfeeding saves money (no formula or bottle costs, fewer doctor bills and medication costs).  Ready to breastfeed/chestfeed anywhere, don t need to make and wash bottles.  Breastfeeding/chestfeeding is wonderful, but not always easy. Learning what to expect ahead of time and get support from a lactation professional. Check out the [facility] Breastfeeding Resource Guide for classes, drop in support groups, childbirth education, Doulas and clinic and hospital lactation services.     Early Childhood Family Education [place] [address] provides a free, drop-in class/breastfeeding support group, facilitated by a lactation consultant and .  During the group you can connect with other parents, weigh your baby, ask questions about feeding and baby development, and more.  You do not need to register.  Fall in-person meetings will begin on September 12, are for parents of babies from birth to 9 months, and will meet on Monday evenings from 6 - 7:15 pm in [facility]  "[address], which is at [address] [place] [address] also offers virtual group meetings with a lactation consultant/.  These take place on , from 11:30 am - 12:30 pm for parents of newborns to 3-month-olds, and from 4:15 - 5:15 for parents of 3 - 9 - month olds.  To get the meeting link contact Lizbeth Restrepo at [phone].    [facility] offers a free, drop-in breastfeeding support group facilitated by MCKENNA Villanueva.   at [facility] [address], [address], [place].  A scale is available to check baby weights, if desired.  [facility] also has a variety of new parent classes:  (cost for registration)  https://Aidhenscorner/classes/    [facility] Lactation Lounge facilitated by MCKENNA Herrera:  Free, drop-in support group for breastfeeding, with baby scale available if needed.  Meets at [facility], second Tuesday of each month, 10 am - 12 pm.  Text [phone] for info.    [place] [employer] Support Group,  at 10:30 am   Run by MCKENNA Babin of The Baby Whisperer Lactation Consultants   Go to The Baby Whisperer Lactation Consultants Facebook page, click on \"events\" for link:   Https://www.Sylvan Source.com/events/[email or web address]/    [facility] [place] breastfeeding support community:  free, drop-in breastfeeding support facilitated by Certified Lactation Counselors, open  and  from 1 pm - 5 pm.  In [place]:  [facility] [place], [facility] [facility]:   guidingstarwakota.org    [facility] Milk Hour,  at 2:30 pm    Run by MCKENNA Rg  Go to [facility] FB page and send message to get link   Https://www.Sylvan Source.com/healthfoundations/    Zackary Balderrama:  http://www.[facility].org/    [place] offers a Lactation Lounge every Friday 12pm - 1pm, run by Zackary Romo Leader.  Sign up via link at " Arynga/cbe-lactation   https://www.Arynga/cbe-lactation    [facility] is offering virtual support groups every Monday, 10:30 am - 12 pm, run by RN IBCLC: Https://www.facebook.com/events/417936474895349/    Culturally-Specific Breastfeeding Support:     For Hmong Families:   The Hmong Breastfeeding Coalition is a wonderful support for [place] Hmong women who are breastfeeding.  They are best found on Facebook.    for Black families:    Mealnutcolate Milk Club:  http://www.Common Ground.Overflow Cafe/chocolate-milk-club/  Email: William@Moonshado    R.O.S.E. = Reaching our Sisters Everywhere  Http://www.breastfeedingrose.org/    Club Mom breastfeeding support for Black mothers:  Contact Greg Mercado  Phone: [phone]   Email:  [email or web address].     Guera Bocanegra  Phone: [phone]   Email:  [email or web address]    Club Dad parent support for Black fathers:   Contact Kang Cardona   Phone: [phone]   Email:  Mary@[facility]    For Native/Indigenous Families:    https://www.Human Demand.com/groups/nitdontaesing.gimashkikinaan     Additional Resources:  La Leche League is an international, nonprofit, nonsectarian organization offering information, education, and support to mothers who want to breast-feed their babies. Local groups offer phone help and monthly meetings. Visit [facility].org or Geniuzz.org and us the  Find local support  drop down menu or click on the  Resources  tab.    [place] Breastfeeding Resources: 3-946-250-BABY (2229) toll free    National Breastfeeding Help Line trained breastfeeding peer counselors can help answer common breast-feeding questions by phone. Monday-Friday: English/Bulgarian  6-424- [phone] toll free, [phone] (TTY)    Virtual Breastfeeding Support:    During this time of isolation, breastfeeding families need even more community!  Here are some area organizations offering virtual support groups  "for breastfeeding:    [facility] [facility] Support Group,  at 10:30 am   Run by MCKENNA Babin of The Baby Whisperer Lactation Consultants   Go to The Baby Whisperer Lactation Consultants Facebook page and click on \"events\" for link   https://www.Crowned Grace International.com/events/[email or web address]/  [facility] Milk Hour,  at 2:30 pm    Run by MCKENNA Rg   Go to [facility] FB page and send message to get link   https://www.Crowned Grace International.G2 Crowd/Team Everestfoundations/  [facility] holding virtual meetings the first Tuesday of each month, 8-9 pm, and the   Third Saturday, 10 - 11 am.  Go to [facility] and [place] FB page; message to get link https://www.Puzl/LLLofGoldenBrandon/?hc_location=[facility]  [place] offers a Lactation Lounge every Friday 12pm - 1pm, run by [facility] Leader   Sign up via link at https://www.Lively Inc./cbe-lactation  [facility] is offering virtual support groups every Monday, 10:30 am - 12 pm, run by nurse IBCLC   Https://www.Crowned Grace International.com/events/[email or web address]/    Prenatal Breastfeeding Classes:      BloomAppsFlyer is offering virtual breastfeeding and  care classes:  https://www.Lively Inc./education-workshops  BirthEd childbirth and breastfeeding education offering virtual prenatal breastfeeding classes  https://www.Wattageedmn.com/workshops    Preparing for your baby:     [place] Screening Program  Http://www.health.[facility].mn.us/newbornscreening/  [place] newborns are tested soon after birth for more than 50 hidden, rare disorders, including hearing loss and critical congenital heart disease (CCHD). This site provides resources and information for families and providers.    When to call:   Appointment line and to get a hold of CNM in clinic Monday-Friday 8 am - 5 pm:  [phone].  There are some clinics with early start times (1st appointment 7:40 " am) and others with evening hours (last appointment 6:20 pm).  Most are typically open from 8 am to 5 pm.    CNM on call answering service: [phone].  Specify your hospital of choice and leave a brief message for CNM;  will then page CNM who is on call at your specified hospital and you should receive a call back with 15 minutes.  Be sure that your ringer is audible and that you can accept blocked calls so that we can get back in touch with you! This number should be reserved for urgent needs if during the day, before 8 am, after 5 pm, weekends, holidays.    Contact the on-call CNM with warning signs, such as:  vaginal bleeding   Vaginal discharge and itching or pain and burning during urination  Leg/calf pain or swelling on one side  severe abdominal pain  nausea and vomiting more than 4-5 times a day, or if you are unable to keep anything down  fever more than 100.4 degrees F.     Make plans for transportation and  as needed for when you are going to the hospital.    Ask your health care provider about vaccinations you may need following delivery. By now, you should have received a Tdap immunization to protect against pertussis or whooping cough. Fathers and family members who will be in close contact with the baby should also receive a Tdap shot at least two weeks before the expected birth of the baby if they have not had a Td (tetanus) shot for at least two years.    Tell your midwife or physician how you plan to feed your baby (breast or bottle), who you have chosen to do pediatric care for your baby, and if you have a boy, whether you have chosen to have him circumcised. You will need a car seat correctly installed in your vehicle to bring your baby home. As you start to set up the nursery at home for your baby, make sure the crib is safe. The mattress needs to fit snugly against the edges of the crib. If you can fit a soda can between the bars, they are too far apart and can allow the  baby's head to caught between them.    Learn about infant care and feeding, including information about infant CPR. We recommend that you put your baby to sleep on his or her back to reduce the chance of Sudden Infant Death Syndrome (SIDS). To maintain a healthy environment in which your child can grow, it's best to keep your home smoke-free. By preparing ahead, your transition into parenthood will go smoothly for you and your baby.    Your midwife will want to see you for a checkup 2 to 6 weeks after delivery.

## 2025-03-05 NOTE — PROGRESS NOTES
Subjective:   Pau is here with Uday  for a routine prenatal visit at 37w6d.  She has no concerns and is feeling well.   Appetite is normal. Interval weight gain is appropriate.   She is feeling baby move, having BH contractions, denies change in vaginal discharge. Desires cervical exam with  membrane sweep. Risks and benefits reviewed and membranes swept with exam.    Getting daily iron supplementation from prenatal vitamin.    Objective:  See flowsheet.    Assessment:    Supervision of other normal pregnancy in third trimester    (O35.EXX0) Pyelectasis of fetus on prenatal ultrasound      Plan:  1.Additional testing needed: none  2.Anticipatory guidance, warning signs, when to call and CNM contact information reviewed.   3. Return to clinic in  1 weeks.     Delma Spann DNP,APRN,CNM

## 2025-03-06 ENCOUNTER — TELEPHONE (OUTPATIENT)
Dept: MIDWIFE SERVICES | Facility: CLINIC | Age: 25
End: 2025-03-06
Payer: COMMERCIAL

## 2025-03-06 NOTE — TELEPHONE ENCOUNTER
Incoming written order request for breast pump received from [employer]. Form placed in [facility] in box for review and signature.

## 2025-03-07 ENCOUNTER — MEDICAL CORRESPONDENCE (OUTPATIENT)
Dept: HEALTH INFORMATION MANAGEMENT | Facility: CLINIC | Age: 25
End: 2025-03-07

## 2025-03-09 ENCOUNTER — HOSPITAL ENCOUNTER (OUTPATIENT)
Facility: CLINIC | Age: 25
Discharge: HOME | End: 2025-03-09
Attending: ADVANCED PRACTICE MIDWIFE | Admitting: ADVANCED PRACTICE MIDWIFE
Payer: COMMERCIAL

## 2025-03-09 VITALS
HEIGHT: 66 IN | WEIGHT: 205 LBS | DIASTOLIC BLOOD PRESSURE: 75 MMHG | RESPIRATION RATE: 18 BRPM | BODY MASS INDEX: 32.95 KG/M2 | TEMPERATURE: 98.4 F | HEART RATE: 82 BPM | SYSTOLIC BLOOD PRESSURE: 129 MMHG

## 2025-03-09 DIAGNOSIS — Z36.89 ENCOUNTER FOR TRIAGE IN PREGNANT PATIENT: Primary | ICD-10-CM

## 2025-03-09 DIAGNOSIS — O35.EXX0 PYELECTASIS OF FETUS ON PRENATAL ULTRASOUND: ICD-10-CM

## 2025-03-09 PROCEDURE — 99214 OFFICE O/P EST MOD 30 MIN: CPT | Mod: 25

## 2025-03-09 PROCEDURE — G0463 HOSPITAL OUTPT CLINIC VISIT: HCPCS

## 2025-03-09 PROCEDURE — 59025 FETAL NON-STRESS TEST: CPT | Mod: 26

## 2025-03-09 RX ORDER — LIDOCAINE 40 MG/G
CREAM TOPICAL
Status: DISCONTINUED | OUTPATIENT
Start: 2025-03-09 | End: 2025-03-09 | Stop reason: HOSPADM

## 2025-03-09 ASSESSMENT — ACTIVITIES OF DAILY LIVING (ADL)
ADLS_ACUITY_SCORE: 16
ADLS_ACUITY_SCORE: 16

## 2025-03-09 NOTE — PROGRESS NOTES
Outpatient Note:    Patient Name:  Pau Murphy  :      2000  MRN:      [id number]    Assessment:  24yo  @ 38w3d here for evaluation of contractions.   GBS negative  Prodromal vs early labor  Reactive NST    Plan:   - Recommend increased PO hydration  - Monitor x30 minutes after SVE  - After 30-minute monitoring period, contraction pattern unchanged with no return to frequent or intense pattern. Extensively reviewed options and recommendation against labor induction or augmentation prior to 39w without medical indication. Recommend discharge to home undelivered with increased PO hydration, tub soaks, and rest as able.  Reviewed warning signs including decreased fetal movement, leaking of fluid, vaginal bleeding, or signs of labor. Reviewed how to contact on-call CNM. Follow-up in clinic with CNM as scheduled this week or sooner as needed. All questions answered. Agrees with plan.   - Considering IOL after 39 weeks and undecided today, will plan to revisit at return CNM visit.     Subjective:  Pau Murphy is a 25 year old  at 38wd weeks, with an EDC of 3/20/25 who presented to [facility] at 1632 for evaluation of contractions q5min at home. Contractions have spaced since being in car and currently do not feel as frequent or intense as during phone call to writer earlier today. Reports SVE with membrane sweep at last prenatal visit, 3/5/25. Denies leaking of fluid, bleeding, or changes in fetal movement. Reports no dysuria, malodorous or irritating vaginal discharge, or persistent back pain. Accompanied by partner, Laith.    Antepartum chart reviewed. Followed by [facility] at the [facility] with consistent antepartum care starting at 15w1d weeks GA.   Noted risk factors:   Asthma  Hyperemesis gravidarum  Cystic fibrosis carrier      Objective:  Vital signs:   Temp:  [98.4  F (36.9  C)] 98.4  F (36.9  C)  Pulse:  [82] 82  Resp:  [18]  18  BP: (129)/(75) 129/75    FHR: 125 baseline, moderate variability, 15 X 15 accelerations present, decelerations absent.   Uterine contractions: irritability, soft by palpation. Soft resting tone.    Physical Exam:   Abdomen: SIUP, vertex by Leopold's, abdomen non-tender  SVE: 1.5/30%/posterior/soft/-2 / no bloody show . 30-minute repeat deferred.  Legs: no edema,  moves all freely    Results:    No results found for this or any previous visit (from the past 24 hours).    Provider:  ALOK Rosa CNM  Patient evaluated with ALOK Ahmadi CNM      30 minutes spent on the date of the encounter doing chart review, review of test results, patient visit and documentation

## 2025-03-09 NOTE — TELEPHONE ENCOUNTER
Phone Call:    Pau Murphy, is a 25 year old,  at 38w3d, calls reporting contractions all day since this morning. Contractions are now ~q5min, feel less strong than prior labor but notes prior labor was augmented with pitocin. Overall feels well and reports no LOF, no bleeding, typical fetal movement. Is 30-minute drive from hospital; accepts writer's strong recommendation to head to [facility] now for assessment. Plans for partner to drive. Reviewed need for urgent obstetric care at nearest hospital if unable to make it to [facility] in timely fashion. Charge RN notified. All questions answered, agrees with plan.         [facility]  [facility]

## 2025-03-09 NOTE — PROGRESS NOTES
Data: [facility] presented to [facility]: 3/9/2025  4:32 PM.  Reason for maternal/fetal assessment is uterine contractions. Patient reports contractions. Patient denies leaking of vaginal fluid/rupture of membranes, vaginal bleeding, abdominal pain, pelvic pressure, nausea, vomiting, headache, visual disturbances, epigastric or RUQ pain, significant edema. Patient reports fetal movement is normal. Patient is a 38w3d . Prenatal record reviewed. Pregnancy has been uncomplicated. Support person is present.     Fetal HR baseline was 135, variability is moderate (amplitude range 6 to 25 bpm). Accelerations: absent. Decelerations: absent. Uterine assessment is moderate by palpation during contractions and soft by palpation at rest. Cervix 1.5 cm dilated and 80% effaced. Fetal station  . Fetal presentation   per  CNM evaluation . Membranes: intact.    Vital signs wnl. Patient reports pain and is coping.     Action: Verbal consent for EFM. Triage assessment completed. Patient may meet criteria for early labor discharge.     Response: Patient verbalized understanding of triage assessment. Will contact Clover Calvillo CNM with assessment and consideration of early labor discharge vs admission.

## 2025-03-09 NOTE — DISCHARGE INSTRUCTIONS
EARLY LABOR DISCHARGE INSTRUCTIONS    You were seen for: Labor Assessment  You had (Test or Medicine): fetal monitoring and cervical exam    Refer to Any Day [facility] handout for tips on how to labor at home    WHEN TO CALL YOUR PROVIDER:  If this is your first baby: Your contractions (tightening) are 5 minutes apart, last more than 1 minute, and have been consistently getting stronger for 1 hour or more  If this is your second baby or beyond: Your contractions are less than 10 minutes apart and have been consistently getting stronger for 1 hour or more  Feeling your baby move less than usual  Temperature of 100.4 F (38 C) or higher  New fluid leaking from your vagina  Other signs your provider asked you to look for in your body  Vaginal bleeding (bright red blood)  Swelling in your face or more swelling in your hands or legs  Headaches that don't get better after taking Tylenol (acetaminophen)  Changes in your vision (blurry or seeing spots or stars)  Nausea (sick to your stomach) and vomiting (throwing up)  Heartburn that doesn't go away  Sudden, bad belly pain that is unlike your contractions    IF YOU ARRIVED WITH YOUR WATER ALREADY BROKEN (MEMBRANES RUPTURED):  Avoid placing anything in vagina, including intercourse (sex)  Check your temperature every 3 hours when awake  Call your provider/clinic if:  Your temperature is 100.4 F (38 C) or higher  Your fluid becomes not clear or is smelly  Your baby is moving less than usual  You don't go into labor within 24 hours of your water breaking  You have other concerns  Follow up plan: as previously scheduled in clinic      FOLLOW UP:  As scheduled in the clinic    Provider/clinic number:

## 2025-03-09 NOTE — PROGRESS NOTES
Data: Patient assessed in the [facility] for uterine contractions. Cervix 1.5 cm dilated and 80% effaced. Fetal station  . Membranes intact. Contractions are  , rare minutes apart, and last   seconds. Uterine assessment is moderate by palpation during contractions and soft by palpation at rest. See flowsheets for fetal assessment documentation.     Action:  Presumed adequate fetal oxygenation documented. Early labor precautions and discharge instructions reviewed, including when to notify provider if warning signs present. Patient instructed to report decrease in fetal movement, vaginal bleeding, changes in membrane status, abdominal pain, or any concerns related to the pregnancy to patient's provider/clinic.     Response: Orders to discharge home per Clover Calvillo CNM. Plan for patient is to re-evaluate labor status by following up with provider as previously scheduled. Patient verbalized understanding of education and agreement with plan. Discharged to home at 1805  .

## 2025-03-11 NOTE — PATIENT INSTRUCTIONS
"\"We hope you had a positive experience and that you can definitely recommend [facility] to your family and friends. You ll be receiving a survey soon and we look forward to hearing your feedback\".    [facility] - Contact information:  Appointment line and to get a hold of CNM in clinic Monday-Friday 8 am - 5 pm:  [phone].  There are some clinics with early start times (1st appointment 7:40 am) and others with evening hours (last appointment 6:20 pm).  Most are typically open from 8 am to 5 pm.    CNM on call answering service: [phone].  Specify your hospital of choice and leave a brief message for CNM;  will then page CNM who is on call at your specified hospital and you should receive a call back with 15 minutes.  Be sure that your ringer is audible and that you can accept blocked calls so that we can get back in touch with you! This number should be reserved for urgent needs if during the day, before 8 am, after 5 pm, weekends, holidays.    Contact the on-call CNM with warning signs, such as:  vaginal bleeding   Vaginal discharge and itching or pain and burning during urination  Leg/calf pain or swelling on one side  severe abdominal pain  nausea and vomiting more than 4-5 times a day, or if you are unable to keep anything down  fever more than 100.4 degrees F.     Powerhouse [employer]ologicshart  After each of your visits you are welcome to check [place] for your visit summary including education and links to information relevant to your pregnancy and/or well woman care.   Find the \"Visits\" tab at the top of the page, you will see a list of recent visits and for each visit a for link for \"View After Visit Summary.\" View of your After Visit Summary will allow you to read our recommendations from your visit, review any education provided, and link to websites with useful information.   If you have any questions or difficulty navigating [place], please feel free to " "contact us and we will do our best to direct you.    Meet the Midwives from [facility]  You are invited to an informational meet and greet with [facility] Certified Nurse-Midwives. Our free \"Meet the Midwives\" event is a great opportunity to learn about our midwives' philosophy and experience, the hospitals where we can assist with your birth, and answer questions you may have. Partners, friends, and family are welcome to attend. Currently, this is a virtual event.  Dates: Last Thursday of every month at 7 pm.    Link to next (live) meeting  [email or web address]-the-[email or web address]  To Join by Telephone (audio only) Call:   [phone] Phone Conference ID: [phone] #      Touring the Maternity Care Center  At this time we are offering a virtual tour of the Maternity Care Centers at both [facility] and [facility]:   [facility] Maternity Care:   https://[facility].org/locations/the-birthplace-[facility]:   https://[facility].org/locations/[facility]    Scroll to the bottom of this hyperlink if the above link does not work      Childbirth and Parenting Education:     Everyday Miracles:   https://www.everyday-miracles.org/    Free Video Series from [facility]: https://nursing.[facility]/academics/specialty-areas/nurse-midwifery/having-baby-prenatal-videos/having-baby-prenatal-and    Childbirth Education virtual (live) classes: www.DesignPax/classes  The Birth Hour: https://SkyBridge/online-childbirth-class/  BirthED: https://www.birthedmn.com/  DAMIAN parenting center: http://ammapareRenovate America.Project Liberty Digital Incubator/   ([phone]  [place]: (education, yoga & wellness) www.Directr.Project Liberty Digital Incubator  Enlightened Mama: www.enlightenedmama.com   Childbirth collective: (Parent topic nights)  www.childbirthcollective.org/  Hypnobabies:  " www.UnitywarebiestPursuit Vascularcities.Revaluate/  Hypnobirthing:  Http://hypnSpecialized Pharmaceuticalss.Revaluate/  Hypnobirthing virtual class: www.Hi-Stor Technologies.Revaluate/hypnobirthing    Information about doulas:  Childbirth collective: http://www.childbirthcollective.org/  Doulas of [place] (LANCE):  www.lance.org  Alcresta  project: http://baimos technologiesdoSweetLabsject.Revaluate/     Early Childhood and Family Education (ECFE):  ECFE offers parents hands-on learning experiences that will nourish a lifetime of teachable moments.  http://ecfe.info/ecfe-home/    APPS and Podcasts:   Perlita Mariscal Nurture    Evidence Based Birth  The Birth Hour (for birth stories)   Birthful   Expectful   The Longest Shortest Time  PregnancyPodcast Rhodanabeel Castellano  https://www.downtobirthshow.com/    Book Recommendations:   Lina Belle's Birthing From Within--first few chapters include a new-age tone, you may prefer to skip it and keep going, because there is good stuff later.  This book recommendation covers emotional preparation, but does cover coping with pain, and use of both pharmacological and nonpharmacological methods.    Guide to Childbirth by Sahara Hutson  Childbirth Without Fear by Natalie Samuel Read    Dr. Mahajan' The Pregnancy Book and The Birth Book--the pregnancy book goes month-by month    The Birth Partner by Leigh Chapa    Womanly Art of Breastfeeding by La Leche League International   Bestfeeding by Brii Gay--great pictures    Mothering Your Nursing Toddler, by Lisette Barreto.   Addresses dealing with so many of the challenging behaviors of a nursing toddler.  How Weaning Happens, by La Leche League.  Discusses weaning at all ages, from medically necessary weaning of an infant, all the way up to age 5 (or older), with why/why not, and strategies.  Very empowering book both for deciding to wean and deciding not to.    American College of Nurse-Midwives (ACNM) http://www.midwife.org/; look at the informational handouts at  "http://www.midwife.org/Share-With-Women     www.mymidwife.org    Mother to Baby (Medication and Herbal guidance in pregnancy): http://www.mothertobaby.org  Toll-Free Hotline: 631.525.7300  LactMed (Medication use while breastfeeding): http://toxnet.nlm.nih.gov/newtoxnet/lactmed.htm    Women's Health.gov:  http://www.womenshealth.gov/a-z-topics/index.html    American pregnancy association - http://americanpregnancy.org    Centering Pregnancy (group prenatal care option): http://centeringhealthcare.org    March of Dimes www.Memamp.DigiZmart     FDA - Nutrition  www.mypyramid.gov  Under \"For Consumers,\" click on \"pregnant and breastfeeding women.\"      Centers for Disease Control and Prevention (CDC) - Vaccines : http://www.cdc.gov/vaccines/       When researching information on the web, question the validity of websites.  The Pfenex .gov, .edu and.org tend to be more reliable information.  If there are a lot of advertisements, be cautious of the information provided. Stay away from blogs and chat rooms please!     Making Plans for Feeding My Baby    By this point, you probably have read a lot about feeding your baby.  Breastfeed or formula? Each parent's decision is their own and [facility] respects you and your choices. We ve gathered information on both breastfeeding and formula feeding to help with your decision. Talking with your nurse-midwife can also help in your decision.  However you plan to feed your baby, [facility] encourage rooming in with your baby, skin-to-skin contact and feeding your baby based on his or her cues.    Skin-to-skin contact  Being close to mom helps your baby adjust to life outside of the womb.  It helps your baby regulate their body temperature, heart rate, and breathing.  Your baby will usually be placed skin-to-skin immediately following birth or as soon as possible, if medical intervention is needed.    Rooming-In  Having " your baby stay with you in your room is called  rooming-in .  Keeping your baby in your room helps you to learn how to care for your baby by getting to know your baby s cues, body rhythms and sleep cycle.       Cue-based feeding  Cues (signals) are baby s way of telling you what he or she wants.  When you learn your infant s cues, you know how to care for and feed your baby.   Feeding cues are the licking and smacking of lips, bringing their fist to their mouth, and a reflex called  rooting - where baby turns and opens his or her mouth, searching for the breast or bottle.  Crying is a late feeding cue.  Babies can feed frequently, often at least 8 times in 24 hours.    Breastfeeding facts  Breast milk is the best source of nutrition for your baby and is available at birth. In the first couple of days, your milk volume is already starting to increase, though it may not be noticeable. Breastfeed frequently to increase your milk supply. Within three to five days, you will begin to notice larger milk volumes. An increase in breast size, heaviness and firmness are often described as the milk  coming in.  Frequent breastfeeding can help breasts from getting overly firm and painful. You will know the baby is getting enough milk if your baby is having wet and dirty diapers and gaining weight.     If your goal is to exclusively breastfeed, it is important to not use any formula or artificial nipples (including bottles and pacifiers) while your baby is learning to breastfeed.  While it may seem like an  easy  option to give your baby a bottle, formula should only be given if there is a medical reason for your baby to have it.      Positioning and attachment   Get comfortable.  Use pillows as needed to support your arms and baby.  Hold baby close at the level of your breast, facing you in a tummy to tummy position.  Skin to skin helps with this.  Position the baby with his or her nose by the nipple.  There should be a straight  line from baby s ear to shoulder to hips.  Tickle your baby s lips or wait for baby to open mouth wide, bring baby to breast by leading with the chin.  Aim the nipple at the roof of baby s mouth.  A rapid sucking pattern is followed by longer, drawing pattern with occasional swallows heard.  When baby is correctly latched, your nipple and much of the areola are pulled well into baby s mouth.      Returning to work or school  Focus on a good start to breastfeeding.  Many women continue to provide breastmilk for their baby when they return to work or school.  Making plans about where to pump and store milk can make the transition go well.  Talk with other mothers who have also returned to work or school for tips and support.  Your employer s Human Resource department may be a resource as well.     Returning to work or school: (continued)   babies can mean fewer  sick  days for you.  A quality breast pump will also save time and add comfort.  Check with your insurance prior to giving birth for breast pump coverage.  Many insurance companies include a pump within your benefits.  Wait until your baby is at least three weeks old to introduce a bottle for the first time.  Have someone besides you give the bottle.  Breastfeed when you are with your baby. Reserve your bottles of breast milk for when you are away.   Your breasts will need to be  emptied  either by your baby or a pump.  Plan to pump at least twice in an eight hour day.  If you cannot pump at work, continue breastfeeding at home. Any amount of breast milk is worth giving to your baby.    Formula feeding facts  If you are planning to use formula to feed your baby, you will want to make some preparations ahead of time. Talk to your doctor or nurse-midwife about what type of formula to use. Some are iron-fortified, meaning they have extra iron in them. You will want to purchase formula and bottles before your baby is born to be sure you are ready after  you return from the hospital. [facility] do not provide formula samples to take home.    Be sure to follow formula mixing directions closely. Regular milk in the dairy case at the grocery store should not be given to babies under 1 year old. Baby formula is sold in several forms including:  Ready-to-use. This is the most expensive, but no mixing is necessary.  Concentrated liquid. This is less expensive than ready-to-use and you mix with water.  Powder. This is the least expensive. You mix one level scoop of powdered formula with two ounces of water and stir well.    Most babies need 2.5 ounces of formula per pound of body weight each day. This means an 8-pound baby may drink about 20 ounces of formula a day; however, this is just an estimate. The most important thing is to pay attention to your baby s cues.  If your baby is always fussy, needs more iron or has certain food allergies, your physician may suggest you change your baby s formula to a different kind.     How do I warm my baby s bottles?  You may feed your baby a bottle without warming it first. It is OK for the breast milk or formula to be cool or room temperature. If your baby seems to prefer it warmed, you can put the filled bottle in a container of warm water and let it stand for a few minutes. Check the temperature of the liquid on your skin before feeding it to your baby; to be sure it isn t too hot. Do not heat bottles in the microwave. Microwaves heat food and liquids unevenly, and this can cause hot spots that can burn your baby.    How do I clean and sterilize bottles?  Sterilize bottles and nipples before you use them for the first time. You can do this by putting them in boiling water for 5 minutes. After that first time, you can wash them in hot and soapy water. Rinse them carefully to be sure there is no soap left on them. You can also wash them in the .    Breastfeeding/Chestfeeding Support  Contact  us  Breastfeeding/chestfeeding is the natural and healthy way for parents to feed their babies and provides the best source of nutrition in most cases to infants. Breast milk has health benefits for mom, too. The American Academy of Pediatrics recommends exclusively breastfeeding for 6 months for optimal growth and development and breastfeeding up to at least a year.  Benefits to baby:  Easy digestion, less diarrhea and constipation, breast milk is easy to digest.  Lots of bonding with parent.  Less likely to have asthma.  Less ear infections and respiratory infections.  Less likely to have Type 2 Diabetes.  Less likely to become obese.  Lower risk of Sudden Infant Death Syndrome (SIDS).  Benefits to breastfeeding/chestfeeding parent:  Helps with weight loss.  Lower risk of ovarian and breast cancer, Type 2 diabetes and heart disease.  /chestfed babies are easy to take on trips. Just grab the diapers and go!  Breastfeeding/chestfeeding saves money (no formula or bottle costs, fewer doctor bills and medication costs).  Ready to breastfeed/chestfeed anywhere, don t need to make and wash bottles.  Breastfeeding/chestfeeding is wonderful, but not always easy. Learning what to expect ahead of time and get support from a lactation professional. Check out the [facility] Breastfeeding Resource Guide for classes, drop in support groups, childbirth education, Doulas and clinic and hospital lactation services.     Early Childhood Family Education [place] [address] provides a free, drop-in class/breastfeeding support group, facilitated by a lactation consultant and .  During the group you can connect with other parents, weigh your baby, ask questions about feeding and baby development, and more.  You do not need to register.  Fall in-person meetings will begin on September 12, are for parents of babies from birth to 9 months, and will meet on Monday evenings from 6 - 7:15 pm in [facility]  "[address], [place] [id number] [place] [address] also offers virtual group meetings with a lactation consultant/.  These take place on , from 11:30 am - 12:30 pm for parents of newborns to 3-month-olds, and from 4:15 - 5:15 for parents of 3 - 9 - month olds.  To get the meeting link contact Lizbeth Restrepo at [phone].    [facility] offers a free, drop-in breastfeeding support group facilitated by MCKENNA Villanueva.   at [place] [facility] [address], [place].  A scale is available to check baby weights, if desired.  [place] also has a variety of new parent classes:  (cost for registration)  https://The Language Express/classes/    [facility] Lactation Lounge facilitated by MCKENNA Herrera:  Free, drop-in support group for breastfeeding, with baby scale available if needed.  Meets at [facility], second Tuesday of each month, 10 am - 12 pm.  Text [phone] for info.    [place] Support Group,  at 10:30 am   Run by MCKENNA Babin of The Baby Whisperer Lactation Consultants   Go to The Baby Whisperer Lactation Consultants Facebook page, click on \"events\" for link:   Https://www.logtrust.[email or web address]/[email or web address]/    [place] breastfeeding support community:  free, drop-in breastfeeding support facilitated by Certified Lactation Counselors, open  and  from 1 pm - 5 pm.  In [place]:  [facility] [place], [address] [facility]:   guidingstar[place].org    [facility] Milk Hour,  at 2:30 pm    Run by MCKENNA Rg  Go to [facility] FB page and send message to get link   Https://www.logtrust.com/healthfoundations/    Zackary Balderrama:  http://www.[facility].org/    [place] offers a Lactation Lounge every Friday 12pm - 1pm, run by Zackary Romo Leader.  Sign up via link at " [employer]/cbe-lactation   https://www.Tacere Therapeutics/cbe-lactation    [facility] is offering virtual support groups every Monday, 10:30 am - 12 pm, run by RN IBCLC: Https://www.facebook.com/events/[email or web address]/    Culturally-Specific Breastfeeding Support:     For Hmong Families:   The Hmong Breastfeeding Coalition is a wonderful support for [place] Hmong women who are breastfeeding.  They are best found on Facebook.    for Black families:    Corinthian Ophthalmiccolate Milk Club:  http://www.Haztucesta.GoPago/chocolate-milk-club/  Email: William@Parature    R.O.S.E. = Reaching our Sisters Everywhere  Http://www.breastfeedingrose.org/    Club Mom breastfeeding support for Black mothers:  Contact Greg Mercado  Phone: [phone]   Email:  [email or web address].     Guera Bocanegra  Phone: [phone]   Email:  [email or web address]    Club Dad parent support for Black fathers:   Contact Kang Cardona   Phone: [phone]   Email:  Mary@[facility]    For Native/Indigenous Families:    https://www.iNeed.com/groups/nitdontaesing.gimashkikinaan     Additional Resources:  La Leche League is an international, nonprofit, nonsectarian organization offering information, education, and support to mothers who want to breast-feed their babies. Local groups offer phone help and monthly meetings. Visit Club Venit.org or [employer]Dx [employer].org and us the  Find local support  drop down menu or click on the  Resources  tab.    [place] Breastfeeding Resources: 4-052-207-BABY (2229) toll free    National Breastfeeding Help Line trained breastfeeding peer counselors can help answer common breast-feeding questions by phone. Monday-Friday: English/Polish  [phone]- [phone] toll free, [phone] (TTY)    Virtual Breastfeeding Support:    During this time of isolation, breastfeeding families need even more community!  Here are some area organizations offering virtual support groups  "for breastfeeding:    [place] Support Group,  at 10:30 am   Run by MCKENNA Babin of The Baby Whisperer Lactation Consultants   Go to The Baby Whisperer Lactation Consultants Facebook page and click on \"events\" for link   https://www.Skybox Imaging.com/events/[email or web address]/  [facility] Milk Hour,  at 2:30 pm    Run by MCKENNA Rg   Go to [facility] FB page and send message to get link   https://www.Skybox Imaging.TEOCO Corporation/Gracious Eloisefoundations/  [facility]/[place] holding virtual meetings the first Tuesday of each month, 8-9 pm, and the   Third Saturday, 10 - 11 am.  Go to [facility] and [place] FB page; message to get link https://www.Deal Pepper/LLLofGoldenBrandon/?hc_location=[facility]  [place] offers a Lactation Lounge every Friday 12pm - 1pm, run by Kesha Conn [facility] Leader   Sign up via link at https://www.[place] Single Parents' Network/cbe-lactation  [facility] is offering virtual support groups every Monday, 10:30 am - 12 pm, run by nurse IBCLC   Https://www.Skybox Imaging.com/events/[email or web address]/    Prenatal Breastfeeding Classes:      BloomPanelfly is offering virtual breastfeeding and  care classes:  https://www.[place] Single Parents' Network/education-workshops  BirthEd childbirth and breastfeeding education offering virtual prenatal breastfeeding classes  https://www.Alchemy Pharmatech Ltd.edmn.com/workshops    Preparing for your baby:     [place] Screening Program  Http://www.health.[facility].mn.us/newbornscreening/  [place] newborns are tested soon after birth for more than 50 hidden, rare disorders, including hearing loss and critical congenital heart disease (CCHD). This site provides resources and information for families and providers.    When to call:   Appointment line and to get a hold of CNM in clinic Monday-Friday 8 am - 5 pm:  [phone].  There are some clinics with early start times (1st appointment 7:40 " am) and others with evening hours (last appointment 6:20 pm).  Most are typically open from 8 am to 5 pm.    CNM on call answering service: [phone].  Specify your hospital of choice and leave a brief message for CNM;  will then page CNM who is on call at your specified hospital and you should receive a call back with 15 minutes.  Be sure that your ringer is audible and that you can accept blocked calls so that we can get back in touch with you! This number should be reserved for urgent needs if during the day, before 8 am, after 5 pm, weekends, holidays.    Contact the on-call CNM with warning signs, such as:  vaginal bleeding   Vaginal discharge and itching or pain and burning during urination  Leg/calf pain or swelling on one side  severe abdominal pain  nausea and vomiting more than 4-5 times a day, or if you are unable to keep anything down  fever more than 100.4 degrees F.     Make plans for transportation and  as needed for when you are going to the hospital.    Ask your health care provider about vaccinations you may need following delivery. By now, you should have received a Tdap immunization to protect against pertussis or whooping cough. Fathers and family members who will be in close contact with the baby should also receive a Tdap shot at least two weeks before the expected birth of the baby if they have not had a Td (tetanus) shot for at least two years.    Tell your midwife or physician how you plan to feed your baby (breast or bottle), who you have chosen to do pediatric care for your baby, and if you have a boy, whether you have chosen to have him circumcised. You will need a car seat correctly installed in your vehicle to bring your baby home. As you start to set up the nursery at home for your baby, make sure the crib is safe. The mattress needs to fit snugly against the edges of the crib. If you can fit a soda can between the bars, they are too far apart and can allow the  baby's head to caught between them.    Learn about infant care and feeding, including information about infant CPR. We recommend that you put your baby to sleep on his or her back to reduce the chance of Sudden Infant Death Syndrome (SIDS). To maintain a healthy environment in which your child can grow, it's best to keep your home smoke-free. By preparing ahead, your transition into parenthood will go smoothly for you and your baby.    Your midwife will want to see you for a checkup 2 to 6 weeks after delivery.

## 2025-03-11 NOTE — PROGRESS NOTES
Pau Murphy is a 25 year old  at 38w6d, presenting today alone for routine OB care.    Was seen in triage 3/9/24 for rule out labor eval after having intense contractions throughout the day. She was discharged home with no cervical change and advised to increase hydration. Since that visit she notes intermittent contractions that are irregular, feeling stronger but short. No bloody show or fluid leaking. Had acupuncture today to try to encourage labor and requests SVE/membrane sweep.   Concerns:   No HA, RUQ pain, N/V, visual changes.  Total weight gain 11#; interval gain 2# with normal fundal height.   Labor precautions discussed.  RTC 1 week.    ALOK Ahmadi CNM

## 2025-03-12 ENCOUNTER — PRENATAL OFFICE VISIT (OUTPATIENT)
Dept: MIDWIFE SERVICES | Facility: CLINIC | Age: 25
End: 2025-03-12
Payer: COMMERCIAL

## 2025-03-12 VITALS
HEART RATE: 88 BPM | DIASTOLIC BLOOD PRESSURE: 80 MMHG | HEIGHT: 66 IN | WEIGHT: 206 LBS | BODY MASS INDEX: 33.11 KG/M2 | SYSTOLIC BLOOD PRESSURE: 110 MMHG

## 2025-03-12 DIAGNOSIS — O35.EXX0 PYELECTASIS OF FETUS ON PRENATAL ULTRASOUND: ICD-10-CM

## 2025-03-12 DIAGNOSIS — Z34.80 SUPERVISION OF OTHER NORMAL PREGNANCY, ANTEPARTUM: Primary | ICD-10-CM

## 2025-03-12 PROCEDURE — 0502F SUBSEQUENT PRENATAL CARE: CPT | Performed by: ADVANCED PRACTICE MIDWIFE

## 2025-03-12 PROCEDURE — 3079F DIAST BP 80-89 MM HG: CPT | Performed by: ADVANCED PRACTICE MIDWIFE

## 2025-03-12 PROCEDURE — 3074F SYST BP LT 130 MM HG: CPT | Performed by: ADVANCED PRACTICE MIDWIFE

## 2025-03-12 PROCEDURE — 99207 PR PRENATAL VISIT: CPT | Performed by: ADVANCED PRACTICE MIDWIFE

## 2025-03-17 ENCOUNTER — PRENATAL OFFICE VISIT (OUTPATIENT)
Dept: MIDWIFE SERVICES | Facility: CLINIC | Age: 25
End: 2025-03-17
Payer: COMMERCIAL

## 2025-03-17 VITALS
HEART RATE: 100 BPM | SYSTOLIC BLOOD PRESSURE: 116 MMHG | WEIGHT: 204.6 LBS | HEIGHT: 66 IN | OXYGEN SATURATION: 98 % | DIASTOLIC BLOOD PRESSURE: 80 MMHG | BODY MASS INDEX: 32.88 KG/M2

## 2025-03-17 DIAGNOSIS — O35.EXX0 PYELECTASIS OF FETUS ON PRENATAL ULTRASOUND: ICD-10-CM

## 2025-03-17 DIAGNOSIS — Z34.80 SUPERVISION OF OTHER NORMAL PREGNANCY, ANTEPARTUM: Primary | ICD-10-CM

## 2025-03-17 PROCEDURE — 0502F SUBSEQUENT PRENATAL CARE: CPT | Performed by: ADVANCED PRACTICE MIDWIFE

## 2025-03-17 PROCEDURE — 3074F SYST BP LT 130 MM HG: CPT | Performed by: ADVANCED PRACTICE MIDWIFE

## 2025-03-17 PROCEDURE — 3079F DIAST BP 80-89 MM HG: CPT | Performed by: ADVANCED PRACTICE MIDWIFE

## 2025-03-17 PROCEDURE — 59426 ANTEPARTUM CARE ONLY: CPT | Performed by: ADVANCED PRACTICE MIDWIFE

## 2025-03-17 NOTE — PROGRESS NOTES
"Pau is here with Laith for same-day prenatal visit to evaluate for eIOL.  Reports regular fetal movements with ongoing irregular contractions, wonders if fetal movements contributing to contractions. Denies bleeding, leaking, or dFM.  Is feeling anxious to for labor to start and \"ready to be done\".   Hopeful to schedule elective IOL. Has previously reviewed options on her own, voices preference for cervical balloon > misoprostol if requires cervical ripening. Prior IOL started with misoprostol and was \"painful\". Reviewed cervical exam, maternal and fetal well being will help guide IOL process.   Requests SVE today. Cervix is unchanged from previous exam and 2/50%/-2/soft/posterior without bloody show.   IOL scheduled 3/18/25 at 0700 at [facility]. Reviewed to call labor unit at 0600 to confirm. On-call CNM notified.    Also continues with nausea: vomits x1 daily in AM while brushing teeth. Weight fluctuant, decreased 2lb today from prior, 4.355 kg (9 lb 9.6 oz), <recommended TWG. Last growth 2/7/25 WDL, S=D today. Reports is keeping small, frequent meals down but overall decreased appetitue d/t nausea. Toothpaste and acid reflux are activators. Encouraged to trial alternative toothpaste flavors, continue TID B6/nightly unisom, zofran PRN, +allergy reglan. Reports none eliminating morning nausea. Has focused on smaller, more frequent meals with bland foods (bagels, eggs). +Relief with tums, famotidine.    Reviewed counseling on term labor precautions, warning signs and when/how to call the on-call CNM.      Exam:  Constitutional: healthy, alert, and no distress  Abdominal: Gravid, non-tender, no guarding, +FHTs  Skin: Turgor WDL  Psychiatric: mentation appears normal and affect normal/bright      RTC tomorrow for eIOL as scheduled        ALOK Rosa CNM  March 17, 2025  3:20 PM  "

## 2025-03-17 NOTE — PROGRESS NOTES
"Pau is here with Laith for same-day prenatal visit to evaluate for eIOL.  Reports regular fetal movements with ongoing irregular contractions, wonders if fetal movements contributing to contractions. Denies bleeding, leaking, or dFM.  Is feeling anxious to for labor to start and \"ready to be done\".   Hopeful to schedule elective IOL. Has previously reviewed options on her own, voices preference for cervical balloon > misoprostol if requires cervical ripening. Prior IOL started with misoprostol and was \"painful\". Reviewed cervical exam, maternal and fetal well being will help guide IOL process.   Requests SVE today. Cervix is unchanged from previous exam and 2/50%/-2/soft/posterior without bloody show.   IOL scheduled 3/18/25 at 0700 at [facility]. Reviewed to call labor unit at 0600 to confirm. On-call CNM notified.    Also continues with nausea: vomits x1 daily in AM while brushing teeth. Weight fluctuant, decreased 2lb today from prior, 4.355 kg (9 lb 9.6 oz), <recommended TWG. Last growth 2/7/25 WDL, S=D today. Reports is keeping small, frequent meals down but overall decreased appetitue d/t nausea. Toothpaste and acid reflux are activators. Encouraged to trial alternative toothpaste flavors, continue TID B6/nightly unisom, zofran PRN, +allergy reglan. Reports none eliminating morning nausea. Has focused on smaller, more frequent meals with bland foods (bagels, eggs). +Relief with tums, famotidine.    Reviewed counseling on term labor precautions, warning signs and when/how to call the on-call CNM.      Exam:  Constitutional: healthy, alert, and no distress  Abdominal: Gravid, non-tender, no guarding, +FHTs  Skin: Turgor WDL  Psychiatric: mentation appears normal and affect normal/bright      RTC tomorrow for eIOL as scheduled        ALOK Rosa CNM  March 17, 2025  3:20 PM      "

## 2025-03-17 NOTE — PATIENT INSTRUCTIONS
"\"We hope you had a positive experience and that you can definitely recommend [facility] to your family and friends. You ll be receiving a survey soon and we look forward to hearing your feedback\".    [facility] - Contact information:  Appointment line and to get a hold of CNM in clinic Monday-Friday 8 am - 5 pm:  [phone].  There are some clinics with early start times (1st appointment 7:40 am) and others with evening hours (last appointment 6:20 pm).  Most are typically open from 8 am to 5 pm.    CNM on call answering service: [phone].  Specify your hospital of choice and leave a brief message for CNM;  will then page CNM who is on call at your specified hospital and you should receive a call back with 15 minutes.  Be sure that your ringer is audible and that you can accept blocked calls so that we can get back in touch with you! This number should be reserved for urgent needs if during the day, before 8 am, after 5 pm, weekends, holidays.    Contact the on-call CNM with warning signs, such as:  vaginal bleeding   Vaginal discharge and itching or pain and burning during urination  Leg/calf pain or swelling on one side  severe abdominal pain  nausea and vomiting more than 4-5 times a day, or if you are unable to keep anything down  fever more than 100.4 degrees F.     EZDOCTORhart  After each of your visits you are welcome to check [facility] for your visit summary including education and links to information relevant to your pregnancy and/or well woman care.   Find the \"Visits\" tab at the top of the page, you will see a list of recent visits and for each visit a for link for \"View After Visit Summary.\" View of your After Visit Summary will allow you to read our recommendations from your visit, review any education provided, and link to websites with useful information.   If you have any questions or difficulty navigating [place], please feel free to " "contact us and we will do our best to direct you.    Meet the Midwives from [facility]  You are invited to an informational meet and greet with [facility] Certified Nurse-Midwives. Our free \"Meet the Midwives\" event is a great opportunity to learn about our midwives' philosophy and experience, the hospitals where we can assist with your birth, and answer questions you may have. Partners, friends, and family are welcome to attend. Currently, this is a virtual event.  Dates: Last Thursday of every month at 7 pm.    Link to next (live) meeting  [email or web address]  To Join by Telephone (audio only) Call:   [phone] Phone Conference ID: [phone] #      Touring the Maternity Care Center  At this time we are offering a virtual tour of the Maternity Care Centers at both [facility] and [facility]:   [facility] Maternity Care:   [email or web address]:   [email or web address]://[email or web address].[email or web address]/locations/[email or web address]    Scroll to the bottom of this hyperlink if the above link does not work      Childbirth and Parenting Education:     Everyday Miracles:   https://www.everyday-miracles.org/    Free Video Series from [facility]: https://nursing.[facility]/academics/specialty-areas/nurse-midwifery/having-baby-prenatal-videos/having-baby-prenatal-and    Childbirth Education virtual (live) classes: www.ivi.ru/classes  The Birth Hour: https://InSample/online-childbirth-class/  BirthED: https://www.birthedmn.com/  DAMIAN parenting center: http://ammapareEscape Dynamics.Coty/   (141) 222-BABY  [place]: (education, yoga & wellness) www.Daily News Online.Coty  Enlightened Mama: www.enlightenedmama.com   Childbirth collective: (Parent topic nights)  www.childbirthcollective.org/  Hypnobabies:  " www.Entertainment CruisesbiestTherapeuticsMDcities.Briggo/  Hypnobirthing:  Http://hypnTebla.Briggo/  Hypnobirthing virtual class: www.Reflektion.Briggo/hypnobirthing    Information about doulas:  Childbirth collective: http://www.childbirthcollective.org/  Doulas of [place] (LANCE):  www.lance.org  Novera Optics  project: http://InstinctivdoSocial GameWorksject.Briggo/     Early Childhood and Family Education (ECFE):  ECFE offers parents hands-on learning experiences that will nourish a lifetime of teachable moments.  http://ecfe.info/ecfe-home/    APPS and Podcasts:   Perlita Mariscal Nurture    Evidence Based Birth  The Birth Hour (for birth stories)   Birthful   Expectful   The Longest Shortest Time  PregnancyPodcast Rhodanabeel Castellano  https://www.downtobirthshow.com/    Book Recommendations:   Lina Saint Elmo's Birthing From Within--first few chapters include a new-age tone, you may prefer to skip it and keep going, because there is good stuff later.  This book recommendation covers emotional preparation, but does cover coping with pain, and use of both pharmacological and nonpharmacological methods.    Guide to Childbirth by Sahara Hutson  Childbirth Without Fear by Natalie Samuel Read    Dr. Mahajan' The Pregnancy Book and The Birth Book--the pregnancy book goes month-by month    The Birth Partner by Leigh Chapa    Womanly Art of Breastfeeding by La Leche League International   Bestfeeding by Brii Gay--great pictures    Mothering Your Nursing Toddler, by Lisette Barreto.   Addresses dealing with so many of the challenging behaviors of a nursing toddler.  How Weaning Happens, by La Leche League.  Discusses weaning at all ages, from medically necessary weaning of an infant, all the way up to age 5 (or older), with why/why not, and strategies.  Very empowering book both for deciding to wean and deciding not to.    American College of Nurse-Midwives (ACNM) http://www.midwife.org/; look at the informational handouts at  "http://www.midwife.org/Share-With-Women     www.mymidwife.org    Mother to Baby (Medication and Herbal guidance in pregnancy): http://www.mothertobaby.org  Toll-Free Hotline: 549.744.1688  LactMed (Medication use while breastfeeding): http://toxnet.nlm.nih.gov/newtoxnet/lactmed.htm    Women's Health.gov:  http://www.womenshealth.gov/a-z-topics/index.html    American pregnancy association - http://americanpregnancy.org    Centering Pregnancy (group prenatal care option): http://centeringhealthcare.org    March of Dimes www.Satin Creditcare Network Limited (SCNL).Allen Learning Technologies     FDA - Nutrition  www.mypyramid.gov  Under \"For Consumers,\" click on \"pregnant and breastfeeding women.\"      Centers for Disease Control and Prevention (CDC) - Vaccines : http://www.cdc.gov/vaccines/       When researching information on the web, question the validity of websites.  The UsTrendy .gov, .edu and.org tend to be more reliable information.  If there are a lot of advertisements, be cautious of the information provided. Stay away from blogs and chat rooms please!     Making Plans for Feeding My Baby    By this point, you probably have read a lot about feeding your baby.  Breastfeed or formula? Each parent's decision is their own and [facility] respects you and your choices. We ve gathered information on both breastfeeding and formula feeding to help with your decision. Talking with your nurse-midwife can also help in your decision.  However you plan to feed your baby, [facility] Care [facility] encourage rooming in with your baby, skin-to-skin contact and feeding your baby based on his or her cues.    Skin-to-skin contact  Being close to mom helps your baby adjust to life outside of the womb.  It helps your baby regulate their body temperature, heart rate, and breathing.  Your baby will usually be placed skin-to-skin immediately following birth or as soon as possible, if medical intervention is needed.    Rooming-In  Having " your baby stay with you in your room is called  rooming-in .  Keeping your baby in your room helps you to learn how to care for your baby by getting to know your baby s cues, body rhythms and sleep cycle.       Cue-based feeding  Cues (signals) are baby s way of telling you what he or she wants.  When you learn your infant s cues, you know how to care for and feed your baby.   Feeding cues are the licking and smacking of lips, bringing their fist to their mouth, and a reflex called  rooting - where baby turns and opens his or her mouth, searching for the breast or bottle.  Crying is a late feeding cue.  Babies can feed frequently, often at least 8 times in 24 hours.    Breastfeeding facts  Breast milk is the best source of nutrition for your baby and is available at birth. In the first couple of days, your milk volume is already starting to increase, though it may not be noticeable. Breastfeed frequently to increase your milk supply. Within three to five days, you will begin to notice larger milk volumes. An increase in breast size, heaviness and firmness are often described as the milk  coming in.  Frequent breastfeeding can help breasts from getting overly firm and painful. You will know the baby is getting enough milk if your baby is having wet and dirty diapers and gaining weight.     If your goal is to exclusively breastfeed, it is important to not use any formula or artificial nipples (including bottles and pacifiers) while your baby is learning to breastfeed.  While it may seem like an  easy  option to give your baby a bottle, formula should only be given if there is a medical reason for your baby to have it.      Positioning and attachment   Get comfortable.  Use pillows as needed to support your arms and baby.  Hold baby close at the level of your breast, facing you in a tummy to tummy position.  Skin to skin helps with this.  Position the baby with his or her nose by the nipple.  There should be a straight  line from baby s ear to shoulder to hips.  Tickle your baby s lips or wait for baby to open mouth wide, bring baby to breast by leading with the chin.  Aim the nipple at the roof of baby s mouth.  A rapid sucking pattern is followed by longer, drawing pattern with occasional swallows heard.  When baby is correctly latched, your nipple and much of the areola are pulled well into baby s mouth.      Returning to work or school  Focus on a good start to breastfeeding.  Many women continue to provide breastmilk for their baby when they return to work or school.  Making plans about where to pump and store milk can make the transition go well.  Talk with other mothers who have also returned to work or school for tips and support.  Your employer s Human Resource department may be a resource as well.     Returning to work or school: (continued)   babies can mean fewer  sick  days for you.  A quality breast pump will also save time and add comfort.  Check with your insurance prior to giving birth for breast pump coverage.  Many insurance companies include a pump within your benefits.  Wait until your baby is at least three weeks old to introduce a bottle for the first time.  Have someone besides you give the bottle.  Breastfeed when you are with your baby. Reserve your bottles of breast milk for when you are away.   Your breasts will need to be  emptied  either by your baby or a pump.  Plan to pump at least twice in an eight hour day.  If you cannot pump at work, continue breastfeeding at home. Any amount of breast milk is worth giving to your baby.    Formula feeding facts  If you are planning to use formula to feed your baby, you will want to make some preparations ahead of time. Talk to your doctor or nurse-midwife about what type of formula to use. Some are iron-fortified, meaning they have extra iron in them. You will want to purchase formula and bottles before your baby is born to be sure you are ready after  you return from the hospital. The [facility] do not provide formula samples to take home.    Be sure to follow formula mixing directions closely. Regular milk in the dairy case at the grocery store should not be given to babies under 1 year old. Baby formula is sold in several forms including:  Ready-to-use. This is the most expensive, but no mixing is necessary.  Concentrated liquid. This is less expensive than ready-to-use and you mix with water.  Powder. This is the least expensive. You mix one level scoop of powdered formula with two ounces of water and stir well.    Most babies need 2.5 ounces of formula per pound of body weight each day. This means an 8-pound baby may drink about 20 ounces of formula a day; however, this is just an estimate. The most important thing is to pay attention to your baby s cues.  If your baby is always fussy, needs more iron or has certain food allergies, your physician may suggest you change your baby s formula to a different kind.     How do I warm my baby s bottles?  You may feed your baby a bottle without warming it first. It is OK for the breast milk or formula to be cool or room temperature. If your baby seems to prefer it warmed, you can put the filled bottle in a container of warm water and let it stand for a few minutes. Check the temperature of the liquid on your skin before feeding it to your baby; to be sure it isn t too hot. Do not heat bottles in the microwave. Microwaves heat food and liquids unevenly, and this can cause hot spots that can burn your baby.    How do I clean and sterilize bottles?  Sterilize bottles and nipples before you use them for the first time. You can do this by putting them in boiling water for 5 minutes. After that first time, you can wash them in hot and soapy water. Rinse them carefully to be sure there is no soap left on them. You can also wash them in the .    Breastfeeding/Chestfeeding Support  Contact  us  Breastfeeding/chestfeeding is the natural and healthy way for parents to feed their babies and provides the best source of nutrition in most cases to infants. Breast milk has health benefits for mom, too. The American Academy of Pediatrics recommends exclusively breastfeeding for 6 months for optimal growth and development and breastfeeding up to at least a year.  Benefits to baby:  Easy digestion, less diarrhea and constipation, breast milk is easy to digest.  Lots of bonding with parent.  Less likely to have asthma.  Less ear infections and respiratory infections.  Less likely to have Type 2 Diabetes.  Less likely to become obese.  Lower risk of Sudden Infant Death Syndrome (SIDS).  Benefits to breastfeeding/chestfeeding parent:  Helps with weight loss.  Lower risk of ovarian and breast cancer, Type 2 diabetes and heart disease.  /chestfed babies are easy to take on trips. Just grab the diapers and go!  Breastfeeding/chestfeeding saves money (no formula or bottle costs, fewer doctor bills and medication costs).  Ready to breastfeed/chestfeed anywhere, don t need to make and wash bottles.  Breastfeeding/chestfeeding is wonderful, but not always easy. Learning what to expect ahead of time and get support from a lactation professional. Check out the [facility] Breastfeeding Resource Guide for classes, drop in support groups, childbirth education, Doulas and clinic and hospital lactation services.     Early Childhood Family Education [place] [address] provides a free, drop-in class/breastfeeding support group, facilitated by a lactation consultant and .  During the group you can connect with other parents, weigh your baby, ask questions about feeding and baby development, and more.  You do not need to register.  Fall in-person meetings will begin on September 12, are for parents of babies from birth to 9 months, and will meet on Monday evenings from 6 - 7:15 pm in [facility]  "[address], which is at [id number] [address] also offers virtual group meetings with a lactation consultant/.  These take place on , from 11:30 am - 12:30 pm for parents of newborns to 3-month-olds, and from 4:15 - 5:15 for parents of 3 - 9 - month olds.  To get the meeting link contact Lizbeth Restrepo at [phone].    [facility] offers a free, drop-in breastfeeding support group facilitated by MCKENNA Villanueva.   at [facility] [address], [place].  A scale is available to check baby weights, if desired.  [facility] also has a variety of new parent classes:  (cost for registration)  https://Treatsie/classes/    [facility] Lactation Lounge facilitated by MCKENNA Herrera:  Free, drop-in support group for breastfeeding, with baby scale available if needed.  Meets at [facility], second Tuesday of each month, 10 am - 12 pm.  Text [phone] for info.    [place] Support Group,  at 10:30 am   Run by MCKENNA Babin of The Baby Whisperer Lactation Consultants   Go to The Baby Whisperer Lactation Consultants Facebook page, click on \"events\" for link:   Https://www.minicabit.[email or web address]/events/[email or web address]/    [facility] [place] breastfeeding support community:  free, drop-in breastfeeding support facilitated by Certified Lactation Counselors, open  and  from 1 pm - 5 pm.  In [place]:  [facility] [place], [address] [facility]:   guidingstar[place].org    [facility] Milk Hour,  at 2:30 pm    Run by MCKENNA Rg  Go to [facility] FB page and send message to get link   Https://www.minicabit.com/healthfoundations/    Zackary Balderrama:  http://www.[facility] Hospitalndas.org/    [place] offers a Lactation Lounge every Friday 12pm - 1pm, run by Zackary Romo Leader.  Sign up via link at " Pongr/cbe-lactation   https://www.Pongr/cbe-lactation    [facility] is offering virtual support groups every Monday, 10:30 am - 12 pm, run by RN IBCLC: Https://www.facebook.com/events/[email or web address]/    Culturally-Specific Breastfeeding Support:     For Hmong Families:   The Hmong Breastfeeding Coalition is a wonderful support for [place] Hmong women who are breastfeeding.  They are best found on Facebook.    for Black families:    SportStreamcolate Milk Club:  http://www.Agito Networks.Cambridge Broadband Networks/chocolate-milk-club/  Email: William@Kliqed    R.O.S.E. = Reaching our Sisters Everywhere  Http://www.breastfeedingrose.org/    Club Mom breastfeeding support for Black mothers:  Contact Greg Mercado  Phone: [phone]   Email:  [email or web address].     Guera Bocanegra  Phone: [phone]   Email:  Norah@[facility]    Club Dad parent support for Black fathers:   Contact Kang Cardona   Phone: [phone]   Email:  Mary@[facility]    For Native/Indigenous Families:    https://www.Giftly.com/groups/nitdontaesing.gimashkikinaan     Additional Resources:  La Leche League is an international, nonprofit, nonsectarian organization offering information, education, and support to mothers who want to breast-feed their babies. Local groups offer phone help and monthly meetings. Visit Verious.org or Deskwanted.org and us the  Find local support  drop down menu or click on the  Resources  tab.    [place] Breastfeeding Resources: 2-908-152-BABY (2229) toll free    National Breastfeeding Help Line trained breastfeeding peer counselors can help answer common breast-feeding questions by phone. Monday-Friday: English/Bermudian  7-043- [phone] toll free, [phone] (TTY)    Virtual Breastfeeding Support:    During this time of isolation, breastfeeding families need even more community!  Here are some area organizations offering virtual support groups  "for breastfeeding:    [place] [facility] Support Group,  at 10:30 am   Run by MCKENNA Babin of The Baby Whisperer Lactation Consultants   Go to The Baby Whisperer Lactation Consultants Facebook page and click on \"events\" for link   https://www.[facility].com/[email or web address]/  [facility] Milk Hour,  at 2:30 pm    Run by MCKENNA Rg   Go to [facility] FB page and send message to get link   https://www.[facility].Yasuu/Medalliafoundations/  [facility]/[place] holding virtual meetings the first Tuesday of each month, 8-9 pm, and the   Third Saturday, 10 - 11 am.  Go to [facility] and [place] FB page; message to get link [email or web address]://[email or web address].Xplornet/LLLofGoldenBrandon/?hc_location=[facility]  [place] offers a Lactation Lounge every Friday 12pm - 1pm, run by Kesha Conn [facility] Leader   Sign up via link at https://www.BlueKai/cbe-lactation  [facility] is offering virtual support groups every Monday, 10:30 am - 12 pm, run by nurse IBCLC   Https://www.[facility].com/events/[email or web address]/    Prenatal Breastfeeding Classes:      [facility] is offering virtual breastfeeding and  care classes:  https://www.BlueKai/education-workshops  BirthEd childbirth and breastfeeding education offering virtual prenatal breastfeeding classes  https://www.NewsFixededmn.com/workshops    Preparing for your baby:     [place] Screening Program  Http://www.health.[facility].mn.us/newbornscreening/  [place] newborns are tested soon after birth for more than 50 hidden, rare disorders, including hearing loss and critical congenital heart disease (CCHD). This site provides resources and information for families and providers.    When to call:   Appointment line and to get a hold of CNM in clinic Monday-Friday 8 am - 5 pm:  [phone].  There are some clinics with early start times (1st appointment 7:40 " am) and others with evening hours (last appointment 6:20 pm).  Most are typically open from 8 am to 5 pm.    CNM on call answering service: [phone].  Specify your hospital of choice and leave a brief message for CNM;  will then page CNM who is on call at your specified hospital and you should receive a call back with 15 minutes.  Be sure that your ringer is audible and that you can accept blocked calls so that we can get back in touch with you! This number should be reserved for urgent needs if during the day, before 8 am, after 5 pm, weekends, holidays.    Contact the on-call CNM with warning signs, such as:  vaginal bleeding   Vaginal discharge and itching or pain and burning during urination  Leg/calf pain or swelling on one side  severe abdominal pain  nausea and vomiting more than 4-5 times a day, or if you are unable to keep anything down  fever more than 100.4 degrees F.     Make plans for transportation and  as needed for when you are going to the hospital.    Ask your health care provider about vaccinations you may need following delivery. By now, you should have received a Tdap immunization to protect against pertussis or whooping cough. Fathers and family members who will be in close contact with the baby should also receive a Tdap shot at least two weeks before the expected birth of the baby if they have not had a Td (tetanus) shot for at least two years.    Tell your midwife or physician how you plan to feed your baby (breast or bottle), who you have chosen to do pediatric care for your baby, and if you have a boy, whether you have chosen to have him circumcised. You will need a car seat correctly installed in your vehicle to bring your baby home. As you start to set up the nursery at home for your baby, make sure the crib is safe. The mattress needs to fit snugly against the edges of the crib. If you can fit a soda can between the bars, they are too far apart and can allow the  baby's head to caught between them.    Learn about infant care and feeding, including information about infant CPR. We recommend that you put your baby to sleep on his or her back to reduce the chance of Sudden Infant Death Syndrome (SIDS). To maintain a healthy environment in which your child can grow, it's best to keep your home smoke-free. By preparing ahead, your transition into parenthood will go smoothly for you and your baby.    Your midwife will want to see you for a checkup 2 to 6 weeks after delivery.

## 2025-03-18 ENCOUNTER — ANESTHESIA EVENT (OUTPATIENT)
Dept: OBGYN | Facility: CLINIC | Age: 25
End: 2025-03-18
Payer: COMMERCIAL

## 2025-03-18 ENCOUNTER — HOSPITAL ENCOUNTER (INPATIENT)
Facility: CLINIC | Age: 25
LOS: 2 days | Discharge: HOME | End: 2025-03-20
Attending: ADVANCED PRACTICE MIDWIFE | Admitting: ADVANCED PRACTICE MIDWIFE
Payer: COMMERCIAL

## 2025-03-18 ENCOUNTER — ANESTHESIA (OUTPATIENT)
Dept: OBGYN | Facility: CLINIC | Age: 25
End: 2025-03-18
Payer: COMMERCIAL

## 2025-03-18 DIAGNOSIS — O35.EXX0 PYELECTASIS OF FETUS ON PRENATAL ULTRASOUND: ICD-10-CM

## 2025-03-18 PROBLEM — J45.909 ASTHMA: Status: ACTIVE | Noted: 2024-09-27

## 2025-03-18 PROBLEM — Z34.90 PREGNANT: Status: ACTIVE | Noted: 2025-03-18

## 2025-03-18 PROBLEM — Z34.90 ENCOUNTER FOR PLANNED INDUCTION OF LABOR: Status: ACTIVE | Noted: 2025-03-18

## 2025-03-18 PROBLEM — Z91.89 AT RISK FOR POSTPARTUM DEPRESSION: Status: ACTIVE | Noted: 2022-08-10

## 2025-03-18 LAB
ABO + RH BLD: NORMAL
BLD GP AB SCN SERPL QL: NEGATIVE
ERYTHROCYTE [DISTWIDTH] IN BLOOD BY AUTOMATED COUNT: 13.3 % (ref 10–15)
HCT VFR BLD AUTO: 36.2 % (ref 35–47)
HGB BLD-MCNC: 12.9 G/DL (ref 11.7–15.7)
MCH RBC QN AUTO: 30.3 PG (ref 26.5–33)
MCHC RBC AUTO-ENTMCNC: 35.6 G/DL (ref 31.5–36.5)
MCV RBC AUTO: 85 FL (ref 78–100)
PLATELET # BLD AUTO: 176 10E3/UL (ref 150–450)
RBC # BLD AUTO: 4.26 10E6/UL (ref 3.8–5.2)
SPECIMEN EXP DATE BLD: NORMAL
T PALLIDUM AB SER QL: NONREACTIVE
WBC # BLD AUTO: 11.6 10E3/UL (ref 4–11)

## 2025-03-18 PROCEDURE — 250N000011 HC RX IP 250 OP 636: Performed by: ANESTHESIOLOGY

## 2025-03-18 PROCEDURE — 370N000003 HC ANESTHESIA WARD SERVICE: Performed by: ANESTHESIOLOGY

## 2025-03-18 PROCEDURE — 85027 COMPLETE CBC AUTOMATED: CPT | Performed by: ADVANCED PRACTICE MIDWIFE

## 2025-03-18 PROCEDURE — 258N000003 HC RX IP 258 OP 636: Performed by: ADVANCED PRACTICE MIDWIFE

## 2025-03-18 PROCEDURE — 120N000001 HC R&B MED SURG/OB

## 2025-03-18 PROCEDURE — 86900 BLOOD TYPING SEROLOGIC ABO: CPT | Performed by: ADVANCED PRACTICE MIDWIFE

## 2025-03-18 PROCEDURE — 00HU33Z INSERTION OF INFUSION DEVICE INTO SPINAL CANAL, PERCUTANEOUS APPROACH: ICD-10-PCS | Performed by: ANESTHESIOLOGY

## 2025-03-18 PROCEDURE — 86850 RBC ANTIBODY SCREEN: CPT | Performed by: ADVANCED PRACTICE MIDWIFE

## 2025-03-18 PROCEDURE — 86780 TREPONEMA PALLIDUM: CPT | Performed by: ADVANCED PRACTICE MIDWIFE

## 2025-03-18 PROCEDURE — 36415 COLL VENOUS BLD VENIPUNCTURE: CPT | Performed by: ADVANCED PRACTICE MIDWIFE

## 2025-03-18 PROCEDURE — 250N000009 HC RX 250: Performed by: ADVANCED PRACTICE MIDWIFE

## 2025-03-18 PROCEDURE — 3E0R3BZ INTRODUCTION OF ANESTHETIC AGENT INTO SPINAL CANAL, PERCUTANEOUS APPROACH: ICD-10-PCS | Performed by: ANESTHESIOLOGY

## 2025-03-18 RX ORDER — ONDANSETRON 2 MG/ML
4 INJECTION INTRAMUSCULAR; INTRAVENOUS EVERY 6 HOURS PRN
Status: DISCONTINUED | OUTPATIENT
Start: 2025-03-18 | End: 2025-03-19 | Stop reason: HOSPADM

## 2025-03-18 RX ORDER — LOPERAMIDE HYDROCHLORIDE 2 MG/1
2 CAPSULE ORAL
Status: DISCONTINUED | OUTPATIENT
Start: 2025-03-18 | End: 2025-03-19 | Stop reason: HOSPADM

## 2025-03-18 RX ORDER — NALOXONE HYDROCHLORIDE 0.4 MG/ML
0.2 INJECTION, SOLUTION INTRAMUSCULAR; INTRAVENOUS; SUBCUTANEOUS
Status: DISCONTINUED | OUTPATIENT
Start: 2025-03-18 | End: 2025-03-20 | Stop reason: HOSPADM

## 2025-03-18 RX ORDER — CITRIC ACID/SODIUM CITRATE 334-500MG
30 SOLUTION, ORAL ORAL
Status: DISCONTINUED | OUTPATIENT
Start: 2025-03-18 | End: 2025-03-19 | Stop reason: HOSPADM

## 2025-03-18 RX ORDER — KETOROLAC TROMETHAMINE 15 MG/ML
15 INJECTION, SOLUTION INTRAMUSCULAR; INTRAVENOUS
Status: DISCONTINUED | OUTPATIENT
Start: 2025-03-18 | End: 2025-03-19 | Stop reason: HOSPADM

## 2025-03-18 RX ORDER — NALBUPHINE HYDROCHLORIDE 10 MG/ML
2.5-5 INJECTION INTRAMUSCULAR; INTRAVENOUS; SUBCUTANEOUS EVERY 6 HOURS PRN
Status: DISCONTINUED | OUTPATIENT
Start: 2025-03-18 | End: 2025-03-20 | Stop reason: HOSPADM

## 2025-03-18 RX ORDER — TRANEXAMIC ACID 10 MG/ML
1 INJECTION, SOLUTION INTRAVENOUS EVERY 30 MIN PRN
Status: DISCONTINUED | OUTPATIENT
Start: 2025-03-18 | End: 2025-03-19 | Stop reason: HOSPADM

## 2025-03-18 RX ORDER — NALOXONE HYDROCHLORIDE 0.4 MG/ML
0.4 INJECTION, SOLUTION INTRAMUSCULAR; INTRAVENOUS; SUBCUTANEOUS
Status: DISCONTINUED | OUTPATIENT
Start: 2025-03-18 | End: 2025-03-20 | Stop reason: HOSPADM

## 2025-03-18 RX ORDER — IBUPROFEN 800 MG/1
800 TABLET, FILM COATED ORAL
Status: DISCONTINUED | OUTPATIENT
Start: 2025-03-18 | End: 2025-03-19 | Stop reason: HOSPADM

## 2025-03-18 RX ORDER — OXYTOCIN/0.9 % SODIUM CHLORIDE 30/500 ML
1-24 PLASTIC BAG, INJECTION (ML) INTRAVENOUS CONTINUOUS
Status: DISCONTINUED | OUTPATIENT
Start: 2025-03-18 | End: 2025-03-19 | Stop reason: HOSPADM

## 2025-03-18 RX ORDER — TERBUTALINE SULFATE 1 MG/ML
0.25 INJECTION SUBCUTANEOUS
Status: DISCONTINUED | OUTPATIENT
Start: 2025-03-18 | End: 2025-03-19 | Stop reason: HOSPADM

## 2025-03-18 RX ORDER — FENTANYL/ROPIVACAINE/NS/PF 2MCG/ML-.1
PLASTIC BAG, INJECTION (ML) EPIDURAL
Status: DISCONTINUED | OUTPATIENT
Start: 2025-03-18 | End: 2025-03-19 | Stop reason: HOSPADM

## 2025-03-18 RX ORDER — OXYTOCIN/0.9 % SODIUM CHLORIDE 30/500 ML
100-340 PLASTIC BAG, INJECTION (ML) INTRAVENOUS CONTINUOUS PRN
Status: DISCONTINUED | OUTPATIENT
Start: 2025-03-18 | End: 2025-03-20 | Stop reason: HOSPADM

## 2025-03-18 RX ORDER — SODIUM CHLORIDE, SODIUM LACTATE, POTASSIUM CHLORIDE, CALCIUM CHLORIDE 600; 310; 30; 20 MG/100ML; MG/100ML; MG/100ML; MG/100ML
INJECTION, SOLUTION INTRAVENOUS CONTINUOUS PRN
Status: DISCONTINUED | OUTPATIENT
Start: 2025-03-18 | End: 2025-03-19 | Stop reason: HOSPADM

## 2025-03-18 RX ORDER — MISOPROSTOL 200 UG/1
400 TABLET ORAL
Status: DISCONTINUED | OUTPATIENT
Start: 2025-03-18 | End: 2025-03-19 | Stop reason: HOSPADM

## 2025-03-18 RX ORDER — FENTANYL CITRATE 50 UG/ML
100 INJECTION, SOLUTION INTRAMUSCULAR; INTRAVENOUS
Status: DISCONTINUED | OUTPATIENT
Start: 2025-03-18 | End: 2025-03-19 | Stop reason: HOSPADM

## 2025-03-18 RX ORDER — LIDOCAINE HYDROCHLORIDE AND EPINEPHRINE 15; 5 MG/ML; UG/ML
INJECTION, SOLUTION EPIDURAL PRN
Status: DISCONTINUED | OUTPATIENT
Start: 2025-03-18 | End: 2025-03-19

## 2025-03-18 RX ORDER — OXYTOCIN 10 [USP'U]/ML
10 INJECTION, SOLUTION INTRAMUSCULAR; INTRAVENOUS
Status: DISCONTINUED | OUTPATIENT
Start: 2025-03-18 | End: 2025-03-19 | Stop reason: HOSPADM

## 2025-03-18 RX ORDER — CARBOPROST TROMETHAMINE 250 UG/ML
250 INJECTION, SOLUTION INTRAMUSCULAR
Status: DISCONTINUED | OUTPATIENT
Start: 2025-03-18 | End: 2025-03-19 | Stop reason: HOSPADM

## 2025-03-18 RX ORDER — OXYTOCIN/0.9 % SODIUM CHLORIDE 30/500 ML
340 PLASTIC BAG, INJECTION (ML) INTRAVENOUS CONTINUOUS PRN
Status: DISCONTINUED | OUTPATIENT
Start: 2025-03-18 | End: 2025-03-19 | Stop reason: HOSPADM

## 2025-03-18 RX ORDER — ONDANSETRON 4 MG/1
4 TABLET, ORALLY DISINTEGRATING ORAL EVERY 6 HOURS PRN
Status: DISCONTINUED | OUTPATIENT
Start: 2025-03-18 | End: 2025-03-19 | Stop reason: HOSPADM

## 2025-03-18 RX ORDER — FENTANYL CITRATE-0.9 % NACL/PF 10 MCG/ML
100 PLASTIC BAG, INJECTION (ML) INTRAVENOUS EVERY 5 MIN PRN
Status: DISCONTINUED | OUTPATIENT
Start: 2025-03-18 | End: 2025-03-19 | Stop reason: HOSPADM

## 2025-03-18 RX ORDER — OXYTOCIN 10 [USP'U]/ML
10 INJECTION, SOLUTION INTRAMUSCULAR; INTRAVENOUS
Status: DISCONTINUED | OUTPATIENT
Start: 2025-03-18 | End: 2025-03-20 | Stop reason: HOSPADM

## 2025-03-18 RX ORDER — LOPERAMIDE HYDROCHLORIDE 2 MG/1
4 CAPSULE ORAL
Status: DISCONTINUED | OUTPATIENT
Start: 2025-03-18 | End: 2025-03-19 | Stop reason: HOSPADM

## 2025-03-18 RX ORDER — METHYLERGONOVINE MALEATE 0.2 MG/ML
200 INJECTION INTRAVENOUS
Status: DISCONTINUED | OUTPATIENT
Start: 2025-03-18 | End: 2025-03-19 | Stop reason: HOSPADM

## 2025-03-18 RX ORDER — MISOPROSTOL 200 UG/1
800 TABLET ORAL
Status: DISCONTINUED | OUTPATIENT
Start: 2025-03-18 | End: 2025-03-19 | Stop reason: HOSPADM

## 2025-03-18 RX ORDER — PROCHLORPERAZINE MALEATE 10 MG
10 TABLET ORAL EVERY 6 HOURS PRN
Status: DISCONTINUED | OUTPATIENT
Start: 2025-03-18 | End: 2025-03-19 | Stop reason: HOSPADM

## 2025-03-18 RX ADMIN — Medication: at 22:42

## 2025-03-18 RX ADMIN — Medication 2 MILLI-UNITS/MIN: at 21:27

## 2025-03-18 RX ADMIN — SODIUM CHLORIDE, SODIUM LACTATE, POTASSIUM CHLORIDE, AND CALCIUM CHLORIDE 500 ML: .6; .31; .03; .02 INJECTION, SOLUTION INTRAVENOUS at 21:10

## 2025-03-18 RX ADMIN — LIDOCAINE HYDROCHLORIDE,EPINEPHRINE BITARTRATE 3 ML: 15; .005 INJECTION, SOLUTION EPIDURAL; INFILTRATION; INTRACAUDAL; PERINEURAL at 22:40

## 2025-03-18 RX ADMIN — SODIUM CHLORIDE, SODIUM LACTATE, POTASSIUM CHLORIDE, AND CALCIUM CHLORIDE: .6; .31; .03; .02 INJECTION, SOLUTION INTRAVENOUS at 22:04

## 2025-03-18 RX ADMIN — LIDOCAINE HYDROCHLORIDE,EPINEPHRINE BITARTRATE 2 ML: 15; .005 INJECTION, SOLUTION EPIDURAL; INFILTRATION; INTRACAUDAL; PERINEURAL at 22:41

## 2025-03-18 ASSESSMENT — ACTIVITIES OF DAILY LIVING (ADL)
ADLS_ACUITY_SCORE: 16

## 2025-03-18 NOTE — H&P
Date: 3/18/2025  Time: 8:10 AM    Admission H&P  Pau Murphy,  2000, MRN [id number]    [facility]   Pyelectasis of fetus on prenatal ultrasound [O35.EXX0]    PCP: Edd Siddiqui, [phone]          Extended Emergency Contact Information  Primary Emergency Contact: Katherine Brandon WALDROP  Address: [address] [place] [address] [facility]  Mobile Phone: [phone]  Relation: Spouse  Secondary Emergency Contact: sukhdeep eid  Address: [address] [place] [address] [facility]  Home Phone: [phone]  Mobile Phone: [phone]  Relation: Mother  Father: rogelio eid  Address: [address] [place] [address] [facility]  Home Phone: [phone]  Mobile Phone: [phone]       Chief Complaint: Encounter for planned induction of labor         HPI:      Pau Murphy, is a 25 year old,  at 39w5d, LMP Patient's last menstrual period was 2024 (approximate). , Estimated Date of Delivery: Mar 20, 2025, consistent with ultrasound at 39 weeks, admitted to [facility] on 3/18/2025 at 0730 secondary to: Planned induction of labor with need for cervical ripening.    Prenatal History:  Pau Murphy began care with [facility] the  [facility] on 2024 at 15 weeks gestation with regular care thereafter for a total of 15 visits. Her care was complicated by   Patient Active Problem List   Diagnosis    Hyperemesis affecting pregnancy, antepartum    At risk for postpartum depression    Cystic fibrosis carrier    Asthma    Supervision of other normal pregnancy, antepartum    Pyelectasis of fetus on prenatal ultrasound    Low maternal weight gain, unspecified trimester    Encounter for triage in pregnant patient    Encounter for planned induction of labor    .      Pre-pregnant weight:  195 lbs   Pre-pregnant BMI: 31    Total weight gain:  10  lbs    Labs:  Admission on 03/18/2025   Component Date Value Ref Range Status    WBC Count 03/18/2025 11.6 (H)  4.0 - 11.0 10e3/uL Final    RBC Count 03/18/2025 4.26  3.80 - 5.20 10e6/uL Final    Hemoglobin 03/18/2025 12.9  11.7 - 15.7 g/dL Final    Hematocrit 03/18/2025 36.2  35.0 - 47.0 % Final    MCV 03/18/2025 85  78 - 100 fL Final    MCH 03/18/2025 30.3  26.5 - 33.0 pg Final    MCHC 03/18/2025 35.6  31.5 - 36.5 g/dL Final    RDW 03/18/2025 13.3  10.0 - 15.0 % Final    Platelet Count 03/18/2025 176  150 - 450 10e3/uL Final    ABO/RH(D) 03/18/2025 O POS   Preliminary    SPECIMEN EXPIRATION DATE 03/18/2025 [id number]   Preliminary   Prenatal Office Visit on 02/26/2025   Component Date Value Ref Range Status    Trichomonas 02/26/2025 Absent  Absent Final    Yeast 02/26/2025 Absent  Absent Final    Clue Cells 02/26/2025 Absent  Absent Final    WBCs/high power field 02/26/2025 4+ (A)  None Final    Rupture of Fetal Membranes by ROM * 02/26/2025 Negative  Negative, Invalid, Suggest Repeat Final   Prenatal Office Visit on 02/20/2025   Component Date Value Ref Range Status    Hemoglobin 02/20/2025 12.1  11.7 - 15.7 g/dL Final   Admission on 02/15/2025, Discharged on 02/15/2025   Component Date Value Ref Range Status    Fern Crystallization 02/15/2025 No ferning present  No ferning present Final    Trichomonas 02/15/2025 Absent  Absent Final    Yeast 02/15/2025 Absent  Absent Final    Clue Cells 02/15/2025 Absent  Absent Final    WBCs/high power field 02/15/2025 3+ (A)  None Final    Rupture of Fetal Membranes by ROM * 02/15/2025 Negative  Negative, Invalid, Suggest Repeat Final    Color Urine 02/15/2025 Yellow  Colorless, Straw, Light Yellow, Yellow Final    Appearance Urine 02/15/2025 Turbid (A)  Clear Final    Glucose Urine 02/15/2025 Negative  Negative mg/dL Final    Bilirubin Urine 02/15/2025 Negative  Negative Final    Ketones Urine 02/15/2025 Negative  Negative mg/dL Final    Specific Gravity Urine 02/15/2025  1.020  1.001 - 1.030 Final    Blood Urine 02/15/2025 Negative  Negative Final    pH Urine 02/15/2025 6.5  5.0 - 7.0 Final    Protein Albumin Urine 02/15/2025 20 (A)  Negative mg/dL Final    Urobilinogen Urine 02/15/2025 <2.0  <2.0 mg/dL Final    Nitrite Urine 02/15/2025 Negative  Negative Final    Leukocyte Esterase Urine 02/15/2025 250 Mak/uL (A)  Negative Final    Bacteria Urine 02/15/2025 Few (A)  None Seen /HPF Final    Mucus Urine 02/15/2025 Present (A)  None Seen /LPF Final    RBC Urine 02/15/2025 1  <=2 /HPF Final    WBC Urine 02/15/2025 8 (H)  <=5 /HPF Final    Squamous Epithelials Urine 02/15/2025 10 (H)  <=1 /HPF Final    Culture 02/15/2025 50,000-100,000 CFU/mL Mixture of urogenital bianca   Final   Admission on 02/14/2025, Discharged on 02/15/2025   Component Date Value Ref Range Status    Rupture of Fetal Membranes by ROM * 02/14/2025 Negative  Negative, Invalid, Suggest Repeat Final    Group B Strep PCR 02/14/2025 Negative  Negative Final    Presumed negative for Streptococcus agalactiae (Group B Streptococcus) or the number of organisms may be below the limit of detection of the assay.   Prenatal Office Visit on 01/29/2025   Component Date Value Ref Range Status    Treponema Antibody Total 01/29/2025 Nonreactive  Nonreactive Final   Prenatal Office Visit on 01/21/2025   Component Date Value Ref Range Status    Total Protein Urine mg/dL 01/21/2025 6.1    mg/dL Final    The reference ranges have not been established in urine protein. The results should be integrated into the clinical context for interpretation.    Total Protein Urine mg/mg Creat 01/21/2025 0.10  0.00 - 0.20 mg/mg Cr Final    Creatinine Urine mg/dL 01/21/2025 58.2  mg/dL Final    The reference ranges have not been established in urine creatinine. The results should be integrated into the clinical context for interpretation.    Sodium 01/21/2025 136  135 - 145 mmol/L Final    Potassium 01/21/2025 4.1  3.4 - 5.3 mmol/L Final    Carbon  Dioxide (CO2) 01/21/2025 19 (L)  22 - 29 mmol/L Final    Anion Gap 01/21/2025 12  7 - 15 mmol/L Final    Urea Nitrogen 01/21/2025 6.2  6.0 - 20.0 mg/dL Final    Creatinine 01/21/2025 0.54  0.51 - 0.95 mg/dL Final    GFR Estimate 01/21/2025 >90  >60 mL/min/1.73m2 Final    eGFR calculated using 2021 CKD-EPI equation.    Calcium 01/21/2025 9.3  8.8 - 10.4 mg/dL Final    Reference intervals for this test were updated on 7/16/2024 to reflect our healthy population more accurately. There may be differences in the flagging of prior results with similar values performed with this method. Those prior results can be interpreted in the context of the updated reference intervals.    Chloride 01/21/2025 105  98 - 107 mmol/L Final    Glucose 01/21/2025 79  70 - 99 mg/dL Final    Alkaline Phosphatase 01/21/2025 106  40 - 150 U/L Final    AST 01/21/2025 13  0 - 45 U/L Final    ALT 01/21/2025 12  0 - 50 U/L Final    Protein Total 01/21/2025 6.1 (L)  6.4 - 8.3 g/dL Final    Albumin 01/21/2025 3.5  3.5 - 5.2 g/dL Final    Bilirubin Total 01/21/2025 0.3  <=1.2 mg/dL Final    WBC Count 01/21/2025 11.6 (H)  4.0 - 11.0 10e3/uL Final    RBC Count 01/21/2025 4.14  3.80 - 5.20 10e6/uL Final    Hemoglobin 01/21/2025 12.5  11.7 - 15.7 g/dL Final    Hematocrit 01/21/2025 36.7  35.0 - 47.0 % Final    MCV 01/21/2025 89  78 - 100 fL Final    MCH 01/21/2025 30.2  26.5 - 33.0 pg Final    MCHC 01/21/2025 34.1  31.5 - 36.5 g/dL Final    RDW 01/21/2025 13.3  10.0 - 15.0 % Final    Platelet Count 01/21/2025 206  150 - 450 10e3/uL Final   Prenatal Office Visit on 12/23/2024   Component Date Value Ref Range Status    Glu Gest Screen 1hr 50g 12/23/2024 120  70 - 129 mg/dL Final    Hemoglobin 12/23/2024 12.1  11.7 - 15.7 g/dL Final    Color Urine 12/23/2024 Yellow  Colorless, Straw, Light Yellow, Yellow Final    Appearance Urine 12/23/2024 Clear  Clear Final    Glucose Urine 12/23/2024 Negative  Negative mg/dL Final    Bilirubin Urine 12/23/2024 Negative   Negative Final    Ketones Urine 12/23/2024 Negative  Negative mg/dL Final    Specific Gravity Urine 12/23/2024 1.025  1.005 - 1.030 Final    Blood Urine 12/23/2024 Negative  Negative Final    pH Urine 12/23/2024 7.0  5.0 - 8.0 Final    Protein Albumin Urine 12/23/2024 Trace (A)  Negative mg/dL Final    Urobilinogen Urine 12/23/2024 0.2  0.2, 1.0 E.U./dL Final    Nitrite Urine 12/23/2024 Negative  Negative Final    Leukocyte Esterase Urine 12/23/2024 Small (A)  Negative Final    Hold Specimen 12/23/2024 [facility]   Final   Office Visit on 09/11/2024   Component Date Value Ref Range Status    Color Urine 09/11/2024 Dark Yellow (A)  Colorless, Straw, Light Yellow, Yellow Final    Appearance Urine 09/11/2024 Cloudy (A)  Clear Final    Glucose Urine 09/11/2024 Negative  Negative mg/dL Final    Bilirubin Urine 09/11/2024 Small (A)  Negative Final    Ketones Urine 09/11/2024 Negative  Negative mg/dL Final    Specific Gravity Urine 09/11/2024 >=1.030  1.005 - 1.030 Final    Blood Urine 09/11/2024 Negative  Negative Final    pH Urine 09/11/2024 6.0  5.0 - 8.0 Final    Protein Albumin Urine 09/11/2024 Trace (A)  Negative mg/dL Final    Urobilinogen Urine 09/11/2024 1.0  0.2, 1.0 E.U./dL Final    Nitrite Urine 09/11/2024 Negative  Negative Final    Leukocyte Esterase Urine 09/11/2024 Small (A)  Negative Final    Sodium 09/11/2024 135  135 - 145 mmol/L Final    Potassium 09/11/2024 4.0  3.4 - 5.3 mmol/L Final    Carbon Dioxide (CO2) 09/11/2024 22  22 - 29 mmol/L Final    Anion Gap 09/11/2024 10  7 - 15 mmol/L Final    Urea Nitrogen 09/11/2024 9.5  6.0 - 20.0 mg/dL Final    Creatinine 09/11/2024 0.55  0.51 - 0.95 mg/dL Final    GFR Estimate 09/11/2024 >90  >60 mL/min/1.73m2 Final    eGFR calculated using 2021 CKD-EPI equation.    Calcium 09/11/2024 9.4  8.8 - 10.4 mg/dL Final    Reference intervals for this test were updated on 7/16/2024 to reflect our healthy population more accurately. There may be differences in the flagging of  prior results with similar values performed with this method. Those prior results can be interpreted in the context of the updated reference intervals.    Chloride 09/11/2024 103  98 - 107 mmol/L Final    Glucose 09/11/2024 92  70 - 99 mg/dL Final    Alkaline Phosphatase 09/11/2024 58  40 - 150 U/L Final    AST 09/11/2024 13  0 - 45 U/L Final    ALT 09/11/2024 16  0 - 50 U/L Final    Protein Total 09/11/2024 6.8  6.4 - 8.3 g/dL Final    Albumin 09/11/2024 4.1  3.5 - 5.2 g/dL Final    Bilirubin Total 09/11/2024 0.4  <=1.2 mg/dL Final    TSH 09/11/2024 0.52  0.30 - 4.20 uIU/mL Final    WBC Count 09/11/2024 10.7  4.0 - 11.0 10e3/uL Final    RBC Count 09/11/2024 4.85  3.80 - 5.20 10e6/uL Final    Hemoglobin 09/11/2024 14.0  11.7 - 15.7 g/dL Final    Hematocrit 09/11/2024 39.9  35.0 - 47.0 % Final    MCV 09/11/2024 82  78 - 100 fL Final    MCH 09/11/2024 28.9  26.5 - 33.0 pg Final    MCHC 09/11/2024 35.1  31.5 - 36.5 g/dL Final    RDW 09/11/2024 12.3  10.0 - 15.0 % Final    Platelet Count 09/11/2024 249  150 - 450 10e3/uL Final    % Neutrophils 09/11/2024 68  % Final    % Lymphocytes 09/11/2024 24  % Final    % Monocytes 09/11/2024 5  % Final    % Eosinophils 09/11/2024 2  % Final    % Basophils 09/11/2024 0  % Final    % Immature Granulocytes 09/11/2024 1  % Final    NRBCs per 100 WBC 09/11/2024 0  <1 /100 Final    Absolute Neutrophils 09/11/2024 7.4  1.6 - 8.3 10e3/uL Final    Absolute Lymphocytes 09/11/2024 2.5  0.8 - 5.3 10e3/uL Final    Absolute Monocytes 09/11/2024 0.5  0.0 - 1.3 10e3/uL Final    Absolute Eosinophils 09/11/2024 0.2  0.0 - 0.7 10e3/uL Final    Absolute Basophils 09/11/2024 0.0  0.0 - 0.2 10e3/uL Final    Absolute Immature Granulocytes 09/11/2024 0.1  <=0.4 10e3/uL Final    Absolute NRBCs 09/11/2024 0.0  10e3/uL Final   Admission on 08/25/2024, Discharged on 08/25/2024   Component Date Value Ref Range Status    Sodium 08/25/2024 136  135 - 145 mmol/L Final    Potassium 08/25/2024 3.5  3.4 - 5.3  mmol/L Final    Chloride 08/25/2024 105  98 - 107 mmol/L Final    Carbon Dioxide (CO2) 08/25/2024 18 (L)  22 - 29 mmol/L Final    Anion Gap 08/25/2024 13  7 - 15 mmol/L Final    Urea Nitrogen 08/25/2024 7.8  6.0 - 20.0 mg/dL Final    Creatinine 08/25/2024 0.49 (L)  0.51 - 0.95 mg/dL Final    GFR Estimate 08/25/2024 >90  >60 mL/min/1.73m2 Final    eGFR calculated using 2021 CKD-EPI equation.    Calcium 08/25/2024 8.9  8.8 - 10.4 mg/dL Final    Reference intervals for this test were updated on 7/16/2024 to reflect our healthy population more accurately. There may be differences in the flagging of prior results with similar values performed with this method. Those prior results can be interpreted in the context of the updated reference intervals.    Glucose 08/25/2024 94  70 - 99 mg/dL Final    Ventricular Rate 08/25/2024 71  BPM Final    Atrial Rate 08/25/2024 71  BPM Final    NJ Interval 08/25/2024 140  ms Final    QRS Duration 08/25/2024 94  ms Final    QT 08/25/2024 410  ms Final    QTc 08/25/2024 445  ms Final    P Axis 08/25/2024 60  degrees Final    R AXIS 08/25/2024 48  degrees Final    T Axis 08/25/2024 11  degrees Final    Interpretation ECG 08/25/2024    Final                    Value:Sinus rhythm with sinus arrhythmia  Normal ECG  No previous ECGs available  Confirmed by - EMERGENCY ROOM, PHYSICIAN ([id number]),  CARLEE ELLIS (Matias) on 8/26/2024 7:07:32 AM      WBC Count 08/25/2024 9.2  4.0 - 11.0 10e3/uL Final    RBC Count 08/25/2024 4.49  3.80 - 5.20 10e6/uL Final    Hemoglobin 08/25/2024 13.0  11.7 - 15.7 g/dL Final    Hematocrit 08/25/2024 36.7  35.0 - 47.0 % Final    MCV 08/25/2024 82  78 - 100 fL Final    MCH 08/25/2024 29.0  26.5 - 33.0 pg Final    MCHC 08/25/2024 35.4  31.5 - 36.5 g/dL Final    RDW 08/25/2024 12.1  10.0 - 15.0 % Final    Platelet Count 08/25/2024 192  150 - 450 10e3/uL Final    % Neutrophils 08/25/2024 58  % Final    % Lymphocytes 08/25/2024 32  % Final    % Monocytes 08/25/2024  "7  % Final    % Eosinophils 2024 3  % Final    % Basophils 2024 0  % Final    % Immature Granulocytes 2024 1  % Final    NRBCs per 100 WBC 2024 0  <1 /100 Final    Absolute Neutrophils 2024 5.3  1.6 - 8.3 10e3/uL Final    Absolute Lymphocytes 2024 3.0  0.8 - 5.3 10e3/uL Final    Absolute Monocytes 2024 0.6  0.0 - 1.3 10e3/uL Final    Absolute Eosinophils 2024 0.2  0.0 - 0.7 10e3/uL Final    Absolute Basophils 2024 0.0  0.0 - 0.2 10e3/uL Final    Absolute Immature Granulocytes 2024 0.1  <=0.4 10e3/uL Final    Absolute NRBCs 2024 0.0  10e3/uL Final    Hold Specimen 2024 J[facility]   Final    Hold Specimen 2024 [facility]   Final   There may be more visits with results that are not included.       Pertinent Radiology:  Based On MAGGIE GA Diff User Date   Last Menstrual Period on 2024 (Approximate) 2025 Working Evette Garcia CNM 2024      Ultrasound on 2024 Same Jessica Mendez CNM 2024   GA: 20w4d  Comment: EFW 40%, posterior placenta, normal anatomy with exception of minimally prominent renal pelves (3mm bilaterally), and single EIF. Normal NIPS this pregnancy.   Ultrasound on 01/10/2025 2025 Same Colette Graves CNM 2025   GA: 30w1d  Comment: Follow-up ultrasound:Slight prominence of bilateral fetal renal pelves measuring 3 mm on both sides unchanged       OB HISTORY  OB History    Para Term  AB Living   2 1 1 0 0 1   SAB IAB Ectopic Multiple Live Births   0 0 0 0 1      # Outcome Date GA Lbr Sincere/2nd Weight Sex Type Anes PTL Lv   2 Current            1 Term 22 41w0d 06:35 / 00:46 3.29 kg (7 lb 4.1 oz) F Vag-Spont IV, EPI N MAXX      Name: Randa      Apgar1: 9  Apgar5: 9         Medical History  Past Medical History:   Diagnosis Date    Bipolar affective disorder (H)     One episode in high school, \"drug induced psychosis\"    Constipation     Depressive disorder     " "Uncomplicated asthma          Surgical History  She  has a past surgical history that includes Extraction(s) dental and orthopedic surgery.       Social History  Reviewed, and she  reports that she has never smoked. She has never been exposed to tobacco smoke. She has never used smokeless tobacco. She reports that she does not currently use alcohol. She reports that she does not currently use drugs.  Partner: Laith  Education level: college graduate  Occupation:       Family History  Reviewed, and family history includes Bipolar Disorder in her mother; Cancer in her maternal grandfather; Depression in her sister; Diabetes in her paternal grandmother; Hyperlipidemia in her father; Impaired Fasting Glucose in her father; No Known Problems in her daughter.          Allergies   Allergen Reactions    Reglan [Metoclopramide] Other (See Comments)     EPS/psychosis       Medications Prior to Admission   Medication Sig Dispense Refill Last Dose/Taking    doxylamine (UNISOM) 25 MG TABS tablet Take 1 tablet (25 mg) by mouth at bedtime. Additional half tablet in the morning 90 tablet 3     famotidine (PEPCID) 10 MG tablet Take 10 mg by mouth daily.       ondansetron (ZOFRAN ODT) 4 MG ODT tab Take 1 tablet (4 mg) by mouth every 8 hours as needed for other (for persistent vomiting). 30 tablet 3     Prenatal Vit-Fe Fumarate-FA (PRENATAL MULTIVITAMIN  PLUS IRON) 27-1 MG TABS Take by mouth daily.       pyridOXINE (VITAMIN B6) 25 MG tablet Take 1 tablet (25 mg) by mouth 3 times daily. 90 tablet 3         Review of Systems:  Pertinent items are noted in HPI.   Physical Exam:  Temp:  [97.7  F (36.5  C)] 97.7  F (36.5  C)  Pulse:  [] 95  Resp:  [16] 16  BP: (116-123)/(80) 123/80  SpO2:  [98 %] 98 %    /80 (BP Location: Left arm, Patient Position: Semi-Saleh's, Cuff Size: Adult Regular)   Pulse 95   Temp 97.7  F (36.5  C) (Oral)   Resp 16   Ht 1.676 m (5' 6\")   Wt 93 kg (205 lb)   LMP 06/13/2024 " "(Approximate)   BMI 33.09 kg/m      Height: 5' 6\"  General appearance:  comfortable  Psych: AAO x3  Skin: Pink, warm & dry  HEENT: unremarkable  Cardiovascular:  RRR, S1, S2, no extra sounds or murmurs  Respiratory:  breath sounds CTA bilaterally, anteriorly and posteriorly  Abdomen: soft, NT, gravid  Leopolds: vertex         EFW:<4500 grams (AGA)     FHR:Baseline: 135 bpm, Variability: Moderate (6 - 25 bpm), Accelerations: present and Decelerations: Absent    Uterine contractions: Frequency: none, Duration: na seconds and Intensity: na    SVE:2/50/-2 very posterior, soft  Cook catheter placement:   Discussed options for cervical ripening; questions answered and pt prefers [employer] cathetercervical ripening balloon. Reviewed risks and benefits. SVE revealed 2cm dilation and posterior, the sterile catheter with stylet in place was threaded along side exam fingers and introduced through the cervix without difficulty up to the vaginal balloon via normal manual exam. At that time 20 mL of sterile water was placed in the uterine catheter balloon, the catheter was retracted until resistance met, then 20 mL of sterile water was placed in the vaginal balloon and the stylet was removed. The balloons were then filled in an alternating pattern (Uterine then Vaginal) for 20 mL until a maximum of 60 mL sterile water was complete in both balloons. Pau tolerated the procedure well and noted only a small amount of pressure. Reviewed monitoring protocol and encouraged movement and rest, eat and drink normally.      Extremeties: trace edema  moves all freely      Membrane Status: intact        Notable AP/IP factors to date:  1.)hyperemesis gravidarum  2.)prepregnancy BMI 31, TWG 10#  3.)history mood disorder, elevated EPDS mid pregnancy; no medication management  4.)fetal pyelectasis, bilateral and mild  5.) ESBL infection history, need for stool sampling    Impression:  Pau Murphy is a 25 year old year old at 39w5d weeks, " Category I FHTs, moderate variability.   Unfavorable cervix  Cook catheter placed at 0820     Plan:  1.  [facility] reviewed pt preferences for cervical ripening phase and elects cook catheter placement. Reviewed procedure, R/B/A and answered all questions. Advised on unpredictability of timeline for induction of labor and next evaluation will be based on expiration of the cook catheter at 12 hours (approximately 2020 on 3/18/2025) or change in status prior.   2.  Encourage position changes, eat and drink normally.  3.  Labor support PRN. States desires unmedicated but open to epidural when needed.   4.  Following cook catheter placement, 30 minutes evaluation before change to intermittent fetal monitoring if category I EFM  5.  Anticipate Spontaneous vaginal birth  6. Labs: type and screen, CBC, RPR, EBSL organism sample screen  7. Discussed will require 2nd stool sample 2 weeks apart and may elect to obtain that sample after her postpartum visit.       Total time with patient:  60 minutes at bedside and in the [facility] obtaining and clarifying the above history, performing the physical exam, education and counseling and developing this plan of care.  >50% spent on counseling and coordination of care.    Provider: Tiff Alegre, KAYLA, APRN, RENATAM

## 2025-03-18 NOTE — PLAN OF CARE
Problem: Labor  Goal: Stable Fetal Wellbeing  Outcome: Progressing     Problem: Labor  Goal: Effective Progression to Delivery  Outcome: Progressing   Goal Outcome Evaluation:

## 2025-03-18 NOTE — PROGRESS NOTES
Pt arrived to [facility] for I[place]. 39.5wks. . Pt admitted to room 264. FHT's category I. Uterus soft, non tender. Pt denies feeling regular ctx's. VSS. SVE per CNM /-2, soft, posterior. Chilel is 5. Cook balloon for cervical ripening placed at 0826. Pt tolerated this well. Will plan for balloon to stay in place until it falls out or for a max of 12 hrs. Pt states understanding.

## 2025-03-18 NOTE — PROGRESS NOTES
Labor Progress Note:    Assessment: 25 year old  @ 39w5d here for induction of labor with cervical ripening  - Fetal status: FHT Category I with moderate variability  -cook catheter in place  -intact bag of water    Plan:   - Discussed options for next plan of care to include discontinuation of cook early, initiation of pitocin following removal, or if cervix remains unfavorable option for further cervical ripening overnight. At this time she desires reassessment after dinner 1830 and will likely elect trial of pitocin for labor. All questions answered.  -Routine CNM support & management. Encourage position changes, ambulation, rest as desired.  -Anticipate progress and NSVB.   -Reevaluate progress in 2-3 hours or sooner with a change in status.       Subjective:  Pau is coping well with contractions irregular contractions. Feeling more pelvic pressure and states walking has helped the most. Adequate PO fluid intake. Voiding without issue. Spouse Laith is supportive at bedside.    Objective:  Patient Vitals for the past 4 hrs:   BP Temp Temp src Resp   25 1514 117/72 98.8  F (37.1  C) Oral 16       Membrane Status: Intact    Fetal Heart Rate:  FHR:Baseline: 135 bpm, Variability: Moderate (6 - 25 bpm), Accelerations: present and Decelerations: Absent    Uterine contractions:TocoFrequency: irritability, Duration: na seconds and Intensity: na    SVE:deferred          Tiff Alegre, IDALIA, APRN, CNM      Total time spent with patient: 30 minutes, of which >50% time spent counseling and coordination of care.    3/18/2025 3:46 PM

## 2025-03-19 PROCEDURE — 250N000009 HC RX 250: Performed by: ADVANCED PRACTICE MIDWIFE

## 2025-03-19 PROCEDURE — 250N000013 HC RX MED GY IP 250 OP 250 PS 637: Performed by: ADVANCED PRACTICE MIDWIFE

## 2025-03-19 PROCEDURE — 722N000001 HC LABOR CARE VAGINAL DELIVERY SINGLE

## 2025-03-19 PROCEDURE — 0UQGXZZ REPAIR VAGINA, EXTERNAL APPROACH: ICD-10-PCS | Performed by: ADVANCED PRACTICE MIDWIFE

## 2025-03-19 PROCEDURE — 120N000001 HC R&B MED SURG/OB

## 2025-03-19 PROCEDURE — 10907ZC DRAINAGE OF AMNIOTIC FLUID, THERAPEUTIC FROM PRODUCTS OF CONCEPTION, VIA NATURAL OR ARTIFICIAL OPENING: ICD-10-PCS | Performed by: ADVANCED PRACTICE MIDWIFE

## 2025-03-19 PROCEDURE — 59410 OBSTETRICAL CARE: CPT | Performed by: ADVANCED PRACTICE MIDWIFE

## 2025-03-19 RX ORDER — AMOXICILLIN 250 MG
2 CAPSULE ORAL
Status: DISCONTINUED | OUTPATIENT
Start: 2025-03-19 | End: 2025-03-20 | Stop reason: HOSPADM

## 2025-03-19 RX ORDER — METHYLERGONOVINE MALEATE 0.2 MG/ML
200 INJECTION INTRAVENOUS
Status: DISCONTINUED | OUTPATIENT
Start: 2025-03-19 | End: 2025-03-20 | Stop reason: HOSPADM

## 2025-03-19 RX ORDER — MISOPROSTOL 200 UG/1
400 TABLET ORAL
Status: DISCONTINUED | OUTPATIENT
Start: 2025-03-19 | End: 2025-03-20 | Stop reason: HOSPADM

## 2025-03-19 RX ORDER — OXYTOCIN/0.9 % SODIUM CHLORIDE 30/500 ML
340 PLASTIC BAG, INJECTION (ML) INTRAVENOUS CONTINUOUS PRN
Status: DISCONTINUED | OUTPATIENT
Start: 2025-03-19 | End: 2025-03-20 | Stop reason: HOSPADM

## 2025-03-19 RX ORDER — OXYTOCIN 10 [USP'U]/ML
10 INJECTION, SOLUTION INTRAMUSCULAR; INTRAVENOUS
Status: DISCONTINUED | OUTPATIENT
Start: 2025-03-19 | End: 2025-03-20 | Stop reason: HOSPADM

## 2025-03-19 RX ORDER — POLYETHYLENE GLYCOL 3350 17 G/17G
17 POWDER, FOR SOLUTION ORAL DAILY PRN
Status: DISCONTINUED | OUTPATIENT
Start: 2025-03-19 | End: 2025-03-20 | Stop reason: HOSPADM

## 2025-03-19 RX ORDER — LOPERAMIDE HYDROCHLORIDE 2 MG/1
4 CAPSULE ORAL
Status: DISCONTINUED | OUTPATIENT
Start: 2025-03-19 | End: 2025-03-20 | Stop reason: HOSPADM

## 2025-03-19 RX ORDER — ACETAMINOPHEN 325 MG/1
650 TABLET ORAL EVERY 4 HOURS PRN
Status: DISCONTINUED | OUTPATIENT
Start: 2025-03-19 | End: 2025-03-20 | Stop reason: HOSPADM

## 2025-03-19 RX ORDER — BISACODYL 10 MG
10 SUPPOSITORY, RECTAL RECTAL DAILY PRN
Status: DISCONTINUED | OUTPATIENT
Start: 2025-03-19 | End: 2025-03-20 | Stop reason: HOSPADM

## 2025-03-19 RX ORDER — HYDROCORTISONE 25 MG/G
CREAM TOPICAL 3 TIMES DAILY PRN
Status: DISCONTINUED | OUTPATIENT
Start: 2025-03-19 | End: 2025-03-20 | Stop reason: HOSPADM

## 2025-03-19 RX ORDER — MISOPROSTOL 200 UG/1
800 TABLET ORAL
Status: DISCONTINUED | OUTPATIENT
Start: 2025-03-19 | End: 2025-03-20 | Stop reason: HOSPADM

## 2025-03-19 RX ORDER — CARBOPROST TROMETHAMINE 250 UG/ML
250 INJECTION, SOLUTION INTRAMUSCULAR
Status: DISCONTINUED | OUTPATIENT
Start: 2025-03-19 | End: 2025-03-20 | Stop reason: HOSPADM

## 2025-03-19 RX ORDER — IBUPROFEN 800 MG/1
800 TABLET, FILM COATED ORAL EVERY 6 HOURS PRN
Status: DISCONTINUED | OUTPATIENT
Start: 2025-03-19 | End: 2025-03-20 | Stop reason: HOSPADM

## 2025-03-19 RX ORDER — TRANEXAMIC ACID 10 MG/ML
1 INJECTION, SOLUTION INTRAVENOUS EVERY 30 MIN PRN
Status: DISCONTINUED | OUTPATIENT
Start: 2025-03-19 | End: 2025-03-20 | Stop reason: HOSPADM

## 2025-03-19 RX ORDER — LOPERAMIDE HYDROCHLORIDE 2 MG/1
2 CAPSULE ORAL
Status: DISCONTINUED | OUTPATIENT
Start: 2025-03-19 | End: 2025-03-20 | Stop reason: HOSPADM

## 2025-03-19 RX ADMIN — IBUPROFEN 800 MG: 800 TABLET, FILM COATED ORAL at 15:05

## 2025-03-19 RX ADMIN — ACETAMINOPHEN 650 MG: 325 TABLET ORAL at 12:05

## 2025-03-19 RX ADMIN — IBUPROFEN 800 MG: 800 TABLET, FILM COATED ORAL at 03:01

## 2025-03-19 RX ADMIN — IBUPROFEN 800 MG: 800 TABLET, FILM COATED ORAL at 09:01

## 2025-03-19 RX ADMIN — ACETAMINOPHEN 650 MG: 325 TABLET ORAL at 08:07

## 2025-03-19 RX ADMIN — Medication 340 ML/HR: at 01:05

## 2025-03-19 RX ADMIN — ACETAMINOPHEN 650 MG: 325 TABLET ORAL at 16:19

## 2025-03-19 RX ADMIN — ACETAMINOPHEN 650 MG: 325 TABLET ORAL at 20:37

## 2025-03-19 RX ADMIN — IBUPROFEN 800 MG: 800 TABLET, FILM COATED ORAL at 21:43

## 2025-03-19 ASSESSMENT — ACTIVITIES OF DAILY LIVING (ADL)
ADLS_ACUITY_SCORE: 23
ADLS_ACUITY_SCORE: 22
ADLS_ACUITY_SCORE: 22
ADLS_ACUITY_SCORE: 16
ADLS_ACUITY_SCORE: 16
ADLS_ACUITY_SCORE: 23
ADLS_ACUITY_SCORE: 23
ADLS_ACUITY_SCORE: 22
ADLS_ACUITY_SCORE: 23
ADLS_ACUITY_SCORE: 16
ADLS_ACUITY_SCORE: 22
ADLS_ACUITY_SCORE: 23
ADLS_ACUITY_SCORE: 16
ADLS_ACUITY_SCORE: 22
ADLS_ACUITY_SCORE: 23
ADLS_ACUITY_SCORE: 16
ADLS_ACUITY_SCORE: 22
ADLS_ACUITY_SCORE: 23

## 2025-03-19 NOTE — PROGRESS NOTES
"Day of Delivery Postpartum Note:      Patient Name:  Pau Murphy  :      2000  MRN:      [id number]        Clotilde is doing well physically.  Able to move around the room independently, emptying her bladder, hydrating and eating.  Had 3 good breastfeeding episodes.  Now baby has been sleepy for several hours, and hasn't fed well.  Reassured about the \"normalness\" of a sleepy stage in the first 24 hours after birth.  Feeling tired herself, and ready for a nap.    Happy with birth experience.  Notes that is got intense quickly after start of pitocin.  Glad it went quickly after that.    Plans discharge to home tomorrow.          Provider:  ANASTASIYA Gutiérrez          Date:  3/19/2025  Time:  12:11 PM        "

## 2025-03-19 NOTE — L&D DELIVERY NOTE
OB Vaginal Delivery Note    Pau Murphy MRN# [id number]   Age: 25 year old YOB: 2000       GA: 39w6d  GP:   Labor Complications: None  EBL: 50 mL  Delivery QBL:    Delivery Type: Vaginal, Spontaneous  ROM to Delivery Time: (Delivered) Hours: 1 Minutes: 27  [place] Weight:     1 Minute 5 Minute 10 Minute   Apgar Totals: 7   9        MELINDA RAIN;FLORY LAU    Delivery Details:  Pau Murphy, a 25 year old  female delivered a viable male infant with apgars of 7  and 9 . Delivery was via vaginal, spontaneous to a sterile field under epidural anesthesia. Infant delivered in vertex left occiput anterior position with a nuchal hand and loose nuchal cord that was reduced at the perineum. Anterior and posterior shoulders delivered without difficulty. The cord was clamped, cut twice and 3 vessels were noted. Cord blood was obtained in routine fashion with the following disposition: lab.      Cord complications: nuchal  Placenta delivered at 3/19/2025  1:10 AM. Placental disposition was Hospital disposal. Fundal massage performed and fundus found to be firm.     Episiotomy: none   Perineum, vagina, cervix were inspected, and the following lacerations were noted:   Perineal lacerations: none        vaginal laceration noted       Any lacerations were repaired in the usual fashion using.    Excellent hemostasis was noted. Needle count correct. Infant and patient in delivery room in good and stable condition.        Amara Murphy-Pau [[id number]]      Labor Event Times      Latent labor onset date/time: 3/18/2025 2045    Active labor onset date: 3/18/25 Onset time: 11:36 PM   Dilation complete date: 3/19/25 Complete time: 12:55 AM   Start pushing date/time: 3/19/2025 0101          Labor Events     labor?: No   steroids: None  Labor Type: Induction/Cervical ripening, Augmentation  Predominate monitoring during 1st stage: intermittent auscultation, continuous  electronic fetal monitoring     Antibiotics received during labor?: No       Rupture date/time: 3/18/25 2336   Rupture type: Artificial Rupture of Membranes  Fluid color: Clear  Fluid odor: Normal     Induction: Cervical Ripening Balloon  Induction date/time: 3/18/25 2127    Cervical ripening date/time: 3/19/25 0826    Indications for induction: Elective     Augmentation: AROM, Oxytocin  Augmentation date/time: 3/18/25 2336   Indications for augmentation: Ineffective Contraction Pattern       Delivery/Placenta Date and Time      Delivery Date: 3/19/25 Delivery Time:  1:03 AM   Placenta Date/Time: 3/19/2025  1:10 AM  Oxytocin given at the time of delivery: after delivery of baby  Delivering clinician: Lubna Perez APRN CNM   Other personnel present at delivery:  Provider Role   Adrianna Sanon RN [place], Ashley L, RN              Vaginal Counts       Initial count performed by 2 team members:  Two Team Members   Lubna Jean Baptiste RN         Needles Suture Needles Sponges (RETIRED) Instruments   Initial counts 0 0 0    Added to count 0 1 0    Relief counts       Final counts 0 1 0            Placed during labor Accounted for at the end of labor   FSE NA NA   IUPC NA NA   Cervidil NA NA                  Final count performed by 2 team members:  Two Team Members   Alyson Mcdermott      Final count correct?: Yes  Pre-Birth Team Brief: Complete  Post-Birth Team Debrief: Complete       Apgars    Living status: Living   1 Minute 5 Minute 10 Minute 15 Minute 20 Minute   Skin color: 0  1       Heart rate: 2  2       Reflex irritability: 2  2       Muscle tone: 2  2       Respiratory effort: 1  2       Total: 7  9       Apgars assigned by: COSMO       Cord      Vessels: 3 Vessels    Cord Complications: Nuchal   Nuchal Intervention: reduced         Nuchal cord description: loose nuchal cord         Cord Blood Disposition: Lab    Gases Sent?: No    Delayed cord clamping?: Yes    Cord  Clamping Delay (seconds): >120 seconds    Stem cell collection?: No           [place] Resuscitation    Methods: None       Skin to Skin and Feeding Plan      Skin to skin initiation date/time: 1/3/1841    Skin to skin with: Mother  Skin to skin end date/time:            Labor Events and Shoulder Dystocia    Fetal Tracing Prior to Delivery: Category 2  Fetal Tracing Comments: variable decelerations  Shoulder dystocia present?: Neg       Delivery (Maternal) (Provider to Complete) ([id number])    Episiotomy: None  Perineal lacerations: None      Vaginal laceration?: Yes    Est. blood loss (mL): 50  Repair suture: 3-0 Vicryl  Number of repair packets: 1  Genital tract inspection done: Pos       Blood Loss  Mother: Clotilde Murphy #[id number]     Start of Mother's Information      Delivery Blood Loss   Intrapartum & Postpartum: 25 2336 - 25    Delivery Admission: 25 0712 - 25         Intrapartum & Postpartum Delivery Admission    EBL (mL) Hospital Encounter 50 mL 50 mL    Total  50 mL 50 mL               End of Mother's Information  Mother: Clotilde Murphy #[id number]                Delivery - Provider to Complete ([id number])    Delivering clinician: Lubna Perez APRN CNM  Delivery Type (Choose the 1 that will go to the Birth History): Vaginal, Spontaneous                         Other personnel:  Provider Role   Adrianna Sanon RN    LeonardaMarcy young RN                     Placenta    Date/Time: 3/19/2025  1:10 AM  Removal: Expressed  Comments: Nuchal hand also present  Disposition: Hospital disposal             Anesthesia    Method: Epidural  Cervical dilation at placement: 4-7                    Presentation and Position    Presentation: Vertex    Position: Left Occiput Anterior                     ALOK Morris CNM

## 2025-03-19 NOTE — PLAN OF CARE
Day RN ([phone])    VSS and afebrile.  Pt experiencing cramping during breastfeeding and nipple discomfort - pain managed adequately with prn PO ibuprofen and tylenol.  Tucks, dermoplast and ice for perineum.  1st degree laceration healing wdl.  Up independently - steady.  Voiding adequately.  Tolerating Regular diet well.  Fundus is firm, midline and at the U.  Lochia wdl - no clots.  Breast feeding baby, bonding well - states feedings are going well and declined RN assistance this shift.  Spouse at bedside - supportive of patient.  Plan is home with infant and spouse when ready medically for discharge.  Will continue to monitor.             Africa Valdez, RN    Care Plan Problem    Problem: Adult Inpatient Plan of Care  Goal: Optimal Comfort and Wellbeing  Outcome: Progressing  Intervention: Provide Person-Centered Care  Recent Flowsheet Documentation  Taken 3/19/2025 1159 by Africa Valdez, RN  Trust Relationship/Rapport:   care explained   choices provided   emotional support provided   empathic listening provided   questions answered   questions encouraged   reassurance provided   thoughts/feelings acknowledged     Problem: Breastfeeding  Goal: Effective Breastfeeding  Outcome: Progressing

## 2025-03-19 NOTE — PLAN OF CARE
Patient up to the bathroom to take a bath now after epidural has worn off. Pt's bleeding WNL, vitals WNL, fundus firm u/u. Has  infant with minimal assistance and independently, states it feels okay as he is feeding. Pain controlled with Ibuprofen and ice pack to perineum.  MELINDA RAIN RN on 3/19/2025 at 5:17 AM

## 2025-03-19 NOTE — ANESTHESIA PROCEDURE NOTES
Epidural catheter Procedure Note    Pre-Procedure   Staff -        Anesthesiologist:  Danish Castillo MD       Performed By: anesthesiologist       Location: OB       Procedure Start/Stop Times: 3/18/2025 10:30 PM and 3/18/2025 10:41 PM       Pre-Anesthestic Checklist: patient identified, IV checked, risks and benefits discussed, informed consent, monitors and equipment checked, pre-op evaluation, at physician/surgeon's request and post-op pain management  Timeout:       Correct Patient: Yes        Correct Procedure: Yes        Correct Site: Yes        Correct Position: Yes   Procedure Documentation  Procedure: epidural catheter         Patient Position: sitting       Patient Prep/Sterile Barriers: sterile gloves, mask       Skin prep: Chloraprep       Local skin infiltrated with 3 mL of 1% lidocaine.        Insertion Site: L3-4. (midline approach).       Technique: LORT air        FORTINO at 8 cm.       Needle type: Ghost.       Needle Gauge: 18.        Needle Length (Inches): 3.5        Catheter: 20 G.          Catheter threaded easily.         5 cm epidural space.         Threaded 13 cm at skin.         # of attempts: 2 and  # of redirects:  0    Assessment/Narrative         Paresthesias: No.       Test dose of 3 mL lidocaine 1.5% w/ 1:200,000 epinephrine at.         Test dose negative, 3 minutes after injection, for signs of intravascular, subdural, or intrathecal injection.       Insertion/Infusion Method: LORT air       No aspiration negative for Heme or CSF via Epidural Catheter.    Medication(s) Administered   Medication Administration Time: 3/18/2025 10:30 PM     Comments:  Crisp loss of resistance at 8 cm. Catheter threaded easily 5 cm into the epidural space. Negative aspiration for heme and CSF. Negative test dose. No signs of LAST. No pain or paresthesias upon placement. No complications.    Prior to leaving room, patient reported much improved pain control.        FOR [facility] ([facility]) ONLY:    "Pain Team Contact information: please page the Pain Team Via [facility]. Search \"Pain\". During daytime hours, please page the attending first. At night please page the resident first.      "

## 2025-03-19 NOTE — PROGRESS NOTES
"CNM PROGRESS NOTE    SUBJECTIVE:  Pt reports feeling cramping. She is up an moving around and coping well. Plans epidural for pain management and is open to starting pitocin.    OBJECTIVE:  /71 (BP Location: Left arm, Patient Position: Semi-Saleh's, Cuff Size: Adult Regular)   Pulse 95   Temp 97.9  F (36.6  C) (Oral)   Resp 16   Ht 1.676 m (5' 6\")   Wt 93 kg (205 lb)   LMP 2024 (Approximate)   BMI 33.09 kg/m      Fetal heart tones: Baseline 130   Variability: moderate  Accelerations: present  Decelerations: absent    Contractions: Pt is romeo in an irregular pattern    Cervix: 3/ 50%/ -2, Vtx  ROM: not ruptured    Pitocin- none  Antibiotics- none  Cervical ripening: cook catheter - removed prior to this SVE    ASSESSMENT:  IUP @ 39w5d here for elective IOL   GBS- negative  FHT Cat 1     PLAN:   Cooks cath removed with ease  Anticipate   Labor induction with Pitocin; risks and benefits reviewed  Reevaluate in 2-4 hours/PRN    ALOK Morris CNM       10:47 PM Add: Pt is opting for epidural related to discomfort.       "

## 2025-03-19 NOTE — PROGRESS NOTES
"CNM PROGRESS NOTE    SUBJECTIVE:  Feeling more comfortable with epidural and coping well. States that she is interested in having her water broke.     OBJECTIVE:  /62   Pulse 95   Temp 98.2  F (36.8  C) (Oral)   Resp 22   Ht 1.676 m (5' 6\")   Wt 93 kg (205 lb)   LMP 2024 (Approximate)   SpO2 95%   BMI 33.09 kg/m      Fetal heart tones: Baseline 130   Variability: moderate  Accelerations: absent  Decelerations: early decelerations    Contractions: Pt is romeo every 2-4 minutes, lasting 70-90 seconds and palpates strong    Cervix: 5/ 70% / -2, Vtx  ROM: AROM of clear fluid     Pitocin- 6 mu/min.  Antibiotics- none  Cervical ripening: s/p cook catheter    ASSESSMENT:  IUP @ 39w6d Elective IOL and early labor   GBS- negative  AROM to clear fluid  Cat 1     PLAN:   AROM, risks and benefits reviewed with patient  Continue present managment  Anticipate   Reevaluate in 2 hours/PRN    ALOK Morris CNM    "

## 2025-03-19 NOTE — ANESTHESIA PREPROCEDURE EVALUATION
"Anesthesia Pre-Procedure Evaluation    Patient: Pau Murphy   MRN: [id number] : 2000        Procedure :           Past Medical History:   Diagnosis Date    Bipolar affective disorder (H)     One episode in high school, \"drug induced psychosis\"    Constipation     Depressive disorder     Uncomplicated asthma       Past Surgical History:   Procedure Laterality Date    EXTRACTION(S) DENTAL      ORTHOPEDIC SURGERY      had pins put in arm age 2      Allergies   Allergen Reactions    Reglan [Metoclopramide] Other (See Comments)     EPS/psychosis       Social History     Tobacco Use    Smoking status: Never     Passive exposure: Never    Smokeless tobacco: Never   Substance Use Topics    Alcohol use: Not Currently     Alcohol/week: 0.0 standard drinks of alcohol      Wt Readings from Last 1 Encounters:   25 93 kg (205 lb)        Anesthesia Evaluation   Pt has had prior anesthetic. Type: OB Labor Epidural.    No history of anesthetic complications       ROS/MED HX  ENT/Pulmonary:     (+)                      asthma                  Neurologic:  - neg neurologic ROS     Cardiovascular:  - neg cardiovascular ROS     METS/Exercise Tolerance:     Hematologic:  - neg hematologic  ROS     Musculoskeletal:       GI/Hepatic:  - neg GI/hepatic ROS     Renal/Genitourinary:       Endo:     (+)               Obesity,       Psychiatric/Substance Use:     (+) psychiatric history        Infectious Disease:       Malignancy:       Other:            Physical Exam    Airway        Mallampati: II   TM distance: > 3 FB   Neck ROM: full   Mouth opening: > 3 cm    Respiratory Devices and Support         Dental  no notable dental history         Cardiovascular   cardiovascular exam normal          Pulmonary   pulmonary exam normal                OUTSIDE LABS:  CBC:   Lab Results   Component Value Date    WBC 11.6 (H) 2025    WBC 11.6 (H) 2025    HGB 12.9 2025    HGB 12.1 2025    HCT 36.2 2025    " "HCT 36.7 01/21/2025     03/18/2025     01/21/2025     BMP:   Lab Results   Component Value Date     01/21/2025     09/11/2024    POTASSIUM 4.1 01/21/2025    POTASSIUM 4.0 09/11/2024    CHLORIDE 105 01/21/2025    CHLORIDE 103 09/11/2024    CO2 19 (L) 01/21/2025    CO2 22 09/11/2024    BUN 6.2 01/21/2025    BUN 9.5 09/11/2024    CR 0.54 01/21/2025    CR 0.55 09/11/2024    GLC 79 01/21/2025    GLC 92 09/11/2024     COAGS: No results found for: \"PTT\", \"INR\", \"FIBR\"  POC:   Lab Results   Component Value Date    HCG Negative 09/21/2021     HEPATIC:   Lab Results   Component Value Date    ALBUMIN 3.5 01/21/2025    PROTTOTAL 6.1 (L) 01/21/2025    ALT 12 01/21/2025    AST 13 01/21/2025    ALKPHOS 106 01/21/2025    BILITOTAL 0.3 01/21/2025    FROYLAN 24 02/05/2016     OTHER:   Lab Results   Component Value Date    ISHMAEL 9.3 01/21/2025    LIPASE 334 (H) 03/30/2016    AMYLASE 94 03/30/2016    TSH 0.52 09/11/2024    SED 5 01/26/2016       Anesthesia Plan    ASA Status:  2       Anesthesia Type: Epidural.              Consents    Anesthesia Plan(s) and associated risks, benefits, and realistic alternatives discussed. Questions answered and patient/representative(s) expressed understanding.     - Discussed:     - Discussed with:  Patient            Postoperative Care            Comments:    Other Comments: Patient requests labor epidural.  Chart reviewed, including labs.  I reviewed the options and risks with the patient, including but not limited to: bleeding, infection, damage to tissues under the skin (nerves, muscles, blood vessels), hypotension, headache, and epidural failure.  The patient's questions were answered and the consent was signed. The patient agrees to elective epidural placement.           neg OB ROS.      Danish Castillo MD    I have reviewed the pertinent notes and labs in the chart from the past 30 days and (re)examined the patient.  Any updates or changes from those notes are " reflected in this note.    Clinically Significant Risk Factors Present on Admission                                 # Asthma: noted on problem list

## 2025-03-19 NOTE — ANESTHESIA POSTPROCEDURE EVALUATION
Patient: Pau Murphy    Procedure: * No procedures listed *       Anesthesia Type:  Epidural    Note:  Disposition: Inpatient   Postop Pain Control: Uneventful            Sign Out: Well controlled pain   PONV: No   Neuro/Psych: Uneventful            Sign Out: Acceptable/Baseline neuro status   Airway/Respiratory: Uneventful            Sign Out: Acceptable/Baseline resp. status   CV/Hemodynamics: Uneventful            Sign Out: Acceptable CV status; No obvious hypovolemia; No obvious fluid overload   Other NRE: NONE   DID A NON-ROUTINE EVENT OCCUR? No    Event details/Postop Comments:  Good analgesia with labor epidural.  No problems.  No follow up necessary.         Last vitals:  Vitals:    03/19/25 0311 03/19/25 0326 03/19/25 0800   BP: 120/66 99/56 117/77   Pulse:   80   Resp: 18 18    Temp: 36.4  C (97.6  F)  36.4  C (97.5  F)   SpO2:          Electronically Signed By: Yessica Nunez MD  March 19, 2025  8:33 AM

## 2025-03-20 VITALS
WEIGHT: 198 LBS | BODY MASS INDEX: 31.82 KG/M2 | TEMPERATURE: 98.1 F | OXYGEN SATURATION: 98 % | HEART RATE: 80 BPM | SYSTOLIC BLOOD PRESSURE: 126 MMHG | RESPIRATION RATE: 18 BRPM | HEIGHT: 66 IN | DIASTOLIC BLOOD PRESSURE: 75 MMHG

## 2025-03-20 PROCEDURE — 250N000013 HC RX MED GY IP 250 OP 250 PS 637: Performed by: ADVANCED PRACTICE MIDWIFE

## 2025-03-20 RX ORDER — IBUPROFEN 200 MG
800 TABLET ORAL EVERY 6 HOURS PRN
COMMUNITY
Start: 2025-03-20 | End: 2025-04-03

## 2025-03-20 RX ORDER — ACETAMINOPHEN 325 MG/1
650 TABLET ORAL EVERY 4 HOURS PRN
COMMUNITY
Start: 2025-03-20 | End: 2025-04-03

## 2025-03-20 RX ADMIN — ACETAMINOPHEN 650 MG: 325 TABLET ORAL at 11:42

## 2025-03-20 RX ADMIN — ACETAMINOPHEN 650 MG: 325 TABLET ORAL at 01:59

## 2025-03-20 RX ADMIN — ACETAMINOPHEN 650 MG: 325 TABLET ORAL at 06:47

## 2025-03-20 RX ADMIN — IBUPROFEN 800 MG: 800 TABLET, FILM COATED ORAL at 06:47

## 2025-03-20 RX ADMIN — IBUPROFEN 800 MG: 800 TABLET, FILM COATED ORAL at 13:22

## 2025-03-20 ASSESSMENT — ACTIVITIES OF DAILY LIVING (ADL)
ADLS_ACUITY_SCORE: 22

## 2025-03-20 NOTE — PROGRESS NOTES
"Outreach Note for [facility]    Chart reviewed, discharge plan discussed with patient, needs assessed. Patient verbalizes understanding of plan, requests a home care visit as ordered, [facility] nurse visit planned for Saturday 3/22/25, Home Care Intake updated. Patient and , \"Brandon\", phone number is reported as correct in EMR.    Patient states she has good support at home, has baby care essentials, and feels ready to discharge today. Outreach RN will continue to follow and assist if needed with discharge plan. No additional needs identified at this time.      "

## 2025-03-20 NOTE — DISCHARGE INSTRUCTIONS
New Mother Care    Postpartum Appointment:  You should have your postpartum appointment at two and six weeks after delivery.  If you had a  birth, you should have an appointment at two weeks with the physician who performed your surgery, and six weeks with the midwives. Call [phone] to schedule this appointment.     Lactation Appointment with CNM:   If you would like to make a clinic appointment for breastfeeding support with one of the Certified Nurse-Midwives who is also an International Board Certified Lactation Consultant, please call [phone].     Postpartum Changes:  You have experienced many physical and emotional changes during your pregnancy.  After delivery, you will notice other changes.  Here are some tips for common experiences after your baby is born.      Sign/Symptom Cause Suggestions   Mood Swings Hormonal changes, stress, fatigue Rest.  Ask for help.  Contact your health care provider if sadness continues more than two weeks, or caring for baby becomes overwhelming.   Engorged Breasts Swelling with more fluid and breast milk production two to five days after delivery.  May occur whether or not you are breastfeeding. Heat or ice for comfort.  If breastfeeding, nurse frequently.  Use supportive bra without underwire.  Should improve in one to two days.   After pains/ Cramping Cramping of uterus, often with nursing which stimulates the uterus to tighten.  Stronger with subsequent babies (second or more). Use non-aspirin pain reliever (ibuprofen or acetaminophen), especially before breastfeeding.  Empty your bladder frequently (at least every 2 hours).   Increased vaginal bleeding Too much physical activity; breastfeeding (due to uterine contractions). Rest more.  Avoid use of tampons.  Redness and amount of vaginal discharge (bleeding) decreases by three to four weeks after delivery.  Call clinic if you fill more than one pad within two hours.   Perineal discomfort and hemorrhoids  "Swelling from delivery; episiotomy or laceration (tearing); stitches. Ice, non-asprin pain reliever, witch hazel pads, warm tub soaks, stool softener, high fiber diet, kegel exercises. Stitches are absorbed.  Healing in two to three weeks. Do NOT use \"doughnut\" pillow for sitting.   Increased sweating and urinating Body losing extra fluid from pregnancy; hormonal shifts. Empy bladder more often, especially before breastfeeding; increase water intake; improves within three to four days.   Constipation Change in abdominal pressure and swelling after delivery; hormonal changes. Increase fluids and fiber in diet; Metamucil or stool softner as needed.   Skin coloring  Hair Thickness  Swelling Skin darkening and pregnancy rash due to hormonal changes; extra hair growth during pregnancy; increased fluids from pregnancy and birth causes swelling. Darker skin coloring and stretch marks with fade after delivery (no treatment needed).  Extra hair will be lost in two to four months.  Pregnancy swelling resolves in one to four weeks. Continue to hydrate.   Sciatica pain Nerve irritation due to extra weight and pressure during pregnancy. May continue after delivery; heat, acetaminophen, ibuprofen, position changes for comfort.     Postpartum Exercises  These exercises may be started soon after delivery.  If you feel tired or uncomfortable, stop and try these exercises after resting.  Check for any separation in your stomach wall before doing exercises that involve twising or stress on your stomach muscles.  Place your fingers in the center of your upper abdomen and lift your head and shoulders up in a partial sit-up.  If you feel a separation in your muscle wall, it is too soon to do sit ups.  Try the following instead:  Tighten and relax your pelvic floor muscles often.  Breathe deeply while laying on your back.  As you breathe out, lift just your head up and pull the sides of your stomach toward the middle with your hands.  " Then breathe in as you put your head down.  This will help to close the separation of your stomach.  Tilt your pelvis back and forth.  Alternate this with full body stretches.  Move your feet back and forth, then in circular motions.  While lying on your back, slide your heels toward your hips and bend your knees one at a time, then together.  While lying flat on the floor, bend your knees and raise your legs one at a time.  When the stomach wall separation has closed, you can progress to straight curl-ups, diagonal curl-ups, and side leg lifts.    After a  Birth  In addition to the instruction after a vaginal delivery, follow these guidelines:  Check your incision each day for signs of infection: redness, drainage, warmth or discomfort.  Contact your midwife or OB who performed your surgery if you have any of these signs or heavy vaginal bleeding.  Check with your midwife or surgeon before taking tub baths.  You may start driving a car two weeks after delivery.  Gradually increase your physical activity and exercise level according to how comfortable or tired you feel.  During the first days after delivery, try deep breathing, bending and stretching your feet in up-and-down circular motions, extending and tightening your legs while crossed at the ankles, and doing isometric exercises while lying down.  Next, try pelvic lifts with bent knees, bending and straightening your knees separately and together.  After checking for the abdominal rectus (stomach wall) separation, advance to back arching, twisting to each side, and reaching for your knees with pillow support.  Straight curl-ups, diagonal curl-ups and side leg lifts can then be added.    Reminders  Healthy nutrition is still important for your recovery and breastfeeding.  Continue your prenatal vitamins if you are breastfeeding.  It is important for your health and your baby's health to stay smoke-free.  Babies exposed to smoke are sick more  often.  Family planning is important.  Discuss birth control options with your provider.    Warning Signs  Call the on-call midwife if you have:  Heavy bleeding (filling one pad within one to two hours).  Blood clots larger than the size of a golf ball, especially with heavy bleeding.  Vaginal discharge with foul odor or green color.  Fever (oral temperature over 100.3 F).  Signs of bladder infection (burning, frequent urination)  Signs of vaginal infection (vaginal itching, irritation)  Painful, hard lump in breast and/or red streaks with fever.  Vaginal pain or tissue coming out of vagina.  Abdominal pain or abdomen tender to the touch.  Signs of depression or anxiety, affecting sleep or activities of daily living.    If you have questions or concerns and need to speak with a midwife immediately, please call the on-call midwife at [phone].    If you have a non-emergent question or would like to schedule a follow up appointment, please call the clinic midwife at [phone].    Thank you for sharing your birth experience with the [facility].  Congratulations on the birth of your baby!  Warning Signs after Having a Baby    Keep this paper on your fridge or somewhere else where you can see it.    Call your provider if you have any of these symptoms up to 12 weeks after having your baby.    Thoughts of hurting yourself or your baby  Pain in your chest or trouble breathing  Severe headache not helped by pain medicine  Eyesight concerns (blurry vision, seeing spots or flashes of light, other changes to eyesight)  Fainting, shaking or other signs of a seizure    Call 9-1-1 if you feel that it is an emergency.     The symptoms below can happen to anyone after giving birth. They can be very serious. Call your provider if you have any of these warning signs.    My provider s phone number: _______________________    Losing too much blood (hemorrhage)    Call your provider if  you soak through a pad in less than an hour or pass blood clots bigger than a golf ball. These may be signs that you are bleeding too much.    Blood clots in the legs or lungs    After you give birth, your body naturally clots its blood to help prevent blood loss. Sometimes this increased clotting can happen in other areas of the body, like the legs or lungs. This can block your blood flow and be very dangerous.     Call your provider if you:  Have a red, swollen spot on the back of your leg that is warm or painful when you touch it.   Are coughing up blood.     Infection    Call your provider if you have any of these symptoms:  Fever of 100.4 F (38 C) or higher.  Pain or redness around your stitches if you had an incision.   Any yellow, white, or green fluid coming from places where you had stitches or surgery.    Mood Problems (postpartum depression)    Many people feel sad or have mood changes after having a baby. But for some people, these mood swings are worse.     Call your provider right away if you feel so anxious or nervous that you can't care for yourself or your baby.    Preeclampsia (high blood pressure)    Even if you didn't have high blood pressure when you were pregnant, you are at risk for the high blood pressure disease called preeclampsia. This risk can last up to 12 weeks after giving birth.     Call your provider if you have:   Pain on your right side under your rib cage  Sudden swelling in the hands and face    Remember: You know your body. If something doesn't feel right, get medical help.     For informational purposes only. Not to replace the advice of your health care provider. Copyright 2020 [facility]. All rights reserved. Clinically reviewed by Beatriz Aquino, RNC-OB, MSN. [employer] 498339 - Rev 02/23.          Lactation suggestions:    Nipples sized at 17mm on the right. 18 mm on the left.    Recommended purchasing 19mm insert or 20mm flange for Spectra  "pump      Postpartum Care at Home With Your Baby: Care Instructions  Overview     After childbirth (postpartum period), your body goes through many changes as you recover. In these weeks after delivery, try to take good care of yourself. Get rest whenever you can and accept help from others.  It may take 4 to 6 weeks to feel like yourself again, and possibly longer if you had a  birth. You may feel sore or very tired as you recover. After delivery, you may continue to have contractions as the uterus returns to the size it was before your pregnancy. You will also have some vaginal bleeding. And you may have pain around the vagina as you heal. Several days after delivery you may also have pain and swelling in your breasts as they fill with milk. There are things you can do at home to help ease these discomforts.  After childbirth, it's common to feel emotional. You may feel irritable, cry easily, and feel happy one minute and sad the next. This is called the \"baby blues.\" Hormone changes are one cause of these emotional changes. These feelings usually get better within a couple of weeks. If they don't, talk to your doctor or midwife.  In the first couple of weeks after you give birth, your doctor or midwife may want to check in with you and make a plan for follow-up care. You will likely have a complete postpartum visit in the first 3 months after delivery. At that time, your doctor or midwife will check on your recovery and see how you're doing. But if you have questions or concerns before then, you can always call your doctor or midwife.  Follow-up care is a key part of your treatment and safety. Be sure to make and go to all appointments, and call your doctor if you are having problems. It's also a good idea to know your test results and keep a list of the medicines you take.  How can you care for yourself at home?  Taking care of your body  Use pads instead of tampons for bleeding. After birth, you will " have bloody vaginal discharge. You may also pass some blood clots that shouldn't be bigger than an egg. Over the next 6 weeks or so, your bleeding should decrease a little every day and slowly change to a pinkish and then whitish discharge.  For cramps or mild pain, try an over-the-counter pain medicine, such as acetaminophen (Tylenol) or ibuprofen (Advil, Motrin). Read and follow all instructions on the label.  To ease pain around the vagina or from hemorrhoids:  Put ice or a cold pack on the area for 10 to 20 minutes at a time. Put a thin cloth between the ice and your skin.  Try sitting in a few inches of warm water (sitz bath) when you can or after bowel movements.  Clean yourself with a gentle squeeze of warm water from a bottle instead of wiping with toilet paper.  Use witch hazel or hemorrhoid pads (such as Tucks).  Try using a cold compress for sore and swollen breasts. And wear a supportive bra that fits.  Ease constipation by drinking plenty of fluids and eating high-fiber foods. Ask your doctor or midwife about over-the-counter stool softeners.  Activity  Rest when you can.  Ask for help from family or friends when you need it.  If you can, have another adult in your home for at least 2 or 3 days after birth.  When you feel ready, try to get some exercise every day. For many people, walking is a good choice. Don't do any heavy exercise until your doctor or midwife says it's okay.  Ask your doctor or midwife when it is okay to have vaginal sex.  If you don't want to get pregnant, talk to your doctor or midwife about birth control options. You can get pregnant even before your period returns. Also, you can get pregnant while you are breastfeeding.  Talk to your doctor or midwife if you want to get pregnant again. They can talk to you about when it is safe.  Emotional health  It's normal to have some sadness, anxiety, and mood swings after delivery. It may help to talk with a trusted friend or family member.  You can also call the Maternal Mental Health Hotline at 1-121-FRJ-MAMA (1-304.339.3268) for support. If these mood changes last more than a couple of weeks, talk to your doctor or midwife.  When should you call for help?  Share this information with your partner, family, or a friend. They can help you watch for warning signs.  Call 911  anytime you think you may need emergency care. For example, call if:    You feel you cannot stop from hurting yourself, your baby, or someone else.     You passed out (lost consciousness).     You have chest pain, are short of breath, or cough up blood.     You have a seizure.   Where to get help 24 hours a day, 7 days a week   If you or someone you know talks about suicide, self-harm, a mental health crisis, a substance use crisis, or any other kind of emotional distress, get help right away. You can:    Call the Suicide and Crisis Lifeline at 988.     Call 1-418-616-TALK (1-804.584.8365).     Text HOME to 581565 to access the Crisis Text Line.   Consider saving these numbers in your phone.  Go to [employer] for more information or to chat online.  Call your doctor or midwife now or seek immediate medical care if:    You have signs of hemorrhage (too much bleeding), such as:  Heavy vaginal bleeding. This means that you are soaking through one or more pads in an hour. Or you pass blood clots bigger than an egg.  Feeling dizzy or lightheaded, or you feel like you may faint.  Feeling so tired or weak that you cannot do your usual activities.  A fast or irregular heartbeat.  New or worse belly pain.     You have signs of infection, such as:  A fever.  Increased pain, swelling, warmth, or redness from an incision or wound.  Frequent or painful urination or blood in your urine.  Vaginal discharge that smells bad.  New or worse belly pain.     You have symptoms of a blood clot in your leg (called a deep vein thrombosis), such as:  Pain in the calf, back of the knee, thigh, or  "groin.  Swelling in the leg or groin.  A color change on the leg or groin. The skin may be reddish or purplish, depending on your usual skin color.     You have signs of preeclampsia, such as:  Sudden swelling of your face, hands, or feet.  New vision problems (such as dimness, blurring, or seeing spots).  A severe headache.     You have signs of heart failure, such as:  New or increased shortness of breath.  New or worse swelling in your legs, ankles, or feet.  Sudden weight gain, such as more than 2 to 3 pounds in a day or 5 pounds in a week.  Feeling so tired or weak that you cannot do your usual activities.     You had spinal or epidural pain relief and have:  New or worse back pain.  Increased pain, swelling, warmth, or redness at the injection site.  Tingling, weakness, or numbness in your legs or groin.   Watch closely for changes in your health, and be sure to contact your doctor or midwife if:    Your vaginal bleeding isn't decreasing.     You feel sad, anxious, or hopeless for more than a few days.     You are having problems with your breasts or breastfeeding.   Where can you learn more?  Go to https://www.[employer].net/patiented  Enter Z768 in the search box to learn more about \"Postpartum Care at Home With Your Baby: Care Instructions.\"  Current as of: April 30, 2024  Content Version: 14.4    3288-6401 [employer].   Care instructions adapted under license by your healthcare professional. If you have questions about a medical condition or this instruction, always ask your healthcare professional. [employer] disclaims any warranty or liability for your use of this information.    "

## 2025-03-20 NOTE — PLAN OF CARE
Goal Outcome Evaluation:                        Problem: Adult Inpatient Plan of Care  Goal: Optimal Comfort and Wellbeing  Outcome: Progressing     Problem: Postpartum (Vaginal Delivery)  Goal: Hemostasis  Outcome: Progressing     Problem: Breastfeeding  Goal: Effective Breastfeeding  Outcome: Progressing

## 2025-03-20 NOTE — LACTATION NOTE
This note was copied from a baby's chart.  Follow up to assess latch and transfer.  Baby is awake and eager to feed.  Her right breast is the side that has been more painful and is flat and pinched when baby comes off.  In cross cradle hold, demonstrated how to properly apply nipple shield.  Baby latched and sucked for 2-3 minutes without swallows, baby frustrated and kept pulling off.  Assisted with latching without nipple shield and swallows were heard within a few sucks.  Latch was pinchy at first until lips flanged and baby positioned in laid back to help maintain deep latch.  Encouraged mom to keep baby close so both cheeks touching the breast for increased comfort also.      Gave mom 24mm nipple shield and instructed to not use if not hearing swallows.      Hannah Valentine RNC, IBCLC

## 2025-03-20 NOTE — DISCHARGE SUMMARY
OB Discharge Summary      Date:  3/20/2025    Name:  Pau Murphy  :  2000  MRN:  [id number]      Admission Date:  3/18/2025  Delivery Date:  3/19/2025  Gestational Age at Delivery:  39w6d  Discharge Date:  3/20/2025    Principal Diagnosis:    Problem List Items Addressed This Visit          Other    Pyelectasis of fetus on prenatal ultrasound    * (Principal)  (normal spontaneous vaginal delivery) - Primary    Relevant Medications    acetaminophen (TYLENOL) 325 MG tablet    ibuprofen (ADVIL/MOTRIN) 200 MG tablet    Other Relevant Orders    Primary Care - Care Coordination Referral    Lactation Referral    Postpartum Home Care Referral     Other Visit Diagnoses       Care and examination of lactating mother        Relevant Orders    Breast pump - Manual/Electric    Lactation Referral          Other Diagnosis:      Conditions complicating Pregnancy:  Patient Active Problem List   Diagnosis    Hyperemesis affecting pregnancy, antepartum    At risk for postpartum depression    Cystic fibrosis carrier    Asthma    Supervision of other normal pregnancy, antepartum    Pyelectasis of fetus on prenatal ultrasound    Low maternal weight gain, unspecified trimester    Encounter for triage in pregnant patient     (normal spontaneous vaginal delivery)    Pregnant    Obstetric vaginal laceration without perineal laceration         Procedure(s) Performed:  , vaginal laceration repaired      Indication for Induction:  elective     Condition at Discharge:  stable    Discharge Medications:      Medication List      START taking these medications     acetaminophen 325 MG tablet; Commonly known as: TYLENOL; Take 2 tablets   (650 mg) by mouth every 4 hours as needed for fever or other (second line   or per patient preference for mild to moderate pain management).   ibuprofen 200 MG tablet; Commonly known as: ADVIL/MOTRIN; Take 4 tablets   (800 mg) by mouth every 6 hours as needed for other (first line or per    patient preference for mild to moderate pain management).     CONTINUE taking these medications     famotidine 10 MG tablet; Commonly known as: PEPCID   prenatal multivitamin  plus iron 27-1 MG Tabs     STOP taking these medications     doxylamine 25 MG Tabs tablet; Commonly known as: UNISOM   ondansetron 4 MG ODT tab; Commonly known as: ZOFRAN ODT   pyridOXINE 25 MG tablet; Commonly known as: VITAMIN B6        Assessment:   Day 1 postpartum,  vaginal laceration repaired  Breastfeeding established; tongue tie per lactation  Stable postpartum course.  History mood disorder, no medication management    Plan:    -Reviewed postpartum teaching. Discussed alana care, breast care, pain management, breastfeeding initiation, bowel changes, return of fertility, postpartum exercises, postpartum mood changes and adjustments, postpartum blues vs. postpartum depression, sleep changes, required support. Written instructions provided.  -Discharge instructions reviewed. Encouraged to call on-call CNM with any warning signs: pelvic pain, excessive perineal pain, heavy bleeding, large clots, fever, chills, abdominal tenderness, breast pain or redness, or signs/symptoms of postpartum depression.  -Return to work reviewed, anticipatory guidance given on this transition.   -Return of fertility reviewed. Postpartum contraception plans include possibly Paragard, considering non-hormonal after use of Kyleena.   -Outpatient lactation resources reviewed including [employer], [employer], community groups. Lactation referral provided as needed.  -Offered postpartum home visit by RN. Accepted and ordered if insurance allows.   - Has breast pump at home, declines Rx.  -Discharge to home today. Advised follow-up at 2 and 6 weeks postpartum in clinic or sooner as needed. Patient instructed to call the CNM scheduling number for appointment at [phone].    Subjective:  Pau Murphy feels ready for discharge. She is  "up and active in the room independently, is voiding and ambulating without difficulty without dizziness or calf pain. Tolerating normal diet and passing flatus. has not had BM. Voiding without issue. Bleeding is light without clots. Pain is well controlled with current medications of ibuprofen. Baby ranjan Ozuna IV (\"Emmanuel\") is breastfeeding on cue. Breastfeeding with nipple shield and the assistance of the nursing staff. Postpartum contraception plans include IUD. Support at home identified mom and sister after spouse goes back to work. Pau Murphy will have 8 weeks off from work; partner will have 1 weeks off from work. She reports  has had  depression and anxiety; currently stable.     Objective:  /75 (BP Location: Left arm, Patient Position: Sitting, Cuff Size: Adult Regular)   Pulse 80   Temp 98.1  F (36.7  C) (Oral)   Resp 18   Ht 1.676 m (5' 6\")   Wt 93 kg (205 lb)   LMP 06/13/2024 (Approximate)   SpO2 98%   Breastfeeding Unknown   BMI 33.09 kg/m    Patient Vitals for the past 24 hrs:   BP Temp Temp src Pulse Resp SpO2   03/20/25 0926 126/75 98.1  F (36.7  C) Oral 80 18 98 %   03/20/25 0200 119/71 97.9  F (36.6  C) Oral 83 17 --   03/19/25 2037 111/85 97.6  F (36.4  C) Oral 74 16 --   03/19/25 1618 132/68 98  F (36.7  C) Oral 82 18 --       General Appearance:    Alert, NAD   Breast Exam:   Breasts soft, filling, nipples intact without bruising, creases or cracks.   Abdomen:   Soft, non-tender; fundus firm @ U - 1, midline,    non-tender   Perineum:   Tissues well- approximated intact perineum, no edema. Lochia mild, rubra, non-malodorous, without clots.    Legs:     no edema, no calf tenderness, no varicosities noted.     Hemoglobin   Date Value Ref Range Status   03/18/2025 12.9 11.7 - 15.7 g/dL Final   03/18/2021 13.7 11.7 - 15.7 g/dL Final   ]     Immunization History   Administered Date(s) Administered    COVID-19 MONOVALENT 12+ (Pfizer) 12/12/2021, 01/02/2022    DTAP (<7y) 1999, " 1999, 1999, 05/19/2000, 03/29/2004    HIB, Unspecified 1999, 1999, 1999, 05/19/2000    HPV Quadrivalent 09/18/2014, 02/02/2015, 10/05/2015    Hepatitis A (Vaqta/Havrix)(Peds 12m-18y) 09/24/2009, 03/29/2011    Hepatitis B, Peds (Engerix-B/Recombivax HB) 1999, 1999, 1999    Influenza Vaccine >6 months,quad, PF 10/19/2022    MMR (MMRII) 05/19/2000, 03/29/2004    Meningococcal ACWY (Menactra ) 03/29/2011    Meningococcal ACWY (Menveo ) 10/05/2015    Poliovirus, inactivated (IPV) 1999, 1999, 05/19/2000, 03/29/2004    Rotavirus, Unspecified Formulation 1999, 1999    TDAP (Adacel,Boostrix) 03/18/2021, 08/11/2022    TDAP Vaccine (Adacel) 03/29/2011    TRIHIBIT (DTAP/HIB, <7y) 05/19/2000    Varicella (Varivax) 02/23/2000, 03/29/2011              40 minutes on the date of the encounter doing chart review, patient visit, documentation, and physical exam      Provider:  Tiff Alegre, DNP, APRN, CNM  [facility]  Midwifery     Date:  3/20/2025  Time:  1:05 PM

## 2025-03-20 NOTE — LACTATION NOTE
This note was copied from a baby's chart.  RN releasing lactation consult.  Mother is a , overall has been independently breastfeeding and declining staff help - however nipples have become increasingly sore.  She requests you come assess latch and help with education if needed.  Thank you

## 2025-03-20 NOTE — PLAN OF CARE
Day RN ([phone])    Pt and infant discharged home from hospital at around 1345.  Reviewed discharge instructions with patient and spouse. Questions answered. Patient discharged to home with ride from spouse, plan for picking up home discharge meds (tylenol and ibuprofen), discharge instructions, and belongings.  Carseat provided by family.  Family eager and adequate for discharge.    VSS and afebrile.  Pt experiencing cramping during breastfeeding and nipple discomfort - pain managed adequately with prn PO tylenol and ibuprofen.  1st degree laceration healing wdl.  Up independently - steady.  Voiding adequately.  Tolerating regular diet well.  Fundus is firm, midline and -1.  Lochia wdl - no clots.  Breast feeding baby, bonding well.  Spouse at bedside - supportive of patient.  Plan is home with infant and spouse on discharge.  Will continue to monitor.           Africa Valdez RN

## 2025-03-20 NOTE — LACTATION NOTE
"This note was copied from a baby's chart.  Rounded with family for lactation support per lactation consult request.    Baby Name/Birth Order/Breastfeeding History: Emmanuel, 2nd baby,  1 yr old daughter for 9 months.     Maternal Risk Factors:   none    Baby Risk Factors: tight lingual frenulum to tip, tongue heart shaped, tight upper labial frenulum also.    Nipple issues: everted and tender    Latch achieved: deep latch, audible swallows, and visual swallows    Best position:  football and cross cradle    Nipple shield: nipple shield at bed side. Clotilde wanted instruction on how to use if needed. Educated on application and use of a 20 mm nipple shield to facilitate a latch.       Pumping: no    Has Home Pump? yes and Brand? Spectra    To enhance comfortable and effective breast pumping, I measured Flynns nipples to be 18 mm on the left and 17 mm on the right.  Recommendation of 1-3 mm larger for the breast flange.  Recommended 19mm insert or 20mm flange for Spectra pump. Provided Spectra cheat sheet for beginner pumping.    Ed folder reviewed: yes      Educated/reviewed hand expression using a C Hold and \"press, compress and release\".  Mom had success  with deep breast compression. Mom able to independently express drops of colostrum prior to latch.     Mom experiencing pain with feedings. More on the right than the left. Left nipple flat and pinched when baby comes off. Mom using cradle hold for feeding.    Recessed chin noted and upper lip frenulum tight and thick. Tight lingual frenulum at the tip of the tongue noted when baby cried. Tongue is heart shaped when baby extends tongue. Baby is able to extend tongue just past lower gum line. Suck assessment on my gloved finger, baby loses suction with every suck when checking for tongue placement. (Restricted elevation)     Educated/reviewed importance of achieving an asymmetrical pain free latch with breastfeeding parent's nipple pointed toward baby's nose. "  Advised that cross cradle and football hold is best for latching with tight frenulum for achieving best latch possible. Assisted the dyad to achieve a latch in football hold on left breast. Latch was initially painful but increased comfort within 30 seconds. Upper lip was flanged, lower lick was tucked and unable to manually flange due to recessed chin. Mom felt latch was comfortable and multiple audible swallows were present or visible. Baby fed for 10 minutes and came off on his own and was content. Diaper changed, attempted to latch on right side with nipple shield. Baby was too sleepy to latch. Clotilde will call next time baby feeds to assess latch on right side.      Educated/reviewed milk production of supply and demand.  Encouraged mom to breastfeed on demand with a goal of 8 feedings per day to help milk production. Reviewed expectation of transitional milk arriving by 3-5 days of life and mature milk by 2 weeks of life.  Educated on importance of frequent breastfeeding or milk expression to establish a healthy milk supply.    Educated/reviewed signs of milk transfer with gentle tug at the breast, audible swallows and wet and soiled diapers per the education folder I & O.     Reviewed use of education folder for self learning, lactation and community support, indicators to call MD and maternal/family well being. Given tongue tie and cranial sacral resources. Encouraged to seek outpatient lactation to follow up on frenulum/ feeding assessments.     Provided education and a resource/teaching sheet with QR codes for video support/education for:  Hand expressing breastmilk  Achieving a Deep Asymmetrical Latch  Breastfeeding Positions  How to Choose a breast pump flange size   Side Lying paced bottle feeding (if supplementation is needed)  Application of a Nipple Shield  Treating Engorgement  Post Partum Support in the community    Will follow up as needed.   Dione Kaufman, RN, IBCLC

## 2025-03-20 NOTE — PLAN OF CARE
Problem: Breastfeeding  Goal: Effective Breastfeeding  Outcome: Progressing     Problem: Adult Inpatient Plan of Care  Goal: Optimal Comfort and Wellbeing  Outcome: Progressing  Intervention: Monitor Pain and Promote Comfort  Recent Flowsheet Documentation  Taken 3/20/2025 0200 by Dione Lau, RN  Pain Management Interventions:   medication (see MAR)   rest  Intervention: Provide Person-Centered Care  Recent Flowsheet Documentation  Taken 3/20/2025 0200 by Dione Lau, RN  Trust Relationship/Rapport:   care explained   choices provided   emotional support provided   empathic listening provided   questions answered   questions encouraged   reassurance provided   thoughts/feelings acknowledged   Goal Outcome Evaluation:

## 2025-03-22 ENCOUNTER — MEDICAL CORRESPONDENCE (OUTPATIENT)
Dept: HEALTH INFORMATION MANAGEMENT | Facility: CLINIC | Age: 25
End: 2025-03-22
Payer: COMMERCIAL

## 2025-04-02 ASSESSMENT — EDINBURGH POSTNATAL DEPRESSION SCALE (EPDS)
I HAVE BEEN ABLE TO LAUGH AND SEE THE FUNNY SIDE OF THINGS: AS MUCH AS I ALWAYS COULD
I HAVE BLAMED MYSELF UNNECESSARILY WHEN THINGS WENT WRONG: YES, SOME OF THE TIME
I HAVE FELT SAD OR MISERABLE: NO, NOT AT ALL
I HAVE BEEN SO UNHAPPY THAT I HAVE BEEN CRYING: ONLY OCCASIONALLY
I HAVE BEEN SO UNHAPPY THAT I HAVE HAD DIFFICULTY SLEEPING: NOT AT ALL
THINGS HAVE BEEN GETTING ON TOP OF ME: YES, MOST OF THE TIME I HAVEN'T BEEN ABLE TO COPE AT ALL
THE THOUGHT OF HARMING MYSELF HAS OCCURRED TO ME: NEVER
I HAVE BEEN ANXIOUS OR WORRIED FOR NO GOOD REASON: YES, SOMETIMES
TOTAL SCORE: 11
I HAVE LOOKED FORWARD WITH ENJOYMENT TO THINGS: RATHER LESS THAN I USED TO
I HAVE FELT SCARED OR PANICKY FOR NO GOOD REASON: YES, SOMETIMES

## 2025-04-02 NOTE — PROGRESS NOTES
"Assessment:    Fifteen day old infant gaining weight very well on breastfeeding:  well over birthweight today  Tendency to slightly shallow latch, managed with gentle chin pressure.  Some cheek dimpling but this does not seem to affect feeding.  Good suck, with excellent milk transfer to baby's needs during observed feeding in office today  Healing well after scissors frenotomy  Mother with ample milk supply    Plan:    Use good positioning for deep latch, with baby held close to body and baby's head/shoulders/hips in good alignment.  When in a seated position, use a pillow to help bring baby close to breasts, and stepstool to elevate your knees above hips.    When bringing your baby to your breast, compress your breast vertically and in line with baby's mouth--this will help them to get a larger mouthful of breast and a deeper latch. Babies latch best to the breast by bringing their chin in first, so point your nipple towards baby's nose, tuck the chin in close, and then wait for his mouth to open.  When his mouth opens, bring his head in deeply.  Baby's chin should be snugged deeply in your breast, their upper cheeks should be touching the breast, and their nose just out of the breast.   If there is pinching or pain, try using a finger to give a little gentle pressure on his chin to help his open more widely and take in more of your breast.  If it is still painful, use a finger to break the suction, remove baby from the breast and try again until there is no pain with nursing.  There is sometimes a little pain when the baby first begins sucking, but after the first few seconds there should be no pain--only a tugging feeling.   Continue to nurse baby on cue, 8-12 times each day.   When you nurse, feed on one side until baby finishes swallowing.  Once swallowing slows, use breast compression to encourage more intake, but once baby is sleeping, coming off the breast, or just \"nibbling,\" you can take baby off the " "breast and move to the other side.  Offer both breasts at each feeding, but if Emmanuel appears comfortable and does not take the second side, just begin with that breast at the next feeding.  If you find you become very full and uncomfortable between feedings, pump or hand-express just a small amount;  just enough to relieve your fullness and allow you to remain comfortable until the next feeding (like an ounce).  Do not pump to completely \"empty\" your breast, because this will encourage an increase in milk supply, which will just worsen the sensations of fullness.      Once babies are about a month old, they need about 25-30 oz/day (or 3- 4 oz each feeding if they eat about 8 times/day), so when planning for your time away, about 30 oz each day (or about 150 oz total) would be reasonable.  If you use a silicon passive pump like the Haakaa for fullness occasionally, keep this milk for Emmanuel, and you may not need as much donor milk while you are gone.  This 25 - 30 oz a day is where baby's needs stay for their entire first year;  you don't need to keep producing more and more milk as they grow.    While you are away,  be certain to bring your pump and pump as often as Emmanuel would normally be eating.  This is important to do to maintain your comfort and your milk supply.  Follow up with lactation as needed, and pediatric provider as planned.  [employer]Me can be used for brief questions, but it's important to know that messages are not seen Friday through Sunday. If urgent help is needed, Monday through Friday you can call [phone] and one of our lactation consultants will get the message and respond; if you need a rapid response over a weekend or holiday, it is best to call your on-call maternity or pediatric provider.  Please feel free to schedule a return visit if the concern is more detailed;  telephone visits are also an option if you don't feel you need to be seen in person.         Subjective: Clotilde is here today " "with the following concerns:  Painful latch:  had baby's tongue tie clipped about a week ago by ENT due to very painful nipples, and noticed immediate but not full improvement.  She does continue to have somepain on initial latch, which is improving later in feeding.  Not seeing any skin breakdown  Noticing some redness on baby's chin after feeding and feels that his chin may be moving up and down against her breast  Baby seems gassy and has frequent stools; wondering about possibility of food sensitivities such as dairy  Having milk for time away:  Clotilde and her  will be going away for 5 days on a work trip in about 3 weeks, and she wants to provide expressed and/or donor milk for Emmanuel while she is away.  Has pumped just a few times, yielding 6 - 7 oz.  Using silicone passive pump to relieve fullness about once daily between feedings and yielding 1-2 oz    She is vaccinated for Covid-19, with most recent dose 1/2/22    Hospital Course:  Elective induction with uncomplicated birth.  Seen by hospital IBCLC due to nipple pain;  noted tight lingual and labial frena with loss of suction when sucking.  Baby nursing well;  instructed on use of nipple shield.    Mother's Relevant Med/Surg History: Depression; drug induced psychosis/bipolar episode as a teen;  history of ESBL colonization;  hyperemesis this pregnancy    Breastfeeding Goals: continued exclusive breastfeeding    Previous Breastfeeding Experience:  first child for 9 months with no difficulty    Infant's name: Brandon   \"Emmanuel\"  Infant's bday: [birth year]  Gestational age: 39w6d  Infant's birth weight: 7 # 13 oz   Discharge weight: 7 # 7.5 oz     Pediatric Provider: [facility] Dr. Glenn Oliveira.  Clotilde gives her permission for today's note to be forwarded to Dr. Elizabeth.  CHANDLER signed and filed in Clotilde's chart as Emmanuel has no local active pediatric chart.   Recent weights:  7 # 10 oz at one week        Peq Lactation Visit Questionnaire    " 4/2/2025  1:29 PM CDT - Filed by Patient   What is your main concern today? Tongue Tie / Latching   Your baby's first name: Brandon   Your baby's last name: Katherine   Type of Birth    Your doctor/midwife: Lubna MILLER   Baby's doctor or nurse practitioner: [facility]   Baby's birthday: 3/19/2026   Birth weight: 7lb 13oz   Baby's weight just before leaving the hospital: 7lbs 8oz   Baby's most recent weight: Not sure   Date:    How often does your baby eat? Every 2 hours on average   How long does each feeding last?  10-20 min   How much of the time does your baby take both breasts when nursing? 75%   Can you hear the baby swallowing during nursing? Yes   How many times does your baby feed in 24 hours? 10   How many times does your baby urinate (pee) in 24 hours? 8   How many stools (poops) does your baby have in 24 hours? 10   Describe the color and consistency of the poop: Orange / Brownish and soft sometimes runny   Do you give your baby extra milk in addition to or instead of breastfeeding? No   How much extra do you usually give?    How do you give extra milk?    Are you pumping your breasts? Yes   How often? When I am leaking ans he is still asleep I will do Haaka for 5 min or else a pumping sesh then give a bottle   How much is pumped? Anywhere from 3-6oz   When you were pregnant did your breasts grow larger? Yes   Did your areola (the dark area around your nipple) grow larger or darker? Yes   Did you notice your breasts fill when your baby was 3-5 days old? Yes   Have you had any breast surgeries? No   Please select any of the following medical conditions you have been previously diagnosed with or are currently being treated for:    What else would you like the lactation consultant to know? Baby seems to be pooping a lot almost everytime I feed him. I am wondering if it could be an allergy?        Objective/Physical exam:   Her nipples are everted, the areola is compressible, the breast is soft and full.     Sore  "nipples: tender   EPDS: 11, with \"never\" marked for question on thoughts of self-harm.  Verbalizes some stress with helping older daughter adjust to new baby, but is feeling better about this now.  Denies any difficulty with eating or sleeping, and feels she is coping well overall.    Assessment of infant: 43.38% Weight for age percentile   Age today: 15 days  Today's weight: 8 # 6.4 oz   Amount of milk transferred from LEFT side: 1.2 oz   Amount of milk transferred from RIGHT side: 3.2 oz    Baby has full flexion of arms and legs, normal tone, behavior is alert and active, respirations are normal, skin is normal, hydration is normal, jaw is normal size and alignment, palate is normal, frenulum is recently released and  normal, baby can lateralize tongue, has adequate tongue lift, and tongue can protrude past bottom gum line. Upper labial frenulum inserts somewhat low on gums.     Suck exam:  Baby has strong, coordinated suck with good tongue cupping    Baby thrush: none    Jaundice: none      Feeding assessment: Baby can hold suction with tongue while at the breast.     Alignment: The baby was flex relaxed. Baby's head was aligned with its trunk. Baby did face mother. Baby was in cross cradle/cradle position today.   Areolar Grasp: Baby was able to open mouth widely. Baby's lips were not pursed, but he did have some dimpling of cheeks with suck. Baby's lips did flange outward. Tongue was visible over bottom gum. Baby had complete seal.  No skin irritation noted after feeding.    Areolar Compression: Baby made rhythmic motion. There were no clicking or smacking sounds. There was no severe nipple discomfort. Nipples appeared rounded but slightly blanched after feeding, although there was no discomfort.  Audible swallowing: Baby made quiet sounds of swallowing: There was an increase in frequency after milk ejection reflex. The milk ejection reflex is normal and milk supply is ample.    /70 (BP Location: Left " arm, Patient Position: Sitting, Cuff Size: Adult Regular)   Pulse 80   LMP 2024 (Approximate)   Breastfeeding Yes   OB History    Para Term  AB Living   2 2 2 0 0 2   SAB IAB Ectopic Multiple Live Births   0 0 0 0 2      # Outcome Date GA Lbr Sincere/2nd Weight Sex Type Anes PTL Lv   2 Term 25 39w6d 01:19 / 00:08 3.544 kg (7 lb 13 oz) M Vag-Spont EPI N MAXX      Name: Brandon Murphy      Apgar1: 7  Apgar5: 9   1 Term 22 41w0d 06:35 / 00:46 3.29 kg (7 lb 4.1 oz) F Vag-Spont IV, EPI N MAXX      Name: Randa      Apgar1: 9  Apgar5: 9       Current Outpatient Medications:     Prenatal Vit-Fe Fumarate-FA (PRENATAL MULTIVITAMIN  PLUS IRON) 27-1 MG TABS, Take by mouth daily., Disp: , Rfl:     UNABLE TO FIND, MEDICATION NAME: Sunflower Lecithin, Disp: , Rfl:     acetaminophen (TYLENOL) 325 MG tablet, Take 2 tablets (650 mg) by mouth every 4 hours as needed for fever or other (second line or per patient preference for mild to moderate pain management). (Patient not taking: Reported on 4/3/2025), Disp: , Rfl:     famotidine (PEPCID) 10 MG tablet, Take 10 mg by mouth daily. (Patient not taking: Reported on 4/3/2025), Disp: , Rfl:     ibuprofen (ADVIL/MOTRIN) 200 MG tablet, Take 4 tablets (800 mg) by mouth every 6 hours as needed for other (first line or per patient preference for mild to moderate pain management). (Patient not taking: Reported on 4/3/2025), Disp: , Rfl:   No current facility-administered medications for this visit.    Facility-Administered Medications Ordered in Other Visits:     acetaminophen (TYLENOL) tablet 650 mg, 650 mg, Oral, Q4H PRN, Marcello Mann MD    calcium carbonate (TUMS) chewable tablet 500-1,000 mg, 500-1,000 mg, Oral, Q2H PRN, Marcello Mann MD    phenol-menthol (CEPASTAT) lozenge 1 lozenge, 1 lozenge, Buccal, Q1H PRN, Johnathan, Marcello Austin MD  Past Medical History:   Diagnosis Date    Bipolar affective disorder (H)  "    One episode in high school, \"drug induced psychosis\"    Constipation     Depressive disorder     Uncomplicated asthma      Past Surgical History:   Procedure Laterality Date    EXTRACTION(S) DENTAL      ORTHOPEDIC SURGERY      had pins put in arm age 2     Family History   Problem Relation Age of Onset    Bipolar Disorder Mother     Hyperlipidemia Father     Impaired Fasting Glucose Father     Depression Sister     Cancer Maternal Grandfather     Diabetes Paternal Grandmother     No Known Problems Daughter        Time spent on day of service:    Face-to-face visit:   49 min   Documentation:  12 min   Total time spent: 61 min    ALOK Aleman, CNM, IBCLC    "

## 2025-04-03 ENCOUNTER — OFFICE VISIT (OUTPATIENT)
Dept: MIDWIFE SERVICES | Facility: CLINIC | Age: 25
End: 2025-04-03
Payer: COMMERCIAL

## 2025-04-03 VITALS — SYSTOLIC BLOOD PRESSURE: 102 MMHG | HEART RATE: 80 BPM | DIASTOLIC BLOOD PRESSURE: 70 MMHG

## 2025-04-03 DIAGNOSIS — O92.29 POSTPARTUM NIPPLE PAIN: Primary | ICD-10-CM

## 2025-04-03 NOTE — PATIENT INSTRUCTIONS
"    Use good positioning for deep latch, with baby held close to body and baby's head/shoulders/hips in good alignment.  When in a seated position, use a pillow to help bring baby close to breasts, and stepstool to elevate your knees above hips.    When bringing your baby to your breast, compress your breast vertically and in line with baby's mouth--this will help them to get a larger mouthful of breast and a deeper latch. Babies latch best to the breast by bringing their chin in first, so point your nipple towards baby's nose, tuck the chin in close, and then wait for his mouth to open.  When his mouth opens, bring his head in deeply.  Baby's chin should be snugged deeply in your breast, their upper cheeks should be touching the breast, and their nose just out of the breast.   If there is pinching or pain, try using a finger to give a little gentle pressure on his chin to help his open more widely and take in more of your breast.  If it is still painful, use a finger to break the suction, remove baby from the breast and try again until there is no pain with nursing.  There is sometimes a little pain when the baby first begins sucking, but after the first few seconds there should be no pain--only a tugging feeling.   Continue to nurse baby on cue, 8-12 times each day.   When you nurse, feed on one side until baby finishes swallowing.  Once swallowing slows, use breast compression to encourage more intake, but once baby is sleeping, coming off the breast, or just \"nibbling,\" you can take baby off the breast and move to the other side.  Offer both breasts at each feeding, but if Emmanuel appears comfortable and does not take the second side, just begin with that breast at the next feeding.  If you find you become very full and uncomfortable between feedings, pump or hand-express just a small amount;  just enough to relieve your fullness and allow you to remain comfortable until the next feeding (like an ounce).  Do not " "pump to completely \"empty\" your breast, because this will encourage an increase in milk supply, which will just worsen the sensations of fullness.      Once babies are about a month old, they need about 25-30 oz/day (or 3- 4 oz each feeding if they eat about 8 times/day), so when planning for your time away, about 30 oz each day (or about 150 oz total) would be reasonable.  If you use a silicon passive pump like the Haakaa for fullness occasionally, keep this milk for Emmanuel, and you may not need as much donor milk while you are gone.  This 25 - 30 oz a day is where baby's needs stay for their entire first year;  you don't need to keep producing more and more milk as they grow.    While you are away,  be certain to bring your pump and pump as often as Emmanuel would normally be eating.  This is important to do to maintain your comfort and your milk supply.  Follow up with lactation as needed, and pediatric provider as planned.  [employer] can be used for brief questions, but it's important to know that messages are not seen Friday through Sunday. If urgent help is needed, Monday through Friday you can call [phone] and one of our lactation consultants will get the message and respond; if you need a rapid response over a weekend or holiday, it is best to call your on-call maternity or pediatric provider.  Please feel free to schedule a return visit if the concern is more detailed;  telephone visits are also an option if you don't feel you need to be seen in person.   ________________      If you find that you are having a lot of uncomfortable leaking, you can just use a hand to put some pressure on your areolar area and this will stop the flow.  If you would like to collect the milk that is released with letdown without encouraging more supply, consider a type of  that does not use suction to stay in place.  The Haakaa Laurence, Evie Catch, and Milkies Milk-Saver can all be used in this way.  Alternatively, if " "you would like to use a  to gather enough milk for a bottle, you could use a type that stays in place with suction and draws out more milk.  The best way to use these is to nurse your baby on one side, and then when you move baby to the second side, place the  on the first side.  In this way baby always gets \"first choice.\"  Just collect the amount of milk that you will use.     ________________       Gas is very normal and common in young babies, and does not mean that you are doing something wrong with feeding.  Give him the opportunity to burp partway through the feeding, especially if using bottles, but there is nothing further you need to do.  They just need to become accustomed to the feelings of gas moving in their bodies!  Hiccups are similar--they are part of the 's immature system and not due to diet or feeding practices.  Few babies are sensitive to foods we encounter on a daily basis, so you do not need to limit your diet.  Food sensitivities are not terribly common, and babies will often show other signs of a problem, such as eczema, spitting up very large volumes of milk/weight loss, and/or blood in the poop.      Good article on gassiness in babies:    When your baby is very gassy  https://[employer]/parenting/parenting-faq/gassybaby/      ________________    Screening potential donors of breastmilk    Donor mothers should be:   In good health.  Only on medications or herbal preparations that are compatible with breastfeeding. It is recommended that the [facility] LactMed website and   Medications and Mother s Milk   by Dr. Kang Landeros be used for decisions on whether medications are compatible with breastfeeding  Review the donor mother s prenatal and (if performed) regular  infectious screening tests. The donor mother should be negative for: HIV, Hepatitis B virus and HTLV-1 (in high prevalence areas)  Social practices. A woman is not a suitable breast milk donor if she  " uses illegal drugs or marijuana,smokes or uses tobacco products, including nicotine gum, patch, e-cigarettes, consumes >1.5 ounces (44 mL) of hard liquor/spirits, 12 ounces (355 mL) of beer, 5 ounces (148 mL) of wine, or 10 ounces (296 mL) of wine coolers (beverage of wine and fruit juice with lower alcohol content than wine) daily, and is at risk for HIV or had a sexual partner within past 12 months who is at risk for HIV.    Guidelines for Home Pasteurization of Donated Breast Milk Using the Flash Heating Method  Donated milk can be heat processed (pasteurized) to remove potentially harmful bacteria and viruses.   Put the milk you want to sterilize in a heat-resistant glass (not plastic) jar. The amount of milk should be between 50 and 150 mL. If you have more milk, you may divide it into two jars.  Place the jar of milk in a small pan of water. Make sure the water is about two fingers above the level of milk so that all the milk will be heated well.  Heat the water on the highest level of your stove until it reaches a rolling boil (when the water has large bubbles). Stay close by because this should only take a few minutes. Leaving the water to boil too long will damage some of the nutrients in the milk.  Immediately after the water comes to a boil, remove the jar of milk from the boiling water. Place the jar in a container of cool water, or let it stand alone to cool until it reaches room temperature.  5. Protect the milk as it cools and during storage by placing a clean lid or small plate on it.  6. You can safely feed your baby this heated milk at room temperature within 6 hours or refrigerate or refreeze the milk      From the Academy of Breastfeeding Medicine's 2017 Position Statement on Informal Breast Milk Sharing for the Term Healthy Infant  BREASTFEEDING MEDICINE  Volume 13, Number 1, 2018  Negin Padron.     _____________    Good breastfeeding  resources:    Websites:  www.[employer]  www."[email or web address]/blog/  www.li.org/breastfeeding-info/    Instagram:    [email or web address]  [email or web address]    Books:   The Womanly Art of Breastfeeding by [employer]ec[employer]  Breastfeeding Doesn't Need to Suck, by Ema Mak      For help with using baby carriers:  https://babywearingtwincities.org/

## 2025-04-15 ENCOUNTER — PRENATAL OFFICE VISIT (OUTPATIENT)
Dept: MIDWIFE SERVICES | Facility: CLINIC | Age: 25
End: 2025-04-15
Attending: MIDWIFE
Payer: COMMERCIAL

## 2025-04-15 VITALS
BODY MASS INDEX: 29.25 KG/M2 | SYSTOLIC BLOOD PRESSURE: 92 MMHG | DIASTOLIC BLOOD PRESSURE: 70 MMHG | HEART RATE: 76 BPM | HEIGHT: 66 IN | WEIGHT: 182 LBS

## 2025-04-15 DIAGNOSIS — O35.EXX0 PYELECTASIS OF FETUS ON PRENATAL ULTRASOUND: ICD-10-CM

## 2025-04-15 PROCEDURE — 3078F DIAST BP <80 MM HG: CPT | Performed by: MIDWIFE

## 2025-04-15 PROCEDURE — 99207 PR NO BILLABLE SERVICE THIS VISIT: CPT | Performed by: MIDWIFE

## 2025-04-15 PROCEDURE — 0503F POSTPARTUM CARE VISIT: CPT | Performed by: MIDWIFE

## 2025-04-15 PROCEDURE — 3074F SYST BP LT 130 MM HG: CPT | Performed by: MIDWIFE

## 2025-04-15 ASSESSMENT — ANXIETY QUESTIONNAIRES
GAD7 TOTAL SCORE: 11
1. FEELING NERVOUS, ANXIOUS, OR ON EDGE: MORE THAN HALF THE DAYS
2. NOT BEING ABLE TO STOP OR CONTROL WORRYING: SEVERAL DAYS
5. BEING SO RESTLESS THAT IT IS HARD TO SIT STILL: SEVERAL DAYS
GAD7 TOTAL SCORE: 11
IF YOU CHECKED OFF ANY PROBLEMS ON THIS QUESTIONNAIRE, HOW DIFFICULT HAVE THESE PROBLEMS MADE IT FOR YOU TO DO YOUR WORK, TAKE CARE OF THINGS AT HOME, OR GET ALONG WITH OTHER PEOPLE: SOMEWHAT DIFFICULT
GAD7 TOTAL SCORE: 11
7. FEELING AFRAID AS IF SOMETHING AWFUL MIGHT HAPPEN: MORE THAN HALF THE DAYS
4. TROUBLE RELAXING: SEVERAL DAYS
8. IF YOU CHECKED OFF ANY PROBLEMS, HOW DIFFICULT HAVE THESE MADE IT FOR YOU TO DO YOUR WORK, TAKE CARE OF THINGS AT HOME, OR GET ALONG WITH OTHER PEOPLE?: SOMEWHAT DIFFICULT
6. BECOMING EASILY ANNOYED OR IRRITABLE: NEARLY EVERY DAY
7. FEELING AFRAID AS IF SOMETHING AWFUL MIGHT HAPPEN: MORE THAN HALF THE DAYS
3. WORRYING TOO MUCH ABOUT DIFFERENT THINGS: SEVERAL DAYS

## 2025-04-15 ASSESSMENT — EDINBURGH POSTNATAL DEPRESSION SCALE (EPDS)
THINGS HAVE BEEN GETTING ON TOP OF ME: NO, MOST OF THE TIME I HAVE COPED QUITE WELL
THINGS HAVE BEEN GETTING ON TOP OF ME: NO, MOST OF THE TIME I HAVE COPED QUITE WELL
I HAVE BEEN ANXIOUS OR WORRIED FOR NO GOOD REASON: YES, SOMETIMES
I HAVE BEEN ANXIOUS OR WORRIED FOR NO GOOD REASON: YES, SOMETIMES
THE THOUGHT OF HARMING MYSELF HAS OCCURRED TO ME: NEVER
THE THOUGHT OF HARMING MYSELF HAS OCCURRED TO ME: NEVER
I HAVE BEEN SO UNHAPPY THAT I HAVE BEEN CRYING: NO, NEVER
I HAVE BEEN SO UNHAPPY THAT I HAVE BEEN CRYING: NO, NEVER
I HAVE FELT SCARED OR PANICKY FOR NO GOOD REASON: YES, SOMETIMES
I HAVE LOOKED FORWARD WITH ENJOYMENT TO THINGS: AS MUCH AS I EVER DID
I HAVE FELT SAD OR MISERABLE: NO, NOT AT ALL
I HAVE FELT SAD OR MISERABLE: NO, NOT AT ALL
I HAVE BLAMED MYSELF UNNECESSARILY WHEN THINGS WENT WRONG: NOT VERY OFTEN
I HAVE BEEN ABLE TO LAUGH AND SEE THE FUNNY SIDE OF THINGS: AS MUCH AS I ALWAYS COULD
I HAVE BEEN SO UNHAPPY THAT I HAVE HAD DIFFICULTY SLEEPING: NOT AT ALL
I HAVE BEEN SO UNHAPPY THAT I HAVE HAD DIFFICULTY SLEEPING: NOT AT ALL
I HAVE LOOKED FORWARD WITH ENJOYMENT TO THINGS: AS MUCH AS I EVER DID
TOTAL SCORE: 6
I HAVE BLAMED MYSELF UNNECESSARILY WHEN THINGS WENT WRONG: NOT VERY OFTEN
I HAVE BEEN ABLE TO LAUGH AND SEE THE FUNNY SIDE OF THINGS: AS MUCH AS I ALWAYS COULD
I HAVE FELT SCARED OR PANICKY FOR NO GOOD REASON: YES, SOMETIMES

## 2025-04-15 NOTE — PROGRESS NOTES
Answers submitted by the patient for this visit:  Patient Health Questionnaire (G7) (Submitted on 4/15/2025)  KRISTA 7 TOTAL SCORE: 11  P: RTC in 2 weeks for routine postpartum exam    A: 4 weeks postpartum, wants laceration check and remove trailing sutures    S/O: Feeling well, 4 weeks postpartum after a  on 3/19/25 sustained a vaginal laceration which appears to be healing well. Has help at home. Breast feeding going well. Has plans for a trip to [place] next week, wants to know if OK to go in the pool. Reviewed risks and benefits with healing laceration. Advised to avoid water park this week. Exam reveals: well healed laceration, suture material present and protruding from vagina. Excess suture trimmed with scissors. Scant amount of brownish lochia present in vaginal vault. Pt denies active bleeding.   20minutes on the date of the encounter doing chart review, patient visit, and documentation

## 2025-04-15 NOTE — PATIENT INSTRUCTIONS
"Hello!       Below are some tips for ideal feminine hygiene, to avoid perineal and vaginal infections/imbalances/skin irritations.    [place] Feminine Hygiene:    Bathing and Hygiene    #1- Bathing/Cleaning  Use only water for cleaning.  If you have a removable shower head, you can direct the water straight toward your perineal/private area to clean by rinsing well with warm water.  Never douche or use soaps on your perineal area.  These wash away your body's oils, which have natural antibiotic properties, keeping the \"bad\" bacteria away and promoting \"good\" bacteria.  For the rest of your body, use a gentle non scented soap such as Dove for Sensitive Skin, Neutrogena, Basis, Aveeno, or Pears.  If you have a partner, have them use one of these soaps as well.  Do not scrub the vulvar skin with a wash cloth.  If your vulvar area is itching, burning, or otherwise irritated, try a baking soda soak.  Use lukewarm (not hot) bath water with 4-5 tablespoons of baking soda to help soothe the skin.  Soak 1-3 times a day for 10 minutes.  If you use a sitz bath, decrease baking soda to 1-2 teaspoons.  After bathing, pat the vulvar/private area dry with a towel- do not rub the skin.  Do not use lotions, gels, ointments, creams, feminine hygiene sprays, or perfumes.  These can be irritating, especially if they contain perfumes.  Small amounts of coconut oil, zinc oxide ointment, or plan petroleum jelly may be applied to your vulva to protect the skin and decrease irritation during your period or menstrual bleeding.  Do not shave with a razor, wax, or use hair removal products on the vulvar area.  You may use scissors or electric clippers to trim the pubic hair close to the vulva.  Laser hair removal is a safe option.   .  #2- Toileting/Menstruation  If urine causes burning of the skin, pour/spray lukewarm water over the vulva/urethra while urinating.  Use white, unscented toilet paper.  Do not use toilet paper with aloe or other " lotions.  Pat dry rather than wiping.  Do not use adult or baby wipes.  These can be irritating, especially if they contain perfumes.   You can use Tucks pads if needed or desired.  Witch Hazel is helpful and healing for skin tissue, and can soothe irritation.  Do not use deodorized pads or tampons.  Only use tampons when the blood flow is heavy enough to soak one tampon in four hours or less.  Wearing them too long or when the blood flow is light may result in vaginal infection, increased discharge, unpleasant odor, or toxic shock syndrome.  Use only pads with a cotton liner, such as Stayfree, Ware, or 7th Generation.  Pads with nylon mesh weave traps moisture and keeps irritating blood and discharge against your skin longer.  Consider trying cotton (reusable) pads to see if they are less irritating.      #3- Daily care.   Keep your perineal area cool and dry as possible, as warm, wet environments favor undesirable yeast/bacteria/skin irritation.    Use cotton fabrics (underwear, pantyliners, pads) whenever possible.  Only wear 100% cotton underwear (or 100% cotton liner at a minimum) as this material provides better air circulation, allowing air in and moisture out.  Do not wear thongs.  Do not wear underwear to bed at night as this also allows for better air circulation (pajama bottoms or loose boxers are okay as they are loose and let air flow).  During the day, keep an extra pair of underwear with you, and change if you become damp.  Gold Cam or Zeasorb may be applied to the vulva and groin area 1-2 times per day to help absorb moisture.  Do not use powders that contain cornstarch or talcum powder.  Rinse your private area off with warm water and pat dry with a towel any time the area becomes warm and damp (such as after exercise, or on a hot, humid day).  Remove wet bathing and exercise clothing as soon as you can.  Avoid tight clothing, especially made of synthetic fabrics.  Do not wear pads on a daily  "basis if possible.  Avoid pantyhose. If you must wear them, cut the mona crotch out being sure to leave enough fabric to prevent the seam from running, or wear thigh high hose.  Dryness and irritation during intercourse can be helped by using a sterile lubricant.  Avoid water based lubricants as these tend to dry out before intercourse is over, causing small tears in the vagina and/or irritation of vulvar skin.  If needed, use coconut oil or name brand Slippery Stuff for lubrication, which will also help keep semen off the skin and decrease burning and irritation with intercourse.     #4- Birth Control  If using condoms, use ones that do not come lubricated and do not contain spermicides.  Lubricated condoms, contraceptive jellies, creams, or sponges may all cause itching and burning.  If needed, use coconut oil or name brand Slippery Stuff for lubricating dry condoms, which will also decrease burning and irritation with intercourse.  Avoid petroluem-based lubricants as these may affect the integrity of condoms and increase chance of pregnancy and sexually transmitted infections.  If desiring oral birth control pills, consider low-dose as these do not increase your chances of getting a yeast infection.    #5- Laundry  Use detergent free and clear of dyes and perfumes on all laundry that goes into your washer, every load, every time.  No substitutions.  Use 1/3-1/2 suggested amount of soap per load.  Do not use fabric softeners or sheets in the washer or dryer, even those advertised as \"free\".  If you use a shred washer/dryer such as laundromat, apartment, or dorm hand wash and line dry your underwear.  Use gonzalez balls to help soften clothes.  Avoid stain removing products, including bleach, especially on towels and underwear.  If you must use stain removers, soak and rinse in clear water anything which has had a stain removing product on it.  Then wash in regular washing cycle using detergent free and clear of " dyes and perfumes to remove as much of the product as possible.  White vinegar or lemon juice, 1/4-/13 cup per load of laundry, can be used to freshen clothes and remove oils.    #6- If you desire, you can try oral or vaginal probiotics.  We don't know for sure if this promotes healthy vaginal bianca (bacteria), but it may be beneficial, and is unlikely to be harmful.  I recommend Fem Dophilis one tablet per day.    Hope this helps!

## 2025-04-30 ENCOUNTER — HOSPITAL ENCOUNTER (EMERGENCY)
Facility: CLINIC | Age: 25
Discharge: HOME | End: 2025-04-30
Attending: EMERGENCY MEDICINE | Admitting: EMERGENCY MEDICINE
Payer: COMMERCIAL

## 2025-04-30 VITALS
HEART RATE: 90 BPM | WEIGHT: 188.27 LBS | SYSTOLIC BLOOD PRESSURE: 118 MMHG | TEMPERATURE: 98.5 F | OXYGEN SATURATION: 98 % | RESPIRATION RATE: 19 BRPM | DIASTOLIC BLOOD PRESSURE: 54 MMHG | BODY MASS INDEX: 30.26 KG/M2 | HEIGHT: 66 IN

## 2025-04-30 DIAGNOSIS — N61.0 MASTITIS: ICD-10-CM

## 2025-04-30 DIAGNOSIS — R79.89 ELEVATED LFTS: ICD-10-CM

## 2025-04-30 LAB
ALBUMIN SERPL BCG-MCNC: 4.2 G/DL (ref 3.5–5.2)
ALBUMIN UR-MCNC: NEGATIVE MG/DL
ALP SERPL-CCNC: 129 U/L (ref 40–150)
ALT SERPL W P-5'-P-CCNC: 289 U/L (ref 0–50)
ANION GAP SERPL CALCULATED.3IONS-SCNC: 14 MMOL/L (ref 7–15)
APPEARANCE UR: CLEAR
AST SERPL W P-5'-P-CCNC: 146 U/L (ref 0–45)
BASOPHILS # BLD AUTO: 0 10E3/UL (ref 0–0.2)
BASOPHILS NFR BLD AUTO: 0 %
BILIRUB SERPL-MCNC: 0.7 MG/DL
BILIRUB UR QL STRIP: NEGATIVE
BUN SERPL-MCNC: 13.3 MG/DL (ref 6–20)
CALCIUM SERPL-MCNC: 9.4 MG/DL (ref 8.8–10.4)
CHLORIDE SERPL-SCNC: 101 MMOL/L (ref 98–107)
CLUE CELLS: ABNORMAL
COLOR UR AUTO: NORMAL
CREAT SERPL-MCNC: 0.67 MG/DL (ref 0.51–0.95)
D DIMER PPP FEU-MCNC: <0.27 UG/ML FEU (ref 0–0.5)
EGFRCR SERPLBLD CKD-EPI 2021: >90 ML/MIN/1.73M2
EOSINOPHIL # BLD AUTO: 0 10E3/UL (ref 0–0.7)
EOSINOPHIL NFR BLD AUTO: 0 %
ERYTHROCYTE [DISTWIDTH] IN BLOOD BY AUTOMATED COUNT: 11.7 % (ref 10–15)
FLUAV RNA SPEC QL NAA+PROBE: NEGATIVE
FLUBV RNA RESP QL NAA+PROBE: NEGATIVE
GLUCOSE SERPL-MCNC: 106 MG/DL (ref 70–99)
GLUCOSE UR STRIP-MCNC: NEGATIVE MG/DL
HCO3 BLDV-SCNC: 21 MMOL/L (ref 21–28)
HCO3 SERPL-SCNC: 21 MMOL/L (ref 22–29)
HCT VFR BLD AUTO: 37.8 % (ref 35–47)
HGB BLD-MCNC: 13.5 G/DL (ref 11.7–15.7)
HGB UR QL STRIP: NEGATIVE
IMM GRANULOCYTES # BLD: 0.1 10E3/UL
IMM GRANULOCYTES NFR BLD: 1 %
KETONES UR STRIP-MCNC: NEGATIVE MG/DL
LACTATE BLD-SCNC: 1.4 MMOL/L (ref 0.7–2)
LEUKOCYTE ESTERASE UR QL STRIP: NEGATIVE
LYMPHOCYTES # BLD AUTO: 0.8 10E3/UL (ref 0.8–5.3)
LYMPHOCYTES NFR BLD AUTO: 6 %
MCH RBC QN AUTO: 30.1 PG (ref 26.5–33)
MCHC RBC AUTO-ENTMCNC: 35.7 G/DL (ref 31.5–36.5)
MCV RBC AUTO: 84 FL (ref 78–100)
MONOCYTES # BLD AUTO: 0.9 10E3/UL (ref 0–1.3)
MONOCYTES NFR BLD AUTO: 6 %
MONOCYTES NFR BLD AUTO: NEGATIVE %
NEUTROPHILS # BLD AUTO: 12.3 10E3/UL (ref 1.6–8.3)
NEUTROPHILS NFR BLD AUTO: 88 %
NITRATE UR QL: NEGATIVE
NRBC # BLD AUTO: 0 10E3/UL
NRBC BLD AUTO-RTO: 0 /100
PCO2 BLDV: 30 MM HG (ref 40–50)
PH BLDV: 7.45 [PH] (ref 7.32–7.43)
PH UR STRIP: 6.5 [PH] (ref 5–7)
PLATELET # BLD AUTO: 157 10E3/UL (ref 150–450)
PO2 BLDV: 53 MM HG (ref 25–47)
POTASSIUM SERPL-SCNC: 3.6 MMOL/L (ref 3.4–5.3)
PROT SERPL-MCNC: 6.9 G/DL (ref 6.4–8.3)
RBC # BLD AUTO: 4.49 10E6/UL (ref 3.8–5.2)
RBC URINE: 0 /HPF
RSV RNA SPEC NAA+PROBE: NEGATIVE
SAO2 % BLDV: 89 % (ref 70–75)
SARS-COV-2 RNA RESP QL NAA+PROBE: NEGATIVE
SODIUM SERPL-SCNC: 136 MMOL/L (ref 135–145)
SP GR UR STRIP: 1.01 (ref 1–1.03)
SQUAMOUS EPITHELIAL: <1 /HPF
TRICHOMONAS, WET PREP: ABNORMAL
UROBILINOGEN UR STRIP-MCNC: NORMAL MG/DL
WBC # BLD AUTO: 14.1 10E3/UL (ref 4–11)
WBC URINE: <1 /HPF
WBC'S/HIGH POWER FIELD, WET PREP: ABNORMAL
YEAST, WET PREP: ABNORMAL

## 2025-04-30 PROCEDURE — 96360 HYDRATION IV INFUSION INIT: CPT | Mod: 59

## 2025-04-30 PROCEDURE — 83605 ASSAY OF LACTIC ACID: CPT

## 2025-04-30 PROCEDURE — 87637 SARSCOV2&INF A&B&RSV AMP PRB: CPT | Performed by: EMERGENCY MEDICINE

## 2025-04-30 PROCEDURE — 85379 FIBRIN DEGRADATION QUANT: CPT | Performed by: EMERGENCY MEDICINE

## 2025-04-30 PROCEDURE — 258N000003 HC RX IP 258 OP 636: Performed by: EMERGENCY MEDICINE

## 2025-04-30 PROCEDURE — 99285 EMERGENCY DEPT VISIT HI MDM: CPT | Mod: 25

## 2025-04-30 PROCEDURE — 86308 HETEROPHILE ANTIBODY SCREEN: CPT | Performed by: EMERGENCY MEDICINE

## 2025-04-30 PROCEDURE — 36415 COLL VENOUS BLD VENIPUNCTURE: CPT | Performed by: EMERGENCY MEDICINE

## 2025-04-30 PROCEDURE — 87491 CHLMYD TRACH DNA AMP PROBE: CPT | Performed by: EMERGENCY MEDICINE

## 2025-04-30 PROCEDURE — 84155 ASSAY OF PROTEIN SERUM: CPT | Performed by: EMERGENCY MEDICINE

## 2025-04-30 PROCEDURE — 81003 URINALYSIS AUTO W/O SCOPE: CPT | Performed by: EMERGENCY MEDICINE

## 2025-04-30 PROCEDURE — 250N000011 HC RX IP 250 OP 636: Performed by: EMERGENCY MEDICINE

## 2025-04-30 PROCEDURE — 87040 BLOOD CULTURE FOR BACTERIA: CPT | Performed by: EMERGENCY MEDICINE

## 2025-04-30 PROCEDURE — 250N000013 HC RX MED GY IP 250 OP 250 PS 637: Performed by: EMERGENCY MEDICINE

## 2025-04-30 PROCEDURE — 87210 SMEAR WET MOUNT SALINE/INK: CPT | Performed by: EMERGENCY MEDICINE

## 2025-04-30 PROCEDURE — 96361 HYDRATE IV INFUSION ADD-ON: CPT

## 2025-04-30 PROCEDURE — 85004 AUTOMATED DIFF WBC COUNT: CPT | Performed by: EMERGENCY MEDICINE

## 2025-04-30 RX ORDER — ACETAMINOPHEN 500 MG
1000 TABLET ORAL ONCE
Status: COMPLETED | OUTPATIENT
Start: 2025-04-30 | End: 2025-04-30

## 2025-04-30 RX ORDER — CEPHALEXIN 500 MG/1
500 CAPSULE ORAL 4 TIMES DAILY
Qty: 28 CAPSULE | Refills: 0 | Status: SHIPPED | OUTPATIENT
Start: 2025-04-30 | End: 2025-05-07

## 2025-04-30 RX ORDER — IOPAMIDOL 755 MG/ML
500 INJECTION, SOLUTION INTRAVASCULAR ONCE
Status: COMPLETED | OUTPATIENT
Start: 2025-04-30 | End: 2025-04-30

## 2025-04-30 RX ORDER — CEPHALEXIN 500 MG/1
500 CAPSULE ORAL ONCE
Status: COMPLETED | OUTPATIENT
Start: 2025-04-30 | End: 2025-04-30

## 2025-04-30 RX ADMIN — SODIUM CHLORIDE 1779 ML: 0.9 INJECTION, SOLUTION INTRAVENOUS at 16:16

## 2025-04-30 RX ADMIN — IOPAMIDOL 94 ML: 755 INJECTION, SOLUTION INTRAVENOUS at 17:54

## 2025-04-30 RX ADMIN — CEPHALEXIN 500 MG: 500 CAPSULE ORAL at 19:01

## 2025-04-30 RX ADMIN — ACETAMINOPHEN 1000 MG: 500 TABLET ORAL at 17:06

## 2025-04-30 ASSESSMENT — COLUMBIA-SUICIDE SEVERITY RATING SCALE - C-SSRS
1. IN THE PAST MONTH, HAVE YOU WISHED YOU WERE DEAD OR WISHED YOU COULD GO TO SLEEP AND NOT WAKE UP?: NO
2. HAVE YOU ACTUALLY HAD ANY THOUGHTS OF KILLING YOURSELF IN THE PAST MONTH?: NO
6. HAVE YOU EVER DONE ANYTHING, STARTED TO DO ANYTHING, OR PREPARED TO DO ANYTHING TO END YOUR LIFE?: NO

## 2025-04-30 ASSESSMENT — ACTIVITIES OF DAILY LIVING (ADL)
ADLS_ACUITY_SCORE: 48

## 2025-04-30 NOTE — ED TRIAGE NOTES
Pt arrives c/o fever, abdominal pain, and 1 episode of hemopytsis. Pt arrived home from [place] 2 days ago. Pt was cleared after vaginal delivery 6 weeks ago to swim in pool and ocean while on vacation. Has had fever, vaginal discharge, and vomiting since arriving home. No other sick contacts on vacation. Tachycardic with soft pressures. A&Ox4. Last tylenol 9am, Advil at 2pm

## 2025-04-30 NOTE — ED PROVIDER NOTES
Emergency Department Note      History of Present Illness     Chief Complaint   Fever and Hemoptysis    HPI   Pau Murphy is a 25 year old female with history of a vaginal delivery on 03/19, who presents to the ED with her  for evaluation of a fever and hemoptysis. Clotilde was in the [facility] on 04/27, she had one episode of dark bloody vomiting. She returned home on 04/28, she felt fine until this morning when she woke up feeling lightheaded, had contraction like abdominal pain, a headache, body aches, lower back pain, elevated temperature, and a clogged milk duct. She took an ibuprofen which did not help her abdominal pain but did relieve her headache. Additionally, Clotilde had two episodes of increased non bloody vaginal discharge. Clotilde has not vomited since Sunday. She denies a cough, rhinorrhea, dysuria, hematuria, confusion, light sensitivity, breast pain/redness, rash, chest pain, or shortness of breath. No known sick contacts.    Independent Historian   None    Review of External Notes   I reviewed the OBGYN note form 04/15, she was seen for a routine post partum exam. She had a vaginal delivery on 03/19, the vaginal laceration appeared to be healing well.    Past Medical History     Medical History and Problem List   Asthma   ADD  Bipolar affective disorder   Cystic fibrosis carrier   Constipation  Depressive disorder  Uncomplicated asthma    Medications   No current outpatient medications.     Surgical History   Past Surgical History:   Procedure Laterality Date    EXTRACTION(S) DENTAL      ORTHOPEDIC SURGERY      had pins put in arm age 2     Physical Exam     Patient Vitals for the past 24 hrs:   BP Temp Temp src Pulse Resp SpO2 Height Weight   04/30/25 1906 -- -- -- 102 15 -- -- --   04/30/25 1851 -- -- -- 89 20 -- -- --   04/30/25 1836 -- -- -- -- 13 -- -- --   04/30/25 1721 -- -- -- 102 20 -- -- --   04/30/25 1715 101/69 -- -- 102 21 -- -- --   04/30/25 1712 -- -- -- 96 12 -- --  "--   04/30/25 1706 -- -- -- 94 15 98 % -- --   04/30/25 1657 98/60 -- -- 97 11 98 % -- --   04/30/25 1647 -- -- -- 90 19 96 % -- --   04/30/25 1644 98/61 -- -- 102 14 97 % -- --   04/30/25 1642 98/61 -- -- 96 17 97 % -- --   04/30/25 1632 -- -- -- 96 12 97 % -- --   04/30/25 1617 -- -- -- 103 27 97 % -- --   04/30/25 1532 -- 98.5  F (36.9  C) Oral -- -- 97 % -- --   04/30/25 1528 93/75 -- -- (!) 131 24 -- 1.676 m (5' 6\") 85.4 kg (188 lb 4.4 oz)     Physical Exam  GENERAL: Awake, alert  Neck: No meningismus or tenderness to palpation  Breast: Patient has erythema and warmth to the 11 o'clock position over the right breast, with associated tenderness to palpation  CARDIOVASCULAR: Regular rate and rhythm  LUNGS: Clear bilaterally, no wheezes rales or rhonchi  ABDOMEN: Soft, tender to bilateral lower quadrants, no rebound or guarding  : White amount of discharge in the vaginal vault, cervix appears normal, patient has no cervical motion tenderness but she does have bilateral adnexal tenderness  EXTREMITIES: No peripheral edema  NEURO: Awake and alert, moves all extremities    Diagnostics     Lab Results   Labs Ordered and Resulted from Time of ED Arrival to Time of ED Departure   COMPREHENSIVE METABOLIC PANEL - Abnormal       Result Value    Sodium 136      Potassium 3.6      Carbon Dioxide (CO2) 21 (*)     Anion Gap 14      Urea Nitrogen 13.3      Creatinine 0.67      GFR Estimate >90      Calcium 9.4      Chloride 101      Glucose 106 (*)     Alkaline Phosphatase 129       (*)      (*)     Protein Total 6.9      Albumin 4.2      Bilirubin Total 0.7     CBC WITH PLATELETS AND DIFFERENTIAL - Abnormal    WBC Count 14.1 (*)     RBC Count 4.49      Hemoglobin 13.5      Hematocrit 37.8      MCV 84      MCH 30.1      MCHC 35.7      RDW 11.7      Platelet Count 157      % Neutrophils 88      % Lymphocytes 6      % Monocytes 6      % Eosinophils 0      % Basophils 0      % Immature Granulocytes 1      NRBCs per " 100 WBC 0      Absolute Neutrophils 12.3 (*)     Absolute Lymphocytes 0.8      Absolute Monocytes 0.9      Absolute Eosinophils 0.0      Absolute Basophils 0.0      Absolute Immature Granulocytes 0.1      Absolute NRBCs 0.0     ISTAT GASES LACTATE VENOUS POCT - Abnormal    Lactic Acid POCT 1.4      Bicarbonate Venous POCT 21      O2 Sat, Venous POCT 89 (*)     pCO2 Venous POCT 30 (*)     pH Venous POCT 7.45 (*)     pO2 Venous POCT 53 (*)    WET PREPARATION - Abnormal    Trichomonas Absent      Yeast Absent      Clue Cells Absent      WBCs/high power field 1+ (*)    ROUTINE UA WITH MICROSCOPIC REFLEX TO CULTURE - Normal    Color Urine Straw      Appearance Urine Clear      Glucose Urine Negative      Bilirubin Urine Negative      Ketones Urine Negative      Specific Gravity Urine 1.008      Blood Urine Negative      pH Urine 6.5      Protein Albumin Urine Negative      Urobilinogen Urine Normal      Nitrite Urine Negative      Leukocyte Esterase Urine Negative      RBC Urine 0      WBC Urine <1      Squamous Epithelials Urine <1     D DIMER QUANTITATIVE - Normal    D-Dimer Quantitative <0.27     INFLUENZA A/B, RSV AND SARS-COV2 PCR - Normal    Influenza A PCR Negative      Influenza B PCR Negative      RSV PCR Negative      SARS CoV2 PCR Negative     MONONUCLEOSIS SCREEN   BLOOD CULTURE   BLOOD CULTURE   URINE CULTURE   CHLAMYDIA TRACHOMATIS/NEISSERIA GONORRHOEAE BY PCR     Imaging   CT Abdomen Pelvis w Contrast   Final Result   IMPRESSION:       1.  Although partially decompressed, mild urinary bladder wall thickening; correlate with labs for acute cystitis.      2.  No other acute findings to explain symptoms.      3.  Minimal free pelvic fluid, within physiologic limits. No free air. No abscess.         US Pelvis Cmplt w Transvag & Doppler LmtPel Duplex Limited   Final Result   IMPRESSION:     1.  Normal pelvic ultrasound.                 Independent Interpretation   None    ED Course      Medications Administered    Medications   sodium chloride (PF) 0.9% PF flush 3 mL (has no administration in time range)   sodium chloride (PF) 0.9% PF flush 3 mL (3 mLs Intracatheter $Given 4/30/25 1618)   sodium chloride 0.9% BOLUS 1,779 mL (0 mLs Intravenous Stopped 4/30/25 1811)   acetaminophen (TYLENOL) tablet 1,000 mg (1,000 mg Oral $Given 4/30/25 1706)   sodium chloride (PF) 0.9% PF flush 100 mL (64 mLs Intravenous $Given 4/30/25 1754)   iopamidol (ISOVUE-370) solution 500 mL (94 mLs Intravenous $Given 4/30/25 1754)   cephALEXin (KEFLEX) capsule 500 mg (500 mg Oral $Given 4/30/25 1901)     Procedures   Procedures     Discussion of Management   OB/GYN, Dr. Roberto.    ED Course   ED Course as of 04/30/25 1918 Wed Apr 30, 2025 1626 I obtained history and examined the patient as noted above.    1912 I spoke to OBGYN Dr. Roberto, I detailed the patient's post-partum updates, the patient's present symptoms, lab results, my findings from the pelvic exam and scans.   1918 I updated and discharged the patient.      Additional Documentation  None    Medical Decision Making / Diagnosis     CMS Diagnoses: None    MIPS       None    MDM   Pau Murphy is a 25 year old female who presents for evaluation of multiple complaints including fever, abdominal pain, vaginal discharge, vomiting in the setting of recent travel to the [place].  She was afebrile here but initially recorded as tachycardic, this resolved with IV fluids.  She was afebrile here but recently took antipyretics.  A broad workup was obtained given that she is approximately 6 weeks postpartum.  Blood culture was obtained which is currently pending.  Patient had mild leukocytosis to 14,000.  Her AST was 146 and ALT was also elevated at 289, normal bilirubins.  Patient does state that she was drinking 8-10 drinks per day over her vacation.  She has no evidence of UTI.  Monotest and viral respiratory panel was negative.  Wet prep was negative.  She had adnexal tenderness  but no cervical motion tenderness.  Initial report was hemoptysis but patient had 1 episode of hematemesis several days ago although unclear if this was actual hematemesis given that she was consuming red drinks.  Her hemoglobin was normal here.  Low suspicion for endometritis at this point but given that patient is postpartum, did discuss with OB on-call, and very low suspicion as well for endometritis.  Pelvic ultrasound was also negative.  Patient is feeling a bit better after IV fluids, she does have evidence of mastitis, will be treated with Keflex for this.  She is taking an unknown supplement to help her with her postpartum mood and certainly if she needs her LFTs rechecked in the next several days to make sure that these are not worsening.  Her hepatobiliary system appeared normal on CT, patient had no right upper abdominal tenderness, do not believe that an ultrasound is warranted.  On reexamination, patient still has mild tenderness to bilateral lower abdomen but I believe she is stable for close monitoring of her symptoms at home, will return if worsening, will have her LFTs rechecked in several days and advised to refrain from alcohol    Disposition   The patient was discharged.     Diagnosis     ICD-10-CM    1. Mastitis  N61.0       2. Elevated LFTs  R79.89          Discharge Medications   New Prescriptions    No medications on file     I, Precious Middleton, am serving as a scribe at 4:12 PM on 4/30/2025 to document services personally performed by Liana Enriquez MD based on my observations and the provider's statements to me.        Liana Enriquez MD  04/30/25 [id number]

## 2025-05-01 ENCOUNTER — PRENATAL OFFICE VISIT (OUTPATIENT)
Dept: MIDWIFE SERVICES | Facility: CLINIC | Age: 25
End: 2025-05-01
Payer: COMMERCIAL

## 2025-05-01 VITALS
SYSTOLIC BLOOD PRESSURE: 94 MMHG | TEMPERATURE: 98.6 F | WEIGHT: 185.8 LBS | BODY MASS INDEX: 29.86 KG/M2 | HEART RATE: 96 BPM | DIASTOLIC BLOOD PRESSURE: 66 MMHG | HEIGHT: 66 IN

## 2025-05-01 LAB
BACTERIA BLD CULT: NORMAL
BACTERIA BLD CULT: NORMAL
C TRACH DNA SPEC QL PROBE+SIG AMP: NEGATIVE
N GONORRHOEA DNA SPEC QL NAA+PROBE: NEGATIVE
SPECIMEN TYPE: NORMAL

## 2025-05-01 ASSESSMENT — EDINBURGH POSTNATAL DEPRESSION SCALE (EPDS)
THE THOUGHT OF HARMING MYSELF HAS OCCURRED TO ME: NEVER
I HAVE FELT SAD OR MISERABLE: NO, NOT AT ALL
TOTAL SCORE: 8
I HAVE LOOKED FORWARD WITH ENJOYMENT TO THINGS: AS MUCH AS I EVER DID
I HAVE BEEN ABLE TO LAUGH AND SEE THE FUNNY SIDE OF THINGS: AS MUCH AS I ALWAYS COULD
I HAVE BEEN SO UNHAPPY THAT I HAVE HAD DIFFICULTY SLEEPING: NOT AT ALL
THINGS HAVE BEEN GETTING ON TOP OF ME: YES, SOMETIMES I HAVEN'T BEEN COPING AS WELL AS USUAL
I HAVE BLAMED MYSELF UNNECESSARILY WHEN THINGS WENT WRONG: YES, SOME OF THE TIME
I HAVE BEEN SO UNHAPPY THAT I HAVE BEEN CRYING: NO, NEVER
I HAVE BEEN ANXIOUS OR WORRIED FOR NO GOOD REASON: YES, SOMETIMES
I HAVE FELT SCARED OR PANICKY FOR NO GOOD REASON: YES, SOMETIMES

## 2025-05-01 ASSESSMENT — ANXIETY QUESTIONNAIRES
4. TROUBLE RELAXING: NOT AT ALL
8. IF YOU CHECKED OFF ANY PROBLEMS, HOW DIFFICULT HAVE THESE MADE IT FOR YOU TO DO YOUR WORK, TAKE CARE OF THINGS AT HOME, OR GET ALONG WITH OTHER PEOPLE?: NOT DIFFICULT AT ALL
7. FEELING AFRAID AS IF SOMETHING AWFUL MIGHT HAPPEN: SEVERAL DAYS
GAD7 TOTAL SCORE: 4
7. FEELING AFRAID AS IF SOMETHING AWFUL MIGHT HAPPEN: SEVERAL DAYS
3. WORRYING TOO MUCH ABOUT DIFFERENT THINGS: NOT AT ALL
1. FEELING NERVOUS, ANXIOUS, OR ON EDGE: SEVERAL DAYS
GAD7 TOTAL SCORE: 4
2. NOT BEING ABLE TO STOP OR CONTROL WORRYING: SEVERAL DAYS
IF YOU CHECKED OFF ANY PROBLEMS ON THIS QUESTIONNAIRE, HOW DIFFICULT HAVE THESE PROBLEMS MADE IT FOR YOU TO DO YOUR WORK, TAKE CARE OF THINGS AT HOME, OR GET ALONG WITH OTHER PEOPLE: NOT DIFFICULT AT ALL
5. BEING SO RESTLESS THAT IT IS HARD TO SIT STILL: NOT AT ALL
6. BECOMING EASILY ANNOYED OR IRRITABLE: SEVERAL DAYS
GAD7 TOTAL SCORE: 4

## 2025-05-01 NOTE — PROGRESS NOTES
Midwife Postpartum 6 Week Visit    Pau Murphy is a 25 year old here for a postpartum checkup.     Delivery date was 3/19/25. She had a  of a viable boy, weight 7 pounds 13 oz., with no complications      Since delivery, she has been breast feeding.  She has had any signs of infection, and was seen in the ED yesterday for mastitis related to traveling and using subpar breast pump.  He is feeling a little better today and does not have a fever. Her lochia stopped after 5 weeks.      She is voiding and having bowel movements without difficulty.       Contraception was discussed and patient desires Parguard IUD.   She  has not had intercourse since delivery.   She complains of No  perineal discomfort.  Desires placement in 1 week as she wants to recover from the mastitis.    Mood is Stable  Patient screened for postpartum depression.   Depression Rating was:   Last PHQ-9 score on record =       2022     9:21 PM   PHQ-9 SCORE   PHQ-9 Total Score MyChart 5 (Mild depression)   PHQ-9 Total Score 5     Last GAD7 score on record =       2025     3:31 PM   KRISTA-7 SCORE   Total Score 4 (minimal anxiety)   Total Score 4        Patient-reported     Alcohol Score = drink frequently on trip but is now not drinking    ROS:   ROS: 10 point ROS neg other than the symptoms noted above in the HPI.       Current Outpatient Medications:     cephALEXin (KEFLEX) 500 MG capsule, Take 1 capsule (500 mg) by mouth 4 times daily for 7 days., Disp: 28 capsule, Rfl: 0    Prenatal Vit-Fe Fumarate-FA (PRENATAL MULTIVITAMIN  PLUS IRON) 27-1 MG TABS, Take by mouth daily., Disp: , Rfl:     UNABLE TO FIND, MEDICATION NAME: cognitive support OTC supplement, Disp: , Rfl:     UNABLE TO FIND, MEDICATION NAME: Sunflower Lecithin, Disp: , Rfl:   No current facility-administered medications for this visit.    Facility-Administered Medications Ordered in Other Visits:     acetaminophen (TYLENOL) tablet 650 mg, 650 mg, Oral, Q4H PRN, Johnathan  "Marcello Austin MD    calcium carbonate (TUMS) chewable tablet 500-1,000 mg, 500-1,000 mg, Oral, Q2H PRN, Marcello Mann MD    phenol-menthol (CEPASTAT) lozenge 1 lozenge, 1 lozenge, Buccal, Q1H PRN, Marcello Mann MD.   OB History    Para Term  AB Living   2 2 2 0 0 2   SAB IAB Ectopic Multiple Live Births   0 0 0 0 2      # Outcome Date GA Lbr Sincere/2nd Weight Sex Type Anes PTL Lv   2 Term 24 39w6d 01:19 / 00:08 3.544 kg (7 lb 13 oz) M Vag-Spont EPI N MAXX      Name: Brandon Porras Katherine      Apgar1: 7  Apgar5: 9   1 Term 21 41w0d 06:35 / 00:46 3.29 kg (7 lb 4.1 oz) F Vag-Spont IV, EPI N MAXX      Name: Randa      Apgar1: 9  Apgar5: 9     Last pap:    Lab Results   Component Value Date    PAP NIL 2021     Hgb in hospital was 13.5    EXAM:  BP 94/66 (BP Location: Left arm, Patient Position: Sitting, Cuff Size: Adult Regular)   Pulse 96   Temp 98.6  F (37  C) (Oral)   Ht 1.676 m (5' 6\")   Wt 84.3 kg (185 lb 12.8 oz)   LMP 2024 (Approximate)   Breastfeeding Yes   BMI 29.99 kg/m    BMI: Body mass index is 29.99 kg/m .  Exam:  Constitutional: healthy, alert, and no distress  Head: Normocephalic. No masses, lesions, tenderness or abnormalities  Cardiovascular: negative  Respiratory: negative  Breasts: Deferred as patient is lactating  Gastrointestinal: Abdomen soft, non-tender. BS normal. No masses, organomegaly. No diastasis  Musculoskeletal: extremities normal- no gross deformities noted, gait normal, and normal muscle tone  Skin: no suspicious lesions or rashes  Neurologic: Gait normal. Reflexes normal and symmetric. Sensation grossly WNL.  Psychiatric: mentation appears normal and affect normal/bright  PELVIC EXAM:  deferred      ASSESSMENT:   Normal postpartum exam after .    ICD-10-CM    1. Routine postpartum follow-up  Z39.2             PLAN:    Return as needed or at time of next expected pap, pelvic, or breast exam.  Teaching: " calcium (1200 mg per day), self breast exam, exercise, birth control, and mental health  Family Planning:Parguard IUD.  Plans to have placed next week.  Discussed abstinence or condoms until that time.  Encourage Kegels and abdominal exercise.  Continue a multivitamin/prenatal supplement, especially if breastfeeding.  Pap smear was not obtained today.  Postpartum Hgb was not done today.    GDM:  Fasting and 2hr GCT needed:  No  If yes, remind need for annual fasting BG and nutrition/exercise recommendations.    ALOK Morris CNMORA      avs breast feeding  avs contraception of choice   Answers submitted by the patient for this visit:  Patient Health Questionnaire (G7) (Submitted on 5/1/2025)  KRISTA 7 TOTAL SCORE: 4

## 2025-05-05 LAB
BACTERIA BLD CULT: NO GROWTH
BACTERIA BLD CULT: NO GROWTH

## 2025-05-06 ENCOUNTER — MYC MEDICAL ADVICE (OUTPATIENT)
Dept: FAMILY MEDICINE | Facility: CLINIC | Age: 25
End: 2025-05-06
Payer: COMMERCIAL

## 2025-05-15 ENCOUNTER — RESULTS FOLLOW-UP (OUTPATIENT)
Dept: OBGYN | Facility: CLINIC | Age: 25
End: 2025-05-15

## 2025-05-15 ENCOUNTER — OFFICE VISIT (OUTPATIENT)
Dept: MIDWIFE SERVICES | Facility: CLINIC | Age: 25
End: 2025-05-15
Payer: COMMERCIAL

## 2025-05-15 VITALS
SYSTOLIC BLOOD PRESSURE: 90 MMHG | BODY MASS INDEX: 29.17 KG/M2 | WEIGHT: 181.5 LBS | HEIGHT: 66 IN | DIASTOLIC BLOOD PRESSURE: 68 MMHG

## 2025-05-15 DIAGNOSIS — Z30.430 ENCOUNTER FOR INSERTION OF INTRAUTERINE CONTRACEPTIVE DEVICE: Primary | ICD-10-CM

## 2025-05-15 DIAGNOSIS — Z01.812 PRE-PROCEDURE LAB EXAM: ICD-10-CM

## 2025-05-15 LAB — HCG UR QL: NEGATIVE

## 2025-05-15 RX ORDER — COPPER 313.4 MG/1
1 INTRAUTERINE DEVICE INTRAUTERINE SEE ADMIN INSTRUCTIONS
Status: ACTIVE | COMMUNITY
Start: 2025-05-15

## 2025-05-15 RX ORDER — ACETAMINOPHEN 325 MG/1
650 TABLET ORAL EVERY 4 HOURS PRN
Status: ACTIVE | OUTPATIENT
Start: 2025-05-15

## 2025-05-15 RX ORDER — COPPER 313.4 MG/1
1 INTRAUTERINE DEVICE INTRAUTERINE ONCE
COMMUNITY

## 2025-05-15 RX ADMIN — COPPER 1 EACH: 313.4 INTRAUTERINE DEVICE INTRAUTERINE at 09:56

## 2025-05-15 NOTE — PROGRESS NOTES
IUD Insertion:  CONSULT:    Is a pregnancy test required: Yes.  Was it positive or negative?  Negative  Was a consent obtained?  Yes    Subjective: Pau Murphy is a 25 year old  presents for IUD and desires Paragard type IUD.    Patient has been given the opportunity to ask questions about all forms of birth control, including all options appropriate for Pau Murphy. Discussed that no method of birth control, except abstinence is 100% effective against pregnancy or sexually transmitted infection.     Pau Murphy understands she may have the IUD removed at any time. IUD should be removed by a health care provider.    The entire insertion procedure was reviewed with the patient, including care after placement.    No LMP recorded (lmp unknown). Has current contraception. No allergy to betadine or shellfish. Patient declines STD screening  HCG Qual Urine   Date Value Ref Range Status   2016 Negative NEG Final     hCG Urine Qualitative   Date Value Ref Range Status   2021 Negative Negative Final     Comment:     This test is for screening purposes.  Results should be interpreted along with the clinical picture.  Confirmation testing is available if warranted by ordering [id number], HCG Quantitative Pregnancy.         LMP  (LMP Unknown)     Pelvic Exam:   EG/BUS: normal genital architecture without lesions, erythema or abnormal secretions.   Vagina: moist, pink, rugae with physiologic discharge and secretions  Cervix: parous no lesions and pink, moist, closed, without lesion or CMT  Uterus: anteverted position, mobile, no pain  Adnexa: within normal limits and no masses, nodularity, tenderness    PROCEDURE NOTE: -- IUD Insertion    Reason for Insertion: contraception    Topical lidocaine applied to the cervix  Under sterile technique, cervix was visualized with speculum and prepped with Betadine solution swab x 3. The uterus was sounded to 7.0 cm. IUD prepared for placement, and IUD  inserted according to 's instructions without difficulty or significant resitance, and deployed at the fundus. The strings were visualized and trimmed to 3.0 cm from the external os. Tenaculum was removed and hemostasis noted. Speculum removed.  Patient tolerated procedure well.    EBL: minimal    Complications: none    ASSESSMENT:     ICD-10-CM    1. Encounter for insertion of intrauterine contraceptive device  Z30.430 paragard intrauterine copper IUD device 1 each     INSERTION INTRAUTERINE DEVICE           PLAN:    Given 's handouts, including when to have IUD removed, list of danger s/sx, side effects and follow up recommended. Encouraged condom use for prevention of STD. Back up contraception advised for 7 days if progestin method. Advised to call for any fever, for prolonged or severe pain or bleeding, abnormal vaginal discharge, or unable to palpate strings. She was advised to use pain medications (ibuprofen) as needed for mild to moderate pain. Advised to follow-up in clinic in 4-6 weeks for IUD string check if unable to find strings or as directed by provider.     ALOK Morris CNM

## (undated) RX ORDER — LIDOCAINE HYDROCHLORIDE 10 MG/ML
INJECTION, SOLUTION EPIDURAL; INFILTRATION; INTRACAUDAL; PERINEURAL
Status: DISPENSED
Start: 2024-11-27

## (undated) RX ORDER — DEXAMETHASONE SODIUM PHOSPHATE 4 MG/ML
INJECTION, SOLUTION INTRA-ARTICULAR; INTRALESIONAL; INTRAMUSCULAR; INTRAVENOUS; SOFT TISSUE
Status: DISPENSED
Start: 2024-11-27

## (undated) RX ORDER — GLYCOPYRROLATE 0.2 MG/ML
INJECTION, SOLUTION INTRAMUSCULAR; INTRAVENOUS
Status: DISPENSED
Start: 2024-11-27

## (undated) RX ORDER — ONDANSETRON 2 MG/ML
INJECTION INTRAMUSCULAR; INTRAVENOUS
Status: DISPENSED
Start: 2024-11-27